# Patient Record
Sex: FEMALE | Race: WHITE | NOT HISPANIC OR LATINO | Employment: OTHER | URBAN - METROPOLITAN AREA
[De-identification: names, ages, dates, MRNs, and addresses within clinical notes are randomized per-mention and may not be internally consistent; named-entity substitution may affect disease eponyms.]

---

## 2017-01-01 ENCOUNTER — APPOINTMENT (OUTPATIENT)
Dept: CT IMAGING | Facility: HOSPITAL | Age: 77
End: 2017-01-01

## 2017-01-01 ENCOUNTER — APPOINTMENT (OUTPATIENT)
Dept: GENERAL RADIOLOGY | Facility: HOSPITAL | Age: 77
End: 2017-01-01

## 2017-01-01 ENCOUNTER — HOSPITAL ENCOUNTER (INPATIENT)
Facility: HOSPITAL | Age: 77
LOS: 8 days | Discharge: NURSING, REHAB OR LONG-TERM CARE | End: 2017-05-19
Attending: EMERGENCY MEDICINE | Admitting: INTERNAL MEDICINE

## 2017-01-01 ENCOUNTER — ANESTHESIA (OUTPATIENT)
Dept: TELEMETRY | Facility: HOSPITAL | Age: 77
End: 2017-01-01

## 2017-01-01 ENCOUNTER — DOCUMENTATION (OUTPATIENT)
Dept: SOCIAL WORK | Facility: HOSPITAL | Age: 77
End: 2017-01-01

## 2017-01-01 ENCOUNTER — ANESTHESIA EVENT (OUTPATIENT)
Dept: TELEMETRY | Facility: HOSPITAL | Age: 77
End: 2017-01-01

## 2017-01-01 ENCOUNTER — HOSPITAL ENCOUNTER (EMERGENCY)
Facility: HOSPITAL | Age: 77
Discharge: HOME | End: 2017-08-27
Attending: EMERGENCY MEDICINE | Admitting: EMERGENCY MEDICINE

## 2017-01-01 ENCOUNTER — TRANSCRIBE ORDERS (OUTPATIENT)
Dept: ADMINISTRATIVE | Facility: HOSPITAL | Age: 77
End: 2017-01-01

## 2017-01-01 ENCOUNTER — ANESTHESIA EVENT (OUTPATIENT)
Dept: PERIOP | Facility: HOSPITAL | Age: 77
End: 2017-01-01

## 2017-01-01 ENCOUNTER — ANESTHESIA (OUTPATIENT)
Dept: PERIOP | Facility: HOSPITAL | Age: 77
End: 2017-01-01

## 2017-01-01 VITALS
BODY MASS INDEX: 22.99 KG/M2 | HEIGHT: 65 IN | OXYGEN SATURATION: 99 % | WEIGHT: 138 LBS | TEMPERATURE: 98.2 F | SYSTOLIC BLOOD PRESSURE: 136 MMHG | RESPIRATION RATE: 20 BRPM | HEART RATE: 93 BPM | DIASTOLIC BLOOD PRESSURE: 68 MMHG

## 2017-01-01 VITALS
WEIGHT: 162 LBS | HEART RATE: 92 BPM | TEMPERATURE: 98.5 F | RESPIRATION RATE: 18 BRPM | DIASTOLIC BLOOD PRESSURE: 86 MMHG | BODY MASS INDEX: 26.99 KG/M2 | SYSTOLIC BLOOD PRESSURE: 150 MMHG | HEIGHT: 65 IN | OXYGEN SATURATION: 100 %

## 2017-01-01 DIAGNOSIS — Z78.9 IMPAIRED MOBILITY AND ADLS: ICD-10-CM

## 2017-01-01 DIAGNOSIS — K80.20 GALLSTONES: ICD-10-CM

## 2017-01-01 DIAGNOSIS — S72.401A CLOSED FRACTURE OF DISTAL END OF RIGHT FEMUR, UNSPECIFIED FRACTURE MORPHOLOGY, INITIAL ENCOUNTER (HCC): Primary | ICD-10-CM

## 2017-01-01 DIAGNOSIS — K29.00 ACUTE GASTRITIS WITHOUT HEMORRHAGE, UNSPECIFIED GASTRITIS TYPE: Primary | ICD-10-CM

## 2017-01-01 DIAGNOSIS — M79.2 INTRACTABLE NEUROPATHIC PAIN OF KNEE, RIGHT: ICD-10-CM

## 2017-01-01 DIAGNOSIS — Z74.09 IMPAIRED MOBILITY AND ADLS: ICD-10-CM

## 2017-01-01 DIAGNOSIS — Z74.09 IMPAIRED FUNCTIONAL MOBILITY, BALANCE, GAIT, AND ENDURANCE: ICD-10-CM
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
## 2017-01-01 DIAGNOSIS — Z87.891 PERSONAL HISTORY OF TOBACCO USE, PRESENTING HAZARDS TO HEALTH: Primary | ICD-10-CM

## 2017-01-01 DIAGNOSIS — J44.1 COPD WITH EXACERBATION (HCC): ICD-10-CM

## 2017-01-01 LAB
ABO GROUP BLD: NORMAL
ABO GROUP BLD: NORMAL
ALBUMIN SERPL-MCNC: 3.5 G/DL (ref 3.2–4.8)
ALBUMIN SERPL-MCNC: 3.8 G/DL (ref 3.2–4.8)
ALBUMIN SERPL-MCNC: 4 G/DL (ref 3.2–4.8)
ALBUMIN/GLOB SERPL: 1.1 G/DL (ref 1.5–2.5)
ALBUMIN/GLOB SERPL: 1.3 G/DL (ref 1.5–2.5)
ALBUMIN/GLOB SERPL: 1.3 G/DL (ref 1.5–2.5)
ALP SERPL-CCNC: 62 U/L (ref 25–100)
ALP SERPL-CCNC: 71 U/L (ref 25–100)
ALP SERPL-CCNC: 89 U/L (ref 25–100)
ALT SERPL W P-5'-P-CCNC: 14 U/L (ref 7–40)
ALT SERPL W P-5'-P-CCNC: 15 U/L (ref 7–40)
ALT SERPL W P-5'-P-CCNC: 19 U/L (ref 7–40)
AMYLASE SERPL-CCNC: 45 U/L (ref 30–118)
ANION GAP SERPL CALCULATED.3IONS-SCNC: 2 MMOL/L (ref 3–11)
ANION GAP SERPL CALCULATED.3IONS-SCNC: 4 MMOL/L (ref 3–11)
ANION GAP SERPL CALCULATED.3IONS-SCNC: 4 MMOL/L (ref 3–11)
APTT PPP: 31.8 SECONDS (ref 24–31)
AST SERPL-CCNC: 20 U/L (ref 0–33)
AST SERPL-CCNC: 23 U/L (ref 0–33)
AST SERPL-CCNC: 24 U/L (ref 0–33)
BACTERIA UR QL AUTO: ABNORMAL /HPF
BASOPHILS # BLD AUTO: 0.01 10*3/MM3 (ref 0–0.2)
BASOPHILS # BLD AUTO: 0.02 10*3/MM3 (ref 0–0.2)
BASOPHILS # BLD AUTO: 0.02 10*3/MM3 (ref 0–0.2)
BASOPHILS NFR BLD AUTO: 0.2 % (ref 0–1)
BASOPHILS NFR BLD AUTO: 0.3 % (ref 0–1)
BASOPHILS NFR BLD AUTO: 0.3 % (ref 0–1)
BILIRUB SERPL-MCNC: 0.2 MG/DL (ref 0.3–1.2)
BILIRUB SERPL-MCNC: 0.2 MG/DL (ref 0.3–1.2)
BILIRUB SERPL-MCNC: 0.3 MG/DL (ref 0.3–1.2)
BILIRUB UR QL STRIP: NEGATIVE
BLD GP AB SCN SERPL QL: NEGATIVE
BNP SERPL-MCNC: 13 PG/ML (ref 0–100)
BUN BLD-MCNC: 18 MG/DL (ref 9–23)
BUN BLD-MCNC: 8 MG/DL (ref 9–23)
BUN BLD-MCNC: 8 MG/DL (ref 9–23)
BUN/CREAT SERPL: 11.4 (ref 7–25)
BUN/CREAT SERPL: 11.4 (ref 7–25)
BUN/CREAT SERPL: 30 (ref 7–25)
CALCIUM SPEC-SCNC: 9 MG/DL (ref 8.7–10.4)
CALCIUM SPEC-SCNC: 9.2 MG/DL (ref 8.7–10.4)
CALCIUM SPEC-SCNC: 9.7 MG/DL (ref 8.7–10.4)
CHLORIDE SERPL-SCNC: 102 MMOL/L (ref 99–109)
CHLORIDE SERPL-SCNC: 105 MMOL/L (ref 99–109)
CHLORIDE SERPL-SCNC: 107 MMOL/L (ref 99–109)
CLARITY UR: ABNORMAL
CLARITY UR: CLEAR
CLARITY UR: CLEAR
CO2 SERPL-SCNC: 28 MMOL/L (ref 20–31)
CO2 SERPL-SCNC: 30 MMOL/L (ref 20–31)
CO2 SERPL-SCNC: 32 MMOL/L (ref 20–31)
COLOR UR: YELLOW
CREAT BLD-MCNC: 0.6 MG/DL (ref 0.6–1.3)
CREAT BLD-MCNC: 0.7 MG/DL (ref 0.6–1.3)
CREAT BLD-MCNC: 0.7 MG/DL (ref 0.6–1.3)
DEPRECATED RDW RBC AUTO: 43.6 FL (ref 37–54)
DEPRECATED RDW RBC AUTO: 45.2 FL (ref 37–54)
DEPRECATED RDW RBC AUTO: 45.5 FL (ref 37–54)
EOSINOPHIL # BLD AUTO: 0.17 10*3/MM3 (ref 0.1–0.3)
EOSINOPHIL # BLD AUTO: 0.2 10*3/MM3 (ref 0.1–0.3)
EOSINOPHIL # BLD AUTO: 0.29 10*3/MM3 (ref 0–0.3)
EOSINOPHIL NFR BLD AUTO: 2.7 % (ref 0–3)
EOSINOPHIL NFR BLD AUTO: 2.8 % (ref 0–3)
EOSINOPHIL NFR BLD AUTO: 3.8 % (ref 0–3)
ERYTHROCYTE [DISTWIDTH] IN BLOOD BY AUTOMATED COUNT: 12.9 % (ref 11.3–14.5)
ERYTHROCYTE [DISTWIDTH] IN BLOOD BY AUTOMATED COUNT: 12.9 % (ref 11.3–14.5)
ERYTHROCYTE [DISTWIDTH] IN BLOOD BY AUTOMATED COUNT: 13.5 % (ref 11.3–14.5)
GFR SERPL CREATININE-BSD FRML MDRD: 81 ML/MIN/1.73
GFR SERPL CREATININE-BSD FRML MDRD: 81 ML/MIN/1.73
GFR SERPL CREATININE-BSD FRML MDRD: 97 ML/MIN/1.73
GLOBULIN UR ELPH-MCNC: 2.7 GM/DL
GLOBULIN UR ELPH-MCNC: 3.1 GM/DL
GLOBULIN UR ELPH-MCNC: 3.6 GM/DL
GLUCOSE BLD-MCNC: 100 MG/DL (ref 70–100)
GLUCOSE BLD-MCNC: 103 MG/DL (ref 70–100)
GLUCOSE BLD-MCNC: 134 MG/DL (ref 70–100)
GLUCOSE UR STRIP-MCNC: NEGATIVE MG/DL
HBA1C MFR BLD: 5.1 % (ref 4.8–5.6)
HCT VFR BLD AUTO: 35.5 % (ref 34.5–44)
HCT VFR BLD AUTO: 36.1 % (ref 34.5–44)
HCT VFR BLD AUTO: 38.1 % (ref 34.5–44)
HGB BLD-MCNC: 11.3 G/DL (ref 11.5–15.5)
HGB BLD-MCNC: 12.3 G/DL (ref 11.5–15.5)
HGB BLD-MCNC: 12.3 G/DL (ref 11.5–15.5)
HGB UR QL STRIP.AUTO: ABNORMAL
HGB UR QL STRIP.AUTO: NEGATIVE
HGB UR QL STRIP.AUTO: NEGATIVE
HOLD SPECIMEN: NORMAL
HOLD SPECIMEN: NORMAL
HYALINE CASTS UR QL AUTO: ABNORMAL /LPF
IMM GRANULOCYTES # BLD: 0 10*3/MM3 (ref 0–0.03)
IMM GRANULOCYTES # BLD: 0.01 10*3/MM3 (ref 0–0.03)
IMM GRANULOCYTES # BLD: 0.02 10*3/MM3 (ref 0–0.03)
IMM GRANULOCYTES NFR BLD: 0 % (ref 0–0.6)
IMM GRANULOCYTES NFR BLD: 0.1 % (ref 0–0.6)
IMM GRANULOCYTES NFR BLD: 0.3 % (ref 0–0.6)
INR PPP: 0.95
KETONES UR QL STRIP: ABNORMAL
KETONES UR QL STRIP: NEGATIVE
KETONES UR QL STRIP: NEGATIVE
LEUKOCYTE ESTERASE UR QL STRIP.AUTO: NEGATIVE
LIPASE SERPL-CCNC: 24 U/L (ref 6–51)
LYMPHOCYTES # BLD AUTO: 1.46 10*3/MM3 (ref 0.6–4.8)
LYMPHOCYTES # BLD AUTO: 1.51 10*3/MM3 (ref 0.6–4.8)
LYMPHOCYTES # BLD AUTO: 1.65 10*3/MM3 (ref 0.6–4.8)
LYMPHOCYTES NFR BLD AUTO: 19.2 % (ref 24–44)
LYMPHOCYTES NFR BLD AUTO: 22.6 % (ref 24–44)
LYMPHOCYTES NFR BLD AUTO: 24.4 % (ref 24–44)
MAGNESIUM SERPL-MCNC: 1.9 MG/DL (ref 1.3–2.7)
MCH RBC QN AUTO: 30.1 PG (ref 27–31)
MCH RBC QN AUTO: 30.4 PG (ref 27–31)
MCH RBC QN AUTO: 30.8 PG (ref 27–31)
MCHC RBC AUTO-ENTMCNC: 31.8 G/DL (ref 32–36)
MCHC RBC AUTO-ENTMCNC: 32.3 G/DL (ref 32–36)
MCHC RBC AUTO-ENTMCNC: 34.1 G/DL (ref 32–36)
MCV RBC AUTO: 88.5 FL (ref 80–99)
MCV RBC AUTO: 95.3 FL (ref 80–99)
MCV RBC AUTO: 95.4 FL (ref 80–99)
MONOCYTES # BLD AUTO: 0.56 10*3/MM3 (ref 0–1)
MONOCYTES # BLD AUTO: 0.86 10*3/MM3 (ref 0–1)
MONOCYTES # BLD AUTO: 0.89 10*3/MM3 (ref 0–1)
MONOCYTES NFR BLD AUTO: 11.7 % (ref 0–12)
MONOCYTES NFR BLD AUTO: 13.9 % (ref 0–12)
MONOCYTES NFR BLD AUTO: 7.7 % (ref 0–12)
NEUTROPHILS # BLD AUTO: 3.63 10*3/MM3 (ref 1.5–8.3)
NEUTROPHILS # BLD AUTO: 4.86 10*3/MM3 (ref 1.5–8.3)
NEUTROPHILS # BLD AUTO: 4.94 10*3/MM3 (ref 1.5–8.3)
NEUTROPHILS NFR BLD AUTO: 58.7 % (ref 41–71)
NEUTROPHILS NFR BLD AUTO: 64.9 % (ref 41–71)
NEUTROPHILS NFR BLD AUTO: 66.4 % (ref 41–71)
NITRITE UR QL STRIP: NEGATIVE
PH UR STRIP.AUTO: 6 [PH] (ref 5–8)
PH UR STRIP.AUTO: 6 [PH] (ref 5–8)
PH UR STRIP.AUTO: 6.5 [PH] (ref 5–8)
PLATELET # BLD AUTO: 256 10*3/MM3 (ref 150–450)
PLATELET # BLD AUTO: 286 10*3/MM3 (ref 150–450)
PLATELET # BLD AUTO: 288 10*3/MM3 (ref 150–450)
PMV BLD AUTO: 8.2 FL (ref 6–12)
PMV BLD AUTO: 8.7 FL (ref 6–12)
PMV BLD AUTO: 8.9 FL (ref 6–12)
POTASSIUM BLD-SCNC: 3.9 MMOL/L (ref 3.5–5.5)
POTASSIUM BLD-SCNC: 4.1 MMOL/L (ref 3.5–5.5)
POTASSIUM BLD-SCNC: 4.8 MMOL/L (ref 3.5–5.5)
PROT SERPL-MCNC: 6.2 G/DL (ref 5.7–8.2)
PROT SERPL-MCNC: 7.1 G/DL (ref 5.7–8.2)
PROT SERPL-MCNC: 7.4 G/DL (ref 5.7–8.2)
PROT UR QL STRIP: NEGATIVE
PROTHROMBIN TIME: 10.3 SECONDS (ref 9.6–11.5)
RBC # BLD AUTO: 3.72 10*6/MM3 (ref 3.89–5.14)
RBC # BLD AUTO: 4 10*6/MM3 (ref 3.89–5.14)
RBC # BLD AUTO: 4.08 10*6/MM3 (ref 3.89–5.14)
RBC # UR: ABNORMAL /HPF
REF LAB TEST METHOD: ABNORMAL
RH BLD: POSITIVE
RH BLD: POSITIVE
SODIUM BLD-SCNC: 134 MMOL/L (ref 132–146)
SODIUM BLD-SCNC: 139 MMOL/L (ref 132–146)
SODIUM BLD-SCNC: 141 MMOL/L (ref 132–146)
SP GR UR STRIP: 1.01 (ref 1–1.03)
SP GR UR STRIP: 1.02 (ref 1–1.03)
SP GR UR STRIP: 1.02 (ref 1–1.03)
SQUAMOUS #/AREA URNS HPF: ABNORMAL /HPF
TROPONIN I SERPL-MCNC: <0.006 NG/ML
TSH SERPL DL<=0.05 MIU/L-ACNC: 2.27 MIU/ML (ref 0.35–5.35)
UROBILINOGEN UR QL STRIP: ABNORMAL
UROBILINOGEN UR QL STRIP: NORMAL
UROBILINOGEN UR QL STRIP: NORMAL
WBC NRBC COR # BLD: 6.18 10*3/MM3 (ref 3.5–10.8)
WBC NRBC COR # BLD: 7.31 10*3/MM3 (ref 3.5–10.8)
WBC NRBC COR # BLD: 7.61 10*3/MM3 (ref 3.5–10.8)
WBC UR QL AUTO: ABNORMAL /HPF
WHOLE BLOOD HOLD SPECIMEN: NORMAL
WHOLE BLOOD HOLD SPECIMEN: NORMAL

## 2017-01-01 PROCEDURE — 94799 UNLISTED PULMONARY SVC/PX: CPT

## 2017-01-01 PROCEDURE — 80053 COMPREHEN METABOLIC PANEL: CPT

## 2017-01-01 PROCEDURE — 94640 AIRWAY INHALATION TREATMENT: CPT

## 2017-01-01 PROCEDURE — 93005 ELECTROCARDIOGRAM TRACING: CPT

## 2017-01-01 PROCEDURE — 25010000002 ONDANSETRON PER 1 MG: Performed by: HOSPITALIST

## 2017-01-01 PROCEDURE — 84443 ASSAY THYROID STIM HORMONE: CPT | Performed by: HOSPITALIST

## 2017-01-01 PROCEDURE — 25010000002 ONDANSETRON PER 1 MG: Performed by: EMERGENCY MEDICINE

## 2017-01-01 PROCEDURE — 97110 THERAPEUTIC EXERCISES: CPT

## 2017-01-01 PROCEDURE — 73560 X-RAY EXAM OF KNEE 1 OR 2: CPT

## 2017-01-01 PROCEDURE — 86901 BLOOD TYPING SEROLOGIC RH(D): CPT | Performed by: EMERGENCY MEDICINE

## 2017-01-01 PROCEDURE — 25010000002 HYDROMORPHONE PER 4 MG: Performed by: EMERGENCY MEDICINE

## 2017-01-01 PROCEDURE — 93005 ELECTROCARDIOGRAM TRACING: CPT | Performed by: HOSPITALIST

## 2017-01-01 PROCEDURE — 97530 THERAPEUTIC ACTIVITIES: CPT | Performed by: OCCUPATIONAL THERAPIST

## 2017-01-01 PROCEDURE — 93010 ELECTROCARDIOGRAM REPORT: CPT | Performed by: INTERNAL MEDICINE

## 2017-01-01 PROCEDURE — 81001 URINALYSIS AUTO W/SCOPE: CPT | Performed by: INTERNAL MEDICINE

## 2017-01-01 PROCEDURE — 25010000002 ROPIVACAINE PER 1 MG: Performed by: NURSE ANESTHETIST, CERTIFIED REGISTERED

## 2017-01-01 PROCEDURE — C1713 ANCHOR/SCREW BN/BN,TIS/BN: HCPCS | Performed by: ORTHOPAEDIC SURGERY

## 2017-01-01 PROCEDURE — 99233 SBSQ HOSP IP/OBS HIGH 50: CPT | Performed by: INTERNAL MEDICINE

## 2017-01-01 PROCEDURE — 99239 HOSP IP/OBS DSCHRG MGMT >30: CPT | Performed by: NURSE PRACTITIONER

## 2017-01-01 PROCEDURE — 25010000002 MORPHINE PER 10 MG: Performed by: EMERGENCY MEDICINE

## 2017-01-01 PROCEDURE — 25010000002 HYDROMORPHONE PER 4 MG: Performed by: HOSPITALIST

## 2017-01-01 PROCEDURE — 73700 CT LOWER EXTREMITY W/O DYE: CPT

## 2017-01-01 PROCEDURE — 86900 BLOOD TYPING SEROLOGIC ABO: CPT | Performed by: EMERGENCY MEDICINE

## 2017-01-01 PROCEDURE — 99232 SBSQ HOSP IP/OBS MODERATE 35: CPT | Performed by: INTERNAL MEDICINE

## 2017-01-01 PROCEDURE — 80053 COMPREHEN METABOLIC PANEL: CPT | Performed by: HOSPITALIST

## 2017-01-01 PROCEDURE — 25010000002 FENTANYL CITRATE (PF) 100 MCG/2ML SOLUTION: Performed by: ANESTHESIOLOGY

## 2017-01-01 PROCEDURE — 94760 N-INVAS EAR/PLS OXIMETRY 1: CPT

## 2017-01-01 PROCEDURE — 71010 HC CHEST PA OR AP: CPT

## 2017-01-01 PROCEDURE — 0QSB04Z REPOSITION RIGHT LOWER FEMUR WITH INTERNAL FIXATION DEVICE, OPEN APPROACH: ICD-10-PCS | Performed by: ORTHOPAEDIC SURGERY

## 2017-01-01 PROCEDURE — 86850 RBC ANTIBODY SCREEN: CPT | Performed by: EMERGENCY MEDICINE

## 2017-01-01 PROCEDURE — 97116 GAIT TRAINING THERAPY: CPT

## 2017-01-01 PROCEDURE — 63710000001 PREDNISONE PER 1 MG: Performed by: EMERGENCY MEDICINE

## 2017-01-01 PROCEDURE — 83036 HEMOGLOBIN GLYCOSYLATED A1C: CPT | Performed by: HOSPITALIST

## 2017-01-01 PROCEDURE — 73502 X-RAY EXAM HIP UNI 2-3 VIEWS: CPT

## 2017-01-01 PROCEDURE — 97166 OT EVAL MOD COMPLEX 45 MIN: CPT

## 2017-01-01 PROCEDURE — 83735 ASSAY OF MAGNESIUM: CPT

## 2017-01-01 PROCEDURE — 76001 HC FLUORO GREATER THAN 1 HOUR: CPT

## 2017-01-01 PROCEDURE — 85025 COMPLETE CBC W/AUTO DIFF WBC: CPT | Performed by: HOSPITALIST

## 2017-01-01 PROCEDURE — 25010000002 ENOXAPARIN PER 10 MG: Performed by: INTERNAL MEDICINE

## 2017-01-01 PROCEDURE — 85025 COMPLETE CBC W/AUTO DIFF WBC: CPT | Performed by: EMERGENCY MEDICINE

## 2017-01-01 PROCEDURE — 97162 PT EVAL MOD COMPLEX 30 MIN: CPT | Performed by: PHYSICAL THERAPIST

## 2017-01-01 PROCEDURE — 74176 CT ABD & PELVIS W/O CONTRAST: CPT

## 2017-01-01 PROCEDURE — 99222 1ST HOSP IP/OBS MODERATE 55: CPT | Performed by: INTERNAL MEDICINE

## 2017-01-01 PROCEDURE — 25010000002 PROPOFOL 10 MG/ML EMULSION: Performed by: ANESTHESIOLOGY

## 2017-01-01 PROCEDURE — 99223 1ST HOSP IP/OBS HIGH 75: CPT | Performed by: HOSPITALIST

## 2017-01-01 PROCEDURE — 84484 ASSAY OF TROPONIN QUANT: CPT | Performed by: EMERGENCY MEDICINE

## 2017-01-01 PROCEDURE — G0378 HOSPITAL OBSERVATION PER HR: HCPCS

## 2017-01-01 PROCEDURE — 81003 URINALYSIS AUTO W/O SCOPE: CPT

## 2017-01-01 PROCEDURE — 97110 THERAPEUTIC EXERCISES: CPT | Performed by: PHYSICAL THERAPIST

## 2017-01-01 PROCEDURE — 83880 ASSAY OF NATRIURETIC PEPTIDE: CPT | Performed by: EMERGENCY MEDICINE

## 2017-01-01 PROCEDURE — 76000 FLUOROSCOPY <1 HR PHYS/QHP: CPT

## 2017-01-01 PROCEDURE — 99284 EMERGENCY DEPT VISIT MOD MDM: CPT

## 2017-01-01 PROCEDURE — 82150 ASSAY OF AMYLASE: CPT

## 2017-01-01 PROCEDURE — 25010000002 ROPIVACAINE PER 1 MG: Performed by: ANESTHESIOLOGY

## 2017-01-01 PROCEDURE — 97164 PT RE-EVAL EST PLAN CARE: CPT

## 2017-01-01 PROCEDURE — 25010000002 ENOXAPARIN PER 10 MG: Performed by: ORTHOPAEDIC SURGERY

## 2017-01-01 PROCEDURE — 85730 THROMBOPLASTIN TIME PARTIAL: CPT | Performed by: EMERGENCY MEDICINE

## 2017-01-01 PROCEDURE — 83690 ASSAY OF LIPASE: CPT

## 2017-01-01 PROCEDURE — 25010000002 LORAZEPAM PER 2 MG: Performed by: INTERNAL MEDICINE

## 2017-01-01 PROCEDURE — 81003 URINALYSIS AUTO W/O SCOPE: CPT | Performed by: EMERGENCY MEDICINE

## 2017-01-01 PROCEDURE — 86901 BLOOD TYPING SEROLOGIC RH(D): CPT

## 2017-01-01 PROCEDURE — 85025 COMPLETE CBC W/AUTO DIFF WBC: CPT

## 2017-01-01 PROCEDURE — 96374 THER/PROPH/DIAG INJ IV PUSH: CPT

## 2017-01-01 PROCEDURE — 86900 BLOOD TYPING SEROLOGIC ABO: CPT

## 2017-01-01 PROCEDURE — 85610 PROTHROMBIN TIME: CPT | Performed by: EMERGENCY MEDICINE

## 2017-01-01 PROCEDURE — 97530 THERAPEUTIC ACTIVITIES: CPT

## 2017-01-01 PROCEDURE — 80053 COMPREHEN METABOLIC PANEL: CPT | Performed by: EMERGENCY MEDICINE

## 2017-01-01 DEVICE — SCRW CANN VA LK 5X85MM: Type: IMPLANTABLE DEVICE | Site: FEMUR | Status: FUNCTIONAL

## 2017-01-01 DEVICE — IMPLANTABLE DEVICE: Type: IMPLANTABLE DEVICE | Site: FEMUR | Status: FUNCTIONAL

## 2017-01-01 DEVICE — SCRW VA LK ST STRDRV 5X38MM: Type: IMPLANTABLE DEVICE | Site: FEMUR | Status: FUNCTIONAL

## 2017-01-01 DEVICE — SCRW VA LK ST STRDRV 5X40MM: Type: IMPLANTABLE DEVICE | Site: FEMUR | Status: FUNCTIONAL

## 2017-01-01 DEVICE — SCRW VA LK ST STRDRV 5X36MM: Type: IMPLANTABLE DEVICE | Site: FEMUR | Status: FUNCTIONAL

## 2017-01-01 DEVICE — SCRW CANN VA LK 5X80MM: Type: IMPLANTABLE DEVICE | Site: FEMUR | Status: FUNCTIONAL

## 2017-01-01 RX ORDER — CLINDAMYCIN PHOSPHATE 600 MG/50ML
600 INJECTION INTRAVENOUS ONCE
Status: COMPLETED | OUTPATIENT
Start: 2017-01-01 | End: 2017-01-01

## 2017-01-01 RX ORDER — ROPIVACAINE HYDROCHLORIDE 2 MG/ML
INJECTION, SOLUTION EPIDURAL; INFILTRATION AS NEEDED
Status: DISCONTINUED | OUTPATIENT
Start: 2017-01-01 | End: 2017-01-01 | Stop reason: SURG

## 2017-01-01 RX ORDER — ALPRAZOLAM 2 MG/1
2 TABLET ORAL NIGHTLY PRN
Qty: 3 TABLET | Refills: 0 | Status: ON HOLD | OUTPATIENT
Start: 2017-01-01 | End: 2018-01-01

## 2017-01-01 RX ORDER — CARVEDILOL 3.12 MG/1
3.12 TABLET ORAL 2 TIMES DAILY WITH MEALS
Status: DISCONTINUED | OUTPATIENT
Start: 2017-01-01 | End: 2017-01-01 | Stop reason: HOSPADM

## 2017-01-01 RX ORDER — SODIUM CHLORIDE, SODIUM LACTATE, POTASSIUM CHLORIDE, CALCIUM CHLORIDE 600; 310; 30; 20 MG/100ML; MG/100ML; MG/100ML; MG/100ML
9 INJECTION, SOLUTION INTRAVENOUS CONTINUOUS
Status: DISCONTINUED | OUTPATIENT
Start: 2017-01-01 | End: 2017-01-01 | Stop reason: HOSPADM

## 2017-01-01 RX ORDER — CARVEDILOL 3.12 MG/1
3.12 TABLET ORAL 2 TIMES DAILY WITH MEALS
COMMUNITY

## 2017-01-01 RX ORDER — HYDROMORPHONE HYDROCHLORIDE 1 MG/ML
0.25 INJECTION, SOLUTION INTRAMUSCULAR; INTRAVENOUS; SUBCUTANEOUS ONCE
Status: COMPLETED | OUTPATIENT
Start: 2017-01-01 | End: 2017-01-01

## 2017-01-01 RX ORDER — ACETAMINOPHEN 325 MG/1
650 TABLET ORAL EVERY 6 HOURS SCHEDULED
Status: DISCONTINUED | OUTPATIENT
Start: 2017-01-01 | End: 2017-01-01 | Stop reason: HOSPADM

## 2017-01-01 RX ORDER — ATORVASTATIN CALCIUM 80 MG/1
80 TABLET, FILM COATED ORAL DAILY
COMMUNITY

## 2017-01-01 RX ORDER — DULOXETIN HYDROCHLORIDE 30 MG/1
30 CAPSULE, DELAYED RELEASE ORAL DAILY
Status: ON HOLD | COMMUNITY
End: 2018-01-01

## 2017-01-01 RX ORDER — ASPIRIN 81 MG/1
81 TABLET, CHEWABLE ORAL DAILY
Status: ON HOLD | COMMUNITY
End: 2018-01-01

## 2017-01-01 RX ORDER — LIDOCAINE HYDROCHLORIDE 10 MG/ML
0.5 INJECTION, SOLUTION EPIDURAL; INFILTRATION; INTRACAUDAL; PERINEURAL ONCE AS NEEDED
Status: DISCONTINUED | OUTPATIENT
Start: 2017-01-01 | End: 2017-01-01 | Stop reason: HOSPADM

## 2017-01-01 RX ORDER — POLYETHYLENE GLYCOL 3350 17 G/17G
17 POWDER, FOR SOLUTION ORAL 2 TIMES DAILY
Status: ON HOLD
Start: 2017-01-01 | End: 2018-01-01

## 2017-01-01 RX ORDER — ALPRAZOLAM 1 MG/1
2 TABLET ORAL NIGHTLY PRN
Status: DISCONTINUED | OUTPATIENT
Start: 2017-01-01 | End: 2017-01-01 | Stop reason: HOSPADM

## 2017-01-01 RX ORDER — DOCUSATE SODIUM 100 MG/1
100 CAPSULE, LIQUID FILLED ORAL 2 TIMES DAILY
Status: DISCONTINUED | OUTPATIENT
Start: 2017-01-01 | End: 2017-01-01 | Stop reason: HOSPADM

## 2017-01-01 RX ORDER — ONDANSETRON 2 MG/ML
4 INJECTION INTRAMUSCULAR; INTRAVENOUS EVERY 6 HOURS PRN
Status: DISCONTINUED | OUTPATIENT
Start: 2017-01-01 | End: 2017-01-01 | Stop reason: HOSPADM

## 2017-01-01 RX ORDER — BISACODYL 10 MG
10 SUPPOSITORY, RECTAL RECTAL ONCE
Status: COMPLETED | OUTPATIENT
Start: 2017-01-01 | End: 2017-01-01

## 2017-01-01 RX ORDER — NICOTINE 21 MG/24HR
1 PATCH, TRANSDERMAL 24 HOURS TRANSDERMAL
Status: ON HOLD
Start: 2017-01-01 | End: 2018-01-01

## 2017-01-01 RX ORDER — PROPOFOL 10 MG/ML
VIAL (ML) INTRAVENOUS AS NEEDED
Status: DISCONTINUED | OUTPATIENT
Start: 2017-01-01 | End: 2017-01-01 | Stop reason: SURG

## 2017-01-01 RX ORDER — CETIRIZINE HYDROCHLORIDE 10 MG/1
5 TABLET ORAL DAILY
Status: DISCONTINUED | OUTPATIENT
Start: 2017-01-01 | End: 2017-01-01 | Stop reason: HOSPADM

## 2017-01-01 RX ORDER — FENTANYL CITRATE 50 UG/ML
50 INJECTION, SOLUTION INTRAMUSCULAR; INTRAVENOUS
Status: DISCONTINUED | OUTPATIENT
Start: 2017-01-01 | End: 2017-01-01 | Stop reason: HOSPADM

## 2017-01-01 RX ORDER — SODIUM CHLORIDE 0.9 % (FLUSH) 0.9 %
1-10 SYRINGE (ML) INJECTION AS NEEDED
Status: DISCONTINUED | OUTPATIENT
Start: 2017-01-01 | End: 2017-01-01 | Stop reason: HOSPADM

## 2017-01-01 RX ORDER — BUDESONIDE AND FORMOTEROL FUMARATE DIHYDRATE 160; 4.5 UG/1; UG/1
2 AEROSOL RESPIRATORY (INHALATION)
Status: DISCONTINUED | OUTPATIENT
Start: 2017-01-01 | End: 2017-01-01 | Stop reason: HOSPADM

## 2017-01-01 RX ORDER — FAMOTIDINE 20 MG/1
20 TABLET, FILM COATED ORAL ONCE
Status: COMPLETED | OUTPATIENT
Start: 2017-01-01 | End: 2017-01-01

## 2017-01-01 RX ORDER — DOCUSATE SODIUM 100 MG/1
100 CAPSULE, LIQUID FILLED ORAL 2 TIMES DAILY
Status: DISCONTINUED | OUTPATIENT
Start: 2017-01-01 | End: 2017-01-01 | Stop reason: SDUPTHER

## 2017-01-01 RX ORDER — CLOPIDOGREL BISULFATE 75 MG/1
75 TABLET ORAL DAILY
Status: DISCONTINUED | OUTPATIENT
Start: 2017-01-01 | End: 2017-01-01

## 2017-01-01 RX ORDER — ALBUTEROL SULFATE 1.25 MG/3ML
1 SOLUTION RESPIRATORY (INHALATION) EVERY 6 HOURS PRN
Status: ON HOLD | COMMUNITY
End: 2018-01-01

## 2017-01-01 RX ORDER — ONDANSETRON 2 MG/ML
4 INJECTION INTRAMUSCULAR; INTRAVENOUS ONCE AS NEEDED
Status: DISCONTINUED | OUTPATIENT
Start: 2017-01-01 | End: 2017-01-01 | Stop reason: HOSPADM

## 2017-01-01 RX ORDER — ONDANSETRON 2 MG/ML
4 INJECTION INTRAMUSCULAR; INTRAVENOUS ONCE
Status: COMPLETED | OUTPATIENT
Start: 2017-01-01 | End: 2017-01-01

## 2017-01-01 RX ORDER — ALBUTEROL SULFATE 2.5 MG/3ML
10 SOLUTION RESPIRATORY (INHALATION) CONTINUOUS
Status: DISPENSED | OUTPATIENT
Start: 2017-01-01 | End: 2017-01-01

## 2017-01-01 RX ORDER — ALPRAZOLAM 2 MG/1
TABLET ORAL NIGHTLY PRN
Status: ON HOLD | COMMUNITY
End: 2017-01-01

## 2017-01-01 RX ORDER — FENTANYL CITRATE 50 UG/ML
INJECTION, SOLUTION INTRAMUSCULAR; INTRAVENOUS AS NEEDED
Status: DISCONTINUED | OUTPATIENT
Start: 2017-01-01 | End: 2017-01-01 | Stop reason: SURG

## 2017-01-01 RX ORDER — LORATADINE 10 MG/1
CAPSULE, LIQUID FILLED ORAL
Status: ON HOLD | COMMUNITY
End: 2018-01-01

## 2017-01-01 RX ORDER — BISACODYL 5 MG/1
10 TABLET, DELAYED RELEASE ORAL DAILY PRN
Status: ON HOLD
Start: 2017-01-01 | End: 2018-01-01

## 2017-01-01 RX ORDER — DOCUSATE SODIUM 100 MG/1
100 CAPSULE, LIQUID FILLED ORAL 2 TIMES DAILY
Status: ON HOLD | COMMUNITY
End: 2018-01-01

## 2017-01-01 RX ORDER — OXYCODONE AND ACETAMINOPHEN 10; 325 MG/1; MG/1
1 TABLET ORAL EVERY 6 HOURS PRN
Status: DISCONTINUED | OUTPATIENT
Start: 2017-01-01 | End: 2017-01-01 | Stop reason: HOSPADM

## 2017-01-01 RX ORDER — ATORVASTATIN CALCIUM 40 MG/1
80 TABLET, FILM COATED ORAL DAILY
Status: DISCONTINUED | OUTPATIENT
Start: 2017-01-01 | End: 2017-01-01 | Stop reason: HOSPADM

## 2017-01-01 RX ORDER — MORPHINE SULFATE 4 MG/ML
4 INJECTION, SOLUTION INTRAMUSCULAR; INTRAVENOUS ONCE
Status: COMPLETED | OUTPATIENT
Start: 2017-01-01 | End: 2017-01-01

## 2017-01-01 RX ORDER — FAMOTIDINE 10 MG/ML
20 INJECTION, SOLUTION INTRAVENOUS ONCE
Status: DISCONTINUED | OUTPATIENT
Start: 2017-01-01 | End: 2017-01-01

## 2017-01-01 RX ORDER — CLOPIDOGREL BISULFATE 75 MG/1
75 TABLET ORAL DAILY
COMMUNITY
End: 2017-01-01 | Stop reason: HOSPADM

## 2017-01-01 RX ORDER — CLINDAMYCIN PHOSPHATE 600 MG/50ML
600 INJECTION INTRAVENOUS EVERY 8 HOURS
Status: COMPLETED | OUTPATIENT
Start: 2017-01-01 | End: 2017-01-01

## 2017-01-01 RX ORDER — ROCURONIUM BROMIDE 10 MG/ML
INJECTION, SOLUTION INTRAVENOUS AS NEEDED
Status: DISCONTINUED | OUTPATIENT
Start: 2017-01-01 | End: 2017-01-01 | Stop reason: SURG

## 2017-01-01 RX ORDER — CIPROFLOXACIN 500 MG/1
500 TABLET, FILM COATED ORAL 2 TIMES DAILY
COMMUNITY
End: 2017-01-01 | Stop reason: HOSPADM

## 2017-01-01 RX ORDER — RANITIDINE 300 MG/1
300 TABLET ORAL NIGHTLY
COMMUNITY
End: 2017-01-01 | Stop reason: HOSPADM

## 2017-01-01 RX ORDER — IPRATROPIUM BROMIDE AND ALBUTEROL SULFATE 2.5; .5 MG/3ML; MG/3ML
3 SOLUTION RESPIRATORY (INHALATION) EVERY 4 HOURS PRN
Status: DISCONTINUED | OUTPATIENT
Start: 2017-01-01 | End: 2017-01-01 | Stop reason: HOSPADM

## 2017-01-01 RX ORDER — DULOXETIN HYDROCHLORIDE 30 MG/1
30 CAPSULE, DELAYED RELEASE ORAL DAILY
Status: DISCONTINUED | OUTPATIENT
Start: 2017-01-01 | End: 2017-01-01 | Stop reason: HOSPADM

## 2017-01-01 RX ORDER — MAGNESIUM HYDROXIDE 1200 MG/15ML
LIQUID ORAL AS NEEDED
Status: DISCONTINUED | OUTPATIENT
Start: 2017-01-01 | End: 2017-01-01 | Stop reason: HOSPADM

## 2017-01-01 RX ORDER — OMEPRAZOLE 40 MG/1
40 CAPSULE, DELAYED RELEASE ORAL DAILY
Qty: 30 CAPSULE | Refills: 0 | Status: SHIPPED | OUTPATIENT
Start: 2017-01-01 | End: 2017-01-01

## 2017-01-01 RX ORDER — ACETAMINOPHEN 325 MG/1
650 TABLET ORAL EVERY 6 HOURS SCHEDULED
Status: CANCELLED | OUTPATIENT
Start: 2017-01-01

## 2017-01-01 RX ORDER — BISACODYL 5 MG/1
10 TABLET, DELAYED RELEASE ORAL DAILY PRN
Status: DISCONTINUED | OUTPATIENT
Start: 2017-01-01 | End: 2017-01-01 | Stop reason: HOSPADM

## 2017-01-01 RX ORDER — PROMETHAZINE HYDROCHLORIDE 25 MG/ML
12.5 INJECTION, SOLUTION INTRAMUSCULAR; INTRAVENOUS EVERY 6 HOURS PRN
Status: DISCONTINUED | OUTPATIENT
Start: 2017-01-01 | End: 2017-01-01 | Stop reason: HOSPADM

## 2017-01-01 RX ORDER — NICOTINE 21 MG/24HR
1 PATCH, TRANSDERMAL 24 HOURS TRANSDERMAL
Status: DISCONTINUED | OUTPATIENT
Start: 2017-01-01 | End: 2017-01-01 | Stop reason: HOSPADM

## 2017-01-01 RX ORDER — OXYCODONE AND ACETAMINOPHEN 10; 325 MG/1; MG/1
1 TABLET ORAL EVERY 6 HOURS PRN
Qty: 12 TABLET | Refills: 0 | Status: ON HOLD | OUTPATIENT
Start: 2017-01-01 | End: 2018-01-01

## 2017-01-01 RX ORDER — ALUMINA, MAGNESIA, AND SIMETHICONE 2400; 2400; 240 MG/30ML; MG/30ML; MG/30ML
15 SUSPENSION ORAL ONCE
Status: COMPLETED | OUTPATIENT
Start: 2017-01-01 | End: 2017-01-01

## 2017-01-01 RX ORDER — IPRATROPIUM BROMIDE AND ALBUTEROL SULFATE 2.5; .5 MG/3ML; MG/3ML
3 SOLUTION RESPIRATORY (INHALATION) ONCE
Status: COMPLETED | OUTPATIENT
Start: 2017-01-01 | End: 2017-01-01

## 2017-01-01 RX ORDER — OXYCODONE AND ACETAMINOPHEN 10; 325 MG/1; MG/1
1 TABLET ORAL EVERY 6 HOURS PRN
Status: ON HOLD | COMMUNITY
End: 2017-01-01

## 2017-01-01 RX ORDER — SODIUM CHLORIDE 9 MG/ML
75 INJECTION, SOLUTION INTRAVENOUS CONTINUOUS
Status: DISCONTINUED | OUTPATIENT
Start: 2017-01-01 | End: 2017-01-01

## 2017-01-01 RX ORDER — HYDROMORPHONE HYDROCHLORIDE 1 MG/ML
0.5 INJECTION, SOLUTION INTRAMUSCULAR; INTRAVENOUS; SUBCUTANEOUS
Status: DISCONTINUED | OUTPATIENT
Start: 2017-01-01 | End: 2017-01-01 | Stop reason: HOSPADM

## 2017-01-01 RX ORDER — ROPIVACAINE HYDROCHLORIDE 2 MG/ML
10 INJECTION, SOLUTION EPIDURAL; INFILTRATION CONTINUOUS
Status: DISCONTINUED | OUTPATIENT
Start: 2017-01-01 | End: 2017-01-01 | Stop reason: HOSPADM

## 2017-01-01 RX ORDER — POLYETHYLENE GLYCOL 3350 17 G/17G
17 POWDER, FOR SOLUTION ORAL 2 TIMES DAILY
Status: DISCONTINUED | OUTPATIENT
Start: 2017-01-01 | End: 2017-01-01 | Stop reason: HOSPADM

## 2017-01-01 RX ORDER — LORAZEPAM 2 MG/ML
0.5 INJECTION INTRAMUSCULAR ONCE
Status: COMPLETED | OUTPATIENT
Start: 2017-01-01 | End: 2017-01-01

## 2017-01-01 RX ORDER — ASPIRIN 81 MG/1
81 TABLET, CHEWABLE ORAL DAILY
Status: DISCONTINUED | OUTPATIENT
Start: 2017-01-01 | End: 2017-01-01 | Stop reason: HOSPADM

## 2017-01-01 RX ORDER — IPRATROPIUM BROMIDE AND ALBUTEROL SULFATE 2.5; .5 MG/3ML; MG/3ML
3 SOLUTION RESPIRATORY (INHALATION)
Status: DISCONTINUED | OUTPATIENT
Start: 2017-01-01 | End: 2017-01-01 | Stop reason: HOSPADM

## 2017-01-01 RX ORDER — POLYETHYLENE GLYCOL 3350 17 G/17G
17 POWDER, FOR SOLUTION ORAL DAILY
Status: DISCONTINUED | OUTPATIENT
Start: 2017-01-01 | End: 2017-01-01

## 2017-01-01 RX ORDER — PREDNISONE 20 MG/1
60 TABLET ORAL ONCE
Status: COMPLETED | OUTPATIENT
Start: 2017-01-01 | End: 2017-01-01

## 2017-01-01 RX ORDER — SODIUM CHLORIDE 0.9 % (FLUSH) 0.9 %
10 SYRINGE (ML) INJECTION AS NEEDED
Status: DISCONTINUED | OUTPATIENT
Start: 2017-01-01 | End: 2017-01-01 | Stop reason: HOSPADM

## 2017-01-01 RX ADMIN — SODIUM CHLORIDE 75 ML/HR: 9 INJECTION, SOLUTION INTRAVENOUS at 11:36

## 2017-01-01 RX ADMIN — SODIUM CHLORIDE 75 ML/HR: 9 INJECTION, SOLUTION INTRAVENOUS at 00:15

## 2017-01-01 RX ADMIN — ENOXAPARIN SODIUM 40 MG: 40 INJECTION SUBCUTANEOUS at 16:45

## 2017-01-01 RX ADMIN — PREDNISONE 60 MG: 20 TABLET ORAL at 23:04

## 2017-01-01 RX ADMIN — BUDESONIDE AND FORMOTEROL FUMARATE DIHYDRATE 2 PUFF: 160; 4.5 AEROSOL RESPIRATORY (INHALATION) at 07:53

## 2017-01-01 RX ADMIN — CARVEDILOL 3.12 MG: 3.12 TABLET, FILM COATED ORAL at 10:36

## 2017-01-01 RX ADMIN — DOCUSATE SODIUM 100 MG: 100 CAPSULE, LIQUID FILLED ORAL at 18:07

## 2017-01-01 RX ADMIN — IPRATROPIUM BROMIDE AND ALBUTEROL SULFATE 3 ML: .5; 3 SOLUTION RESPIRATORY (INHALATION) at 19:09

## 2017-01-01 RX ADMIN — MORPHINE SULFATE 4 MG: 4 INJECTION, SOLUTION INTRAMUSCULAR; INTRAVENOUS at 01:42

## 2017-01-01 RX ADMIN — ACETAMINOPHEN 650 MG: 325 TABLET ORAL at 23:29

## 2017-01-01 RX ADMIN — ACETAMINOPHEN 650 MG: 325 TABLET ORAL at 23:04

## 2017-01-01 RX ADMIN — ROPIVACAINE HYDROCHLORIDE 10 ML/HR: 2 INJECTION, SOLUTION EPIDURAL; INFILTRATION at 10:47

## 2017-01-01 RX ADMIN — IPRATROPIUM BROMIDE AND ALBUTEROL SULFATE 3 ML: .5; 3 SOLUTION RESPIRATORY (INHALATION) at 07:38

## 2017-01-01 RX ADMIN — ASPIRIN 81 MG 81 MG: 81 TABLET ORAL at 10:09

## 2017-01-01 RX ADMIN — ACETAMINOPHEN 650 MG: 325 TABLET ORAL at 05:57

## 2017-01-01 RX ADMIN — OXYCODONE HYDROCHLORIDE AND ACETAMINOPHEN 1 TABLET: 10; 325 TABLET ORAL at 08:15

## 2017-01-01 RX ADMIN — ALPRAZOLAM 2 MG: 1 TABLET ORAL at 22:13

## 2017-01-01 RX ADMIN — POLYETHYLENE GLYCOL 3350 17 G: 17 POWDER, FOR SOLUTION ORAL at 09:33

## 2017-01-01 RX ADMIN — HYDROMORPHONE HYDROCHLORIDE 0.5 MG: 1 INJECTION, SOLUTION INTRAMUSCULAR; INTRAVENOUS; SUBCUTANEOUS at 09:31

## 2017-01-01 RX ADMIN — CARVEDILOL 3.12 MG: 3.12 TABLET, FILM COATED ORAL at 21:37

## 2017-01-01 RX ADMIN — IPRATROPIUM BROMIDE AND ALBUTEROL SULFATE 3 ML: .5; 3 SOLUTION RESPIRATORY (INHALATION) at 05:07

## 2017-01-01 RX ADMIN — IPRATROPIUM BROMIDE AND ALBUTEROL SULFATE 3 ML: .5; 3 SOLUTION RESPIRATORY (INHALATION) at 07:31

## 2017-01-01 RX ADMIN — CARVEDILOL 3.12 MG: 3.12 TABLET, FILM COATED ORAL at 07:21

## 2017-01-01 RX ADMIN — NICOTINE 1 PATCH: 14 PATCH, EXTENDED RELEASE TRANSDERMAL at 08:08

## 2017-01-01 RX ADMIN — IPRATROPIUM BROMIDE AND ALBUTEROL SULFATE 3 ML: .5; 3 SOLUTION RESPIRATORY (INHALATION) at 16:00

## 2017-01-01 RX ADMIN — OXYCODONE HYDROCHLORIDE AND ACETAMINOPHEN 1 TABLET: 10; 325 TABLET ORAL at 12:28

## 2017-01-01 RX ADMIN — OXYCODONE HYDROCHLORIDE AND ACETAMINOPHEN 1 TABLET: 10; 325 TABLET ORAL at 04:47

## 2017-01-01 RX ADMIN — IPRATROPIUM BROMIDE AND ALBUTEROL SULFATE 3 ML: .5; 3 SOLUTION RESPIRATORY (INHALATION) at 07:54

## 2017-01-01 RX ADMIN — IPRATROPIUM BROMIDE AND ALBUTEROL SULFATE 3 ML: .5; 3 SOLUTION RESPIRATORY (INHALATION) at 16:08

## 2017-01-01 RX ADMIN — ALUMINUM HYDROXIDE, MAGNESIUM HYDROXIDE, AND DIMETHICONE 15 ML: 400; 400; 40 SUSPENSION ORAL at 23:05

## 2017-01-01 RX ADMIN — ONDANSETRON 4 MG: 2 INJECTION INTRAMUSCULAR; INTRAVENOUS at 08:35

## 2017-01-01 RX ADMIN — CARVEDILOL 3.12 MG: 3.12 TABLET, FILM COATED ORAL at 18:00

## 2017-01-01 RX ADMIN — ALPRAZOLAM 2 MG: 1 TABLET ORAL at 23:29

## 2017-01-01 RX ADMIN — OXYCODONE HYDROCHLORIDE AND ACETAMINOPHEN 1 TABLET: 10; 325 TABLET ORAL at 13:43

## 2017-01-01 RX ADMIN — HYDROMORPHONE HYDROCHLORIDE 0.25 MG: 1 INJECTION, SOLUTION INTRAMUSCULAR; INTRAVENOUS; SUBCUTANEOUS at 21:25

## 2017-01-01 RX ADMIN — OXYCODONE HYDROCHLORIDE AND ACETAMINOPHEN 1 TABLET: 10; 325 TABLET ORAL at 20:42

## 2017-01-01 RX ADMIN — ONDANSETRON 4 MG: 2 INJECTION INTRAMUSCULAR; INTRAVENOUS at 00:12

## 2017-01-01 RX ADMIN — BUDESONIDE AND FORMOTEROL FUMARATE DIHYDRATE 2 PUFF: 160; 4.5 AEROSOL RESPIRATORY (INHALATION) at 07:31

## 2017-01-01 RX ADMIN — ACETAMINOPHEN 650 MG: 325 TABLET ORAL at 08:13

## 2017-01-01 RX ADMIN — POLYETHYLENE GLYCOL 3350 17 G: 17 POWDER, FOR SOLUTION ORAL at 17:10

## 2017-01-01 RX ADMIN — POLYETHYLENE GLYCOL 3350 17 G: 17 POWDER, FOR SOLUTION ORAL at 10:08

## 2017-01-01 RX ADMIN — ATORVASTATIN CALCIUM 80 MG: 40 TABLET, FILM COATED ORAL at 09:33

## 2017-01-01 RX ADMIN — IPRATROPIUM BROMIDE AND ALBUTEROL SULFATE 3 ML: .5; 3 SOLUTION RESPIRATORY (INHALATION) at 20:17

## 2017-01-01 RX ADMIN — IPRATROPIUM BROMIDE AND ALBUTEROL SULFATE 3 ML: .5; 3 SOLUTION RESPIRATORY (INHALATION) at 16:53

## 2017-01-01 RX ADMIN — DOCUSATE SODIUM 100 MG: 100 CAPSULE, LIQUID FILLED ORAL at 08:15

## 2017-01-01 RX ADMIN — ASPIRIN 81 MG 81 MG: 81 TABLET ORAL at 08:06

## 2017-01-01 RX ADMIN — BUDESONIDE AND FORMOTEROL FUMARATE DIHYDRATE 2 PUFF: 160; 4.5 AEROSOL RESPIRATORY (INHALATION) at 08:27

## 2017-01-01 RX ADMIN — OXYCODONE HYDROCHLORIDE AND ACETAMINOPHEN 1 TABLET: 10; 325 TABLET ORAL at 06:24

## 2017-01-01 RX ADMIN — DULOXETINE HYDROCHLORIDE 30 MG: 30 CAPSULE, DELAYED RELEASE ORAL at 08:15

## 2017-01-01 RX ADMIN — SODIUM CHLORIDE 75 ML/HR: 9 INJECTION, SOLUTION INTRAVENOUS at 20:18

## 2017-01-01 RX ADMIN — ALPRAZOLAM 2 MG: 1 TABLET ORAL at 00:35

## 2017-01-01 RX ADMIN — HYDROMORPHONE HYDROCHLORIDE 0.5 MG: 1 INJECTION, SOLUTION INTRAMUSCULAR; INTRAVENOUS; SUBCUTANEOUS at 16:20

## 2017-01-01 RX ADMIN — ASPIRIN 81 MG 81 MG: 81 TABLET ORAL at 09:36

## 2017-01-01 RX ADMIN — DOCUSATE SODIUM 100 MG: 100 CAPSULE, LIQUID FILLED ORAL at 17:30

## 2017-01-01 RX ADMIN — OXYCODONE HYDROCHLORIDE AND ACETAMINOPHEN 1 TABLET: 10; 325 TABLET ORAL at 11:47

## 2017-01-01 RX ADMIN — ASPIRIN 81 MG 81 MG: 81 TABLET ORAL at 09:33

## 2017-01-01 RX ADMIN — ALBUTEROL SULFATE 10 MG: 2.5 SOLUTION RESPIRATORY (INHALATION) at 23:27

## 2017-01-01 RX ADMIN — ASPIRIN 81 MG 81 MG: 81 TABLET ORAL at 08:15

## 2017-01-01 RX ADMIN — IPRATROPIUM BROMIDE AND ALBUTEROL SULFATE 3 ML: .5; 3 SOLUTION RESPIRATORY (INHALATION) at 22:53

## 2017-01-01 RX ADMIN — FENTANYL CITRATE 50 MCG: 50 INJECTION, SOLUTION INTRAMUSCULAR; INTRAVENOUS at 19:30

## 2017-01-01 RX ADMIN — CARVEDILOL 3.12 MG: 3.12 TABLET, FILM COATED ORAL at 17:31

## 2017-01-01 RX ADMIN — DOCUSATE SODIUM 100 MG: 100 CAPSULE, LIQUID FILLED ORAL at 08:55

## 2017-01-01 RX ADMIN — IPRATROPIUM BROMIDE AND ALBUTEROL SULFATE 3 ML: .5; 3 SOLUTION RESPIRATORY (INHALATION) at 08:19

## 2017-01-01 RX ADMIN — SODIUM CHLORIDE, POTASSIUM CHLORIDE, SODIUM LACTATE AND CALCIUM CHLORIDE 9 ML/HR: 600; 310; 30; 20 INJECTION, SOLUTION INTRAVENOUS at 14:37

## 2017-01-01 RX ADMIN — ACETAMINOPHEN 650 MG: 325 TABLET ORAL at 23:17

## 2017-01-01 RX ADMIN — CARVEDILOL 3.12 MG: 3.12 TABLET, FILM COATED ORAL at 08:15

## 2017-01-01 RX ADMIN — BUDESONIDE AND FORMOTEROL FUMARATE DIHYDRATE 2 PUFF: 160; 4.5 AEROSOL RESPIRATORY (INHALATION) at 19:18

## 2017-01-01 RX ADMIN — SODIUM CHLORIDE, POTASSIUM CHLORIDE, SODIUM LACTATE AND CALCIUM CHLORIDE: 600; 310; 30; 20 INJECTION, SOLUTION INTRAVENOUS at 17:50

## 2017-01-01 RX ADMIN — IPRATROPIUM BROMIDE AND ALBUTEROL SULFATE 3 ML: .5; 3 SOLUTION RESPIRATORY (INHALATION) at 04:53

## 2017-01-01 RX ADMIN — POLYETHYLENE GLYCOL 3350 17 G: 17 POWDER, FOR SOLUTION ORAL at 08:44

## 2017-01-01 RX ADMIN — LORAZEPAM 0.5 MG: 2 INJECTION, SOLUTION INTRAMUSCULAR; INTRAVENOUS at 11:14

## 2017-01-01 RX ADMIN — DULOXETINE HYDROCHLORIDE 30 MG: 30 CAPSULE, DELAYED RELEASE ORAL at 10:09

## 2017-01-01 RX ADMIN — ONDANSETRON 4 MG: 2 INJECTION INTRAMUSCULAR; INTRAVENOUS at 09:56

## 2017-01-01 RX ADMIN — IPRATROPIUM BROMIDE AND ALBUTEROL SULFATE 3 ML: .5; 3 SOLUTION RESPIRATORY (INHALATION) at 10:39

## 2017-01-01 RX ADMIN — ROPIVACAINE HYDROCHLORIDE 10 ML/HR: 2 INJECTION, SOLUTION EPIDURAL; INFILTRATION at 16:18

## 2017-01-01 RX ADMIN — IPRATROPIUM BROMIDE AND ALBUTEROL SULFATE 3 ML: .5; 3 SOLUTION RESPIRATORY (INHALATION) at 23:27

## 2017-01-01 RX ADMIN — HYDROMORPHONE HYDROCHLORIDE 0.5 MG: 1 INJECTION, SOLUTION INTRAMUSCULAR; INTRAVENOUS; SUBCUTANEOUS at 10:55

## 2017-01-01 RX ADMIN — CARVEDILOL 3.12 MG: 3.12 TABLET, FILM COATED ORAL at 08:06

## 2017-01-01 RX ADMIN — NICOTINE 1 PATCH: 14 PATCH, EXTENDED RELEASE TRANSDERMAL at 10:36

## 2017-01-01 RX ADMIN — POLYETHYLENE GLYCOL 3350 17 G: 17 POWDER, FOR SOLUTION ORAL at 08:16

## 2017-01-01 RX ADMIN — ACETAMINOPHEN 650 MG: 325 TABLET ORAL at 00:03

## 2017-01-01 RX ADMIN — ATORVASTATIN CALCIUM 80 MG: 40 TABLET, FILM COATED ORAL at 08:15

## 2017-01-01 RX ADMIN — ACETAMINOPHEN 650 MG: 325 TABLET ORAL at 11:36

## 2017-01-01 RX ADMIN — OXYCODONE HYDROCHLORIDE AND ACETAMINOPHEN 1 TABLET: 10; 325 TABLET ORAL at 05:42

## 2017-01-01 RX ADMIN — ACETAMINOPHEN 650 MG: 325 TABLET ORAL at 13:03

## 2017-01-01 RX ADMIN — HYDROMORPHONE HYDROCHLORIDE 0.5 MG: 1 INJECTION, SOLUTION INTRAMUSCULAR; INTRAVENOUS; SUBCUTANEOUS at 00:21

## 2017-01-01 RX ADMIN — CARVEDILOL 3.12 MG: 3.12 TABLET, FILM COATED ORAL at 17:22

## 2017-01-01 RX ADMIN — IPRATROPIUM BROMIDE AND ALBUTEROL SULFATE 3 ML: .5; 3 SOLUTION RESPIRATORY (INHALATION) at 20:58

## 2017-01-01 RX ADMIN — HYDROMORPHONE HYDROCHLORIDE 0.5 MG: 1 INJECTION, SOLUTION INTRAMUSCULAR; INTRAVENOUS; SUBCUTANEOUS at 06:55

## 2017-01-01 RX ADMIN — CETIRIZINE HYDROCHLORIDE 5 MG: 10 TABLET, FILM COATED ORAL at 08:15

## 2017-01-01 RX ADMIN — IPRATROPIUM BROMIDE AND ALBUTEROL SULFATE 3 ML: .5; 3 SOLUTION RESPIRATORY (INHALATION) at 19:25

## 2017-01-01 RX ADMIN — SODIUM CHLORIDE 75 ML/HR: 9 INJECTION, SOLUTION INTRAVENOUS at 22:13

## 2017-01-01 RX ADMIN — IPRATROPIUM BROMIDE AND ALBUTEROL SULFATE 3 ML: .5; 3 SOLUTION RESPIRATORY (INHALATION) at 16:16

## 2017-01-01 RX ADMIN — BUDESONIDE AND FORMOTEROL FUMARATE DIHYDRATE 2 PUFF: 160; 4.5 AEROSOL RESPIRATORY (INHALATION) at 07:52

## 2017-01-01 RX ADMIN — CARVEDILOL 3.12 MG: 3.12 TABLET, FILM COATED ORAL at 18:07

## 2017-01-01 RX ADMIN — HYDROMORPHONE HYDROCHLORIDE 0.5 MG: 1 INJECTION, SOLUTION INTRAMUSCULAR; INTRAVENOUS; SUBCUTANEOUS at 11:37

## 2017-01-01 RX ADMIN — CLOPIDOGREL BISULFATE 75 MG: 75 TABLET ORAL at 08:46

## 2017-01-01 RX ADMIN — IPRATROPIUM BROMIDE AND ALBUTEROL SULFATE 3 ML: .5; 3 SOLUTION RESPIRATORY (INHALATION) at 09:24

## 2017-01-01 RX ADMIN — SODIUM CHLORIDE 75 ML/HR: 9 INJECTION, SOLUTION INTRAVENOUS at 01:25

## 2017-01-01 RX ADMIN — BUDESONIDE AND FORMOTEROL FUMARATE DIHYDRATE 2 PUFF: 160; 4.5 AEROSOL RESPIRATORY (INHALATION) at 07:54

## 2017-01-01 RX ADMIN — NICOTINE 1 PATCH: 14 PATCH, EXTENDED RELEASE TRANSDERMAL at 08:57

## 2017-01-01 RX ADMIN — HYDROMORPHONE HYDROCHLORIDE 0.5 MG: 1 INJECTION, SOLUTION INTRAMUSCULAR; INTRAVENOUS; SUBCUTANEOUS at 16:27

## 2017-01-01 RX ADMIN — ONDANSETRON 4 MG: 2 INJECTION INTRAMUSCULAR; INTRAVENOUS at 21:24

## 2017-01-01 RX ADMIN — DOCUSATE SODIUM 100 MG: 100 CAPSULE, LIQUID FILLED ORAL at 18:00

## 2017-01-01 RX ADMIN — POLYETHYLENE GLYCOL 3350 17 G: 17 POWDER, FOR SOLUTION ORAL at 17:18

## 2017-01-01 RX ADMIN — SODIUM CHLORIDE 75 ML/HR: 9 INJECTION, SOLUTION INTRAVENOUS at 04:47

## 2017-01-01 RX ADMIN — DOCUSATE SODIUM 100 MG: 100 CAPSULE, LIQUID FILLED ORAL at 17:22

## 2017-01-01 RX ADMIN — DOCUSATE SODIUM 100 MG: 100 CAPSULE, LIQUID FILLED ORAL at 09:33

## 2017-01-01 RX ADMIN — CETIRIZINE HYDROCHLORIDE 5 MG: 10 TABLET, FILM COATED ORAL at 10:09

## 2017-01-01 RX ADMIN — ROPIVACAINE HYDROCHLORIDE 10 ML/HR: 2 INJECTION, SOLUTION EPIDURAL; INFILTRATION at 03:31

## 2017-01-01 RX ADMIN — PROPOFOL 150 MG: 10 INJECTION, EMULSION INTRAVENOUS at 17:50

## 2017-01-01 RX ADMIN — IPRATROPIUM BROMIDE AND ALBUTEROL SULFATE 3 ML: .5; 3 SOLUTION RESPIRATORY (INHALATION) at 12:09

## 2017-01-01 RX ADMIN — ATORVASTATIN CALCIUM 80 MG: 40 TABLET, FILM COATED ORAL at 08:10

## 2017-01-01 RX ADMIN — DULOXETINE HYDROCHLORIDE 30 MG: 30 CAPSULE, DELAYED RELEASE ORAL at 09:34

## 2017-01-01 RX ADMIN — DULOXETINE HYDROCHLORIDE 30 MG: 30 CAPSULE, DELAYED RELEASE ORAL at 08:06

## 2017-01-01 RX ADMIN — IPRATROPIUM BROMIDE AND ALBUTEROL SULFATE 3 ML: .5; 3 SOLUTION RESPIRATORY (INHALATION) at 17:10

## 2017-01-01 RX ADMIN — ATORVASTATIN CALCIUM 80 MG: 40 TABLET, FILM COATED ORAL at 10:07

## 2017-01-01 RX ADMIN — ASPIRIN 81 MG 81 MG: 81 TABLET ORAL at 08:56

## 2017-01-01 RX ADMIN — CLINDAMYCIN PHOSPHATE 600 MG: 12 INJECTION, SOLUTION INTRAVENOUS at 18:09

## 2017-01-01 RX ADMIN — OXYCODONE HYDROCHLORIDE AND ACETAMINOPHEN 1 TABLET: 10; 325 TABLET ORAL at 09:36

## 2017-01-01 RX ADMIN — FENTANYL CITRATE 50 MCG: 50 INJECTION, SOLUTION INTRAMUSCULAR; INTRAVENOUS at 17:50

## 2017-01-01 RX ADMIN — ACETAMINOPHEN 650 MG: 325 TABLET ORAL at 05:30

## 2017-01-01 RX ADMIN — CARVEDILOL 3.12 MG: 3.12 TABLET, FILM COATED ORAL at 17:18

## 2017-01-01 RX ADMIN — IPRATROPIUM BROMIDE AND ALBUTEROL SULFATE 3 ML: .5; 3 SOLUTION RESPIRATORY (INHALATION) at 15:34

## 2017-01-01 RX ADMIN — NICOTINE 1 PATCH: 14 PATCH, EXTENDED RELEASE TRANSDERMAL at 10:07

## 2017-01-01 RX ADMIN — IPRATROPIUM BROMIDE AND ALBUTEROL SULFATE 3 ML: .5; 3 SOLUTION RESPIRATORY (INHALATION) at 01:20

## 2017-01-01 RX ADMIN — ROPIVACAINE HYDROCHLORIDE 10 ML/HR: 2 INJECTION, SOLUTION EPIDURAL; INFILTRATION at 10:46

## 2017-01-01 RX ADMIN — CLINDAMYCIN PHOSPHATE 600 MG: 12 INJECTION, SOLUTION INTRAVENOUS at 03:01

## 2017-01-01 RX ADMIN — BUDESONIDE AND FORMOTEROL FUMARATE DIHYDRATE 2 PUFF: 160; 4.5 AEROSOL RESPIRATORY (INHALATION) at 09:24

## 2017-01-01 RX ADMIN — DULOXETINE HYDROCHLORIDE 30 MG: 30 CAPSULE, DELAYED RELEASE ORAL at 09:37

## 2017-01-01 RX ADMIN — ACETAMINOPHEN 650 MG: 325 TABLET ORAL at 17:18

## 2017-01-01 RX ADMIN — NICOTINE 1 PATCH: 14 PATCH, EXTENDED RELEASE TRANSDERMAL at 09:35

## 2017-01-01 RX ADMIN — IPRATROPIUM BROMIDE AND ALBUTEROL SULFATE 3 ML: .5; 3 SOLUTION RESPIRATORY (INHALATION) at 07:53

## 2017-01-01 RX ADMIN — ACETAMINOPHEN 650 MG: 325 TABLET ORAL at 06:27

## 2017-01-01 RX ADMIN — HYDROMORPHONE HYDROCHLORIDE 0.5 MG: 1 INJECTION, SOLUTION INTRAMUSCULAR; INTRAVENOUS; SUBCUTANEOUS at 21:32

## 2017-01-01 RX ADMIN — HYDROMORPHONE HYDROCHLORIDE 0.5 MG: 1 INJECTION, SOLUTION INTRAMUSCULAR; INTRAVENOUS; SUBCUTANEOUS at 12:05

## 2017-01-01 RX ADMIN — ACETAMINOPHEN 650 MG: 325 TABLET ORAL at 17:22

## 2017-01-01 RX ADMIN — FENTANYL CITRATE 50 MCG: 50 INJECTION INTRAMUSCULAR; INTRAVENOUS at 20:32

## 2017-01-01 RX ADMIN — CARVEDILOL 3.12 MG: 3.12 TABLET, FILM COATED ORAL at 10:09

## 2017-01-01 RX ADMIN — NICOTINE 1 PATCH: 14 PATCH, EXTENDED RELEASE TRANSDERMAL at 09:34

## 2017-01-01 RX ADMIN — NICOTINE 1 PATCH: 14 PATCH, EXTENDED RELEASE TRANSDERMAL at 08:16

## 2017-01-01 RX ADMIN — CETIRIZINE HYDROCHLORIDE 5 MG: 10 TABLET, FILM COATED ORAL at 08:06

## 2017-01-01 RX ADMIN — HYDROMORPHONE HYDROCHLORIDE 0.5 MG: 1 INJECTION, SOLUTION INTRAMUSCULAR; INTRAVENOUS; SUBCUTANEOUS at 14:01

## 2017-01-01 RX ADMIN — CARVEDILOL 3.12 MG: 3.12 TABLET, FILM COATED ORAL at 09:33

## 2017-01-01 RX ADMIN — IPRATROPIUM BROMIDE AND ALBUTEROL SULFATE 3 ML: .5; 3 SOLUTION RESPIRATORY (INHALATION) at 01:43

## 2017-01-01 RX ADMIN — POLYETHYLENE GLYCOL 3350 17 G: 17 POWDER, FOR SOLUTION ORAL at 09:35

## 2017-01-01 RX ADMIN — ALPRAZOLAM 2 MG: 1 TABLET ORAL at 23:12

## 2017-01-01 RX ADMIN — FAMOTIDINE 20 MG: 20 TABLET ORAL at 14:37

## 2017-01-01 RX ADMIN — ENOXAPARIN SODIUM 40 MG: 40 INJECTION SUBCUTANEOUS at 15:09

## 2017-01-01 RX ADMIN — IPRATROPIUM BROMIDE AND ALBUTEROL SULFATE 3 ML: .5; 3 SOLUTION RESPIRATORY (INHALATION) at 12:04

## 2017-01-01 RX ADMIN — LIDOCAINE HYDROCHLORIDE 15 ML: 20 SOLUTION ORAL; TOPICAL at 23:05

## 2017-01-01 RX ADMIN — IPRATROPIUM BROMIDE AND ALBUTEROL SULFATE 3 ML: .5; 3 SOLUTION RESPIRATORY (INHALATION) at 21:12

## 2017-01-01 RX ADMIN — CETIRIZINE HYDROCHLORIDE 5 MG: 10 TABLET, FILM COATED ORAL at 08:55

## 2017-01-01 RX ADMIN — SODIUM CHLORIDE 75 ML/HR: 9 INJECTION, SOLUTION INTRAVENOUS at 11:47

## 2017-01-01 RX ADMIN — IPRATROPIUM BROMIDE AND ALBUTEROL SULFATE 3 ML: .5; 3 SOLUTION RESPIRATORY (INHALATION) at 13:05

## 2017-01-01 RX ADMIN — ATORVASTATIN CALCIUM 80 MG: 40 TABLET, FILM COATED ORAL at 08:46

## 2017-01-01 RX ADMIN — ALPRAZOLAM 2 MG: 1 TABLET ORAL at 22:11

## 2017-01-01 RX ADMIN — CLINDAMYCIN PHOSPHATE 600 MG: 12 INJECTION, SOLUTION INTRAVENOUS at 11:36

## 2017-01-01 RX ADMIN — DULOXETINE HYDROCHLORIDE 30 MG: 30 CAPSULE, DELAYED RELEASE ORAL at 08:55

## 2017-01-01 RX ADMIN — BUDESONIDE AND FORMOTEROL FUMARATE DIHYDRATE 2 PUFF: 160; 4.5 AEROSOL RESPIRATORY (INHALATION) at 20:17

## 2017-01-01 RX ADMIN — OXYCODONE HYDROCHLORIDE AND ACETAMINOPHEN 1 TABLET: 10; 325 TABLET ORAL at 19:56

## 2017-01-01 RX ADMIN — CETIRIZINE HYDROCHLORIDE 5 MG: 10 TABLET, FILM COATED ORAL at 09:36

## 2017-01-01 RX ADMIN — HYDROMORPHONE HYDROCHLORIDE 0.5 MG: 1 INJECTION, SOLUTION INTRAMUSCULAR; INTRAVENOUS; SUBCUTANEOUS at 18:01

## 2017-01-01 RX ADMIN — ACETAMINOPHEN 650 MG: 325 TABLET ORAL at 05:59

## 2017-01-01 RX ADMIN — ACETAMINOPHEN 650 MG: 325 TABLET ORAL at 17:30

## 2017-01-01 RX ADMIN — BUDESONIDE AND FORMOTEROL FUMARATE DIHYDRATE 2 PUFF: 160; 4.5 AEROSOL RESPIRATORY (INHALATION) at 07:38

## 2017-01-01 RX ADMIN — POLYETHYLENE GLYCOL 3350 17 G: 17 POWDER, FOR SOLUTION ORAL at 08:09

## 2017-01-01 RX ADMIN — HYDROMORPHONE HYDROCHLORIDE 0.5 MG: 1 INJECTION, SOLUTION INTRAMUSCULAR; INTRAVENOUS; SUBCUTANEOUS at 02:58

## 2017-01-01 RX ADMIN — HYDROMORPHONE HYDROCHLORIDE 0.5 MG: 1 INJECTION, SOLUTION INTRAMUSCULAR; INTRAVENOUS; SUBCUTANEOUS at 13:13

## 2017-01-01 RX ADMIN — ENOXAPARIN SODIUM 40 MG: 40 INJECTION SUBCUTANEOUS at 15:06

## 2017-01-01 RX ADMIN — ROPIVACAINE HYDROCHLORIDE 10 ML/HR: 2 INJECTION, SOLUTION EPIDURAL; INFILTRATION at 21:57

## 2017-01-01 RX ADMIN — IPRATROPIUM BROMIDE AND ALBUTEROL SULFATE 3 ML: .5; 3 SOLUTION RESPIRATORY (INHALATION) at 19:17

## 2017-01-01 RX ADMIN — CARVEDILOL 3.12 MG: 3.12 TABLET, FILM COATED ORAL at 09:36

## 2017-01-01 RX ADMIN — ROPIVACAINE HYDROCHLORIDE 10 ML/HR: 2 INJECTION, SOLUTION EPIDURAL; INFILTRATION at 12:02

## 2017-01-01 RX ADMIN — ALPRAZOLAM 2 MG: 1 TABLET ORAL at 23:04

## 2017-01-01 RX ADMIN — ALPRAZOLAM 2 MG: 1 TABLET ORAL at 02:11

## 2017-01-01 RX ADMIN — DOCUSATE SODIUM 100 MG: 100 CAPSULE, LIQUID FILLED ORAL at 17:10

## 2017-01-01 RX ADMIN — ACETAMINOPHEN 650 MG: 325 TABLET ORAL at 12:05

## 2017-01-01 RX ADMIN — HYDROMORPHONE HYDROCHLORIDE 0.5 MG: 1 INJECTION, SOLUTION INTRAMUSCULAR; INTRAVENOUS; SUBCUTANEOUS at 22:28

## 2017-01-01 RX ADMIN — OXYCODONE HYDROCHLORIDE AND ACETAMINOPHEN 1 TABLET: 10; 325 TABLET ORAL at 14:04

## 2017-01-01 RX ADMIN — HYDROMORPHONE HYDROCHLORIDE 0.5 MG: 1 INJECTION, SOLUTION INTRAMUSCULAR; INTRAVENOUS; SUBCUTANEOUS at 17:31

## 2017-01-01 RX ADMIN — CETIRIZINE HYDROCHLORIDE 5 MG: 10 TABLET, FILM COATED ORAL at 08:46

## 2017-01-01 RX ADMIN — POLYETHYLENE GLYCOL 3350 17 G: 17 POWDER, FOR SOLUTION ORAL at 08:56

## 2017-01-01 RX ADMIN — NICOTINE 1 PATCH: 14 PATCH, EXTENDED RELEASE TRANSDERMAL at 13:00

## 2017-01-01 RX ADMIN — OXYCODONE HYDROCHLORIDE AND ACETAMINOPHEN 1 TABLET: 10; 325 TABLET ORAL at 19:39

## 2017-01-01 RX ADMIN — CARVEDILOL 3.12 MG: 3.12 TABLET, FILM COATED ORAL at 01:02

## 2017-01-01 RX ADMIN — HYDROMORPHONE HYDROCHLORIDE 0.5 MG: 1 INJECTION, SOLUTION INTRAMUSCULAR; INTRAVENOUS; SUBCUTANEOUS at 08:55

## 2017-01-01 RX ADMIN — IPRATROPIUM BROMIDE AND ALBUTEROL SULFATE 3 ML: .5; 3 SOLUTION RESPIRATORY (INHALATION) at 12:14

## 2017-01-01 RX ADMIN — HYDROMORPHONE HYDROCHLORIDE 0.5 MG: 1 INJECTION, SOLUTION INTRAMUSCULAR; INTRAVENOUS; SUBCUTANEOUS at 23:18

## 2017-01-01 RX ADMIN — ATORVASTATIN CALCIUM 80 MG: 40 TABLET, FILM COATED ORAL at 09:35

## 2017-01-01 RX ADMIN — OXYCODONE HYDROCHLORIDE AND ACETAMINOPHEN 1 TABLET: 10; 325 TABLET ORAL at 19:14

## 2017-01-01 RX ADMIN — ROCURONIUM BROMIDE 30 MG: 10 INJECTION, SOLUTION INTRAVENOUS at 17:50

## 2017-01-01 RX ADMIN — SODIUM CHLORIDE 75 ML/HR: 9 INJECTION, SOLUTION INTRAVENOUS at 11:13

## 2017-01-01 RX ADMIN — Medication 10 ML: at 14:37

## 2017-01-01 RX ADMIN — BUDESONIDE AND FORMOTEROL FUMARATE DIHYDRATE 2 PUFF: 160; 4.5 AEROSOL RESPIRATORY (INHALATION) at 20:58

## 2017-01-01 RX ADMIN — BUDESONIDE AND FORMOTEROL FUMARATE DIHYDRATE 2 PUFF: 160; 4.5 AEROSOL RESPIRATORY (INHALATION) at 21:12

## 2017-01-01 RX ADMIN — HYDROMORPHONE HYDROCHLORIDE 0.5 MG: 1 INJECTION, SOLUTION INTRAMUSCULAR; INTRAVENOUS; SUBCUTANEOUS at 23:23

## 2017-01-01 RX ADMIN — HYDROMORPHONE HYDROCHLORIDE 0.5 MG: 1 INJECTION, SOLUTION INTRAMUSCULAR; INTRAVENOUS; SUBCUTANEOUS at 02:34

## 2017-01-01 RX ADMIN — ASPIRIN 81 MG 81 MG: 81 TABLET ORAL at 08:44

## 2017-01-01 RX ADMIN — BUDESONIDE AND FORMOTEROL FUMARATE DIHYDRATE 2 PUFF: 160; 4.5 AEROSOL RESPIRATORY (INHALATION) at 19:09

## 2017-01-01 RX ADMIN — SODIUM CHLORIDE 75 ML/HR: 9 INJECTION, SOLUTION INTRAVENOUS at 01:38

## 2017-01-01 RX ADMIN — IPRATROPIUM BROMIDE AND ALBUTEROL SULFATE 3 ML: .5; 3 SOLUTION RESPIRATORY (INHALATION) at 11:46

## 2017-01-01 RX ADMIN — OXYCODONE HYDROCHLORIDE AND ACETAMINOPHEN 1 TABLET: 10; 325 TABLET ORAL at 05:30

## 2017-01-01 RX ADMIN — DOCUSATE SODIUM 100 MG: 100 CAPSULE, LIQUID FILLED ORAL at 08:44

## 2017-01-01 RX ADMIN — BISACODYL 10 MG: 10 SUPPOSITORY RECTAL at 11:48

## 2017-01-01 RX ADMIN — BUDESONIDE AND FORMOTEROL FUMARATE DIHYDRATE 2 PUFF: 160; 4.5 AEROSOL RESPIRATORY (INHALATION) at 19:25

## 2017-01-01 RX ADMIN — CARVEDILOL 3.12 MG: 3.12 TABLET, FILM COATED ORAL at 08:44

## 2017-01-01 RX ADMIN — DOCUSATE SODIUM 100 MG: 100 CAPSULE, LIQUID FILLED ORAL at 08:05

## 2017-01-01 RX ADMIN — Medication 20 ML: at 18:00

## 2017-01-01 RX ADMIN — IPRATROPIUM BROMIDE AND ALBUTEROL SULFATE 3 ML: .5; 3 SOLUTION RESPIRATORY (INHALATION) at 12:41

## 2017-01-01 RX ADMIN — IPRATROPIUM BROMIDE AND ALBUTEROL SULFATE 3 ML: .5; 3 SOLUTION RESPIRATORY (INHALATION) at 07:52

## 2017-01-01 RX ADMIN — CARVEDILOL 3.12 MG: 3.12 TABLET, FILM COATED ORAL at 17:10

## 2017-01-01 RX ADMIN — IPRATROPIUM BROMIDE AND ALBUTEROL SULFATE 3 ML: .5; 3 SOLUTION RESPIRATORY (INHALATION) at 12:16

## 2017-01-01 RX ADMIN — DULOXETINE HYDROCHLORIDE 30 MG: 30 CAPSULE, DELAYED RELEASE ORAL at 08:46

## 2017-01-01 RX ADMIN — ATORVASTATIN CALCIUM 80 MG: 40 TABLET, FILM COATED ORAL at 08:56

## 2017-01-01 RX ADMIN — IPRATROPIUM BROMIDE AND ALBUTEROL SULFATE 3 ML: .5; 3 SOLUTION RESPIRATORY (INHALATION) at 23:10

## 2017-05-11 PROBLEM — S72.401A CLOSED FRACTURE OF DISTAL END OF RIGHT FEMUR (HCC): Status: ACTIVE | Noted: 2017-01-01

## 2017-05-11 PROBLEM — I25.10 CAD (CORONARY ARTERY DISEASE): Status: ACTIVE | Noted: 2017-01-01

## 2017-05-11 PROBLEM — E78.5 HYPERLIPIDEMIA: Status: ACTIVE | Noted: 2017-01-01

## 2017-05-11 PROBLEM — J44.9 COPD (CHRONIC OBSTRUCTIVE PULMONARY DISEASE) (HCC): Status: ACTIVE | Noted: 2017-01-01

## 2017-05-11 PROBLEM — I50.9 CHF (CONGESTIVE HEART FAILURE) (HCC): Status: ACTIVE | Noted: 2017-01-01

## 2017-05-11 PROBLEM — R30.0 DYSURIA: Status: ACTIVE | Noted: 2017-01-01

## 2017-05-19 PROBLEM — R30.0 DYSURIA: Status: RESOLVED | Noted: 2017-01-01 | Resolved: 2017-01-01

## 2017-08-14 NOTE — PROGRESS NOTES
Began pre-authorization for Rx. Duloxetine.  Application forwarded to [facility] to complete.  ([employer])

## 2018-01-01 ENCOUNTER — APPOINTMENT (OUTPATIENT)
Dept: GENERAL RADIOLOGY | Facility: HOSPITAL | Age: 78
End: 2018-01-01

## 2018-01-01 ENCOUNTER — HOSPITAL ENCOUNTER (INPATIENT)
Facility: HOSPITAL | Age: 78
LOS: 16 days | End: 2018-02-25
Attending: INTERNAL MEDICINE | Admitting: INTERNAL MEDICINE

## 2018-01-01 ENCOUNTER — APPOINTMENT (OUTPATIENT)
Dept: CT IMAGING | Facility: HOSPITAL | Age: 78
End: 2018-01-01

## 2018-01-01 ENCOUNTER — ANESTHESIA (OUTPATIENT)
Dept: PERIOP | Facility: HOSPITAL | Age: 78
End: 2018-01-01

## 2018-01-01 ENCOUNTER — HOSPITAL ENCOUNTER (INPATIENT)
Facility: HOSPITAL | Age: 78
LOS: 2 days | End: 2018-02-27
Attending: INTERNAL MEDICINE | Admitting: INTERNAL MEDICINE

## 2018-01-01 ENCOUNTER — APPOINTMENT (OUTPATIENT)
Dept: CARDIOLOGY | Facility: HOSPITAL | Age: 78
End: 2018-01-01
Attending: INTERNAL MEDICINE

## 2018-01-01 ENCOUNTER — ANESTHESIA EVENT (OUTPATIENT)
Dept: PERIOP | Facility: HOSPITAL | Age: 78
End: 2018-01-01

## 2018-01-01 ENCOUNTER — TRANSCRIBE ORDERS (OUTPATIENT)
Dept: ADMINISTRATIVE | Facility: HOSPITAL | Age: 78
End: 2018-01-01

## 2018-01-01 VITALS
HEIGHT: 65 IN | BODY MASS INDEX: 21.01 KG/M2 | HEART RATE: 98 BPM | WEIGHT: 126.1 LBS | RESPIRATION RATE: 24 BRPM | TEMPERATURE: 97.6 F | SYSTOLIC BLOOD PRESSURE: 159 MMHG | DIASTOLIC BLOOD PRESSURE: 59 MMHG | OXYGEN SATURATION: 91 %

## 2018-01-01 VITALS
BODY MASS INDEX: 20.99 KG/M2 | TEMPERATURE: 97.9 F | RESPIRATION RATE: 28 BRPM | DIASTOLIC BLOOD PRESSURE: 68 MMHG | HEART RATE: 103 BPM | HEIGHT: 65 IN | WEIGHT: 126 LBS | SYSTOLIC BLOOD PRESSURE: 126 MMHG | OXYGEN SATURATION: 95 %

## 2018-01-01 DIAGNOSIS — N63.0 BREAST LUMP: Primary | ICD-10-CM

## 2018-01-01 DIAGNOSIS — R13.10 DYSPHAGIA, UNSPECIFIED TYPE: ICD-10-CM

## 2018-01-01 DIAGNOSIS — Z74.09 IMPAIRED MOBILITY AND ADLS: ICD-10-CM

## 2018-01-01 DIAGNOSIS — Z78.9 IMPAIRED MOBILITY AND ADLS: ICD-10-CM

## 2018-01-01 DIAGNOSIS — F41.9 ANXIETY: ICD-10-CM

## 2018-01-01 DIAGNOSIS — I62.9 INTRACRANIAL HEMORRHAGE (HCC): ICD-10-CM

## 2018-01-01 DIAGNOSIS — K63.1 BOWEL PERFORATION (HCC): ICD-10-CM

## 2018-01-01 DIAGNOSIS — K56.7 ILEUS (HCC): ICD-10-CM

## 2018-01-01 DIAGNOSIS — Z74.09 IMPAIRED FUNCTIONAL MOBILITY, BALANCE, GAIT, AND ENDURANCE: ICD-10-CM

## 2018-01-01 DIAGNOSIS — J43.9 PULMONARY EMPHYSEMA, UNSPECIFIED EMPHYSEMA TYPE (HCC): Primary | ICD-10-CM

## 2018-01-01 LAB
ABO + RH BLD: NORMAL
ABO + RH BLD: NORMAL
ABO GROUP BLD: NORMAL
ABO GROUP BLD: NORMAL
ALBUMIN SERPL-MCNC: 2.8 G/DL (ref 3.2–4.8)
ALBUMIN SERPL-MCNC: 2.9 G/DL (ref 3.2–4.8)
ALBUMIN SERPL-MCNC: 3 G/DL (ref 3.2–4.8)
ALBUMIN SERPL-MCNC: 3 G/DL (ref 3.2–4.8)
ALBUMIN SERPL-MCNC: 3.1 G/DL (ref 3.2–4.8)
ALBUMIN SERPL-MCNC: 3.3 G/DL (ref 3.2–4.8)
ALBUMIN SERPL-MCNC: 3.4 G/DL (ref 3.2–4.8)
ALBUMIN SERPL-MCNC: 3.6 G/DL (ref 3.2–4.8)
ALBUMIN SERPL-MCNC: 3.8 G/DL (ref 3.2–4.8)
ALBUMIN/GLOB SERPL: 1.1 G/DL (ref 1.5–2.5)
ALBUMIN/GLOB SERPL: 1.3 G/DL (ref 1.5–2.5)
ALBUMIN/GLOB SERPL: 1.4 G/DL (ref 1.5–2.5)
ALBUMIN/GLOB SERPL: 1.4 G/DL (ref 1.5–2.5)
ALBUMIN/GLOB SERPL: 1.5 G/DL (ref 1.5–2.5)
ALBUMIN/GLOB SERPL: 1.6 G/DL (ref 1.5–2.5)
ALBUMIN/GLOB SERPL: 1.8 G/DL (ref 1.5–2.5)
ALBUMIN/GLOB SERPL: 2.1 G/DL (ref 1.5–2.5)
ALBUMIN/GLOB SERPL: 2.3 G/DL (ref 1.5–2.5)
ALP SERPL-CCNC: 23 U/L (ref 25–100)
ALP SERPL-CCNC: 45 U/L (ref 25–100)
ALP SERPL-CCNC: 48 U/L (ref 25–100)
ALP SERPL-CCNC: 50 U/L (ref 25–100)
ALP SERPL-CCNC: 51 U/L (ref 25–100)
ALP SERPL-CCNC: 54 U/L (ref 25–100)
ALP SERPL-CCNC: 55 U/L (ref 25–100)
ALP SERPL-CCNC: 58 U/L (ref 25–100)
ALP SERPL-CCNC: 62 U/L (ref 25–100)
ALP SERPL-CCNC: 78 U/L (ref 25–100)
ALP SERPL-CCNC: 91 U/L (ref 25–100)
ALT SERPL W P-5'-P-CCNC: 200 U/L (ref 7–40)
ALT SERPL W P-5'-P-CCNC: 21 U/L (ref 7–40)
ALT SERPL W P-5'-P-CCNC: 304 U/L (ref 7–40)
ALT SERPL W P-5'-P-CCNC: 38 U/L (ref 7–40)
ALT SERPL W P-5'-P-CCNC: 40 U/L (ref 7–40)
ALT SERPL W P-5'-P-CCNC: 41 U/L (ref 7–40)
ALT SERPL W P-5'-P-CCNC: 43 U/L (ref 7–40)
ALT SERPL W P-5'-P-CCNC: 43 U/L (ref 7–40)
ALT SERPL W P-5'-P-CCNC: 47 U/L (ref 7–40)
ALT SERPL W P-5'-P-CCNC: 49 U/L (ref 7–40)
ALT SERPL W P-5'-P-CCNC: 53 U/L (ref 7–40)
ANION GAP SERPL CALCULATED.3IONS-SCNC: 11 MMOL/L (ref 3–11)
ANION GAP SERPL CALCULATED.3IONS-SCNC: 4 MMOL/L (ref 3–11)
ANION GAP SERPL CALCULATED.3IONS-SCNC: 5 MMOL/L (ref 3–11)
ANION GAP SERPL CALCULATED.3IONS-SCNC: 6 MMOL/L (ref 3–11)
ANION GAP SERPL CALCULATED.3IONS-SCNC: 6 MMOL/L (ref 3–11)
ANION GAP SERPL CALCULATED.3IONS-SCNC: 7 MMOL/L (ref 3–11)
ANION GAP SERPL CALCULATED.3IONS-SCNC: 8 MMOL/L (ref 3–11)
ANION GAP SERPL CALCULATED.3IONS-SCNC: 9 MMOL/L (ref 3–11)
ANION GAP SERPL CALCULATED.3IONS-SCNC: 9 MMOL/L (ref 3–11)
APTT PPP: 30.3 SECONDS (ref 24–31)
ARTERIAL PATENCY WRIST A: ABNORMAL
ARTICHOKE IGE QN: 107 MG/DL (ref 0–130)
AST SERPL-CCNC: 147 U/L (ref 0–33)
AST SERPL-CCNC: 172 U/L (ref 0–33)
AST SERPL-CCNC: 25 U/L (ref 0–33)
AST SERPL-CCNC: 29 U/L (ref 0–33)
AST SERPL-CCNC: 36 U/L (ref 0–33)
AST SERPL-CCNC: 36 U/L (ref 0–33)
AST SERPL-CCNC: 40 U/L (ref 0–33)
AST SERPL-CCNC: 41 U/L (ref 0–33)
AST SERPL-CCNC: 42 U/L (ref 0–33)
AST SERPL-CCNC: 48 U/L (ref 0–33)
AST SERPL-CCNC: 52 U/L (ref 0–33)
ATMOSPHERIC PRESS: ABNORMAL MMHG
BACTERIA SPEC AEROBE CULT: ABNORMAL
BACTERIA SPEC AEROBE CULT: NORMAL
BACTERIA SPEC AEROBE CULT: NORMAL
BACTERIA UR QL AUTO: ABNORMAL /HPF
BASE EXCESS BLDA CALC-SCNC: -0.1 MMOL/L (ref 0–2)
BASE EXCESS BLDA CALC-SCNC: -0.5 MMOL/L (ref 0–2)
BASE EXCESS BLDA CALC-SCNC: -2 MMOL/L (ref 0–2)
BASE EXCESS BLDA CALC-SCNC: -2.5 MMOL/L (ref 0–2)
BASE EXCESS BLDA CALC-SCNC: -8.1 MMOL/L (ref 0–2)
BASE EXCESS BLDA CALC-SCNC: 0.1 MMOL/L (ref 0–2)
BASE EXCESS BLDA CALC-SCNC: 0.2 MMOL/L (ref 0–2)
BASE EXCESS BLDA CALC-SCNC: 0.4 MMOL/L (ref 0–2)
BASE EXCESS BLDA CALC-SCNC: 1.3 MMOL/L (ref 0–2)
BASE EXCESS BLDA CALC-SCNC: 1.4 MMOL/L (ref 0–2)
BASE EXCESS BLDA CALC-SCNC: 1.6 MMOL/L (ref 0–2)
BASE EXCESS BLDA CALC-SCNC: 2.1 MMOL/L (ref 0–2)
BASE EXCESS BLDA CALC-SCNC: 2.6 MMOL/L (ref 0–2)
BASE EXCESS BLDA CALC-SCNC: 4 MMOL/L (ref 0–2)
BASOPHILS # BLD AUTO: 0 10*3/MM3 (ref 0–0.2)
BASOPHILS # BLD AUTO: 0.01 10*3/MM3 (ref 0–0.2)
BASOPHILS # BLD AUTO: 0.02 10*3/MM3 (ref 0–0.2)
BASOPHILS # BLD AUTO: 0.03 10*3/MM3 (ref 0–0.2)
BASOPHILS NFR BLD AUTO: 0 % (ref 0–1)
BASOPHILS NFR BLD AUTO: 0.1 % (ref 0–1)
BASOPHILS NFR BLD AUTO: 0.2 % (ref 0–1)
BDY SITE: ABNORMAL
BH BB BLOOD EXPIRATION DATE: NORMAL
BH BB BLOOD EXPIRATION DATE: NORMAL
BH BB BLOOD TYPE BARCODE: 6200
BH BB BLOOD TYPE BARCODE: 6200
BH BB DISPENSE STATUS: NORMAL
BH BB DISPENSE STATUS: NORMAL
BH BB PRODUCT CODE: NORMAL
BH BB PRODUCT CODE: NORMAL
BH BB UNIT NUMBER: NORMAL
BH BB UNIT NUMBER: NORMAL
BH CV ECHO MEAS - BSA(HAYCOCK): 1.6 M^2
BH CV ECHO MEAS - BSA: 1.6 M^2
BH CV ECHO MEAS - BZI_BMI: 21 KILOGRAMS/M^2
BH CV ECHO MEAS - BZI_METRIC_HEIGHT: 165.1 CM
BH CV ECHO MEAS - BZI_METRIC_WEIGHT: 57.2 KG
BH CV ECHO MEAS - RAP SYSTOLE: 8 MMHG
BH CV ECHO MEAS - RVSP: 38 MMHG
BH CV ECHO MEAS - TR MAX VEL: 273.1 CM/SEC
BH CV VAS BP LEFT ARM: NORMAL MMHG
BILIRUB SERPL-MCNC: 0.5 MG/DL (ref 0.3–1.2)
BILIRUB SERPL-MCNC: 0.7 MG/DL (ref 0.3–1.2)
BILIRUB SERPL-MCNC: 0.7 MG/DL (ref 0.3–1.2)
BILIRUB SERPL-MCNC: 1.1 MG/DL (ref 0.3–1.2)
BILIRUB SERPL-MCNC: 1.2 MG/DL (ref 0.3–1.2)
BILIRUB SERPL-MCNC: 1.4 MG/DL (ref 0.3–1.2)
BILIRUB UR QL STRIP: NEGATIVE
BLD GP AB SCN SERPL QL: NEGATIVE
BLD GP AB SCN SERPL QL: NEGATIVE
BNP SERPL-MCNC: 199 PG/ML (ref 0–100)
BNP SERPL-MCNC: 225 PG/ML (ref 0–100)
BUN BLD-MCNC: 10 MG/DL (ref 9–23)
BUN BLD-MCNC: 10 MG/DL (ref 9–23)
BUN BLD-MCNC: 15 MG/DL (ref 9–23)
BUN BLD-MCNC: 17 MG/DL (ref 9–23)
BUN BLD-MCNC: 21 MG/DL (ref 9–23)
BUN BLD-MCNC: 22 MG/DL (ref 9–23)
BUN BLD-MCNC: 31 MG/DL (ref 9–23)
BUN BLD-MCNC: 39 MG/DL (ref 9–23)
BUN BLD-MCNC: 40 MG/DL (ref 9–23)
BUN BLD-MCNC: 40 MG/DL (ref 9–23)
BUN BLD-MCNC: 41 MG/DL (ref 9–23)
BUN BLD-MCNC: 43 MG/DL (ref 9–23)
BUN BLD-MCNC: 45 MG/DL (ref 9–23)
BUN BLD-MCNC: 47 MG/DL (ref 9–23)
BUN BLD-MCNC: 49 MG/DL (ref 9–23)
BUN BLD-MCNC: 51 MG/DL (ref 9–23)
BUN BLD-MCNC: 52 MG/DL (ref 9–23)
BUN/CREAT SERPL: 104 (ref 7–25)
BUN/CREAT SERPL: 16.7 (ref 7–25)
BUN/CREAT SERPL: 20 (ref 7–25)
BUN/CREAT SERPL: 21.4 (ref 7–25)
BUN/CREAT SERPL: 32.7 (ref 7–25)
BUN/CREAT SERPL: 34 (ref 7–25)
BUN/CREAT SERPL: 35 (ref 7–25)
BUN/CREAT SERPL: 36.7 (ref 7–25)
BUN/CREAT SERPL: 39.2 (ref 7–25)
BUN/CREAT SERPL: 41 (ref 7–25)
BUN/CREAT SERPL: 51.7 (ref 7–25)
BUN/CREAT SERPL: 53.8 (ref 7–25)
BUN/CREAT SERPL: 57.1 (ref 7–25)
BUN/CREAT SERPL: 57.1 (ref 7–25)
BUN/CREAT SERPL: 75 (ref 7–25)
BUN/CREAT SERPL: 78 (ref 7–25)
BUN/CREAT SERPL: 78.3 (ref 7–25)
CA-I BLDA-SCNC: 0.9 MMOL/L (ref 1.12–1.3)
CA-I BLDA-SCNC: 1.02 MMOL/L (ref 1.12–1.3)
CA-I SERPL ISE-MCNC: 1.03 MMOL/L (ref 1.12–1.32)
CALCIUM SPEC-SCNC: 5.9 MG/DL (ref 8.7–10.4)
CALCIUM SPEC-SCNC: 6.4 MG/DL (ref 8.7–10.4)
CALCIUM SPEC-SCNC: 7.1 MG/DL (ref 8.7–10.4)
CALCIUM SPEC-SCNC: 7.9 MG/DL (ref 8.7–10.4)
CALCIUM SPEC-SCNC: 7.9 MG/DL (ref 8.7–10.4)
CALCIUM SPEC-SCNC: 8 MG/DL (ref 8.7–10.4)
CALCIUM SPEC-SCNC: 8.1 MG/DL (ref 8.7–10.4)
CALCIUM SPEC-SCNC: 8.1 MG/DL (ref 8.7–10.4)
CALCIUM SPEC-SCNC: 8.2 MG/DL (ref 8.7–10.4)
CALCIUM SPEC-SCNC: 8.4 MG/DL (ref 8.7–10.4)
CALCIUM SPEC-SCNC: 8.4 MG/DL (ref 8.7–10.4)
CALCIUM SPEC-SCNC: 8.5 MG/DL (ref 8.7–10.4)
CALCIUM SPEC-SCNC: 8.9 MG/DL (ref 8.7–10.4)
CALCIUM SPEC-SCNC: 9.1 MG/DL (ref 8.7–10.4)
CHLORIDE BLDA-SCNC: 109 MMOL/L (ref 98–105)
CHLORIDE BLDA-SCNC: 110 MMOL/L (ref 98–105)
CHLORIDE SERPL-SCNC: 102 MMOL/L (ref 99–109)
CHLORIDE SERPL-SCNC: 106 MMOL/L (ref 99–109)
CHLORIDE SERPL-SCNC: 107 MMOL/L (ref 99–109)
CHLORIDE SERPL-SCNC: 109 MMOL/L (ref 99–109)
CHLORIDE SERPL-SCNC: 109 MMOL/L (ref 99–109)
CHLORIDE SERPL-SCNC: 110 MMOL/L (ref 99–109)
CHLORIDE SERPL-SCNC: 110 MMOL/L (ref 99–109)
CHLORIDE SERPL-SCNC: 111 MMOL/L (ref 99–109)
CHLORIDE SERPL-SCNC: 112 MMOL/L (ref 99–109)
CHLORIDE SERPL-SCNC: 112 MMOL/L (ref 99–109)
CHLORIDE SERPL-SCNC: 113 MMOL/L (ref 99–109)
CHLORIDE SERPL-SCNC: 113 MMOL/L (ref 99–109)
CHLORIDE SERPL-SCNC: 114 MMOL/L (ref 99–109)
CHLORIDE SERPL-SCNC: 118 MMOL/L (ref 99–109)
CHLORIDE SERPL-SCNC: 120 MMOL/L (ref 99–109)
CHLORIDE SERPL-SCNC: 123 MMOL/L (ref 99–109)
CHLORIDE SERPL-SCNC: 123 MMOL/L (ref 99–109)
CHOLEST SERPL-MCNC: 186 MG/DL (ref 0–200)
CLARITY UR: CLEAR
CO2 BLDA-SCNC: 21 MMOL/L (ref 23–27)
CO2 BLDA-SCNC: 21.9 MMOL/L (ref 22–33)
CO2 BLDA-SCNC: 22.8 MMOL/L (ref 22–33)
CO2 BLDA-SCNC: 22.9 MMOL/L (ref 22–33)
CO2 BLDA-SCNC: 23.2 MMOL/L (ref 22–33)
CO2 BLDA-SCNC: 24 MMOL/L (ref 22–33)
CO2 BLDA-SCNC: 24.2 MMOL/L (ref 22–33)
CO2 BLDA-SCNC: 24.2 MMOL/L (ref 22–33)
CO2 BLDA-SCNC: 24.5 MMOL/L (ref 22–33)
CO2 BLDA-SCNC: 26.1 MMOL/L (ref 22–33)
CO2 BLDA-SCNC: 27.4 MMOL/L (ref 22–33)
CO2 BLDA-SCNC: 27.8 MMOL/L (ref 22–33)
CO2 BLDA-SCNC: 27.8 MMOL/L (ref 23–27)
CO2 BLDA-SCNC: 29.7 MMOL/L (ref 22–33)
CO2 SERPL-SCNC: 22 MMOL/L (ref 20–31)
CO2 SERPL-SCNC: 23 MMOL/L (ref 20–31)
CO2 SERPL-SCNC: 24 MMOL/L (ref 20–31)
CO2 SERPL-SCNC: 24 MMOL/L (ref 20–31)
CO2 SERPL-SCNC: 25 MMOL/L (ref 20–31)
CO2 SERPL-SCNC: 25 MMOL/L (ref 20–31)
CO2 SERPL-SCNC: 26 MMOL/L (ref 20–31)
CO2 SERPL-SCNC: 27 MMOL/L (ref 20–31)
CO2 SERPL-SCNC: 28 MMOL/L (ref 20–31)
CO2 SERPL-SCNC: 30 MMOL/L (ref 20–31)
CO2 SERPL-SCNC: 32 MMOL/L (ref 20–31)
COHGB MFR BLD: 1.1 % (ref 0–2)
COHGB MFR BLD: 1.3 % (ref 0–2)
COHGB MFR BLD: 1.4 % (ref 0–2)
COHGB MFR BLD: 1.4 % (ref 0–2)
COHGB MFR BLD: 1.5 % (ref 0–2)
COHGB MFR BLD: 1.6 % (ref 0–2)
COHGB MFR BLD: 1.8 % (ref 0–2)
COLOR UR: YELLOW
CORTIS SERPL-MCNC: 30.2 MCG/DL
CREAT BLD-MCNC: 0.5 MG/DL (ref 0.6–1.3)
CREAT BLD-MCNC: 0.6 MG/DL (ref 0.6–1.3)
CREAT BLD-MCNC: 0.7 MG/DL (ref 0.6–1.3)
CREAT BLD-MCNC: 0.8 MG/DL (ref 0.6–1.3)
CREAT BLD-MCNC: 1 MG/DL (ref 0.6–1.3)
CREAT BLD-MCNC: 1.3 MG/DL (ref 0.6–1.3)
CREAT BLD-MCNC: 1.5 MG/DL (ref 0.6–1.3)
CRP SERPL-MCNC: 20.7 MG/DL (ref 0–1)
D DIMER PPP FEU-MCNC: 9.91 MG/L (FEU) (ref 0–0.5)
D-LACTATE SERPL-SCNC: 1.9 MMOL/L (ref 0.5–2)
D-LACTATE SERPL-SCNC: 2.6 MMOL/L (ref 0.5–2)
DEPRECATED RDW RBC AUTO: 46.7 FL (ref 37–54)
DEPRECATED RDW RBC AUTO: 47 FL (ref 37–54)
DEPRECATED RDW RBC AUTO: 47.2 FL (ref 37–54)
DEPRECATED RDW RBC AUTO: 47.7 FL (ref 37–54)
DEPRECATED RDW RBC AUTO: 47.7 FL (ref 37–54)
DEPRECATED RDW RBC AUTO: 48.9 FL (ref 37–54)
DEPRECATED RDW RBC AUTO: 49.5 FL (ref 37–54)
DEPRECATED RDW RBC AUTO: 49.9 FL (ref 37–54)
DEPRECATED RDW RBC AUTO: 50.1 FL (ref 37–54)
DEPRECATED RDW RBC AUTO: 50.1 FL (ref 37–54)
DEPRECATED RDW RBC AUTO: 50.5 FL (ref 37–54)
DEPRECATED RDW RBC AUTO: 52.8 FL (ref 37–54)
DEPRECATED RDW RBC AUTO: 53.6 FL (ref 37–54)
DEPRECATED RDW RBC AUTO: 54.4 FL (ref 37–54)
DEPRECATED RDW RBC AUTO: 54.9 FL (ref 37–54)
EOSINOPHIL # BLD AUTO: 0 10*3/MM3 (ref 0–0.3)
EOSINOPHIL # BLD AUTO: 0.01 10*3/MM3 (ref 0–0.3)
EOSINOPHIL # BLD AUTO: 0.01 10*3/MM3 (ref 0–0.3)
EOSINOPHIL # BLD AUTO: 0.21 10*3/MM3 (ref 0–0.3)
EOSINOPHIL NFR BLD AUTO: 0 % (ref 0–3)
EOSINOPHIL NFR BLD AUTO: 0.1 % (ref 0–3)
EOSINOPHIL NFR BLD AUTO: 0.1 % (ref 0–3)
EOSINOPHIL NFR BLD AUTO: 2.4 % (ref 0–3)
ERYTHROCYTE [DISTWIDTH] IN BLOOD BY AUTOMATED COUNT: 13.5 % (ref 11.3–14.5)
ERYTHROCYTE [DISTWIDTH] IN BLOOD BY AUTOMATED COUNT: 13.7 % (ref 11.3–14.5)
ERYTHROCYTE [DISTWIDTH] IN BLOOD BY AUTOMATED COUNT: 13.8 % (ref 11.3–14.5)
ERYTHROCYTE [DISTWIDTH] IN BLOOD BY AUTOMATED COUNT: 14.5 % (ref 11.3–14.5)
ERYTHROCYTE [DISTWIDTH] IN BLOOD BY AUTOMATED COUNT: 14.5 % (ref 11.3–14.5)
ERYTHROCYTE [DISTWIDTH] IN BLOOD BY AUTOMATED COUNT: 14.6 % (ref 11.3–14.5)
ERYTHROCYTE [DISTWIDTH] IN BLOOD BY AUTOMATED COUNT: 14.6 % (ref 11.3–14.5)
ERYTHROCYTE [DISTWIDTH] IN BLOOD BY AUTOMATED COUNT: 14.7 % (ref 11.3–14.5)
ERYTHROCYTE [DISTWIDTH] IN BLOOD BY AUTOMATED COUNT: 14.9 % (ref 11.3–14.5)
ERYTHROCYTE [DISTWIDTH] IN BLOOD BY AUTOMATED COUNT: 15.5 % (ref 11.3–14.5)
ERYTHROCYTE [DISTWIDTH] IN BLOOD BY AUTOMATED COUNT: 15.6 % (ref 11.3–14.5)
ERYTHROCYTE [DISTWIDTH] IN BLOOD BY AUTOMATED COUNT: 16.3 % (ref 11.3–14.5)
ERYTHROCYTE [DISTWIDTH] IN BLOOD BY AUTOMATED COUNT: 16.7 % (ref 11.3–14.5)
GFR SERPL CREATININE-BSD FRML MDRD: 120 ML/MIN/1.73
GFR SERPL CREATININE-BSD FRML MDRD: 34 ML/MIN/1.73
GFR SERPL CREATININE-BSD FRML MDRD: 40 ML/MIN/1.73
GFR SERPL CREATININE-BSD FRML MDRD: 54 ML/MIN/1.73
GFR SERPL CREATININE-BSD FRML MDRD: 70 ML/MIN/1.73
GFR SERPL CREATININE-BSD FRML MDRD: 81 ML/MIN/1.73
GFR SERPL CREATININE-BSD FRML MDRD: 97 ML/MIN/1.73
GLOBULIN UR ELPH-MCNC: 1.2 GM/DL
GLOBULIN UR ELPH-MCNC: 1.4 GM/DL
GLOBULIN UR ELPH-MCNC: 1.7 GM/DL
GLOBULIN UR ELPH-MCNC: 1.9 GM/DL
GLOBULIN UR ELPH-MCNC: 2.2 GM/DL
GLOBULIN UR ELPH-MCNC: 2.2 GM/DL
GLOBULIN UR ELPH-MCNC: 2.3 GM/DL
GLOBULIN UR ELPH-MCNC: 2.3 GM/DL
GLOBULIN UR ELPH-MCNC: 2.7 GM/DL
GLOBULIN UR ELPH-MCNC: 2.8 GM/DL
GLOBULIN UR ELPH-MCNC: 3 GM/DL
GLUCOSE BLD-MCNC: 108 MG/DL (ref 70–100)
GLUCOSE BLD-MCNC: 112 MG/DL (ref 70–100)
GLUCOSE BLD-MCNC: 120 MG/DL (ref 70–100)
GLUCOSE BLD-MCNC: 136 MG/DL (ref 70–100)
GLUCOSE BLD-MCNC: 145 MG/DL (ref 70–100)
GLUCOSE BLD-MCNC: 152 MG/DL (ref 70–100)
GLUCOSE BLD-MCNC: 154 MG/DL (ref 70–100)
GLUCOSE BLD-MCNC: 157 MG/DL (ref 70–100)
GLUCOSE BLD-MCNC: 162 MG/DL (ref 70–100)
GLUCOSE BLD-MCNC: 163 MG/DL (ref 70–100)
GLUCOSE BLD-MCNC: 172 MG/DL (ref 70–100)
GLUCOSE BLD-MCNC: 179 MG/DL (ref 70–100)
GLUCOSE BLD-MCNC: 185 MG/DL (ref 70–100)
GLUCOSE BLD-MCNC: 188 MG/DL (ref 70–100)
GLUCOSE BLD-MCNC: 188 MG/DL (ref 70–100)
GLUCOSE BLD-MCNC: 227 MG/DL (ref 70–100)
GLUCOSE BLD-MCNC: 228 MG/DL (ref 70–100)
GLUCOSE BLDA-MCNC: 126 MG/DL (ref 67–93)
GLUCOSE BLDA-MCNC: 137 MG/DL (ref 67–93)
GLUCOSE BLDC GLUCOMTR-MCNC: 113 MG/DL (ref 70–130)
GLUCOSE BLDC GLUCOMTR-MCNC: 114 MG/DL (ref 70–130)
GLUCOSE BLDC GLUCOMTR-MCNC: 116 MG/DL (ref 70–130)
GLUCOSE BLDC GLUCOMTR-MCNC: 118 MG/DL (ref 70–130)
GLUCOSE BLDC GLUCOMTR-MCNC: 119 MG/DL (ref 70–130)
GLUCOSE BLDC GLUCOMTR-MCNC: 125 MG/DL (ref 70–130)
GLUCOSE BLDC GLUCOMTR-MCNC: 128 MG/DL (ref 70–130)
GLUCOSE BLDC GLUCOMTR-MCNC: 130 MG/DL (ref 70–130)
GLUCOSE BLDC GLUCOMTR-MCNC: 130 MG/DL (ref 70–130)
GLUCOSE BLDC GLUCOMTR-MCNC: 135 MG/DL (ref 70–130)
GLUCOSE BLDC GLUCOMTR-MCNC: 140 MG/DL (ref 70–130)
GLUCOSE BLDC GLUCOMTR-MCNC: 144 MG/DL (ref 70–130)
GLUCOSE BLDC GLUCOMTR-MCNC: 148 MG/DL (ref 70–130)
GLUCOSE BLDC GLUCOMTR-MCNC: 148 MG/DL (ref 70–130)
GLUCOSE BLDC GLUCOMTR-MCNC: 150 MG/DL (ref 70–130)
GLUCOSE BLDC GLUCOMTR-MCNC: 151 MG/DL (ref 70–130)
GLUCOSE BLDC GLUCOMTR-MCNC: 158 MG/DL (ref 70–130)
GLUCOSE BLDC GLUCOMTR-MCNC: 161 MG/DL (ref 70–130)
GLUCOSE BLDC GLUCOMTR-MCNC: 161 MG/DL (ref 70–130)
GLUCOSE BLDC GLUCOMTR-MCNC: 162 MG/DL (ref 70–130)
GLUCOSE BLDC GLUCOMTR-MCNC: 165 MG/DL (ref 70–130)
GLUCOSE BLDC GLUCOMTR-MCNC: 169 MG/DL (ref 70–130)
GLUCOSE BLDC GLUCOMTR-MCNC: 171 MG/DL (ref 70–130)
GLUCOSE BLDC GLUCOMTR-MCNC: 173 MG/DL (ref 70–130)
GLUCOSE BLDC GLUCOMTR-MCNC: 175 MG/DL (ref 70–130)
GLUCOSE BLDC GLUCOMTR-MCNC: 176 MG/DL (ref 70–130)
GLUCOSE BLDC GLUCOMTR-MCNC: 177 MG/DL (ref 70–130)
GLUCOSE BLDC GLUCOMTR-MCNC: 178 MG/DL (ref 70–130)
GLUCOSE BLDC GLUCOMTR-MCNC: 180 MG/DL (ref 70–130)
GLUCOSE BLDC GLUCOMTR-MCNC: 184 MG/DL (ref 70–130)
GLUCOSE BLDC GLUCOMTR-MCNC: 190 MG/DL (ref 70–130)
GLUCOSE BLDC GLUCOMTR-MCNC: 190 MG/DL (ref 70–130)
GLUCOSE BLDC GLUCOMTR-MCNC: 191 MG/DL (ref 70–130)
GLUCOSE BLDC GLUCOMTR-MCNC: 197 MG/DL (ref 70–130)
GLUCOSE BLDC GLUCOMTR-MCNC: 201 MG/DL (ref 70–130)
GLUCOSE BLDC GLUCOMTR-MCNC: 207 MG/DL (ref 70–130)
GLUCOSE BLDC GLUCOMTR-MCNC: 207 MG/DL (ref 70–130)
GLUCOSE BLDC GLUCOMTR-MCNC: 212 MG/DL (ref 70–130)
GLUCOSE BLDC GLUCOMTR-MCNC: 227 MG/DL (ref 70–130)
GLUCOSE BLDC GLUCOMTR-MCNC: 234 MG/DL (ref 70–130)
GLUCOSE BLDC GLUCOMTR-MCNC: 239 MG/DL (ref 70–130)
GLUCOSE UR STRIP-MCNC: NEGATIVE MG/DL
HBA1C MFR BLD: 5.8 % (ref 4.8–5.6)
HCO3 BLDA-SCNC: 19.5 MMOL/L (ref 20–26)
HCO3 BLDA-SCNC: 21 MMOL/L (ref 20–26)
HCO3 BLDA-SCNC: 21.7 MMOL/L (ref 20–26)
HCO3 BLDA-SCNC: 22.1 MMOL/L (ref 20–26)
HCO3 BLDA-SCNC: 22.4 MMOL/L (ref 20–26)
HCO3 BLDA-SCNC: 23 MMOL/L (ref 20–26)
HCO3 BLDA-SCNC: 23.1 MMOL/L (ref 20–26)
HCO3 BLDA-SCNC: 23.3 MMOL/L (ref 20–26)
HCO3 BLDA-SCNC: 23.5 MMOL/L (ref 20–26)
HCO3 BLDA-SCNC: 25.2 MMOL/L (ref 20–26)
HCO3 BLDA-SCNC: 26.4 MMOL/L (ref 20–26)
HCO3 BLDA-SCNC: 26.5 MMOL/L (ref 20–26)
HCO3 BLDA-SCNC: 26.6 MMOL/L (ref 20–26)
HCO3 BLDA-SCNC: 28.1 MMOL/L (ref 20–26)
HCT VFR BLD AUTO: 23.2 % (ref 34.5–44)
HCT VFR BLD AUTO: 27.1 % (ref 34.5–44)
HCT VFR BLD AUTO: 29.1 % (ref 34.5–44)
HCT VFR BLD AUTO: 30 % (ref 34.5–44)
HCT VFR BLD AUTO: 30.4 % (ref 34.5–44)
HCT VFR BLD AUTO: 30.9 % (ref 34.5–44)
HCT VFR BLD AUTO: 31.7 % (ref 34.5–44)
HCT VFR BLD AUTO: 33.6 % (ref 34.5–44)
HCT VFR BLD AUTO: 34.7 % (ref 34.5–44)
HCT VFR BLD AUTO: 34.8 % (ref 34.5–44)
HCT VFR BLD AUTO: 35.9 % (ref 34.5–44)
HCT VFR BLD AUTO: 36.7 % (ref 34.5–44)
HCT VFR BLD AUTO: 37.5 % (ref 34.5–44)
HCT VFR BLD AUTO: 38.6 % (ref 34.5–44)
HCT VFR BLD AUTO: 40.2 % (ref 34.5–44)
HCT VFR BLD CALC: 25.4 %
HCT VFR BLD CALC: 26.4 %
HCT VFR BLD CALC: 28.3 %
HCT VFR BLD CALC: 29.5 %
HCT VFR BLD CALC: 31.5 %
HCT VFR BLD CALC: 35.1 %
HCT VFR BLD CALC: 35.2 %
HCT VFR BLD CALC: 36 %
HCT VFR BLD CALC: 36 %
HCT VFR BLD CALC: 36.5 %
HCT VFR BLD CALC: 37.9 %
HCT VFR BLD CALC: 39.5 %
HCT VFR BLDA CALC: 29 % (ref 39–51)
HCT VFR BLDA CALC: 32 % (ref 39–51)
HDLC SERPL-MCNC: 64 MG/DL (ref 40–60)
HGB BLD-MCNC: 10.1 G/DL (ref 11.5–15.5)
HGB BLD-MCNC: 10.3 G/DL (ref 11.5–15.5)
HGB BLD-MCNC: 10.5 G/DL (ref 11.5–15.5)
HGB BLD-MCNC: 11 G/DL (ref 11.5–15.5)
HGB BLD-MCNC: 11.3 G/DL (ref 11.5–15.5)
HGB BLD-MCNC: 11.5 G/DL (ref 11.5–15.5)
HGB BLD-MCNC: 11.6 G/DL (ref 11.5–15.5)
HGB BLD-MCNC: 12 G/DL (ref 11.5–15.5)
HGB BLD-MCNC: 12.2 G/DL (ref 11.5–15.5)
HGB BLD-MCNC: 12.5 G/DL (ref 11.5–15.5)
HGB BLD-MCNC: 13.3 G/DL (ref 11.5–15.5)
HGB BLD-MCNC: 7.5 G/DL (ref 11.5–15.5)
HGB BLD-MCNC: 9.1 G/DL (ref 11.5–15.5)
HGB BLD-MCNC: 9.7 G/DL (ref 11.5–15.5)
HGB BLD-MCNC: 9.8 G/DL (ref 11.5–15.5)
HGB BLDA-MCNC: 10 G/DL (ref 10–16)
HGB BLDA-MCNC: 10.3 G/DL (ref 14–18)
HGB BLDA-MCNC: 11 G/DL (ref 10–16)
HGB BLDA-MCNC: 11.4 G/DL (ref 14–18)
HGB BLDA-MCNC: 11.5 G/DL (ref 14–18)
HGB BLDA-MCNC: 11.7 G/DL (ref 14–18)
HGB BLDA-MCNC: 11.8 G/DL (ref 14–18)
HGB BLDA-MCNC: 11.9 G/DL (ref 14–18)
HGB BLDA-MCNC: 12.4 G/DL (ref 14–18)
HGB BLDA-MCNC: 12.9 G/DL (ref 14–18)
HGB BLDA-MCNC: 8.3 G/DL (ref 14–18)
HGB BLDA-MCNC: 8.6 G/DL (ref 14–18)
HGB BLDA-MCNC: 9.2 G/DL (ref 14–18)
HGB BLDA-MCNC: 9.6 G/DL (ref 14–18)
HGB UR QL STRIP.AUTO: NEGATIVE
HOLD SPECIMEN: NORMAL
HOROWITZ INDEX BLD+IHG-RTO: 100 %
HOROWITZ INDEX BLD+IHG-RTO: 100 %
HOROWITZ INDEX BLD+IHG-RTO: 28 %
HOROWITZ INDEX BLD+IHG-RTO: 30 %
HOROWITZ INDEX BLD+IHG-RTO: 32 %
HOROWITZ INDEX BLD+IHG-RTO: 36 %
HOROWITZ INDEX BLD+IHG-RTO: 40 %
HOROWITZ INDEX BLD+IHG-RTO: 70 %
HYALINE CASTS UR QL AUTO: ABNORMAL /LPF
IMM GRANULOCYTES # BLD: 0.01 10*3/MM3 (ref 0–0.03)
IMM GRANULOCYTES # BLD: 0.02 10*3/MM3 (ref 0–0.03)
IMM GRANULOCYTES # BLD: 0.03 10*3/MM3 (ref 0–0.03)
IMM GRANULOCYTES # BLD: 0.04 10*3/MM3 (ref 0–0.03)
IMM GRANULOCYTES # BLD: 0.05 10*3/MM3 (ref 0–0.03)
IMM GRANULOCYTES # BLD: 0.06 10*3/MM3 (ref 0–0.03)
IMM GRANULOCYTES # BLD: 0.07 10*3/MM3 (ref 0–0.03)
IMM GRANULOCYTES # BLD: 0.07 10*3/MM3 (ref 0–0.03)
IMM GRANULOCYTES # BLD: 0.08 10*3/MM3 (ref 0–0.03)
IMM GRANULOCYTES NFR BLD: 0.1 % (ref 0–0.6)
IMM GRANULOCYTES NFR BLD: 0.1 % (ref 0–0.6)
IMM GRANULOCYTES NFR BLD: 0.2 % (ref 0–0.6)
IMM GRANULOCYTES NFR BLD: 0.3 % (ref 0–0.6)
IMM GRANULOCYTES NFR BLD: 0.3 % (ref 0–0.6)
IMM GRANULOCYTES NFR BLD: 0.4 % (ref 0–0.6)
IMM GRANULOCYTES NFR BLD: 0.5 % (ref 0–0.6)
INR PPP: 0.94 (ref 0.91–1.09)
KETONES UR QL STRIP: NEGATIVE
LEUKOCYTE ESTERASE UR QL STRIP.AUTO: ABNORMAL
LV EF 2D ECHO EST: 55 %
LYMPHOCYTES # BLD AUTO: 0.24 10*3/MM3 (ref 0.6–4.8)
LYMPHOCYTES # BLD AUTO: 0.36 10*3/MM3 (ref 0.6–4.8)
LYMPHOCYTES # BLD AUTO: 0.38 10*3/MM3 (ref 0.6–4.8)
LYMPHOCYTES # BLD AUTO: 0.38 10*3/MM3 (ref 0.6–4.8)
LYMPHOCYTES # BLD AUTO: 0.43 10*3/MM3 (ref 0.6–4.8)
LYMPHOCYTES # BLD AUTO: 0.61 10*3/MM3 (ref 0.6–4.8)
LYMPHOCYTES # BLD AUTO: 0.62 10*3/MM3 (ref 0.6–4.8)
LYMPHOCYTES # BLD AUTO: 0.66 10*3/MM3 (ref 0.6–4.8)
LYMPHOCYTES # BLD AUTO: 0.82 10*3/MM3 (ref 0.6–4.8)
LYMPHOCYTES # BLD AUTO: 1.1 10*3/MM3 (ref 0.6–4.8)
LYMPHOCYTES # BLD AUTO: 1.11 10*3/MM3 (ref 0.6–4.8)
LYMPHOCYTES # BLD AUTO: 1.26 10*3/MM3 (ref 0.6–4.8)
LYMPHOCYTES # BLD AUTO: 1.78 10*3/MM3 (ref 0.6–4.8)
LYMPHOCYTES NFR BLD AUTO: 1.6 % (ref 24–44)
LYMPHOCYTES NFR BLD AUTO: 11.4 % (ref 24–44)
LYMPHOCYTES NFR BLD AUTO: 14.2 % (ref 24–44)
LYMPHOCYTES NFR BLD AUTO: 2 % (ref 24–44)
LYMPHOCYTES NFR BLD AUTO: 2.9 % (ref 24–44)
LYMPHOCYTES NFR BLD AUTO: 3.6 % (ref 24–44)
LYMPHOCYTES NFR BLD AUTO: 4.7 % (ref 24–44)
LYMPHOCYTES NFR BLD AUTO: 5 % (ref 24–44)
LYMPHOCYTES NFR BLD AUTO: 6 % (ref 24–44)
LYMPHOCYTES NFR BLD AUTO: 8 % (ref 24–44)
LYMPHOCYTES NFR BLD AUTO: 9.3 % (ref 24–44)
MAGNESIUM SERPL-MCNC: 1.6 MG/DL (ref 1.3–2.7)
MAGNESIUM SERPL-MCNC: 2 MG/DL (ref 1.3–2.7)
MAGNESIUM SERPL-MCNC: 2.1 MG/DL (ref 1.3–2.7)
MAGNESIUM SERPL-MCNC: 2.2 MG/DL (ref 1.3–2.7)
MAGNESIUM SERPL-MCNC: 2.3 MG/DL (ref 1.3–2.7)
MAGNESIUM SERPL-MCNC: 2.5 MG/DL (ref 1.3–2.7)
MAGNESIUM SERPL-MCNC: 2.9 MG/DL (ref 1.3–2.7)
MAGNESIUM SERPL-MCNC: 2.9 MG/DL (ref 1.3–2.7)
MCH RBC QN AUTO: 30 PG (ref 27–31)
MCH RBC QN AUTO: 30 PG (ref 27–31)
MCH RBC QN AUTO: 30.1 PG (ref 27–31)
MCH RBC QN AUTO: 30.3 PG (ref 27–31)
MCH RBC QN AUTO: 30.3 PG (ref 27–31)
MCH RBC QN AUTO: 30.4 PG (ref 27–31)
MCH RBC QN AUTO: 30.6 PG (ref 27–31)
MCH RBC QN AUTO: 30.7 PG (ref 27–31)
MCH RBC QN AUTO: 30.7 PG (ref 27–31)
MCH RBC QN AUTO: 30.9 PG (ref 27–31)
MCH RBC QN AUTO: 31 PG (ref 27–31)
MCH RBC QN AUTO: 31.4 PG (ref 27–31)
MCH RBC QN AUTO: 31.5 PG (ref 27–31)
MCHC RBC AUTO-ENTMCNC: 32.3 G/DL (ref 32–36)
MCHC RBC AUTO-ENTMCNC: 32.3 G/DL (ref 32–36)
MCHC RBC AUTO-ENTMCNC: 32.4 G/DL (ref 32–36)
MCHC RBC AUTO-ENTMCNC: 32.5 G/DL (ref 32–36)
MCHC RBC AUTO-ENTMCNC: 32.5 G/DL (ref 32–36)
MCHC RBC AUTO-ENTMCNC: 32.7 G/DL (ref 32–36)
MCHC RBC AUTO-ENTMCNC: 33.1 G/DL (ref 32–36)
MCHC RBC AUTO-ENTMCNC: 33.2 G/DL (ref 32–36)
MCHC RBC AUTO-ENTMCNC: 33.3 G/DL (ref 32–36)
MCHC RBC AUTO-ENTMCNC: 33.3 G/DL (ref 32–36)
MCHC RBC AUTO-ENTMCNC: 33.6 G/DL (ref 32–36)
MCV RBC AUTO: 90.9 FL (ref 80–99)
MCV RBC AUTO: 91.8 FL (ref 80–99)
MCV RBC AUTO: 92.4 FL (ref 80–99)
MCV RBC AUTO: 92.7 FL (ref 80–99)
MCV RBC AUTO: 92.8 FL (ref 80–99)
MCV RBC AUTO: 93.2 FL (ref 80–99)
MCV RBC AUTO: 93.3 FL (ref 80–99)
MCV RBC AUTO: 93.5 FL (ref 80–99)
MCV RBC AUTO: 93.6 FL (ref 80–99)
MCV RBC AUTO: 93.8 FL (ref 80–99)
MCV RBC AUTO: 94.6 FL (ref 80–99)
MCV RBC AUTO: 94.8 FL (ref 80–99)
MCV RBC AUTO: 95.1 FL (ref 80–99)
METHGB BLD QL: 0.4 % (ref 0–1.5)
METHGB BLD QL: 0.5 % (ref 0–1.5)
METHGB BLD QL: 0.6 % (ref 0–1.5)
METHGB BLD QL: 0.7 % (ref 0–1.5)
METHGB BLD QL: 0.7 % (ref 0–1.5)
METHGB BLD QL: 0.8 % (ref 0–1.5)
METHGB BLD QL: 0.8 % (ref 0–1.5)
METHGB BLD QL: 0.9 % (ref 0–1.5)
METHGB BLD QL: 1.1 % (ref 0–1.5)
METHGB BLD QL: 1.1 % (ref 0–1.5)
MODALITY: ABNORMAL
MONOCYTES # BLD AUTO: 0.38 10*3/MM3 (ref 0–1)
MONOCYTES # BLD AUTO: 0.45 10*3/MM3 (ref 0–1)
MONOCYTES # BLD AUTO: 0.61 10*3/MM3 (ref 0–1)
MONOCYTES # BLD AUTO: 0.62 10*3/MM3 (ref 0–1)
MONOCYTES # BLD AUTO: 0.62 10*3/MM3 (ref 0–1)
MONOCYTES # BLD AUTO: 0.64 10*3/MM3 (ref 0–1)
MONOCYTES # BLD AUTO: 0.72 10*3/MM3 (ref 0–1)
MONOCYTES # BLD AUTO: 0.77 10*3/MM3 (ref 0–1)
MONOCYTES # BLD AUTO: 0.77 10*3/MM3 (ref 0–1)
MONOCYTES # BLD AUTO: 0.92 10*3/MM3 (ref 0–1)
MONOCYTES # BLD AUTO: 1.05 10*3/MM3 (ref 0–1)
MONOCYTES # BLD AUTO: 1.3 10*3/MM3 (ref 0–1)
MONOCYTES # BLD AUTO: 1.72 10*3/MM3 (ref 0–1)
MONOCYTES NFR BLD AUTO: 11 % (ref 0–12)
MONOCYTES NFR BLD AUTO: 2.7 % (ref 0–12)
MONOCYTES NFR BLD AUTO: 4.3 % (ref 0–12)
MONOCYTES NFR BLD AUTO: 5 % (ref 0–12)
MONOCYTES NFR BLD AUTO: 5.2 % (ref 0–12)
MONOCYTES NFR BLD AUTO: 5.6 % (ref 0–12)
MONOCYTES NFR BLD AUTO: 6.9 % (ref 0–12)
MONOCYTES NFR BLD AUTO: 7 % (ref 0–12)
MONOCYTES NFR BLD AUTO: 8.2 % (ref 0–12)
MONOCYTES NFR BLD AUTO: 8.4 % (ref 0–12)
MONOCYTES NFR BLD AUTO: 9.5 % (ref 0–12)
NEUTROPHILS # BLD AUTO: 10.77 10*3/MM3 (ref 1.5–8.3)
NEUTROPHILS # BLD AUTO: 11.5 10*3/MM3 (ref 1.5–8.3)
NEUTROPHILS # BLD AUTO: 11.53 10*3/MM3 (ref 1.5–8.3)
NEUTROPHILS # BLD AUTO: 11.66 10*3/MM3 (ref 1.5–8.3)
NEUTROPHILS # BLD AUTO: 12.05 10*3/MM3 (ref 1.5–8.3)
NEUTROPHILS # BLD AUTO: 12.26 10*3/MM3 (ref 1.5–8.3)
NEUTROPHILS # BLD AUTO: 13.74 10*3/MM3 (ref 1.5–8.3)
NEUTROPHILS # BLD AUTO: 14.26 10*3/MM3 (ref 1.5–8.3)
NEUTROPHILS # BLD AUTO: 16.75 10*3/MM3 (ref 1.5–8.3)
NEUTROPHILS # BLD AUTO: 6.55 10*3/MM3 (ref 1.5–8.3)
NEUTROPHILS # BLD AUTO: 6.74 10*3/MM3 (ref 1.5–8.3)
NEUTROPHILS # BLD AUTO: 9.64 10*3/MM3 (ref 1.5–8.3)
NEUTROPHILS # BLD AUTO: 9.99 10*3/MM3 (ref 1.5–8.3)
NEUTROPHILS NFR BLD AUTO: 76.1 % (ref 41–71)
NEUTROPHILS NFR BLD AUTO: 77 % (ref 41–71)
NEUTROPHILS NFR BLD AUTO: 84.3 % (ref 41–71)
NEUTROPHILS NFR BLD AUTO: 84.8 % (ref 41–71)
NEUTROPHILS NFR BLD AUTO: 86.1 % (ref 41–71)
NEUTROPHILS NFR BLD AUTO: 86.7 % (ref 41–71)
NEUTROPHILS NFR BLD AUTO: 87.4 % (ref 41–71)
NEUTROPHILS NFR BLD AUTO: 88.8 % (ref 41–71)
NEUTROPHILS NFR BLD AUTO: 89.4 % (ref 41–71)
NEUTROPHILS NFR BLD AUTO: 91.9 % (ref 41–71)
NEUTROPHILS NFR BLD AUTO: 92.4 % (ref 41–71)
NEUTROPHILS NFR BLD AUTO: 92.8 % (ref 41–71)
NEUTROPHILS NFR BLD AUTO: 93.2 % (ref 41–71)
NITRITE UR QL STRIP: NEGATIVE
OXYHGB MFR BLDV: 85.7 % (ref 94–99)
OXYHGB MFR BLDV: 86.1 % (ref 94–99)
OXYHGB MFR BLDV: 89.3 % (ref 94–99)
OXYHGB MFR BLDV: 89.5 % (ref 94–99)
OXYHGB MFR BLDV: 90.1 % (ref 94–99)
OXYHGB MFR BLDV: 90.1 % (ref 94–99)
OXYHGB MFR BLDV: 92.2 % (ref 94–99)
OXYHGB MFR BLDV: 92.8 % (ref 94–99)
OXYHGB MFR BLDV: 96.7 % (ref 94–99)
OXYHGB MFR BLDV: 97 % (ref 94–99)
OXYHGB MFR BLDV: 98 % (ref 94–99)
OXYHGB MFR BLDV: 98.2 % (ref 94–99)
PCO2 BLDA: 26.4 MM HG (ref 35–45)
PCO2 BLDA: 26.8 MM HG (ref 35–45)
PCO2 BLDA: 28.3 MM HG (ref 35–45)
PCO2 BLDA: 29.8 MM HG (ref 35–45)
PCO2 BLDA: 30.1 MM HG (ref 35–45)
PCO2 BLDA: 30.9 MM HG (ref 35–45)
PCO2 BLDA: 31.6 MM HG (ref 35–45)
PCO2 BLDA: 33.8 MM HG (ref 35–45)
PCO2 BLDA: 33.8 MM HG (ref 35–45)
PCO2 BLDA: 34.1 MM HG (ref 35–45)
PCO2 BLDA: 40.2 MM HG (ref 35–45)
PCO2 BLDA: 43 MM HG (ref 34–46)
PCO2 BLDA: 48.7 MM HG (ref 34–46)
PCO2 BLDA: 53.2 MM HG (ref 35–45)
PF4 HEPARIN CMPLX AB SER-ACNC: 0.33 OD (ref 0–0.4)
PH BLDA: 7.22 PH UNITS (ref 7.34–7.46)
PH BLDA: 7.33 PH UNITS (ref 7.35–7.45)
PH BLDA: 7.41 PH UNITS (ref 7.34–7.46)
PH BLDA: 7.41 PH UNITS (ref 7.35–7.45)
PH BLDA: 7.43 PH UNITS (ref 7.35–7.45)
PH BLDA: 7.44 PH UNITS (ref 7.35–7.45)
PH BLDA: 7.45 PH UNITS (ref 7.35–7.45)
PH BLDA: 7.46 PH UNITS (ref 7.35–7.45)
PH BLDA: 7.49 PH UNITS (ref 7.35–7.45)
PH BLDA: 7.52 PH UNITS (ref 7.35–7.45)
PH BLDA: 7.52 PH UNITS (ref 7.35–7.45)
PH BLDA: 7.53 PH UNITS (ref 7.35–7.45)
PH UR STRIP.AUTO: <=5 [PH] (ref 5–8)
PHOSPHATE SERPL-MCNC: 2.4 MG/DL (ref 2.4–5.1)
PHOSPHATE SERPL-MCNC: 2.6 MG/DL (ref 2.4–5.1)
PHOSPHATE SERPL-MCNC: 2.8 MG/DL (ref 2.4–5.1)
PHOSPHATE SERPL-MCNC: 2.8 MG/DL (ref 2.4–5.1)
PHOSPHATE SERPL-MCNC: 3.3 MG/DL (ref 2.4–5.1)
PHOSPHATE SERPL-MCNC: 3.3 MG/DL (ref 2.4–5.1)
PHOSPHATE SERPL-MCNC: 3.4 MG/DL (ref 2.4–5.1)
PHOSPHATE SERPL-MCNC: 3.6 MG/DL (ref 2.4–5.1)
PHOSPHATE SERPL-MCNC: 3.9 MG/DL (ref 2.4–5.1)
PHOSPHATE SERPL-MCNC: 4.4 MG/DL (ref 2.4–5.1)
PLAT MORPH BLD: NORMAL
PLATELET # BLD AUTO: 113 10*3/MM3 (ref 150–450)
PLATELET # BLD AUTO: 130 10*3/MM3 (ref 150–450)
PLATELET # BLD AUTO: 146 10*3/MM3 (ref 150–450)
PLATELET # BLD AUTO: 235 10*3/MM3 (ref 150–450)
PLATELET # BLD AUTO: 238 10*3/MM3 (ref 150–450)
PLATELET # BLD AUTO: 241 10*3/MM3 (ref 150–450)
PLATELET # BLD AUTO: 258 10*3/MM3 (ref 150–450)
PLATELET # BLD AUTO: 260 10*3/MM3 (ref 150–450)
PLATELET # BLD AUTO: 289 10*3/MM3 (ref 150–450)
PLATELET # BLD AUTO: 304 10*3/MM3 (ref 150–450)
PLATELET # BLD AUTO: 307 10*3/MM3 (ref 150–450)
PLATELET # BLD AUTO: 329 10*3/MM3 (ref 150–450)
PLATELET # BLD AUTO: 79 10*3/MM3 (ref 150–450)
PLATELET # BLD AUTO: 88 10*3/MM3 (ref 150–450)
PLATELET # BLD AUTO: 95 10*3/MM3 (ref 150–450)
PMV BLD AUTO: 10.1 FL (ref 6–12)
PMV BLD AUTO: 10.1 FL (ref 6–12)
PMV BLD AUTO: 10.8 FL (ref 6–12)
PMV BLD AUTO: 11.3 FL (ref 6–12)
PMV BLD AUTO: 12.8 FL (ref 6–12)
PMV BLD AUTO: 8.9 FL (ref 6–12)
PMV BLD AUTO: 9 FL (ref 6–12)
PMV BLD AUTO: 9.1 FL (ref 6–12)
PMV BLD AUTO: 9.5 FL (ref 6–12)
PMV BLD AUTO: 9.5 FL (ref 6–12)
PMV BLD AUTO: 9.6 FL (ref 6–12)
PMV BLD AUTO: 9.8 FL (ref 6–12)
PMV BLD AUTO: 9.9 FL (ref 6–12)
PO2 BLDA: 102 MM HG (ref 83–108)
PO2 BLDA: 106 MM HG (ref 83–108)
PO2 BLDA: 220 MM HG (ref 83–108)
PO2 BLDA: 411.6 MMHG (ref 79–99)
PO2 BLDA: 452.5 MMHG (ref 79–99)
PO2 BLDA: 476 MM HG (ref 83–108)
PO2 BLDA: 49.2 MM HG (ref 83–108)
PO2 BLDA: 50.9 MM HG (ref 83–108)
PO2 BLDA: 57.1 MM HG (ref 83–108)
PO2 BLDA: 57.3 MM HG (ref 83–108)
PO2 BLDA: 58.6 MM HG (ref 83–108)
PO2 BLDA: 61.1 MM HG (ref 83–108)
PO2 BLDA: 66 MM HG (ref 83–108)
PO2 BLDA: 69.2 MM HG (ref 83–108)
POTASSIUM BLD-SCNC: 3.3 MMOL/L (ref 3.5–5.5)
POTASSIUM BLD-SCNC: 3.4 MMOL/L (ref 3.5–5.5)
POTASSIUM BLD-SCNC: 3.4 MMOL/L (ref 3.5–5.5)
POTASSIUM BLD-SCNC: 3.7 MMOL/L (ref 3.5–5.5)
POTASSIUM BLD-SCNC: 3.8 MMOL/L (ref 3.5–5.5)
POTASSIUM BLD-SCNC: 3.9 MMOL/L (ref 3.5–5.5)
POTASSIUM BLD-SCNC: 4 MMOL/L (ref 3.5–5.5)
POTASSIUM BLD-SCNC: 4.1 MMOL/L (ref 3.5–5.5)
POTASSIUM BLD-SCNC: 4.2 MMOL/L (ref 3.5–5.5)
POTASSIUM BLD-SCNC: 4.2 MMOL/L (ref 3.5–5.5)
POTASSIUM BLD-SCNC: 4.3 MMOL/L (ref 3.5–5.5)
POTASSIUM BLD-SCNC: 4.7 MMOL/L (ref 3.5–5.5)
POTASSIUM BLDA-SCNC: 3.51 MMOL/L (ref 3.5–5.3)
POTASSIUM BLDA-SCNC: 3.96 MMOL/L (ref 3.5–5.3)
PREALB SERPL-MCNC: <5 MG/DL (ref 10–40)
PROT SERPL-MCNC: 4 G/DL (ref 5.7–8.2)
PROT SERPL-MCNC: 4.4 G/DL (ref 5.7–8.2)
PROT SERPL-MCNC: 4.7 G/DL (ref 5.7–8.2)
PROT SERPL-MCNC: 5 G/DL (ref 5.7–8.2)
PROT SERPL-MCNC: 5.2 G/DL (ref 5.7–8.2)
PROT SERPL-MCNC: 5.3 G/DL (ref 5.7–8.2)
PROT SERPL-MCNC: 5.4 G/DL (ref 5.7–8.2)
PROT SERPL-MCNC: 5.5 G/DL (ref 5.7–8.2)
PROT SERPL-MCNC: 6.3 G/DL (ref 5.7–8.2)
PROT SERPL-MCNC: 6.4 G/DL (ref 5.7–8.2)
PROT SERPL-MCNC: 6.6 G/DL (ref 5.7–8.2)
PROT UR QL STRIP: NEGATIVE
PROTHROMBIN TIME: 9.9 SECONDS (ref 9.6–11.5)
RBC # BLD AUTO: 2.44 10*6/MM3 (ref 3.89–5.14)
RBC # BLD AUTO: 2.89 10*6/MM3 (ref 3.89–5.14)
RBC # BLD AUTO: 3.14 10*6/MM3 (ref 3.89–5.14)
RBC # BLD AUTO: 3.22 10*6/MM3 (ref 3.89–5.14)
RBC # BLD AUTO: 3.26 10*6/MM3 (ref 3.89–5.14)
RBC # BLD AUTO: 3.39 10*6/MM3 (ref 3.89–5.14)
RBC # BLD AUTO: 3.4 10*6/MM3 (ref 3.89–5.14)
RBC # BLD AUTO: 3.66 10*6/MM3 (ref 3.89–5.14)
RBC # BLD AUTO: 3.68 10*6/MM3 (ref 3.89–5.14)
RBC # BLD AUTO: 3.71 10*6/MM3 (ref 3.89–5.14)
RBC # BLD AUTO: 3.87 10*6/MM3 (ref 3.89–5.14)
RBC # BLD AUTO: 3.92 10*6/MM3 (ref 3.89–5.14)
RBC # BLD AUTO: 4.06 10*6/MM3 (ref 3.89–5.14)
RBC # BLD AUTO: 4.13 10*6/MM3 (ref 3.89–5.14)
RBC # BLD AUTO: 4.24 10*6/MM3 (ref 3.89–5.14)
RBC # UR: ABNORMAL /HPF
RBC MORPH BLD: NORMAL
REF LAB TEST METHOD: ABNORMAL
RH BLD: POSITIVE
RH BLD: POSITIVE
SAO2 % BLD: 99.5 % (ref 92–98)
SAO2 % BLD: 99.5 % (ref 92–98)
SAO2 % BLDCOA: 92.2 %
SAO2 % BLDCOA: 92.8 %
SAO2 % BLDCOA: 94 %
SODIUM BLD-SCNC: 136 MMOL/L (ref 132–146)
SODIUM BLD-SCNC: 139 MMOL/L (ref 132–146)
SODIUM BLD-SCNC: 140 MMOL/L (ref 132–146)
SODIUM BLD-SCNC: 140 MMOL/L (ref 132–146)
SODIUM BLD-SCNC: 141 MMOL/L (ref 132–146)
SODIUM BLD-SCNC: 143 MMOL/L (ref 132–146)
SODIUM BLD-SCNC: 144 MMOL/L (ref 132–146)
SODIUM BLD-SCNC: 145 MMOL/L (ref 132–146)
SODIUM BLD-SCNC: 146 MMOL/L (ref 132–146)
SODIUM BLD-SCNC: 148 MMOL/L (ref 132–146)
SODIUM BLD-SCNC: 148 MMOL/L (ref 132–146)
SODIUM BLD-SCNC: 149 MMOL/L (ref 132–146)
SODIUM BLD-SCNC: 149 MMOL/L (ref 132–146)
SODIUM BLD-SCNC: 151 MMOL/L (ref 132–146)
SODIUM BLD-SCNC: 152 MMOL/L (ref 132–146)
SODIUM BLDA-SCNC: 142.5 MMOL/L (ref 134–146)
SODIUM BLDA-SCNC: 147.1 MMOL/L (ref 134–146)
SODIUM UR-SCNC: 107 MMOL/L (ref 30–90)
SP GR UR STRIP: 1.02 (ref 1–1.03)
SQUAMOUS #/AREA URNS HPF: ABNORMAL /HPF
TRIGL SERPL-MCNC: 109 MG/DL (ref 0–150)
TRIGL SERPL-MCNC: 76 MG/DL (ref 0–150)
TROPONIN I SERPL-MCNC: 0.08 NG/ML
UNIT  ABO: NORMAL
UNIT  ABO: NORMAL
UNIT  RH: NORMAL
UNIT  RH: NORMAL
UROBILINOGEN UR QL STRIP: ABNORMAL
VANCOMYCIN SERPL-MCNC: 10.2 MCG/ML (ref 5–40)
WBC MORPH BLD: NORMAL
WBC NRBC COR # BLD: 10.49 10*3/MM3 (ref 3.5–10.8)
WBC NRBC COR # BLD: 11.78 10*3/MM3 (ref 3.5–10.8)
WBC NRBC COR # BLD: 12.51 10*3/MM3 (ref 3.5–10.8)
WBC NRBC COR # BLD: 12.9 10*3/MM3 (ref 3.5–10.8)
WBC NRBC COR # BLD: 13.33 10*3/MM3 (ref 3.5–10.8)
WBC NRBC COR # BLD: 13.66 10*3/MM3 (ref 3.5–10.8)
WBC NRBC COR # BLD: 13.83 10*3/MM3 (ref 3.5–10.8)
WBC NRBC COR # BLD: 14.88 10*3/MM3 (ref 3.5–10.8)
WBC NRBC COR # BLD: 15.36 10*3/MM3 (ref 3.5–10.8)
WBC NRBC COR # BLD: 15.63 10*3/MM3 (ref 3.5–10.8)
WBC NRBC COR # BLD: 15.98 10*3/MM3 (ref 3.5–10.8)
WBC NRBC COR # BLD: 16.21 10*3/MM3 (ref 3.5–10.8)
WBC NRBC COR # BLD: 17.96 10*3/MM3 (ref 3.5–10.8)
WBC NRBC COR # BLD: 7.56 10*3/MM3 (ref 3.5–10.8)
WBC NRBC COR # BLD: 8.86 10*3/MM3 (ref 3.5–10.8)
WBC UR QL AUTO: ABNORMAL /HPF

## 2018-01-01 PROCEDURE — 25010000002 HYDROMORPHONE PER 4 MG: Performed by: NEUROLOGICAL SURGERY

## 2018-01-01 PROCEDURE — 82962 GLUCOSE BLOOD TEST: CPT

## 2018-01-01 PROCEDURE — 92610 EVALUATE SWALLOWING FUNCTION: CPT

## 2018-01-01 PROCEDURE — 0DH60UZ INSERTION OF FEEDING DEVICE INTO STOMACH, OPEN APPROACH: ICD-10-PCS | Performed by: SURGERY

## 2018-01-01 PROCEDURE — 80053 COMPREHEN METABOLIC PANEL: CPT | Performed by: INTERNAL MEDICINE

## 2018-01-01 PROCEDURE — 25010000002 MAGNESIUM SULFATE PER 500 MG OF MAGNESIUM: Performed by: INTERNAL MEDICINE

## 2018-01-01 PROCEDURE — 80202 ASSAY OF VANCOMYCIN: CPT

## 2018-01-01 PROCEDURE — 25010000002 CALCIUM GLUCONATE PER 10 ML: Performed by: INTERNAL MEDICINE

## 2018-01-01 PROCEDURE — 25010000002 MEROPENEM: Performed by: INTERNAL MEDICINE

## 2018-01-01 PROCEDURE — 80048 BASIC METABOLIC PNL TOTAL CA: CPT | Performed by: NEUROLOGICAL SURGERY

## 2018-01-01 PROCEDURE — 94799 UNLISTED PULMONARY SVC/PX: CPT

## 2018-01-01 PROCEDURE — 84295 ASSAY OF SERUM SODIUM: CPT | Performed by: SURGERY

## 2018-01-01 PROCEDURE — 94660 CPAP INITIATION&MGMT: CPT

## 2018-01-01 PROCEDURE — 99233 SBSQ HOSP IP/OBS HIGH 50: CPT | Performed by: INTERNAL MEDICINE

## 2018-01-01 PROCEDURE — 85025 COMPLETE CBC W/AUTO DIFF WBC: CPT | Performed by: INTERNAL MEDICINE

## 2018-01-01 PROCEDURE — 25010000002 MEROPENEM

## 2018-01-01 PROCEDURE — 84478 ASSAY OF TRIGLYCERIDES: CPT | Performed by: INTERNAL MEDICINE

## 2018-01-01 PROCEDURE — 85025 COMPLETE CBC W/AUTO DIFF WBC: CPT | Performed by: NEUROLOGICAL SURGERY

## 2018-01-01 PROCEDURE — 82805 BLOOD GASES W/O2 SATURATION: CPT | Performed by: INTERNAL MEDICINE

## 2018-01-01 PROCEDURE — 99283 EMERGENCY DEPT VISIT LOW MDM: CPT

## 2018-01-01 PROCEDURE — 25010000002 DEXAMETHASONE PER 1 MG: Performed by: NEUROLOGICAL SURGERY

## 2018-01-01 PROCEDURE — 74018 RADEX ABDOMEN 1 VIEW: CPT

## 2018-01-01 PROCEDURE — 25010000002 MAGNESIUM SULFATE PER 500 MG OF MAGNESIUM

## 2018-01-01 PROCEDURE — 84100 ASSAY OF PHOSPHORUS: CPT | Performed by: INTERNAL MEDICINE

## 2018-01-01 PROCEDURE — 71045 X-RAY EXAM CHEST 1 VIEW: CPT

## 2018-01-01 PROCEDURE — 92612 ENDOSCOPY SWALLOW (FEES) VID: CPT

## 2018-01-01 PROCEDURE — B548ZZA ULTRASONOGRAPHY OF SUPERIOR VENA CAVA, GUIDANCE: ICD-10-PCS | Performed by: INTERNAL MEDICINE

## 2018-01-01 PROCEDURE — 94640 AIRWAY INHALATION TREATMENT: CPT

## 2018-01-01 PROCEDURE — 86900 BLOOD TYPING SEROLOGIC ABO: CPT

## 2018-01-01 PROCEDURE — 74176 CT ABD & PELVIS W/O CONTRAST: CPT

## 2018-01-01 PROCEDURE — 86901 BLOOD TYPING SEROLOGIC RH(D): CPT | Performed by: INTERNAL MEDICINE

## 2018-01-01 PROCEDURE — 25010000002 PROPOFOL 1000 MG/ML EMULSION: Performed by: NEUROLOGICAL SURGERY

## 2018-01-01 PROCEDURE — 25010000002 POTASSIUM CHLORIDE PER 2 MEQ OF POTASSIUM: Performed by: INTERNAL MEDICINE

## 2018-01-01 PROCEDURE — 94003 VENT MGMT INPAT SUBQ DAY: CPT

## 2018-01-01 PROCEDURE — 85025 COMPLETE CBC W/AUTO DIFF WBC: CPT | Performed by: NURSE PRACTITIONER

## 2018-01-01 PROCEDURE — P9016 RBC LEUKOCYTES REDUCED: HCPCS

## 2018-01-01 PROCEDURE — 99238 HOSP IP/OBS DSCHRG MGMT 30/<: CPT | Performed by: INTERNAL MEDICINE

## 2018-01-01 PROCEDURE — 97530 THERAPEUTIC ACTIVITIES: CPT

## 2018-01-01 PROCEDURE — 63710000001 INSULIN DETEMIR PER 5 UNITS: Performed by: INTERNAL MEDICINE

## 2018-01-01 PROCEDURE — 25810000003 POTASSIUM CHLORIDE PER 2 MEQ: Performed by: NEUROLOGICAL SURGERY

## 2018-01-01 PROCEDURE — 83735 ASSAY OF MAGNESIUM: CPT | Performed by: INTERNAL MEDICINE

## 2018-01-01 PROCEDURE — 83880 ASSAY OF NATRIURETIC PEPTIDE: CPT | Performed by: INTERNAL MEDICINE

## 2018-01-01 PROCEDURE — C1713 ANCHOR/SCREW BN/BN,TIS/BN: HCPCS | Performed by: NEUROLOGICAL SURGERY

## 2018-01-01 PROCEDURE — 94002 VENT MGMT INPAT INIT DAY: CPT

## 2018-01-01 PROCEDURE — 85610 PROTHROMBIN TIME: CPT | Performed by: INTERNAL MEDICINE

## 2018-01-01 PROCEDURE — 84295 ASSAY OF SERUM SODIUM: CPT | Performed by: ANESTHESIOLOGY

## 2018-01-01 PROCEDURE — 84132 ASSAY OF SERUM POTASSIUM: CPT | Performed by: SURGERY

## 2018-01-01 PROCEDURE — 86901 BLOOD TYPING SEROLOGIC RH(D): CPT | Performed by: NEUROLOGICAL SURGERY

## 2018-01-01 PROCEDURE — 70450 CT HEAD/BRAIN W/O DYE: CPT

## 2018-01-01 PROCEDURE — 94760 N-INVAS EAR/PLS OXIMETRY 1: CPT

## 2018-01-01 PROCEDURE — 25010000002 PROPOFOL 10 MG/ML EMULSION: Performed by: ANESTHESIOLOGY

## 2018-01-01 PROCEDURE — 63710000001 INSULIN REGULAR HUMAN PER 5 UNITS: Performed by: INTERNAL MEDICINE

## 2018-01-01 PROCEDURE — 82805 BLOOD GASES W/O2 SATURATION: CPT | Performed by: NURSE PRACTITIONER

## 2018-01-01 PROCEDURE — 25010000002 LORAZEPAM PER 2 MG: Performed by: NURSE PRACTITIONER

## 2018-01-01 PROCEDURE — 86900 BLOOD TYPING SEROLOGIC ABO: CPT | Performed by: INTERNAL MEDICINE

## 2018-01-01 PROCEDURE — 25010000002 PROPOFOL 10 MG/ML EMULSION: Performed by: NURSE ANESTHETIST, CERTIFIED REGISTERED

## 2018-01-01 PROCEDURE — 25010000002 HALOPERIDOL LACTATE PER 5 MG: Performed by: INTERNAL MEDICINE

## 2018-01-01 PROCEDURE — 25010000002 VANCOMYCIN PER 500 MG

## 2018-01-01 PROCEDURE — 25010000002 MIDAZOLAM PER 1 MG

## 2018-01-01 PROCEDURE — 25010000003 POTASSIUM CHLORIDE PER 2 MEQ: Performed by: NURSE PRACTITIONER

## 2018-01-01 PROCEDURE — 5A1945Z RESPIRATORY VENTILATION, 24-96 CONSECUTIVE HOURS: ICD-10-PCS | Performed by: INTERNAL MEDICINE

## 2018-01-01 PROCEDURE — 25010000002 FENTANYL CITRATE (PF) 100 MCG/2ML SOLUTION

## 2018-01-01 PROCEDURE — 25010000002 FUROSEMIDE PER 20 MG: Performed by: NEUROLOGICAL SURGERY

## 2018-01-01 PROCEDURE — 82435 ASSAY OF BLOOD CHLORIDE: CPT | Performed by: SURGERY

## 2018-01-01 PROCEDURE — 31500 INSERT EMERGENCY AIRWAY: CPT

## 2018-01-01 PROCEDURE — 25010000002 CEFTRIAXONE PER 250 MG: Performed by: ANESTHESIOLOGY

## 2018-01-01 PROCEDURE — 25010000002 MEROPENEM: Performed by: SURGERY

## 2018-01-01 PROCEDURE — 83605 ASSAY OF LACTIC ACID: CPT | Performed by: NURSE PRACTITIONER

## 2018-01-01 PROCEDURE — 82805 BLOOD GASES W/O2 SATURATION: CPT | Performed by: ANESTHESIOLOGY

## 2018-01-01 PROCEDURE — 87040 BLOOD CULTURE FOR BACTERIA: CPT | Performed by: INTERNAL MEDICINE

## 2018-01-01 PROCEDURE — 0BH17EZ INSERTION OF ENDOTRACHEAL AIRWAY INTO TRACHEA, VIA NATURAL OR ARTIFICIAL OPENING: ICD-10-PCS | Performed by: INTERNAL MEDICINE

## 2018-01-01 PROCEDURE — 36430 TRANSFUSION BLD/BLD COMPNT: CPT

## 2018-01-01 PROCEDURE — 86140 C-REACTIVE PROTEIN: CPT | Performed by: INTERNAL MEDICINE

## 2018-01-01 PROCEDURE — 82435 ASSAY OF BLOOD CHLORIDE: CPT | Performed by: ANESTHESIOLOGY

## 2018-01-01 PROCEDURE — 92507 TX SP LANG VOICE COMM INDIV: CPT

## 2018-01-01 PROCEDURE — 25010000002 CEFTRIAXONE PER 250 MG: Performed by: INTERNAL MEDICINE

## 2018-01-01 PROCEDURE — 84484 ASSAY OF TROPONIN QUANT: CPT | Performed by: INTERNAL MEDICINE

## 2018-01-01 PROCEDURE — 25010000002 DEXAMETHASONE PER 1 MG: Performed by: NURSE ANESTHETIST, CERTIFIED REGISTERED

## 2018-01-01 PROCEDURE — 84132 ASSAY OF SERUM POTASSIUM: CPT | Performed by: ANESTHESIOLOGY

## 2018-01-01 PROCEDURE — 93005 ELECTROCARDIOGRAM TRACING: CPT | Performed by: NURSE PRACTITIONER

## 2018-01-01 PROCEDURE — 97110 THERAPEUTIC EXERCISES: CPT

## 2018-01-01 PROCEDURE — 99284 EMERGENCY DEPT VISIT MOD MDM: CPT

## 2018-01-01 PROCEDURE — 97116 GAIT TRAINING THERAPY: CPT

## 2018-01-01 PROCEDURE — 87077 CULTURE AEROBIC IDENTIFY: CPT | Performed by: INTERNAL MEDICINE

## 2018-01-01 PROCEDURE — 25010000002 HYDROMORPHONE PER 4 MG: Performed by: NURSE PRACTITIONER

## 2018-01-01 PROCEDURE — 25010000002 HYDRALAZINE PER 20 MG: Performed by: PHYSICIAN ASSISTANT

## 2018-01-01 PROCEDURE — P9046 ALBUMIN (HUMAN), 25%, 20 ML: HCPCS | Performed by: NURSE PRACTITIONER

## 2018-01-01 PROCEDURE — 82330 ASSAY OF CALCIUM: CPT | Performed by: ANESTHESIOLOGY

## 2018-01-01 PROCEDURE — 25010000002 CALCIUM GLUCONATE PER 10 ML

## 2018-01-01 PROCEDURE — 97163 PT EVAL HIGH COMPLEX 45 MIN: CPT

## 2018-01-01 PROCEDURE — 25010000002 MORPHINE SULFATE (PF) 2 MG/ML SOLUTION: Performed by: NURSE PRACTITIONER

## 2018-01-01 PROCEDURE — 63710000001 DEXAMETHASONE PER 0.25 MG: Performed by: NEUROLOGICAL SURGERY

## 2018-01-01 PROCEDURE — 86923 COMPATIBILITY TEST ELECTRIC: CPT

## 2018-01-01 PROCEDURE — 80053 COMPREHEN METABOLIC PANEL: CPT | Performed by: NURSE PRACTITIONER

## 2018-01-01 PROCEDURE — 87186 SC STD MICRODIL/AGAR DIL: CPT | Performed by: INTERNAL MEDICINE

## 2018-01-01 PROCEDURE — 25010000002 ALBUMIN HUMAN 5% PER 50 ML: Performed by: NURSE PRACTITIONER

## 2018-01-01 PROCEDURE — 25010000002 DEXAMETHASONE PER 1 MG

## 2018-01-01 PROCEDURE — 0DH63UZ INSERTION OF FEEDING DEVICE INTO STOMACH, PERCUTANEOUS APPROACH: ICD-10-PCS | Performed by: SURGERY

## 2018-01-01 PROCEDURE — 86850 RBC ANTIBODY SCREEN: CPT | Performed by: INTERNAL MEDICINE

## 2018-01-01 PROCEDURE — 86850 RBC ANTIBODY SCREEN: CPT | Performed by: NEUROLOGICAL SURGERY

## 2018-01-01 PROCEDURE — 86900 BLOOD TYPING SEROLOGIC ABO: CPT | Performed by: NEUROLOGICAL SURGERY

## 2018-01-01 PROCEDURE — 99291 CRITICAL CARE FIRST HOUR: CPT | Performed by: NURSE PRACTITIONER

## 2018-01-01 PROCEDURE — 25010000002 FENTANYL CITRATE (PF) 100 MCG/2ML SOLUTION: Performed by: NURSE ANESTHETIST, CERTIFIED REGISTERED

## 2018-01-01 PROCEDURE — 81001 URINALYSIS AUTO W/SCOPE: CPT | Performed by: INTERNAL MEDICINE

## 2018-01-01 PROCEDURE — 85018 HEMOGLOBIN: CPT | Performed by: ANESTHESIOLOGY

## 2018-01-01 PROCEDURE — 25010000002 MORPHINE PER 10 MG: Performed by: NURSE PRACTITIONER

## 2018-01-01 PROCEDURE — 36600 WITHDRAWAL OF ARTERIAL BLOOD: CPT | Performed by: NURSE PRACTITIONER

## 2018-01-01 PROCEDURE — 25010000003 POTASSIUM CHLORIDE PER 2 MEQ: Performed by: INTERNAL MEDICINE

## 2018-01-01 PROCEDURE — 86022 PLATELET ANTIBODIES: CPT | Performed by: INTERNAL MEDICINE

## 2018-01-01 PROCEDURE — 99291 CRITICAL CARE FIRST HOUR: CPT | Performed by: INTERNAL MEDICINE

## 2018-01-01 PROCEDURE — 99232 SBSQ HOSP IP/OBS MODERATE 35: CPT | Performed by: INTERNAL MEDICINE

## 2018-01-01 PROCEDURE — 83605 ASSAY OF LACTIC ACID: CPT | Performed by: INTERNAL MEDICINE

## 2018-01-01 PROCEDURE — 25010000002 HYDROMORPHONE HCL-NACL 30-0.9 MG/30ML-% SOLUTION PREFILLED SYRINGE: Performed by: NURSE PRACTITIONER

## 2018-01-01 PROCEDURE — 25010000002 ONDANSETRON PER 1 MG: Performed by: NEUROLOGICAL SURGERY

## 2018-01-01 PROCEDURE — 87086 URINE CULTURE/COLONY COUNT: CPT | Performed by: INTERNAL MEDICINE

## 2018-01-01 PROCEDURE — 36600 WITHDRAWAL OF ARTERIAL BLOOD: CPT | Performed by: INTERNAL MEDICINE

## 2018-01-01 PROCEDURE — P9041 ALBUMIN (HUMAN),5%, 50ML: HCPCS | Performed by: NURSE PRACTITIONER

## 2018-01-01 PROCEDURE — 25010000002 VANCOMYCIN PER 500 MG: Performed by: NEUROLOGICAL SURGERY

## 2018-01-01 PROCEDURE — 82805 BLOOD GASES W/O2 SATURATION: CPT | Performed by: NEUROLOGICAL SURGERY

## 2018-01-01 PROCEDURE — 61313 CRNEC/CRNOT STTL ICERE: CPT | Performed by: NEUROLOGICAL SURGERY

## 2018-01-01 PROCEDURE — 25010000002 PROMETHAZINE PER 50 MG: Performed by: NEUROLOGICAL SURGERY

## 2018-01-01 PROCEDURE — 93010 ELECTROCARDIOGRAM REPORT: CPT | Performed by: INTERNAL MEDICINE

## 2018-01-01 PROCEDURE — 25010000002 PROPOFOL 10 MG/ML EMULSION

## 2018-01-01 PROCEDURE — 82330 ASSAY OF CALCIUM: CPT | Performed by: SURGERY

## 2018-01-01 PROCEDURE — 93005 ELECTROCARDIOGRAM TRACING: CPT | Performed by: INTERNAL MEDICINE

## 2018-01-01 PROCEDURE — 83735 ASSAY OF MAGNESIUM: CPT | Performed by: NURSE PRACTITIONER

## 2018-01-01 PROCEDURE — 85379 FIBRIN DEGRADATION QUANT: CPT | Performed by: INTERNAL MEDICINE

## 2018-01-01 PROCEDURE — 82805 BLOOD GASES W/O2 SATURATION: CPT | Performed by: SURGERY

## 2018-01-01 PROCEDURE — 36556 INSERT NON-TUNNEL CV CATH: CPT | Performed by: NURSE PRACTITIONER

## 2018-01-01 PROCEDURE — 02HV33Z INSERTION OF INFUSION DEVICE INTO SUPERIOR VENA CAVA, PERCUTANEOUS APPROACH: ICD-10-PCS | Performed by: INTERNAL MEDICINE

## 2018-01-01 PROCEDURE — 85027 COMPLETE CBC AUTOMATED: CPT | Performed by: NEUROLOGICAL SURGERY

## 2018-01-01 PROCEDURE — 25010000002 LORAZEPAM PER 2 MG: Performed by: INTERNAL MEDICINE

## 2018-01-01 PROCEDURE — 85007 BL SMEAR W/DIFF WBC COUNT: CPT | Performed by: NURSE PRACTITIONER

## 2018-01-01 PROCEDURE — 71275 CT ANGIOGRAPHY CHEST: CPT

## 2018-01-01 PROCEDURE — 0 IOPAMIDOL PER 1 ML: Performed by: INTERNAL MEDICINE

## 2018-01-01 PROCEDURE — 85018 HEMOGLOBIN: CPT | Performed by: SURGERY

## 2018-01-01 PROCEDURE — 82533 TOTAL CORTISOL: CPT | Performed by: NURSE PRACTITIONER

## 2018-01-01 PROCEDURE — 25010000002 ALBUMIN HUMAN 25% PER 50 ML: Performed by: NURSE PRACTITIONER

## 2018-01-01 PROCEDURE — 25010000002 FUROSEMIDE PER 20 MG: Performed by: NURSE ANESTHETIST, CERTIFIED REGISTERED

## 2018-01-01 PROCEDURE — 0DP60UZ REMOVAL OF FEEDING DEVICE FROM STOMACH, OPEN APPROACH: ICD-10-PCS | Performed by: SURGERY

## 2018-01-01 PROCEDURE — 93306 TTE W/DOPPLER COMPLETE: CPT

## 2018-01-01 PROCEDURE — 84132 ASSAY OF SERUM POTASSIUM: CPT | Performed by: INTERNAL MEDICINE

## 2018-01-01 PROCEDURE — 99024 POSTOP FOLLOW-UP VISIT: CPT | Performed by: PHYSICIAN ASSISTANT

## 2018-01-01 PROCEDURE — 25010000002 CALCIUM GLUCONATE PER 10 ML: Performed by: NURSE PRACTITIONER

## 2018-01-01 PROCEDURE — 84134 ASSAY OF PREALBUMIN: CPT | Performed by: INTERNAL MEDICINE

## 2018-01-01 PROCEDURE — 25010000002 PHENYLEPHRINE PER 1 ML: Performed by: NURSE ANESTHETIST, CERTIFIED REGISTERED

## 2018-01-01 PROCEDURE — 3E0336Z INTRODUCTION OF NUTRITIONAL SUBSTANCE INTO PERIPHERAL VEIN, PERCUTANEOUS APPROACH: ICD-10-PCS | Performed by: INTERNAL MEDICINE

## 2018-01-01 PROCEDURE — 25010000002 MORPHINE PER 10 MG: Performed by: INTERNAL MEDICINE

## 2018-01-01 PROCEDURE — 80061 LIPID PANEL: CPT | Performed by: NURSE PRACTITIONER

## 2018-01-01 PROCEDURE — 82330 ASSAY OF CALCIUM: CPT | Performed by: NURSE PRACTITIONER

## 2018-01-01 PROCEDURE — 00C70ZZ EXTIRPATION OF MATTER FROM CEREBRAL HEMISPHERE, OPEN APPROACH: ICD-10-PCS | Performed by: NEUROLOGICAL SURGERY

## 2018-01-01 PROCEDURE — 84300 ASSAY OF URINE SODIUM: CPT | Performed by: INTERNAL MEDICINE

## 2018-01-01 PROCEDURE — 97164 PT RE-EVAL EST PLAN CARE: CPT

## 2018-01-01 PROCEDURE — 85730 THROMBOPLASTIN TIME PARTIAL: CPT | Performed by: EMERGENCY MEDICINE

## 2018-01-01 PROCEDURE — 25010000002 VANCOMYCIN

## 2018-01-01 PROCEDURE — 83036 HEMOGLOBIN GLYCOSYLATED A1C: CPT | Performed by: INTERNAL MEDICINE

## 2018-01-01 PROCEDURE — 97168 OT RE-EVAL EST PLAN CARE: CPT

## 2018-01-01 PROCEDURE — 80048 BASIC METABOLIC PNL TOTAL CA: CPT | Performed by: INTERNAL MEDICINE

## 2018-01-01 PROCEDURE — 93306 TTE W/DOPPLER COMPLETE: CPT | Performed by: INTERNAL MEDICINE

## 2018-01-01 PROCEDURE — 85025 COMPLETE CBC W/AUTO DIFF WBC: CPT | Performed by: EMERGENCY MEDICINE

## 2018-01-01 PROCEDURE — 85027 COMPLETE CBC AUTOMATED: CPT | Performed by: INTERNAL MEDICINE

## 2018-01-01 PROCEDURE — 85007 BL SMEAR W/DIFF WBC COUNT: CPT | Performed by: INTERNAL MEDICINE

## 2018-01-01 PROCEDURE — 97165 OT EVAL LOW COMPLEX 30 MIN: CPT

## 2018-01-01 PROCEDURE — 92523 SPEECH SOUND LANG COMPREHEN: CPT

## 2018-01-01 DEVICE — DURAGEN® PLUS DURAL REGENERATION MATRIX, 3 IN X 3 IN (7.5 CM X 7.5 CM)
Type: IMPLANTABLE DEVICE | Site: CRANIAL | Status: FUNCTIONAL
Brand: DURAGEN® PLUS

## 2018-01-01 DEVICE — SCRW MATRIXNEURO SD TI 4MM: Type: IMPLANTABLE DEVICE | Site: CRANIAL | Status: FUNCTIONAL

## 2018-01-01 DEVICE — PERI-GUARD REPAIR PATCH (PERI-GUARD) IS A BIOLOGIC TISSUE PREPARED FROM BOVINE PERICARDIUM CROSS-LINKED WITH GLUTARALDEHYDE. THE PERICARDIUM IS PROCURED FROM CATTLE ORIGINATING IN THE UNITED STATES. PERI-GUARD IS CHEMICALLY STERILIZED USING ETHANOL AND PROPYLENE OXIDE. PERI-GUARD IS TREATED WITH 1 MOLAR SODIUM HYDROXIDE FOR 60-75 MINUTES AT 20-25C.
Type: IMPLANTABLE DEVICE | Site: CRANIAL | Status: FUNCTIONAL
Brand: PERI-GUARD

## 2018-01-01 DEVICE — PLT MATRIXNEURO STR TI 2HL 12MM: Type: IMPLANTABLE DEVICE | Site: CRANIAL | Status: FUNCTIONAL

## 2018-01-01 DEVICE — PLT CVR BURHL MATRIXNEURO TI 17MM: Type: IMPLANTABLE DEVICE | Site: CRANIAL | Status: FUNCTIONAL

## 2018-01-01 RX ORDER — IPRATROPIUM BROMIDE AND ALBUTEROL SULFATE 2.5; .5 MG/3ML; MG/3ML
3 SOLUTION RESPIRATORY (INHALATION) EVERY 6 HOURS PRN
Status: CANCELLED | OUTPATIENT
Start: 2018-01-01

## 2018-01-01 RX ORDER — ONDANSETRON 2 MG/ML
4 INJECTION INTRAMUSCULAR; INTRAVENOUS EVERY 6 HOURS PRN
Status: DISCONTINUED | OUTPATIENT
Start: 2018-01-01 | End: 2018-01-01 | Stop reason: HOSPADM

## 2018-01-01 RX ORDER — LORAZEPAM 2 MG/ML
0.25 INJECTION INTRAMUSCULAR EVERY 4 HOURS PRN
Status: CANCELLED | OUTPATIENT
Start: 2018-01-01 | End: 2018-03-07

## 2018-01-01 RX ORDER — IPRATROPIUM BROMIDE AND ALBUTEROL SULFATE 2.5; .5 MG/3ML; MG/3ML
3 SOLUTION RESPIRATORY (INHALATION) EVERY 6 HOURS PRN
Status: DISCONTINUED | OUTPATIENT
Start: 2018-01-01 | End: 2018-02-28 | Stop reason: HOSPADM

## 2018-01-01 RX ORDER — MORPHINE SULFATE 1 MG/ML
INJECTION INTRAVENOUS CONTINUOUS
Status: CANCELLED | OUTPATIENT
Start: 2018-01-01 | End: 2018-03-11

## 2018-01-01 RX ORDER — ALBUMIN, HUMAN INJ 5% 5 %
25 SOLUTION INTRAVENOUS ONCE
Status: COMPLETED | OUTPATIENT
Start: 2018-01-01 | End: 2018-01-01

## 2018-01-01 RX ORDER — SODIUM CHLORIDE 0.9 % (FLUSH) 0.9 %
1-10 SYRINGE (ML) INJECTION AS NEEDED
Status: DISCONTINUED | OUTPATIENT
Start: 2018-01-01 | End: 2018-01-01

## 2018-01-01 RX ORDER — MAGNESIUM HYDROXIDE 1200 MG/15ML
LIQUID ORAL AS NEEDED
Status: DISCONTINUED | OUTPATIENT
Start: 2018-01-01 | End: 2018-01-01 | Stop reason: HOSPADM

## 2018-01-01 RX ORDER — MAGNESIUM SULFATE HEPTAHYDRATE 40 MG/ML
4 INJECTION, SOLUTION INTRAVENOUS AS NEEDED
Status: DISCONTINUED | OUTPATIENT
Start: 2018-01-01 | End: 2018-01-01

## 2018-01-01 RX ORDER — LORAZEPAM 2 MG/ML
0.5 INJECTION INTRAMUSCULAR EVERY 4 HOURS PRN
Status: DISCONTINUED | OUTPATIENT
Start: 2018-01-01 | End: 2018-01-01 | Stop reason: HOSPADM

## 2018-01-01 RX ORDER — POTASSIUM CHLORIDE 750 MG/1
40 CAPSULE, EXTENDED RELEASE ORAL AS NEEDED
Status: DISCONTINUED | OUTPATIENT
Start: 2018-01-01 | End: 2018-01-01

## 2018-01-01 RX ORDER — SODIUM CHLORIDE 9 MG/ML
100 INJECTION, SOLUTION INTRAVENOUS CONTINUOUS
Status: DISCONTINUED | OUTPATIENT
Start: 2018-01-01 | End: 2018-01-01

## 2018-01-01 RX ORDER — LORAZEPAM 2 MG/ML
0.25 INJECTION INTRAMUSCULAR EVERY 6 HOURS
Status: CANCELLED | OUTPATIENT
Start: 2018-01-01 | End: 2018-03-07

## 2018-01-01 RX ORDER — LORAZEPAM 2 MG/ML
INJECTION INTRAMUSCULAR
Status: DISCONTINUED
Start: 2018-01-01 | End: 2018-01-01 | Stop reason: WASHOUT

## 2018-01-01 RX ORDER — ETOMIDATE 2 MG/ML
INJECTION INTRAVENOUS
Status: COMPLETED
Start: 2018-01-01 | End: 2018-01-01

## 2018-01-01 RX ORDER — LORAZEPAM 2 MG/ML
0.25 INJECTION INTRAMUSCULAR EVERY 4 HOURS PRN
Status: DISCONTINUED | OUTPATIENT
Start: 2018-01-01 | End: 2018-02-28 | Stop reason: HOSPADM

## 2018-01-01 RX ORDER — MAGNESIUM SULFATE 1 G/100ML
3 INJECTION INTRAVENOUS ONCE
Status: DISCONTINUED | OUTPATIENT
Start: 2018-01-01 | End: 2018-01-01

## 2018-01-01 RX ORDER — DEXAMETHASONE SODIUM PHOSPHATE 4 MG/ML
INJECTION, SOLUTION INTRA-ARTICULAR; INTRALESIONAL; INTRAMUSCULAR; INTRAVENOUS; SOFT TISSUE AS NEEDED
Status: DISCONTINUED | OUTPATIENT
Start: 2018-01-01 | End: 2018-01-01 | Stop reason: SURG

## 2018-01-01 RX ORDER — VECURONIUM BROMIDE 1 MG/ML
INJECTION, POWDER, LYOPHILIZED, FOR SOLUTION INTRAVENOUS AS NEEDED
Status: DISCONTINUED | OUTPATIENT
Start: 2018-01-01 | End: 2018-01-01 | Stop reason: SURG

## 2018-01-01 RX ORDER — PROMETHAZINE HYDROCHLORIDE 25 MG/ML
12.5 INJECTION, SOLUTION INTRAMUSCULAR; INTRAVENOUS EVERY 6 HOURS PRN
Status: DISCONTINUED | OUTPATIENT
Start: 2018-01-01 | End: 2018-01-01

## 2018-01-01 RX ORDER — FUROSEMIDE 10 MG/ML
40 INJECTION INTRAMUSCULAR; INTRAVENOUS DAILY
Status: DISCONTINUED | OUTPATIENT
Start: 2018-01-01 | End: 2018-01-01

## 2018-01-01 RX ORDER — ONDANSETRON 4 MG/1
4 TABLET, FILM COATED ORAL EVERY 6 HOURS PRN
Status: DISCONTINUED | OUTPATIENT
Start: 2018-01-01 | End: 2018-01-01

## 2018-01-01 RX ORDER — PHENYLEPHRINE HCL IN 0.9% NACL 0.5 MG/5ML
.5-3 SYRINGE (ML) INTRAVENOUS
Status: DISCONTINUED | OUTPATIENT
Start: 2018-01-01 | End: 2018-01-01

## 2018-01-01 RX ORDER — LORAZEPAM 2 MG/ML
0.25 INJECTION INTRAMUSCULAR EVERY 6 HOURS
Status: DISCONTINUED | OUTPATIENT
Start: 2018-01-01 | End: 2018-01-01 | Stop reason: HOSPADM

## 2018-01-01 RX ORDER — FAMOTIDINE 10 MG/ML
20 INJECTION, SOLUTION INTRAVENOUS
Status: DISCONTINUED | OUTPATIENT
Start: 2018-01-01 | End: 2018-01-01

## 2018-01-01 RX ORDER — DEXAMETHASONE SODIUM PHOSPHATE 4 MG/ML
6 INJECTION, SOLUTION INTRA-ARTICULAR; INTRALESIONAL; INTRAMUSCULAR; INTRAVENOUS; SOFT TISSUE EVERY 6 HOURS
Status: DISCONTINUED | OUTPATIENT
Start: 2018-01-01 | End: 2018-01-01

## 2018-01-01 RX ORDER — IPRATROPIUM BROMIDE AND ALBUTEROL SULFATE 2.5; .5 MG/3ML; MG/3ML
3 SOLUTION RESPIRATORY (INHALATION)
Status: DISCONTINUED | OUTPATIENT
Start: 2018-01-01 | End: 2018-01-01

## 2018-01-01 RX ORDER — MANNITOL 20 G/100ML
50 INJECTION, SOLUTION INTRAVENOUS ONCE
Status: COMPLETED | OUTPATIENT
Start: 2018-01-01 | End: 2018-01-01

## 2018-01-01 RX ORDER — ONDANSETRON 2 MG/ML
4 INJECTION INTRAMUSCULAR; INTRAVENOUS EVERY 6 HOURS PRN
Status: CANCELLED | OUTPATIENT
Start: 2018-01-01

## 2018-01-01 RX ORDER — SODIUM CHLORIDE 0.9 % (FLUSH) 0.9 %
1-10 SYRINGE (ML) INJECTION AS NEEDED
Status: DISCONTINUED | OUTPATIENT
Start: 2018-01-01 | End: 2018-01-01 | Stop reason: HOSPADM

## 2018-01-01 RX ORDER — CEFTRIAXONE 1 G/1
INJECTION, POWDER, FOR SOLUTION INTRAMUSCULAR; INTRAVENOUS AS NEEDED
Status: DISCONTINUED | OUTPATIENT
Start: 2018-01-01 | End: 2018-01-01 | Stop reason: SURG

## 2018-01-01 RX ORDER — ATORVASTATIN CALCIUM 40 MG/1
80 TABLET, FILM COATED ORAL NIGHTLY
Status: DISCONTINUED | OUTPATIENT
Start: 2018-01-01 | End: 2018-01-01

## 2018-01-01 RX ORDER — FUROSEMIDE 10 MG/ML
INJECTION INTRAMUSCULAR; INTRAVENOUS AS NEEDED
Status: DISCONTINUED | OUTPATIENT
Start: 2018-01-01 | End: 2018-01-01 | Stop reason: SURG

## 2018-01-01 RX ORDER — CHLORHEXIDINE GLUCONATE 0.12 MG/ML
15 RINSE ORAL EVERY 12 HOURS SCHEDULED
Status: DISCONTINUED | OUTPATIENT
Start: 2018-01-01 | End: 2018-01-01

## 2018-01-01 RX ORDER — HYDROMORPHONE HYDROCHLORIDE 1 MG/ML
0.5 INJECTION, SOLUTION INTRAMUSCULAR; INTRAVENOUS; SUBCUTANEOUS
Status: DISCONTINUED | OUTPATIENT
Start: 2018-01-01 | End: 2018-01-01

## 2018-01-01 RX ORDER — DEXAMETHASONE SODIUM PHOSPHATE 4 MG/ML
2 INJECTION, SOLUTION INTRA-ARTICULAR; INTRALESIONAL; INTRAMUSCULAR; INTRAVENOUS; SOFT TISSUE EVERY 6 HOURS
Status: DISCONTINUED | OUTPATIENT
Start: 2018-01-01 | End: 2018-01-01

## 2018-01-01 RX ORDER — MORPHINE SULFATE 2 MG/ML
4 INJECTION, SOLUTION INTRAMUSCULAR; INTRAVENOUS
Status: DISCONTINUED | OUTPATIENT
Start: 2018-01-01 | End: 2018-02-28 | Stop reason: HOSPADM

## 2018-01-01 RX ORDER — SODIUM CHLORIDE 0.9 % (FLUSH) 0.9 %
1-10 SYRINGE (ML) INJECTION AS NEEDED
Status: DISCONTINUED | OUTPATIENT
Start: 2018-01-01 | End: 2018-02-28 | Stop reason: HOSPADM

## 2018-01-01 RX ORDER — SODIUM CHLORIDE 0.9 % (FLUSH) 0.9 %
1-10 SYRINGE (ML) INJECTION AS NEEDED
Status: CANCELLED | OUTPATIENT
Start: 2018-01-01

## 2018-01-01 RX ORDER — SODIUM CHLORIDE 9 MG/ML
75 INJECTION, SOLUTION INTRAVENOUS CONTINUOUS
Status: DISCONTINUED | OUTPATIENT
Start: 2018-01-01 | End: 2018-01-01

## 2018-01-01 RX ORDER — MORPHINE SULFATE 2 MG/ML
4 INJECTION, SOLUTION INTRAMUSCULAR; INTRAVENOUS
Status: DISCONTINUED | OUTPATIENT
Start: 2018-01-01 | End: 2018-01-01 | Stop reason: HOSPADM

## 2018-01-01 RX ORDER — FAMOTIDINE 10 MG/ML
20 INJECTION, SOLUTION INTRAVENOUS ONCE
Status: DISCONTINUED | OUTPATIENT
Start: 2018-01-01 | End: 2018-01-01 | Stop reason: HOSPADM

## 2018-01-01 RX ORDER — ALPRAZOLAM 0.5 MG/1
0.5 TABLET ORAL 3 TIMES DAILY PRN
Status: DISCONTINUED | OUTPATIENT
Start: 2018-01-01 | End: 2018-01-01

## 2018-01-01 RX ORDER — SODIUM CHLORIDE 0.9 % (FLUSH) 0.9 %
10 SYRINGE (ML) INJECTION AS NEEDED
Status: DISCONTINUED | OUTPATIENT
Start: 2018-01-01 | End: 2018-01-01

## 2018-01-01 RX ORDER — DEXAMETHASONE SODIUM PHOSPHATE 4 MG/ML
4 INJECTION, SOLUTION INTRA-ARTICULAR; INTRALESIONAL; INTRAMUSCULAR; INTRAVENOUS; SOFT TISSUE EVERY 6 HOURS
Status: DISCONTINUED | OUTPATIENT
Start: 2018-01-01 | End: 2018-01-01

## 2018-01-01 RX ORDER — CEFTRIAXONE SODIUM 1 G/50ML
1 INJECTION, SOLUTION INTRAVENOUS EVERY 24 HOURS
Status: DISCONTINUED | OUTPATIENT
Start: 2018-01-01 | End: 2018-01-01

## 2018-01-01 RX ORDER — DEXAMETHASONE 4 MG/1
4 TABLET ORAL EVERY 6 HOURS SCHEDULED
Status: DISCONTINUED | OUTPATIENT
Start: 2018-01-01 | End: 2018-01-01

## 2018-01-01 RX ORDER — DEXMEDETOMIDINE HYDROCHLORIDE 4 UG/ML
.2-1.5 INJECTION, SOLUTION INTRAVENOUS
Status: DISCONTINUED | OUTPATIENT
Start: 2018-01-01 | End: 2018-01-01

## 2018-01-01 RX ORDER — AMINO ACIDS/PROTEIN HYDROLYS 15G-100/30
1 LIQUID (ML) ORAL DAILY
Status: DISCONTINUED | OUTPATIENT
Start: 2018-01-01 | End: 2018-01-01

## 2018-01-01 RX ORDER — HYDROMORPHONE HYDROCHLORIDE 1 MG/ML
0.25 INJECTION, SOLUTION INTRAMUSCULAR; INTRAVENOUS; SUBCUTANEOUS
Status: DISCONTINUED | OUTPATIENT
Start: 2018-01-01 | End: 2018-01-01

## 2018-01-01 RX ORDER — GLYCOPYRROLATE 0.2 MG/ML
0.4 INJECTION INTRAMUSCULAR; INTRAVENOUS EVERY 4 HOURS PRN
Status: DISCONTINUED | OUTPATIENT
Start: 2018-01-01 | End: 2018-02-28 | Stop reason: HOSPADM

## 2018-01-01 RX ORDER — POTASSIUM CHLORIDE 29.8 MG/ML
20 INJECTION INTRAVENOUS
Status: DISCONTINUED | OUTPATIENT
Start: 2018-01-01 | End: 2018-01-01

## 2018-01-01 RX ORDER — POTASSIUM CHLORIDE 1.5 G/1.77G
40 POWDER, FOR SOLUTION ORAL AS NEEDED
Status: DISCONTINUED | OUTPATIENT
Start: 2018-01-01 | End: 2018-01-01

## 2018-01-01 RX ORDER — MORPHINE SULFATE 1 MG/ML
INJECTION INTRAVENOUS CONTINUOUS
Status: DISCONTINUED | OUTPATIENT
Start: 2018-01-01 | End: 2018-01-01

## 2018-01-01 RX ORDER — HYDROMORPHONE HYDROCHLORIDE 1 MG/ML
0.5 INJECTION, SOLUTION INTRAMUSCULAR; INTRAVENOUS; SUBCUTANEOUS
Status: DISCONTINUED | OUTPATIENT
Start: 2018-01-01 | End: 2018-01-01 | Stop reason: HOSPADM

## 2018-01-01 RX ORDER — BISACODYL 10 MG
10 SUPPOSITORY, RECTAL RECTAL DAILY PRN
Status: DISCONTINUED | OUTPATIENT
Start: 2018-01-01 | End: 2018-01-01

## 2018-01-01 RX ORDER — HALOPERIDOL 5 MG/ML
5 INJECTION INTRAMUSCULAR EVERY 6 HOURS PRN
Status: DISCONTINUED | OUTPATIENT
Start: 2018-01-01 | End: 2018-01-01

## 2018-01-01 RX ORDER — LORAZEPAM 2 MG/ML
0.5 INJECTION INTRAMUSCULAR EVERY 6 HOURS
Status: DISCONTINUED | OUTPATIENT
Start: 2018-01-01 | End: 2018-02-28 | Stop reason: HOSPADM

## 2018-01-01 RX ORDER — SODIUM CHLORIDE, SODIUM LACTATE, POTASSIUM CHLORIDE, CALCIUM CHLORIDE 600; 310; 30; 20 MG/100ML; MG/100ML; MG/100ML; MG/100ML
INJECTION, SOLUTION INTRAVENOUS CONTINUOUS PRN
Status: DISCONTINUED | OUTPATIENT
Start: 2018-01-01 | End: 2018-01-01 | Stop reason: SURG

## 2018-01-01 RX ORDER — IPRATROPIUM BROMIDE AND ALBUTEROL SULFATE 2.5; .5 MG/3ML; MG/3ML
3 SOLUTION RESPIRATORY (INHALATION) EVERY 6 HOURS PRN
Status: DISCONTINUED | OUTPATIENT
Start: 2018-01-01 | End: 2018-01-01 | Stop reason: HOSPADM

## 2018-01-01 RX ORDER — MORPHINE SULFATE 1 MG/ML
INJECTION INTRAVENOUS CONTINUOUS
Status: DISCONTINUED | OUTPATIENT
Start: 2018-01-01 | End: 2018-01-01 | Stop reason: HOSPADM

## 2018-01-01 RX ORDER — LORAZEPAM 2 MG/ML
0.25 INJECTION INTRAMUSCULAR EVERY 6 HOURS PRN
Status: DISCONTINUED | OUTPATIENT
Start: 2018-01-01 | End: 2018-01-01

## 2018-01-01 RX ORDER — DEXAMETHASONE SODIUM PHOSPHATE 4 MG/ML
2 INJECTION, SOLUTION INTRA-ARTICULAR; INTRALESIONAL; INTRAMUSCULAR; INTRAVENOUS; SOFT TISSUE 2 TIMES DAILY
Status: DISCONTINUED | OUTPATIENT
Start: 2018-01-01 | End: 2018-01-01

## 2018-01-01 RX ORDER — SODIUM CHLORIDE, SODIUM LACTATE, POTASSIUM CHLORIDE, CALCIUM CHLORIDE 600; 310; 30; 20 MG/100ML; MG/100ML; MG/100ML; MG/100ML
100 INJECTION, SOLUTION INTRAVENOUS CONTINUOUS
Status: DISCONTINUED | OUTPATIENT
Start: 2018-01-01 | End: 2018-01-01

## 2018-01-01 RX ORDER — SODIUM CHLORIDE, SODIUM LACTATE, POTASSIUM CHLORIDE, CALCIUM CHLORIDE 600; 310; 30; 20 MG/100ML; MG/100ML; MG/100ML; MG/100ML
9 INJECTION, SOLUTION INTRAVENOUS CONTINUOUS
Status: DISCONTINUED | OUTPATIENT
Start: 2018-01-01 | End: 2018-01-01 | Stop reason: HOSPADM

## 2018-01-01 RX ORDER — LORAZEPAM 2 MG/ML
0.25 INJECTION INTRAMUSCULAR EVERY 4 HOURS PRN
Status: DISCONTINUED | OUTPATIENT
Start: 2018-01-01 | End: 2018-01-01 | Stop reason: HOSPADM

## 2018-01-01 RX ORDER — SODIUM CHLORIDE 9 MG/ML
INJECTION, SOLUTION INTRAVENOUS AS NEEDED
Status: DISCONTINUED | OUTPATIENT
Start: 2018-01-01 | End: 2018-01-01 | Stop reason: HOSPADM

## 2018-01-01 RX ORDER — ALBUMIN (HUMAN) 12.5 G/50ML
25 SOLUTION INTRAVENOUS ONCE
Status: COMPLETED | OUTPATIENT
Start: 2018-01-01 | End: 2018-01-01

## 2018-01-01 RX ORDER — PANTOPRAZOLE SODIUM 40 MG/10ML
40 INJECTION, POWDER, LYOPHILIZED, FOR SOLUTION INTRAVENOUS
Status: DISCONTINUED | OUTPATIENT
Start: 2018-01-01 | End: 2018-01-01

## 2018-01-01 RX ORDER — FAMOTIDINE 20 MG/1
20 TABLET, FILM COATED ORAL ONCE
Status: DISCONTINUED | OUTPATIENT
Start: 2018-01-01 | End: 2018-01-01 | Stop reason: HOSPADM

## 2018-01-01 RX ORDER — MIDAZOLAM HYDROCHLORIDE 1 MG/ML
INJECTION INTRAMUSCULAR; INTRAVENOUS
Status: COMPLETED
Start: 2018-01-01 | End: 2018-01-01

## 2018-01-01 RX ORDER — FENTANYL CITRATE 50 UG/ML
50 INJECTION, SOLUTION INTRAMUSCULAR; INTRAVENOUS
Status: DISCONTINUED | OUTPATIENT
Start: 2018-01-01 | End: 2018-01-01 | Stop reason: HOSPADM

## 2018-01-01 RX ORDER — DEXAMETHASONE 4 MG/1
4 TABLET ORAL EVERY 8 HOURS SCHEDULED
Status: DISCONTINUED | OUTPATIENT
Start: 2018-01-01 | End: 2018-01-01

## 2018-01-01 RX ORDER — GLYCOPYRROLATE 0.2 MG/ML
0.3 INJECTION INTRAMUSCULAR; INTRAVENOUS EVERY 4 HOURS PRN
Status: DISCONTINUED | OUTPATIENT
Start: 2018-01-01 | End: 2018-01-01 | Stop reason: HOSPADM

## 2018-01-01 RX ORDER — MIRTAZAPINE 15 MG/1
15 TABLET, FILM COATED ORAL NIGHTLY
Status: DISCONTINUED | OUTPATIENT
Start: 2018-01-01 | End: 2018-01-01

## 2018-01-01 RX ORDER — MORPHINE SULFATE 2 MG/ML
2 INJECTION, SOLUTION INTRAMUSCULAR; INTRAVENOUS
Status: CANCELLED | OUTPATIENT
Start: 2018-01-01

## 2018-01-01 RX ORDER — MORPHINE SULFATE 2 MG/ML
2 INJECTION, SOLUTION INTRAMUSCULAR; INTRAVENOUS
Status: DISCONTINUED | OUTPATIENT
Start: 2018-01-01 | End: 2018-01-01 | Stop reason: HOSPADM

## 2018-01-01 RX ORDER — VANCOMYCIN HYDROCHLORIDE 1 G/200ML
1 INJECTION, SOLUTION INTRAVENOUS ONCE
Status: COMPLETED | OUTPATIENT
Start: 2018-01-01 | End: 2018-01-01

## 2018-01-01 RX ORDER — SACCHAROMYCES BOULARDII 250 MG
250 CAPSULE ORAL 2 TIMES DAILY
Status: DISCONTINUED | OUTPATIENT
Start: 2018-01-01 | End: 2018-01-01

## 2018-01-01 RX ORDER — PROPOFOL 10 MG/ML
VIAL (ML) INTRAVENOUS AS NEEDED
Status: DISCONTINUED | OUTPATIENT
Start: 2018-01-01 | End: 2018-01-01 | Stop reason: SURG

## 2018-01-01 RX ORDER — LIDOCAINE HYDROCHLORIDE 10 MG/ML
0.5 INJECTION, SOLUTION EPIDURAL; INFILTRATION; INTRACAUDAL; PERINEURAL ONCE AS NEEDED
Status: DISCONTINUED | OUTPATIENT
Start: 2018-01-01 | End: 2018-01-01 | Stop reason: HOSPADM

## 2018-01-01 RX ORDER — CALCIUM GLUCONATE 94 MG/ML
1 INJECTION, SOLUTION INTRAVENOUS ONCE
Status: COMPLETED | OUTPATIENT
Start: 2018-01-01 | End: 2018-01-01

## 2018-01-01 RX ORDER — ATRACURIUM BESYLATE 10 MG/ML
INJECTION, SOLUTION INTRAVENOUS AS NEEDED
Status: DISCONTINUED | OUTPATIENT
Start: 2018-01-01 | End: 2018-01-01 | Stop reason: SURG

## 2018-01-01 RX ORDER — ONDANSETRON 2 MG/ML
4 INJECTION INTRAMUSCULAR; INTRAVENOUS EVERY 6 HOURS PRN
Status: DISCONTINUED | OUTPATIENT
Start: 2018-01-01 | End: 2018-02-28 | Stop reason: HOSPADM

## 2018-01-01 RX ORDER — PHENYLEPHRINE HCL IN 0.9% NACL 0.5 MG/5ML
.5-3 SYRINGE (ML) INTRAVENOUS
Status: DISCONTINUED | OUTPATIENT
Start: 2018-01-01 | End: 2018-01-01 | Stop reason: SDUPTHER

## 2018-01-01 RX ORDER — NALOXONE HCL 0.4 MG/ML
0.1 VIAL (ML) INJECTION
Status: DISCONTINUED | OUTPATIENT
Start: 2018-01-01 | End: 2018-01-01

## 2018-01-01 RX ORDER — PROPOFOL 10 MG/ML
VIAL (ML) INTRAVENOUS CONTINUOUS PRN
Status: DISCONTINUED | OUTPATIENT
Start: 2018-01-01 | End: 2018-01-01 | Stop reason: SURG

## 2018-01-01 RX ORDER — MAGNESIUM SULFATE HEPTAHYDRATE 40 MG/ML
6 INJECTION, SOLUTION INTRAVENOUS AS NEEDED
Status: DISCONTINUED | OUTPATIENT
Start: 2018-01-01 | End: 2018-01-01

## 2018-01-01 RX ORDER — MORPHINE SULFATE 2 MG/ML
2 INJECTION, SOLUTION INTRAMUSCULAR; INTRAVENOUS
Status: DISCONTINUED | OUTPATIENT
Start: 2018-01-01 | End: 2018-02-28 | Stop reason: HOSPADM

## 2018-01-01 RX ORDER — ACETAMINOPHEN 650 MG/1
650 SUPPOSITORY RECTAL EVERY 4 HOURS PRN
Status: DISCONTINUED | OUTPATIENT
Start: 2018-01-01 | End: 2018-01-01

## 2018-01-01 RX ORDER — FENTANYL CITRATE 50 UG/ML
INJECTION, SOLUTION INTRAMUSCULAR; INTRAVENOUS
Status: COMPLETED
Start: 2018-01-01 | End: 2018-01-01

## 2018-01-01 RX ORDER — LORAZEPAM 2 MG/ML
0.5 INJECTION INTRAMUSCULAR EVERY 4 HOURS PRN
Status: DISCONTINUED | OUTPATIENT
Start: 2018-01-01 | End: 2018-02-28 | Stop reason: HOSPADM

## 2018-01-01 RX ORDER — GLYCOPYRROLATE 0.2 MG/ML
0.3 INJECTION INTRAMUSCULAR; INTRAVENOUS EVERY 4 HOURS PRN
Status: CANCELLED | OUTPATIENT
Start: 2018-01-01

## 2018-01-01 RX ORDER — DEXTROSE, SODIUM CHLORIDE, AND POTASSIUM CHLORIDE 5; .9; .15 G/100ML; G/100ML; G/100ML
75 INJECTION INTRAVENOUS CONTINUOUS
Status: DISCONTINUED | OUTPATIENT
Start: 2018-01-01 | End: 2018-01-01

## 2018-01-01 RX ORDER — LORAZEPAM 2 MG/ML
0.25 INJECTION INTRAMUSCULAR ONCE
Status: COMPLETED | OUTPATIENT
Start: 2018-01-01 | End: 2018-01-01

## 2018-01-01 RX ORDER — NALOXONE HCL 0.4 MG/ML
0.4 VIAL (ML) INJECTION
Status: DISCONTINUED | OUTPATIENT
Start: 2018-01-01 | End: 2018-01-01

## 2018-01-01 RX ORDER — ONDANSETRON 2 MG/ML
4 INJECTION INTRAMUSCULAR; INTRAVENOUS EVERY 6 HOURS PRN
Status: DISCONTINUED | OUTPATIENT
Start: 2018-01-01 | End: 2018-01-01

## 2018-01-01 RX ORDER — LIDOCAINE HYDROCHLORIDE 10 MG/ML
INJECTION, SOLUTION EPIDURAL; INFILTRATION; INTRACAUDAL; PERINEURAL AS NEEDED
Status: DISCONTINUED | OUTPATIENT
Start: 2018-01-01 | End: 2018-01-01 | Stop reason: SURG

## 2018-01-01 RX ORDER — MANNITOL 20 G/100ML
INJECTION, SOLUTION INTRAVENOUS CONTINUOUS PRN
Status: DISCONTINUED | OUTPATIENT
Start: 2018-01-01 | End: 2018-01-01 | Stop reason: SURG

## 2018-01-01 RX ORDER — DEXAMETHASONE SODIUM PHOSPHATE 4 MG/ML
4 INJECTION, SOLUTION INTRA-ARTICULAR; INTRALESIONAL; INTRAMUSCULAR; INTRAVENOUS; SOFT TISSUE EVERY 8 HOURS
Status: DISCONTINUED | OUTPATIENT
Start: 2018-01-01 | End: 2018-01-01

## 2018-01-01 RX ORDER — HYDRALAZINE HYDROCHLORIDE 20 MG/ML
20 INJECTION INTRAMUSCULAR; INTRAVENOUS EVERY 6 HOURS PRN
Status: DISCONTINUED | OUTPATIENT
Start: 2018-01-01 | End: 2018-01-01

## 2018-01-01 RX ORDER — MORPHINE SULFATE 4 MG/ML
4 INJECTION, SOLUTION INTRAMUSCULAR; INTRAVENOUS
Status: CANCELLED | OUTPATIENT
Start: 2018-01-01

## 2018-01-01 RX ORDER — MIRTAZAPINE 15 MG/1
15 TABLET, FILM COATED ORAL NIGHTLY
COMMUNITY

## 2018-01-01 RX ORDER — ALPRAZOLAM 1 MG/1
1 TABLET ORAL 3 TIMES DAILY PRN
Status: ON HOLD | COMMUNITY
End: 2018-01-01

## 2018-01-01 RX ORDER — RANITIDINE 300 MG/1
300 TABLET ORAL NIGHTLY
Status: ON HOLD | COMMUNITY
End: 2018-01-01

## 2018-01-01 RX ORDER — METHYLPHENIDATE HYDROCHLORIDE 10 MG/1
5 TABLET ORAL DAILY
Status: DISCONTINUED | OUTPATIENT
Start: 2018-01-01 | End: 2018-01-01

## 2018-01-01 RX ORDER — ALENDRONATE SODIUM 70 MG/1
70 TABLET ORAL WEEKLY
Status: ON HOLD | COMMUNITY
End: 2018-01-01

## 2018-01-01 RX ORDER — DOXYCYCLINE HYCLATE 100 MG/1
100 CAPSULE ORAL EVERY 12 HOURS SCHEDULED
Status: DISCONTINUED | OUTPATIENT
Start: 2018-01-01 | End: 2018-01-01

## 2018-01-01 RX ORDER — POTASSIUM CHLORIDE 29.8 MG/ML
20 INJECTION INTRAVENOUS ONCE
Status: COMPLETED | OUTPATIENT
Start: 2018-01-01 | End: 2018-01-01

## 2018-01-01 RX ORDER — CLOPIDOGREL BISULFATE 75 MG/1
75 TABLET ORAL DAILY
Status: ON HOLD | COMMUNITY
End: 2018-01-01

## 2018-01-01 RX ORDER — BUDESONIDE 0.5 MG/2ML
0.5 INHALANT ORAL
Status: DISCONTINUED | OUTPATIENT
Start: 2018-01-01 | End: 2018-01-01

## 2018-01-01 RX ORDER — LORAZEPAM 2 MG/ML
0.25 INJECTION INTRAMUSCULAR
Status: DISCONTINUED | OUTPATIENT
Start: 2018-01-01 | End: 2018-01-01

## 2018-01-01 RX ORDER — LORAZEPAM 2 MG/ML
0.5 INJECTION INTRAMUSCULAR EVERY 4 HOURS PRN
Status: CANCELLED | OUTPATIENT
Start: 2018-01-01 | End: 2018-03-07

## 2018-01-01 RX ORDER — SODIUM CHLORIDE AND POTASSIUM CHLORIDE 150; 450 MG/100ML; MG/100ML
100 INJECTION, SOLUTION INTRAVENOUS CONTINUOUS
Status: DISCONTINUED | OUTPATIENT
Start: 2018-01-01 | End: 2018-01-01

## 2018-01-01 RX ORDER — POVIDONE-IODINE 10 MG/G
OINTMENT TOPICAL
Status: DISCONTINUED | OUTPATIENT
Start: 2018-01-01 | End: 2018-01-01

## 2018-01-01 RX ORDER — CARVEDILOL 3.12 MG/1
3.12 TABLET ORAL 2 TIMES DAILY WITH MEALS
Status: DISCONTINUED | OUTPATIENT
Start: 2018-01-01 | End: 2018-01-01

## 2018-01-01 RX ORDER — ONDANSETRON 2 MG/ML
4 INJECTION INTRAMUSCULAR; INTRAVENOUS ONCE AS NEEDED
Status: DISCONTINUED | OUTPATIENT
Start: 2018-01-01 | End: 2018-01-01

## 2018-01-01 RX ORDER — DEXTROSE MONOHYDRATE 50 MG/ML
75 INJECTION, SOLUTION INTRAVENOUS CONTINUOUS
Status: DISCONTINUED | OUTPATIENT
Start: 2018-01-01 | End: 2018-01-01

## 2018-01-01 RX ORDER — PROPOFOL 10 MG/ML
VIAL (ML) INTRAVENOUS
Status: COMPLETED
Start: 2018-01-01 | End: 2018-01-01

## 2018-01-01 RX ORDER — FAMOTIDINE 20 MG/1
20 TABLET, FILM COATED ORAL 2 TIMES DAILY
Status: CANCELLED | OUTPATIENT
Start: 2018-01-01

## 2018-01-01 RX ORDER — ACETAMINOPHEN 325 MG/1
650 TABLET ORAL EVERY 4 HOURS PRN
Status: DISCONTINUED | OUTPATIENT
Start: 2018-01-01 | End: 2018-01-01

## 2018-01-01 RX ORDER — NICOTINE 21 MG/24HR
1 PATCH, TRANSDERMAL 24 HOURS TRANSDERMAL EVERY 24 HOURS
Status: DISCONTINUED | OUTPATIENT
Start: 2018-01-01 | End: 2018-01-01

## 2018-01-01 RX ORDER — GLYCOPYRROLATE 0.2 MG/ML
0.3 INJECTION INTRAMUSCULAR; INTRAVENOUS EVERY 4 HOURS PRN
Status: DISCONTINUED | OUTPATIENT
Start: 2018-01-01 | End: 2018-01-01

## 2018-01-01 RX ORDER — LORAZEPAM 2 MG/ML
0.25 INJECTION INTRAMUSCULAR EVERY 6 HOURS
Status: DISCONTINUED | OUTPATIENT
Start: 2018-01-01 | End: 2018-01-01

## 2018-01-01 RX ORDER — GUAIFENESIN 600 MG/1
1200 TABLET, EXTENDED RELEASE ORAL 2 TIMES DAILY PRN
Status: ON HOLD | COMMUNITY
End: 2018-01-01

## 2018-01-01 RX ORDER — FENTANYL CITRATE 50 UG/ML
INJECTION, SOLUTION INTRAMUSCULAR; INTRAVENOUS AS NEEDED
Status: DISCONTINUED | OUTPATIENT
Start: 2018-01-01 | End: 2018-01-01 | Stop reason: SURG

## 2018-01-01 RX ORDER — MORPHINE SULFATE 1 MG/ML
INJECTION INTRAVENOUS CONTINUOUS
Status: DISCONTINUED | OUTPATIENT
Start: 2018-01-01 | End: 2018-02-28 | Stop reason: HOSPADM

## 2018-01-01 RX ADMIN — POVIDONE-IODINE: 100 OINTMENT TOPICAL at 09:08

## 2018-01-01 RX ADMIN — DEXAMETHASONE SODIUM PHOSPHATE 4 MG: 4 INJECTION, SOLUTION INTRAMUSCULAR; INTRAVENOUS at 01:31

## 2018-01-01 RX ADMIN — INSULIN HUMAN 2 UNITS: 100 INJECTION, SOLUTION PARENTERAL at 00:03

## 2018-01-01 RX ADMIN — NYSTATIN 500000 UNITS: 100000 SUSPENSION ORAL at 14:20

## 2018-01-01 RX ADMIN — IPRATROPIUM BROMIDE AND ALBUTEROL SULFATE 3 ML: .5; 3 SOLUTION RESPIRATORY (INHALATION) at 09:00

## 2018-01-01 RX ADMIN — NICARDIPINE HYDROCHLORIDE 5 MG/HR: 0.2 INJECTION, SOLUTION INTRAVENOUS at 13:21

## 2018-01-01 RX ADMIN — DEXAMETHASONE SODIUM PHOSPHATE 2 MG: 4 INJECTION, SOLUTION INTRAMUSCULAR; INTRAVENOUS at 00:03

## 2018-01-01 RX ADMIN — ONDANSETRON HYDROCHLORIDE 4 MG: 2 INJECTION, SOLUTION INTRAMUSCULAR; INTRAVENOUS at 20:53

## 2018-01-01 RX ADMIN — SMOFLIPID 20000 MG: 6; 6; 5; 3 INJECTION, EMULSION INTRAVENOUS at 17:34

## 2018-01-01 RX ADMIN — CALCIUM GLUCONATE: 94 INJECTION, SOLUTION INTRAVENOUS at 18:15

## 2018-01-01 RX ADMIN — DEXAMETHASONE 4 MG: 4 TABLET ORAL at 12:34

## 2018-01-01 RX ADMIN — DEXAMETHASONE SODIUM PHOSPHATE 2 MG: 4 INJECTION, SOLUTION INTRAMUSCULAR; INTRAVENOUS at 05:25

## 2018-01-01 RX ADMIN — IPRATROPIUM BROMIDE AND ALBUTEROL SULFATE 3 ML: .5; 3 SOLUTION RESPIRATORY (INHALATION) at 16:19

## 2018-01-01 RX ADMIN — LORAZEPAM 0.25 MG: 2 INJECTION INTRAMUSCULAR; INTRAVENOUS at 20:01

## 2018-01-01 RX ADMIN — INSULIN HUMAN 2 UNITS: 100 INJECTION, SOLUTION PARENTERAL at 13:48

## 2018-01-01 RX ADMIN — DEXTROSE MONOHYDRATE 75 ML/HR: 50 INJECTION, SOLUTION INTRAVENOUS at 17:42

## 2018-01-01 RX ADMIN — HYDROMORPHONE HYDROCHLORIDE 0.25 MG: 10 INJECTION INTRAMUSCULAR; INTRAVENOUS; SUBCUTANEOUS at 00:33

## 2018-01-01 RX ADMIN — NICARDIPINE HYDROCHLORIDE 5 MG/HR: 0.1 INJECTION, SOLUTION INTRAVENOUS at 14:40

## 2018-01-01 RX ADMIN — GLYCOPYRROLATE 0.3 MG: 0.2 INJECTION, SOLUTION INTRAMUSCULAR; INTRAVENOUS at 13:07

## 2018-01-01 RX ADMIN — LORAZEPAM 0.5 MG: 2 INJECTION INTRAMUSCULAR; INTRAVENOUS at 00:33

## 2018-01-01 RX ADMIN — DEXAMETHASONE SODIUM PHOSPHATE 4 MG: 4 INJECTION, SOLUTION INTRAMUSCULAR; INTRAVENOUS at 04:25

## 2018-01-01 RX ADMIN — IPRATROPIUM BROMIDE AND ALBUTEROL SULFATE 3 ML: .5; 3 SOLUTION RESPIRATORY (INHALATION) at 07:41

## 2018-01-01 RX ADMIN — DEXAMETHASONE SODIUM PHOSPHATE 4 MG: 4 INJECTION, SOLUTION INTRAMUSCULAR; INTRAVENOUS at 20:11

## 2018-01-01 RX ADMIN — INSULIN HUMAN 2 UNITS: 100 INJECTION, SOLUTION PARENTERAL at 17:42

## 2018-01-01 RX ADMIN — SODIUM CHLORIDE 1000 ML: 9 INJECTION, SOLUTION INTRAVENOUS at 20:15

## 2018-01-01 RX ADMIN — CHLORHEXIDINE GLUCONATE 15 ML: 1.2 RINSE ORAL at 09:08

## 2018-01-01 RX ADMIN — NYSTATIN 500000 UNITS: 100000 SUSPENSION ORAL at 11:45

## 2018-01-01 RX ADMIN — IPRATROPIUM BROMIDE AND ALBUTEROL SULFATE 3 ML: .5; 3 SOLUTION RESPIRATORY (INHALATION) at 20:12

## 2018-01-01 RX ADMIN — HYDROMORPHONE HYDROCHLORIDE 0.5 MG: 10 INJECTION INTRAMUSCULAR; INTRAVENOUS; SUBCUTANEOUS at 22:56

## 2018-01-01 RX ADMIN — SODIUM CHLORIDE, POTASSIUM CHLORIDE, SODIUM LACTATE AND CALCIUM CHLORIDE: 600; 310; 30; 20 INJECTION, SOLUTION INTRAVENOUS at 15:35

## 2018-01-01 RX ADMIN — CARVEDILOL 3.12 MG: 3.12 TABLET, FILM COATED ORAL at 17:49

## 2018-01-01 RX ADMIN — LORAZEPAM 0.5 MG: 2 INJECTION INTRAMUSCULAR; INTRAVENOUS at 17:16

## 2018-01-01 RX ADMIN — MORPHINE SULFATE 4 MG: 10 INJECTION INTRAVENOUS at 13:14

## 2018-01-01 RX ADMIN — DEXAMETHASONE SODIUM PHOSPHATE 4 MG: 4 INJECTION, SOLUTION INTRAMUSCULAR; INTRAVENOUS at 17:36

## 2018-01-01 RX ADMIN — DEXTROSE MONOHYDRATE, SODIUM CHLORIDE, AND POTASSIUM CHLORIDE 75 ML/HR: 50; 9; 1.49 INJECTION, SOLUTION INTRAVENOUS at 12:41

## 2018-01-01 RX ADMIN — MEROPENEM 1 G: 1 INJECTION, POWDER, FOR SOLUTION INTRAVENOUS at 03:57

## 2018-01-01 RX ADMIN — DEXTROSE MONOHYDRATE, SODIUM CHLORIDE, AND POTASSIUM CHLORIDE 75 ML/HR: 50; 9; 1.49 INJECTION, SOLUTION INTRAVENOUS at 13:45

## 2018-01-01 RX ADMIN — DEXAMETHASONE SODIUM PHOSPHATE 6 MG: 4 INJECTION, SOLUTION INTRAMUSCULAR; INTRAVENOUS at 08:41

## 2018-01-01 RX ADMIN — INSULIN HUMAN 2 UNITS: 100 INJECTION, SOLUTION PARENTERAL at 11:45

## 2018-01-01 RX ADMIN — IPRATROPIUM BROMIDE AND ALBUTEROL SULFATE 3 ML: .5; 3 SOLUTION RESPIRATORY (INHALATION) at 21:11

## 2018-01-01 RX ADMIN — VANCOMYCIN HYDROCHLORIDE 750 MG: 750 INJECTION, SOLUTION INTRAVENOUS at 09:13

## 2018-01-01 RX ADMIN — IPRATROPIUM BROMIDE AND ALBUTEROL SULFATE 3 ML: .5; 3 SOLUTION RESPIRATORY (INHALATION) at 16:56

## 2018-01-01 RX ADMIN — MIRTAZAPINE 15 MG: 15 TABLET, FILM COATED ORAL at 20:46

## 2018-01-01 RX ADMIN — NYSTATIN 500000 UNITS: 100000 SUSPENSION ORAL at 09:07

## 2018-01-01 RX ADMIN — IPRATROPIUM BROMIDE AND ALBUTEROL SULFATE 3 ML: .5; 3 SOLUTION RESPIRATORY (INHALATION) at 20:40

## 2018-01-01 RX ADMIN — INSULIN HUMAN 2 UNITS: 100 INJECTION, SOLUTION PARENTERAL at 05:53

## 2018-01-01 RX ADMIN — INSULIN HUMAN 2 UNITS: 100 INJECTION, SOLUTION PARENTERAL at 00:25

## 2018-01-01 RX ADMIN — DEXAMETHASONE SODIUM PHOSPHATE 4 MG: 4 INJECTION, SOLUTION INTRAMUSCULAR; INTRAVENOUS at 02:07

## 2018-01-01 RX ADMIN — PHENYLEPHRINE HYDROCHLORIDE 200 MCG: 10 INJECTION INTRAVENOUS at 16:51

## 2018-01-01 RX ADMIN — POTASSIUM CHLORIDE AND SODIUM CHLORIDE 100 ML/HR: 450; 150 INJECTION, SOLUTION INTRAVENOUS at 06:02

## 2018-01-01 RX ADMIN — MIRTAZAPINE 15 MG: 15 TABLET, FILM COATED ORAL at 20:37

## 2018-01-01 RX ADMIN — DEXAMETHASONE SODIUM PHOSPHATE 6 MG: 4 INJECTION, SOLUTION INTRAMUSCULAR; INTRAVENOUS at 08:36

## 2018-01-01 RX ADMIN — IPRATROPIUM BROMIDE AND ALBUTEROL SULFATE 3 ML: .5; 3 SOLUTION RESPIRATORY (INHALATION) at 12:17

## 2018-01-01 RX ADMIN — MEROPENEM 1 G: 1 INJECTION, POWDER, FOR SOLUTION INTRAVENOUS at 00:11

## 2018-01-01 RX ADMIN — MIRTAZAPINE 15 MG: 15 TABLET, FILM COATED ORAL at 00:10

## 2018-01-01 RX ADMIN — INSULIN DETEMIR 12 UNITS: 100 INJECTION, SOLUTION SUBCUTANEOUS at 18:17

## 2018-01-01 RX ADMIN — ALBUMIN HUMAN 25 G: 0.25 SOLUTION INTRAVENOUS at 06:33

## 2018-01-01 RX ADMIN — IPRATROPIUM BROMIDE AND ALBUTEROL SULFATE 3 ML: .5; 3 SOLUTION RESPIRATORY (INHALATION) at 08:23

## 2018-01-01 RX ADMIN — IPRATROPIUM BROMIDE AND ALBUTEROL SULFATE 3 ML: .5; 3 SOLUTION RESPIRATORY (INHALATION) at 12:45

## 2018-01-01 RX ADMIN — MEROPENEM 1 G: 1 INJECTION, POWDER, FOR SOLUTION INTRAVENOUS at 12:05

## 2018-01-01 RX ADMIN — DEXAMETHASONE SODIUM PHOSPHATE 6 MG: 4 INJECTION, SOLUTION INTRAMUSCULAR; INTRAVENOUS at 02:10

## 2018-01-01 RX ADMIN — INSULIN DETEMIR 12 UNITS: 100 INJECTION, SOLUTION SUBCUTANEOUS at 14:43

## 2018-01-01 RX ADMIN — LORAZEPAM 0.5 MG: 2 INJECTION INTRAMUSCULAR; INTRAVENOUS at 18:45

## 2018-01-01 RX ADMIN — SODIUM CHLORIDE 75 ML/HR: 9 INJECTION, SOLUTION INTRAVENOUS at 14:40

## 2018-01-01 RX ADMIN — Medication 1 PACKET: at 09:40

## 2018-01-01 RX ADMIN — SMOFLIPID 20000 MG: 6; 6; 5; 3 INJECTION, EMULSION INTRAVENOUS at 18:16

## 2018-01-01 RX ADMIN — SODIUM CHLORIDE: 9 INJECTION, SOLUTION INTRAVENOUS at 21:20

## 2018-01-01 RX ADMIN — DEXAMETHASONE SODIUM PHOSPHATE 6 MG: 4 INJECTION, SOLUTION INTRAMUSCULAR; INTRAVENOUS at 08:32

## 2018-01-01 RX ADMIN — MEROPENEM 1 G: 1 INJECTION, POWDER, FOR SOLUTION INTRAVENOUS at 19:58

## 2018-01-01 RX ADMIN — CALCIUM GLUCONATE: 94 INJECTION, SOLUTION INTRAVENOUS at 17:02

## 2018-01-01 RX ADMIN — NYSTATIN 500000 UNITS: 100000 SUSPENSION ORAL at 20:35

## 2018-01-01 RX ADMIN — FAMOTIDINE 20 MG: 10 INJECTION, SOLUTION INTRAVENOUS at 08:05

## 2018-01-01 RX ADMIN — DEXAMETHASONE SODIUM PHOSPHATE 4 MG: 4 INJECTION, SOLUTION INTRAMUSCULAR; INTRAVENOUS at 10:38

## 2018-01-01 RX ADMIN — DEXMEDETOMIDINE HYDROCHLORIDE 0.4 MCG/KG/HR: 4 INJECTION, SOLUTION INTRAVENOUS at 20:15

## 2018-01-01 RX ADMIN — HALOPERIDOL LACTATE 5 MG: 5 INJECTION, SOLUTION INTRAMUSCULAR at 02:36

## 2018-01-01 RX ADMIN — MORPHINE SULFATE 4 MG: 10 INJECTION INTRAVENOUS at 17:16

## 2018-01-01 RX ADMIN — NYSTATIN 500000 UNITS: 100000 SUSPENSION ORAL at 20:03

## 2018-01-01 RX ADMIN — DEXAMETHASONE SODIUM PHOSPHATE 4 MG: 4 INJECTION, SOLUTION INTRAMUSCULAR; INTRAVENOUS at 01:37

## 2018-01-01 RX ADMIN — MORPHINE SULFATE 2 MG: 10 INJECTION INTRAVENOUS at 13:07

## 2018-01-01 RX ADMIN — MEROPENEM 1 G: 1 INJECTION, POWDER, FOR SOLUTION INTRAVENOUS at 00:07

## 2018-01-01 RX ADMIN — ALBUMIN HUMAN 25 G: 0.05 INJECTION, SOLUTION INTRAVENOUS at 02:35

## 2018-01-01 RX ADMIN — DEXAMETHASONE SODIUM PHOSPHATE 6 MG: 4 INJECTION, SOLUTION INTRAMUSCULAR; INTRAVENOUS at 08:01

## 2018-01-01 RX ADMIN — INSULIN HUMAN 4 UNITS: 100 INJECTION, SOLUTION PARENTERAL at 17:43

## 2018-01-01 RX ADMIN — Medication 0.2 MCG/KG/MIN: at 15:49

## 2018-01-01 RX ADMIN — IPRATROPIUM BROMIDE AND ALBUTEROL SULFATE 3 ML: .5; 3 SOLUTION RESPIRATORY (INHALATION) at 16:48

## 2018-01-01 RX ADMIN — NYSTATIN 500000 UNITS: 100000 SUSPENSION ORAL at 17:38

## 2018-01-01 RX ADMIN — SODIUM CHLORIDE 50 ML/HR: 9 INJECTION, SOLUTION INTRAVENOUS at 10:35

## 2018-01-01 RX ADMIN — HYDROMORPHONE HYDROCHLORIDE 0.5 MG: 10 INJECTION INTRAMUSCULAR; INTRAVENOUS; SUBCUTANEOUS at 03:57

## 2018-01-01 RX ADMIN — DEXAMETHASONE SODIUM PHOSPHATE 6 MG: 4 INJECTION, SOLUTION INTRAMUSCULAR; INTRAVENOUS at 02:11

## 2018-01-01 RX ADMIN — PROPOFOL 50 MCG/KG/MIN: 10 INJECTION, EMULSION INTRAVENOUS at 18:34

## 2018-01-01 RX ADMIN — HYDROMORPHONE HYDROCHLORIDE 0.5 MG: 10 INJECTION INTRAMUSCULAR; INTRAVENOUS; SUBCUTANEOUS at 00:12

## 2018-01-01 RX ADMIN — NYSTATIN 500000 UNITS: 100000 SUSPENSION ORAL at 13:48

## 2018-01-01 RX ADMIN — IPRATROPIUM BROMIDE AND ALBUTEROL SULFATE 3 ML: .5; 3 SOLUTION RESPIRATORY (INHALATION) at 15:45

## 2018-01-01 RX ADMIN — POTASSIUM CHLORIDE 20 MEQ: 29.8 INJECTION, SOLUTION INTRAVENOUS at 06:33

## 2018-01-01 RX ADMIN — IPRATROPIUM BROMIDE AND ALBUTEROL SULFATE 3 ML: .5; 3 SOLUTION RESPIRATORY (INHALATION) at 11:49

## 2018-01-01 RX ADMIN — MEROPENEM 1 G: 1 INJECTION, POWDER, FOR SOLUTION INTRAVENOUS at 20:19

## 2018-01-01 RX ADMIN — MANNITOL 50 G: 20 INJECTION, SOLUTION INTRAVENOUS at 02:17

## 2018-01-01 RX ADMIN — PROPOFOL 50 MCG/KG/MIN: 10 INJECTION, EMULSION INTRAVENOUS at 20:09

## 2018-01-01 RX ADMIN — SODIUM CHLORIDE 100 ML/HR: 9 INJECTION, SOLUTION INTRAVENOUS at 19:01

## 2018-01-01 RX ADMIN — HYDROMORPHONE HYDROCHLORIDE 0.25 MG: 10 INJECTION INTRAMUSCULAR; INTRAVENOUS; SUBCUTANEOUS at 05:51

## 2018-01-01 RX ADMIN — HALOPERIDOL LACTATE 5 MG: 5 INJECTION, SOLUTION INTRAMUSCULAR at 14:55

## 2018-01-01 RX ADMIN — MEROPENEM 1 G: 1 INJECTION, POWDER, FOR SOLUTION INTRAVENOUS at 00:17

## 2018-01-01 RX ADMIN — INSULIN HUMAN 2 UNITS: 100 INJECTION, SOLUTION PARENTERAL at 05:25

## 2018-01-01 RX ADMIN — HYDRALAZINE HYDROCHLORIDE 20 MG: 20 INJECTION INTRAMUSCULAR; INTRAVENOUS at 17:46

## 2018-01-01 RX ADMIN — IPRATROPIUM BROMIDE AND ALBUTEROL SULFATE 3 ML: .5; 3 SOLUTION RESPIRATORY (INHALATION) at 07:06

## 2018-01-01 RX ADMIN — DEXAMETHASONE SODIUM PHOSPHATE 6 MG: 4 INJECTION, SOLUTION INTRAMUSCULAR; INTRAVENOUS at 01:08

## 2018-01-01 RX ADMIN — DEXAMETHASONE SODIUM PHOSPHATE 2 MG: 4 INJECTION, SOLUTION INTRAMUSCULAR; INTRAVENOUS at 08:17

## 2018-01-01 RX ADMIN — CHLORHEXIDINE GLUCONATE 15 ML: 1.2 RINSE ORAL at 20:18

## 2018-01-01 RX ADMIN — DEXAMETHASONE SODIUM PHOSPHATE 6 MG: 4 INJECTION, SOLUTION INTRAMUSCULAR; INTRAVENOUS at 20:28

## 2018-01-01 RX ADMIN — IPRATROPIUM BROMIDE AND ALBUTEROL SULFATE 3 ML: .5; 3 SOLUTION RESPIRATORY (INHALATION) at 15:40

## 2018-01-01 RX ADMIN — SMOFLIPID 20000 MG: 6; 6; 5; 3 INJECTION, EMULSION INTRAVENOUS at 18:26

## 2018-01-01 RX ADMIN — CHLORHEXIDINE GLUCONATE 15 ML: 1.2 RINSE ORAL at 08:37

## 2018-01-01 RX ADMIN — DEXTROSE MONOHYDRATE, SODIUM CHLORIDE, AND POTASSIUM CHLORIDE 75 ML/HR: 50; 9; 1.49 INJECTION, SOLUTION INTRAVENOUS at 07:13

## 2018-01-01 RX ADMIN — HYDROMORPHONE HYDROCHLORIDE 0.5 MG: 10 INJECTION INTRAMUSCULAR; INTRAVENOUS; SUBCUTANEOUS at 15:12

## 2018-01-01 RX ADMIN — NYSTATIN 500000 UNITS: 100000 SUSPENSION ORAL at 17:51

## 2018-01-01 RX ADMIN — DEXAMETHASONE SODIUM PHOSPHATE 4 MG: 4 INJECTION, SOLUTION INTRAMUSCULAR; INTRAVENOUS at 17:46

## 2018-01-01 RX ADMIN — INSULIN HUMAN 2 UNITS: 100 INJECTION, SOLUTION PARENTERAL at 05:46

## 2018-01-01 RX ADMIN — PROMETHAZINE HYDROCHLORIDE 12.5 MG: 25 INJECTION INTRAMUSCULAR; INTRAVENOUS at 08:04

## 2018-01-01 RX ADMIN — PROPOFOL 50 MCG/KG/MIN: 10 INJECTION, EMULSION INTRAVENOUS at 01:41

## 2018-01-01 RX ADMIN — LORAZEPAM 0.25 MG: 2 INJECTION INTRAMUSCULAR; INTRAVENOUS at 08:17

## 2018-01-01 RX ADMIN — DEXAMETHASONE SODIUM PHOSPHATE 6 MG: 4 INJECTION, SOLUTION INTRAMUSCULAR; INTRAVENOUS at 08:05

## 2018-01-01 RX ADMIN — DEXAMETHASONE SODIUM PHOSPHATE 4 MG: 4 INJECTION, SOLUTION INTRAMUSCULAR; INTRAVENOUS at 03:00

## 2018-01-01 RX ADMIN — POVIDONE-IODINE 1 APPLICATION: 100 OINTMENT TOPICAL at 13:50

## 2018-01-01 RX ADMIN — MORPHINE SULFATE: 1 INJECTION INTRAVENOUS at 10:29

## 2018-01-01 RX ADMIN — IOPAMIDOL 46 ML: 755 INJECTION, SOLUTION INTRAVENOUS at 11:58

## 2018-01-01 RX ADMIN — IPRATROPIUM BROMIDE AND ALBUTEROL SULFATE 3 ML: .5; 3 SOLUTION RESPIRATORY (INHALATION) at 13:42

## 2018-01-01 RX ADMIN — INSULIN HUMAN 2 UNITS: 100 INJECTION, SOLUTION PARENTERAL at 06:44

## 2018-01-01 RX ADMIN — HYDROMORPHONE HYDROCHLORIDE 0.5 MG: 10 INJECTION INTRAMUSCULAR; INTRAVENOUS; SUBCUTANEOUS at 12:32

## 2018-01-01 RX ADMIN — LORAZEPAM 0.5 MG: 2 INJECTION INTRAMUSCULAR; INTRAVENOUS at 20:34

## 2018-01-01 RX ADMIN — INSULIN HUMAN 2 UNITS: 100 INJECTION, SOLUTION PARENTERAL at 17:46

## 2018-01-01 RX ADMIN — DEXAMETHASONE SODIUM PHOSPHATE 6 MG: 4 INJECTION, SOLUTION INTRAMUSCULAR; INTRAVENOUS at 13:35

## 2018-01-01 RX ADMIN — MORPHINE SULFATE: 1 INJECTION INTRAVENOUS at 12:20

## 2018-01-01 RX ADMIN — IPRATROPIUM BROMIDE AND ALBUTEROL SULFATE 3 ML: .5; 3 SOLUTION RESPIRATORY (INHALATION) at 12:57

## 2018-01-01 RX ADMIN — CHLORHEXIDINE GLUCONATE 15 ML: 1.2 RINSE ORAL at 20:16

## 2018-01-01 RX ADMIN — CHLORHEXIDINE GLUCONATE 15 ML: 1.2 RINSE ORAL at 08:05

## 2018-01-01 RX ADMIN — MEROPENEM 1 G: 1 INJECTION, POWDER, FOR SOLUTION INTRAVENOUS at 11:45

## 2018-01-01 RX ADMIN — DEXAMETHASONE SODIUM PHOSPHATE 6 MG: 4 INJECTION, SOLUTION INTRAMUSCULAR; INTRAVENOUS at 14:15

## 2018-01-01 RX ADMIN — DEXAMETHASONE SODIUM PHOSPHATE 2 MG: 4 INJECTION, SOLUTION INTRAMUSCULAR; INTRAVENOUS at 20:01

## 2018-01-01 RX ADMIN — MEROPENEM 1 G: 1 INJECTION, POWDER, FOR SOLUTION INTRAVENOUS at 20:03

## 2018-01-01 RX ADMIN — DEXAMETHASONE SODIUM PHOSPHATE 4 MG: 4 INJECTION, SOLUTION INTRAMUSCULAR; INTRAVENOUS at 18:16

## 2018-01-01 RX ADMIN — LORAZEPAM 0.25 MG: 2 INJECTION INTRAMUSCULAR; INTRAVENOUS at 13:23

## 2018-01-01 RX ADMIN — CHLORHEXIDINE GLUCONATE 15 ML: 1.2 RINSE ORAL at 08:33

## 2018-01-01 RX ADMIN — DEXAMETHASONE 4 MG: 4 TABLET ORAL at 14:06

## 2018-01-01 RX ADMIN — ONDANSETRON HYDROCHLORIDE 4 MG: 2 INJECTION, SOLUTION INTRAMUSCULAR; INTRAVENOUS at 04:39

## 2018-01-01 RX ADMIN — DEXAMETHASONE SODIUM PHOSPHATE 4 MG: 4 INJECTION, SOLUTION INTRAMUSCULAR; INTRAVENOUS at 09:51

## 2018-01-01 RX ADMIN — DEXAMETHASONE 4 MG: 4 TABLET ORAL at 17:43

## 2018-01-01 RX ADMIN — SODIUM CHLORIDE 75 ML/HR: 9 INJECTION, SOLUTION INTRAVENOUS at 12:45

## 2018-01-01 RX ADMIN — ALBUMIN HUMAN: 0.05 INJECTION, SOLUTION INTRAVENOUS at 22:20

## 2018-01-01 RX ADMIN — ATORVASTATIN CALCIUM 80 MG: 40 TABLET, FILM COATED ORAL at 20:56

## 2018-01-01 RX ADMIN — INSULIN HUMAN 2 UNITS: 100 INJECTION, SOLUTION PARENTERAL at 11:58

## 2018-01-01 RX ADMIN — HYDROMORPHONE HYDROCHLORIDE 0.5 MG: 10 INJECTION INTRAMUSCULAR; INTRAVENOUS; SUBCUTANEOUS at 08:17

## 2018-01-01 RX ADMIN — MORPHINE SULFATE 4 MG: 10 INJECTION INTRAVENOUS at 20:51

## 2018-01-01 RX ADMIN — SODIUM CHLORIDE 75 ML/HR: 9 INJECTION, SOLUTION INTRAVENOUS at 11:35

## 2018-01-01 RX ADMIN — NYSTATIN 500000 UNITS: 100000 SUSPENSION ORAL at 20:01

## 2018-01-01 RX ADMIN — NYSTATIN 500000 UNITS: 100000 SUSPENSION ORAL at 20:19

## 2018-01-01 RX ADMIN — HYDROMORPHONE HYDROCHLORIDE 0.25 MG: 10 INJECTION INTRAMUSCULAR; INTRAVENOUS; SUBCUTANEOUS at 02:16

## 2018-01-01 RX ADMIN — Medication 9.38 PACKET: at 08:42

## 2018-01-01 RX ADMIN — DEXAMETHASONE SODIUM PHOSPHATE 6 MG: 4 INJECTION, SOLUTION INTRAMUSCULAR; INTRAVENOUS at 02:17

## 2018-01-01 RX ADMIN — SODIUM CHLORIDE 100 ML/HR: 9 INJECTION, SOLUTION INTRAVENOUS at 15:06

## 2018-01-01 RX ADMIN — NICARDIPINE HYDROCHLORIDE 5 MG/HR: 0.1 INJECTION, SOLUTION INTRAVENOUS at 15:13

## 2018-01-01 RX ADMIN — DEXMEDETOMIDINE HYDROCHLORIDE 0.6 MCG/KG/HR: 4 INJECTION, SOLUTION INTRAVENOUS at 02:35

## 2018-01-01 RX ADMIN — POVIDONE-IODINE: 100 OINTMENT TOPICAL at 09:51

## 2018-01-01 RX ADMIN — MEROPENEM 1 G: 1 INJECTION, POWDER, FOR SOLUTION INTRAVENOUS at 03:00

## 2018-01-01 RX ADMIN — DEXTROSE MONOHYDRATE, SODIUM CHLORIDE, AND POTASSIUM CHLORIDE 75 ML/HR: 50; 9; 1.49 INJECTION, SOLUTION INTRAVENOUS at 18:44

## 2018-01-01 RX ADMIN — PHENYLEPHRINE HYDROCHLORIDE 0.1 MCG/KG/MIN: 10 INJECTION INTRAVENOUS at 16:57

## 2018-01-01 RX ADMIN — FUROSEMIDE 40 MG: 10 INJECTION, SOLUTION INTRAMUSCULAR; INTRAVENOUS at 08:18

## 2018-01-01 RX ADMIN — IPRATROPIUM BROMIDE AND ALBUTEROL SULFATE 3 ML: .5; 3 SOLUTION RESPIRATORY (INHALATION) at 16:23

## 2018-01-01 RX ADMIN — PROPOFOL 150 MG: 10 INJECTION, EMULSION INTRAVENOUS at 15:36

## 2018-01-01 RX ADMIN — DEXAMETHASONE SODIUM PHOSPHATE 6 MG: 4 INJECTION, SOLUTION INTRAMUSCULAR; INTRAVENOUS at 20:36

## 2018-01-01 RX ADMIN — CHLORHEXIDINE GLUCONATE 15 ML: 1.2 RINSE ORAL at 09:51

## 2018-01-01 RX ADMIN — CHLORHEXIDINE GLUCONATE 15 ML: 1.2 RINSE ORAL at 08:54

## 2018-01-01 RX ADMIN — MORPHINE SULFATE 4 MG: 10 INJECTION INTRAVENOUS at 02:21

## 2018-01-01 RX ADMIN — MIDAZOLAM HYDROCHLORIDE 4 MG: 1 INJECTION, SOLUTION INTRAMUSCULAR; INTRAVENOUS at 12:48

## 2018-01-01 RX ADMIN — DEXTROSE MONOHYDRATE, SODIUM CHLORIDE, AND POTASSIUM CHLORIDE 75 ML/HR: 50; 9; 1.49 INJECTION, SOLUTION INTRAVENOUS at 00:20

## 2018-01-01 RX ADMIN — MORPHINE SULFATE 4 MG: 10 INJECTION INTRAVENOUS at 09:41

## 2018-01-01 RX ADMIN — NYSTATIN 500000 UNITS: 100000 SUSPENSION ORAL at 08:55

## 2018-01-01 RX ADMIN — HALOPERIDOL LACTATE 5 MG: 5 INJECTION, SOLUTION INTRAMUSCULAR at 21:19

## 2018-01-01 RX ADMIN — IPRATROPIUM BROMIDE AND ALBUTEROL SULFATE 3 ML: .5; 3 SOLUTION RESPIRATORY (INHALATION) at 16:11

## 2018-01-01 RX ADMIN — DEXAMETHASONE SODIUM PHOSPHATE 6 MG: 4 INJECTION, SOLUTION INTRAMUSCULAR; INTRAVENOUS at 01:49

## 2018-01-01 RX ADMIN — CHLORHEXIDINE GLUCONATE 15 ML: 1.2 RINSE ORAL at 00:19

## 2018-01-01 RX ADMIN — IPRATROPIUM BROMIDE AND ALBUTEROL SULFATE 3 ML: .5; 3 SOLUTION RESPIRATORY (INHALATION) at 21:03

## 2018-01-01 RX ADMIN — IPRATROPIUM BROMIDE AND ALBUTEROL SULFATE 3 ML: .5; 3 SOLUTION RESPIRATORY (INHALATION) at 12:19

## 2018-01-01 RX ADMIN — IPRATROPIUM BROMIDE AND ALBUTEROL SULFATE 3 ML: .5; 3 SOLUTION RESPIRATORY (INHALATION) at 11:48

## 2018-01-01 RX ADMIN — FAMOTIDINE 20 MG: 10 INJECTION, SOLUTION INTRAVENOUS at 01:08

## 2018-01-01 RX ADMIN — PANTOPRAZOLE SODIUM 40 MG: 40 INJECTION, POWDER, FOR SOLUTION INTRAVENOUS at 11:58

## 2018-01-01 RX ADMIN — IPRATROPIUM BROMIDE AND ALBUTEROL SULFATE 3 ML: .5; 3 SOLUTION RESPIRATORY (INHALATION) at 19:37

## 2018-01-01 RX ADMIN — DEXMEDETOMIDINE HYDROCHLORIDE 0.2 MCG/KG/HR: 4 INJECTION, SOLUTION INTRAVENOUS at 11:23

## 2018-01-01 RX ADMIN — LORAZEPAM 0.5 MG: 2 INJECTION INTRAMUSCULAR; INTRAVENOUS at 11:53

## 2018-01-01 RX ADMIN — HYDROMORPHONE HYDROCHLORIDE 0.5 MG: 10 INJECTION INTRAMUSCULAR; INTRAVENOUS; SUBCUTANEOUS at 04:45

## 2018-01-01 RX ADMIN — CALCIUM GLUCONATE: 94 INJECTION, SOLUTION INTRAVENOUS at 18:29

## 2018-01-01 RX ADMIN — DEXAMETHASONE SODIUM PHOSPHATE 4 MG: 4 INJECTION, SOLUTION INTRAMUSCULAR; INTRAVENOUS at 10:08

## 2018-01-01 RX ADMIN — ATORVASTATIN CALCIUM 80 MG: 40 TABLET, FILM COATED ORAL at 20:37

## 2018-01-01 RX ADMIN — PANTOPRAZOLE SODIUM 40 MG: 40 INJECTION, POWDER, FOR SOLUTION INTRAVENOUS at 05:25

## 2018-01-01 RX ADMIN — INSULIN HUMAN 4 UNITS: 100 INJECTION, SOLUTION PARENTERAL at 12:34

## 2018-01-01 RX ADMIN — IPRATROPIUM BROMIDE AND ALBUTEROL SULFATE 3 ML: .5; 3 SOLUTION RESPIRATORY (INHALATION) at 08:16

## 2018-01-01 RX ADMIN — ATRACURIUM BESYLATE 50 MG: 10 INJECTION, SOLUTION INTRAVENOUS at 15:36

## 2018-01-01 RX ADMIN — IPRATROPIUM BROMIDE AND ALBUTEROL SULFATE 3 ML: .5; 3 SOLUTION RESPIRATORY (INHALATION) at 08:10

## 2018-01-01 RX ADMIN — Medication 0.5 MCG/KG/MIN: at 09:00

## 2018-01-01 RX ADMIN — Medication 0.2 MCG/KG/MIN: at 14:23

## 2018-01-01 RX ADMIN — ONDANSETRON HYDROCHLORIDE 4 MG: 2 INJECTION, SOLUTION INTRAMUSCULAR; INTRAVENOUS at 18:05

## 2018-01-01 RX ADMIN — FUROSEMIDE 20 MG: 10 INJECTION, SOLUTION INTRAMUSCULAR; INTRAVENOUS at 16:15

## 2018-01-01 RX ADMIN — LORAZEPAM 0.25 MG: 2 INJECTION INTRAMUSCULAR; INTRAVENOUS at 05:23

## 2018-01-01 RX ADMIN — DEXMEDETOMIDINE HYDROCHLORIDE 1 MCG/KG/HR: 4 INJECTION, SOLUTION INTRAVENOUS at 10:18

## 2018-01-01 RX ADMIN — IPRATROPIUM BROMIDE AND ALBUTEROL SULFATE 3 ML: .5; 3 SOLUTION RESPIRATORY (INHALATION) at 19:04

## 2018-01-01 RX ADMIN — MORPHINE SULFATE 2 MG: 10 INJECTION INTRAVENOUS at 15:10

## 2018-01-01 RX ADMIN — HYDROMORPHONE HYDROCHLORIDE: 10 INJECTION INTRAMUSCULAR; INTRAVENOUS; SUBCUTANEOUS at 23:04

## 2018-01-01 RX ADMIN — FAMOTIDINE 20 MG: 10 INJECTION, SOLUTION INTRAVENOUS at 08:54

## 2018-01-01 RX ADMIN — CALCIUM GLUCONATE: 94 INJECTION, SOLUTION INTRAVENOUS at 17:50

## 2018-01-01 RX ADMIN — DEXAMETHASONE SODIUM PHOSPHATE 10 MG: 4 INJECTION, SOLUTION INTRAMUSCULAR; INTRAVENOUS at 15:36

## 2018-01-01 RX ADMIN — NYSTATIN 500000 UNITS: 100000 SUSPENSION ORAL at 18:12

## 2018-01-01 RX ADMIN — DEXAMETHASONE 4 MG: 4 TABLET ORAL at 06:03

## 2018-01-01 RX ADMIN — NOREPINEPHRINE BITARTRATE 0.28 MCG/KG/MIN: 1 INJECTION INTRAVENOUS at 21:20

## 2018-01-01 RX ADMIN — HYDROMORPHONE HYDROCHLORIDE 0.25 MG: 10 INJECTION INTRAMUSCULAR; INTRAVENOUS; SUBCUTANEOUS at 20:04

## 2018-01-01 RX ADMIN — FAMOTIDINE 20 MG: 10 INJECTION, SOLUTION INTRAVENOUS at 08:25

## 2018-01-01 RX ADMIN — NYSTATIN 500000 UNITS: 100000 SUSPENSION ORAL at 08:09

## 2018-01-01 RX ADMIN — IPRATROPIUM BROMIDE AND ALBUTEROL SULFATE 3 ML: .5; 3 SOLUTION RESPIRATORY (INHALATION) at 20:26

## 2018-01-01 RX ADMIN — ATORVASTATIN CALCIUM 80 MG: 40 TABLET, FILM COATED ORAL at 00:09

## 2018-01-01 RX ADMIN — IPRATROPIUM BROMIDE AND ALBUTEROL SULFATE 3 ML: .5; 3 SOLUTION RESPIRATORY (INHALATION) at 19:20

## 2018-01-01 RX ADMIN — HYDROMORPHONE HYDROCHLORIDE 0.5 MG: 10 INJECTION INTRAMUSCULAR; INTRAVENOUS; SUBCUTANEOUS at 23:55

## 2018-01-01 RX ADMIN — PHENYLEPHRINE HYDROCHLORIDE 100 MCG: 10 INJECTION INTRAVENOUS at 16:45

## 2018-01-01 RX ADMIN — FUROSEMIDE 40 MG: 10 INJECTION, SOLUTION INTRAMUSCULAR; INTRAVENOUS at 08:00

## 2018-01-01 RX ADMIN — MANNITOL: 20 INJECTION, SOLUTION INTRAVENOUS at 15:40

## 2018-01-01 RX ADMIN — MEROPENEM 1 G: 1 INJECTION, POWDER, FOR SOLUTION INTRAVENOUS at 11:58

## 2018-01-01 RX ADMIN — IPRATROPIUM BROMIDE AND ALBUTEROL SULFATE 3 ML: .5; 3 SOLUTION RESPIRATORY (INHALATION) at 15:53

## 2018-01-01 RX ADMIN — MEROPENEM 1 G: 1 INJECTION, POWDER, FOR SOLUTION INTRAVENOUS at 14:46

## 2018-01-01 RX ADMIN — MEROPENEM 1 G: 1 INJECTION, POWDER, FOR SOLUTION INTRAVENOUS at 04:00

## 2018-01-01 RX ADMIN — HYDROMORPHONE HYDROCHLORIDE 0.5 MG: 10 INJECTION INTRAMUSCULAR; INTRAVENOUS; SUBCUTANEOUS at 15:29

## 2018-01-01 RX ADMIN — IPRATROPIUM BROMIDE AND ALBUTEROL SULFATE 3 ML: .5; 3 SOLUTION RESPIRATORY (INHALATION) at 19:52

## 2018-01-01 RX ADMIN — DEXTROSE MONOHYDRATE, SODIUM CHLORIDE, AND POTASSIUM CHLORIDE 75 ML/HR: 50; 9; 1.49 INJECTION, SOLUTION INTRAVENOUS at 18:32

## 2018-01-01 RX ADMIN — HYDROMORPHONE HYDROCHLORIDE 0.5 MG: 10 INJECTION INTRAMUSCULAR; INTRAVENOUS; SUBCUTANEOUS at 05:22

## 2018-01-01 RX ADMIN — DEXAMETHASONE SODIUM PHOSPHATE 6 MG: 4 INJECTION, SOLUTION INTRAMUSCULAR; INTRAVENOUS at 02:36

## 2018-01-01 RX ADMIN — VANCOMYCIN HYDROCHLORIDE 750 MG: 750 INJECTION, SOLUTION INTRAVENOUS at 06:03

## 2018-01-01 RX ADMIN — DEXMEDETOMIDINE HYDROCHLORIDE 0.2 MCG/KG/HR: 100 INJECTION, SOLUTION INTRAVENOUS at 16:19

## 2018-01-01 RX ADMIN — MEROPENEM 1 G: 1 INJECTION, POWDER, FOR SOLUTION INTRAVENOUS at 00:03

## 2018-01-01 RX ADMIN — MEROPENEM 1 G: 1 INJECTION, POWDER, FOR SOLUTION INTRAVENOUS at 11:32

## 2018-01-01 RX ADMIN — MIRTAZAPINE 15 MG: 15 TABLET, FILM COATED ORAL at 20:36

## 2018-01-01 RX ADMIN — HYDROMORPHONE HYDROCHLORIDE: 10 INJECTION INTRAMUSCULAR; INTRAVENOUS; SUBCUTANEOUS at 01:08

## 2018-01-01 RX ADMIN — CHLORHEXIDINE GLUCONATE 15 ML: 1.2 RINSE ORAL at 20:15

## 2018-01-01 RX ADMIN — MAGNESIUM SULFATE IN WATER 4 G: 40 INJECTION, SOLUTION INTRAVENOUS at 06:34

## 2018-01-01 RX ADMIN — ALPRAZOLAM 0.5 MG: 0.5 TABLET ORAL at 04:07

## 2018-01-01 RX ADMIN — CEFTRIAXONE SODIUM 1 G: 1 INJECTION, SOLUTION INTRAVENOUS at 17:13

## 2018-01-01 RX ADMIN — DEXAMETHASONE SODIUM PHOSPHATE 4 MG: 4 INJECTION, SOLUTION INTRAMUSCULAR; INTRAVENOUS at 09:11

## 2018-01-01 RX ADMIN — POTASSIUM CHLORIDE: 2 INJECTION, SOLUTION, CONCENTRATE INTRAVENOUS at 18:25

## 2018-01-01 RX ADMIN — MORPHINE SULFATE 4 MG: 10 INJECTION INTRAVENOUS at 20:34

## 2018-01-01 RX ADMIN — NYSTATIN 500000 UNITS: 100000 SUSPENSION ORAL at 11:58

## 2018-01-01 RX ADMIN — CHLORHEXIDINE GLUCONATE 15 ML: 1.2 RINSE ORAL at 20:19

## 2018-01-01 RX ADMIN — IPRATROPIUM BROMIDE AND ALBUTEROL SULFATE 3 ML: .5; 3 SOLUTION RESPIRATORY (INHALATION) at 16:33

## 2018-01-01 RX ADMIN — IPRATROPIUM BROMIDE AND ALBUTEROL SULFATE 3 ML: .5; 3 SOLUTION RESPIRATORY (INHALATION) at 20:02

## 2018-01-01 RX ADMIN — IPRATROPIUM BROMIDE AND ALBUTEROL SULFATE 3 ML: .5; 3 SOLUTION RESPIRATORY (INHALATION) at 12:47

## 2018-01-01 RX ADMIN — HYDROMORPHONE HYDROCHLORIDE 0.25 MG: 10 INJECTION INTRAMUSCULAR; INTRAVENOUS; SUBCUTANEOUS at 17:02

## 2018-01-01 RX ADMIN — FENTANYL CITRATE 250 MCG: 50 INJECTION, SOLUTION INTRAMUSCULAR; INTRAVENOUS at 15:36

## 2018-01-01 RX ADMIN — LORAZEPAM 0.25 MG: 2 INJECTION INTRAMUSCULAR; INTRAVENOUS at 18:01

## 2018-01-01 RX ADMIN — DEXAMETHASONE SODIUM PHOSPHATE 4 MG: 4 INJECTION, SOLUTION INTRAMUSCULAR; INTRAVENOUS at 02:02

## 2018-01-01 RX ADMIN — POTASSIUM CHLORIDE 20 MEQ: 29.8 INJECTION, SOLUTION INTRAVENOUS at 17:24

## 2018-01-01 RX ADMIN — IPRATROPIUM BROMIDE AND ALBUTEROL SULFATE 3 ML: .5; 3 SOLUTION RESPIRATORY (INHALATION) at 19:18

## 2018-01-01 RX ADMIN — DEXMEDETOMIDINE HYDROCHLORIDE 0.8 MCG/KG/HR: 4 INJECTION, SOLUTION INTRAVENOUS at 00:46

## 2018-01-01 RX ADMIN — IPRATROPIUM BROMIDE AND ALBUTEROL SULFATE 3 ML: .5; 3 SOLUTION RESPIRATORY (INHALATION) at 13:01

## 2018-01-01 RX ADMIN — INSULIN HUMAN 2 UNITS: 100 INJECTION, SOLUTION PARENTERAL at 05:48

## 2018-01-01 RX ADMIN — HYDROMORPHONE HYDROCHLORIDE 0.25 MG: 10 INJECTION INTRAMUSCULAR; INTRAVENOUS; SUBCUTANEOUS at 04:38

## 2018-01-01 RX ADMIN — INSULIN HUMAN 4 UNITS: 100 INJECTION, SOLUTION PARENTERAL at 05:46

## 2018-01-01 RX ADMIN — IPRATROPIUM BROMIDE AND ALBUTEROL SULFATE 3 ML: .5; 3 SOLUTION RESPIRATORY (INHALATION) at 21:16

## 2018-01-01 RX ADMIN — LORAZEPAM 0.25 MG: 2 INJECTION INTRAMUSCULAR; INTRAVENOUS at 05:25

## 2018-01-01 RX ADMIN — ATRACURIUM BESYLATE 10 MG: 10 INJECTION, SOLUTION INTRAVENOUS at 18:03

## 2018-01-01 RX ADMIN — IPRATROPIUM BROMIDE AND ALBUTEROL SULFATE 3 ML: .5; 3 SOLUTION RESPIRATORY (INHALATION) at 07:19

## 2018-01-01 RX ADMIN — NYSTATIN 500000 UNITS: 100000 SUSPENSION ORAL at 16:44

## 2018-01-01 RX ADMIN — HYDROMORPHONE HYDROCHLORIDE 0.5 MG: 10 INJECTION INTRAMUSCULAR; INTRAVENOUS; SUBCUTANEOUS at 06:16

## 2018-01-01 RX ADMIN — IPRATROPIUM BROMIDE AND ALBUTEROL SULFATE 3 ML: .5; 3 SOLUTION RESPIRATORY (INHALATION) at 08:34

## 2018-01-01 RX ADMIN — NICARDIPINE HYDROCHLORIDE 5 MG/HR: 0.2 INJECTION, SOLUTION INTRAVENOUS at 02:46

## 2018-01-01 RX ADMIN — FUROSEMIDE 40 MG: 10 INJECTION, SOLUTION INTRAMUSCULAR; INTRAVENOUS at 08:43

## 2018-01-01 RX ADMIN — NOREPINEPHRINE BITARTRATE 0.3 MCG/KG/MIN: 1 INJECTION, SOLUTION, CONCENTRATE INTRAVENOUS at 11:39

## 2018-01-01 RX ADMIN — DEXAMETHASONE SODIUM PHOSPHATE 4 MG: 4 INJECTION, SOLUTION INTRAMUSCULAR; INTRAVENOUS at 17:42

## 2018-01-01 RX ADMIN — CHLORHEXIDINE GLUCONATE 15 ML: 1.2 RINSE ORAL at 08:20

## 2018-01-01 RX ADMIN — IPRATROPIUM BROMIDE AND ALBUTEROL SULFATE 3 ML: .5; 3 SOLUTION RESPIRATORY (INHALATION) at 12:40

## 2018-01-01 RX ADMIN — DEXAMETHASONE SODIUM PHOSPHATE 4 MG: 4 INJECTION, SOLUTION INTRAMUSCULAR; INTRAVENOUS at 17:24

## 2018-01-01 RX ADMIN — POTASSIUM CHLORIDE AND SODIUM CHLORIDE 100 ML/HR: 450; 150 INJECTION, SOLUTION INTRAVENOUS at 20:07

## 2018-01-01 RX ADMIN — IPRATROPIUM BROMIDE AND ALBUTEROL SULFATE 3 ML: .5; 3 SOLUTION RESPIRATORY (INHALATION) at 12:35

## 2018-01-01 RX ADMIN — CARVEDILOL 3.12 MG: 3.12 TABLET, FILM COATED ORAL at 17:43

## 2018-01-01 RX ADMIN — POVIDONE-IODINE: 100 OINTMENT TOPICAL at 08:09

## 2018-01-01 RX ADMIN — LORAZEPAM 0.25 MG: 2 INJECTION INTRAMUSCULAR; INTRAVENOUS at 20:48

## 2018-01-01 RX ADMIN — INSULIN HUMAN 2 UNITS: 100 INJECTION, SOLUTION PARENTERAL at 00:20

## 2018-01-01 RX ADMIN — SMOFLIPID 20000 MG: 6; 6; 5; 3 INJECTION, EMULSION INTRAVENOUS at 17:50

## 2018-01-01 RX ADMIN — NYSTATIN 500000 UNITS: 100000 SUSPENSION ORAL at 08:17

## 2018-01-01 RX ADMIN — FUROSEMIDE 40 MG: 10 INJECTION, SOLUTION INTRAMUSCULAR; INTRAVENOUS at 08:05

## 2018-01-01 RX ADMIN — HYDROMORPHONE HYDROCHLORIDE 0.5 MG: 10 INJECTION INTRAMUSCULAR; INTRAVENOUS; SUBCUTANEOUS at 21:12

## 2018-01-01 RX ADMIN — DEXMEDETOMIDINE HYDROCHLORIDE 0.4 MCG/KG/HR: 4 INJECTION, SOLUTION INTRAVENOUS at 00:21

## 2018-01-01 RX ADMIN — VANCOMYCIN HYDROCHLORIDE 1 G: 1 INJECTION, SOLUTION INTRAVENOUS at 15:45

## 2018-01-01 RX ADMIN — CALCIUM GLUCONATE 1 G: 94 INJECTION, SOLUTION INTRAVENOUS at 06:34

## 2018-01-01 RX ADMIN — SMOFLIPID 20000 MG: 6; 6; 5; 3 INJECTION, EMULSION INTRAVENOUS at 17:51

## 2018-01-01 RX ADMIN — CHLORHEXIDINE GLUCONATE 15 ML: 1.2 RINSE ORAL at 20:39

## 2018-01-01 RX ADMIN — DEXMEDETOMIDINE HYDROCHLORIDE 0.2 MCG/KG/HR: 4 INJECTION, SOLUTION INTRAVENOUS at 04:10

## 2018-01-01 RX ADMIN — NYSTATIN 500000 UNITS: 100000 SUSPENSION ORAL at 20:15

## 2018-01-01 RX ADMIN — DEXAMETHASONE 4 MG: 4 TABLET ORAL at 08:01

## 2018-01-01 RX ADMIN — DEXAMETHASONE SODIUM PHOSPHATE 6 MG: 4 INJECTION, SOLUTION INTRAMUSCULAR; INTRAVENOUS at 08:19

## 2018-01-01 RX ADMIN — FENTANYL CITRATE 100 MCG: 50 INJECTION, SOLUTION INTRAMUSCULAR; INTRAVENOUS at 12:48

## 2018-01-01 RX ADMIN — MORPHINE SULFATE: 1 INJECTION INTRAVENOUS at 02:56

## 2018-01-01 RX ADMIN — ACETAMINOPHEN 650 MG: 650 SUPPOSITORY RECTAL at 00:40

## 2018-01-01 RX ADMIN — DEXAMETHASONE SODIUM PHOSPHATE 4 MG: 4 INJECTION, SOLUTION INTRAMUSCULAR; INTRAVENOUS at 17:51

## 2018-01-01 RX ADMIN — DEXMEDETOMIDINE HYDROCHLORIDE 0.6 MCG/KG/HR: 4 INJECTION, SOLUTION INTRAVENOUS at 11:46

## 2018-01-01 RX ADMIN — DEXAMETHASONE 4 MG: 4 TABLET ORAL at 00:20

## 2018-01-01 RX ADMIN — INSULIN HUMAN 2 UNITS: 100 INJECTION, SOLUTION PARENTERAL at 17:51

## 2018-01-01 RX ADMIN — CEFTRIAXONE SODIUM 1 G: 1 INJECTION, POWDER, FOR SOLUTION INTRAMUSCULAR; INTRAVENOUS at 21:30

## 2018-01-01 RX ADMIN — PROPOFOL: 10 INJECTION, EMULSION INTRAVENOUS at 18:30

## 2018-01-01 RX ADMIN — CHLORHEXIDINE GLUCONATE 15 ML: 1.2 RINSE ORAL at 20:35

## 2018-01-01 RX ADMIN — IPRATROPIUM BROMIDE AND ALBUTEROL SULFATE 3 ML: .5; 3 SOLUTION RESPIRATORY (INHALATION) at 16:01

## 2018-01-01 RX ADMIN — IPRATROPIUM BROMIDE AND ALBUTEROL SULFATE 3 ML: .5; 3 SOLUTION RESPIRATORY (INHALATION) at 13:17

## 2018-01-01 RX ADMIN — IPRATROPIUM BROMIDE AND ALBUTEROL SULFATE 3 ML: .5; 3 SOLUTION RESPIRATORY (INHALATION) at 11:58

## 2018-01-01 RX ADMIN — IPRATROPIUM BROMIDE AND ALBUTEROL SULFATE 3 ML: .5; 3 SOLUTION RESPIRATORY (INHALATION) at 09:20

## 2018-01-01 RX ADMIN — DEXTROSE MONOHYDRATE, SODIUM CHLORIDE, AND POTASSIUM CHLORIDE 75 ML/HR: 50; 9; 1.49 INJECTION, SOLUTION INTRAVENOUS at 20:29

## 2018-01-01 RX ADMIN — ETOMIDATE: 2 INJECTION INTRAVENOUS at 18:15

## 2018-01-01 RX ADMIN — NICARDIPINE HYDROCHLORIDE 5 MG/HR: 0.1 INJECTION, SOLUTION INTRAVENOUS at 18:31

## 2018-01-01 RX ADMIN — ATORVASTATIN CALCIUM 80 MG: 40 TABLET, FILM COATED ORAL at 20:35

## 2018-01-01 RX ADMIN — HYDROMORPHONE HYDROCHLORIDE 0.5 MG: 10 INJECTION INTRAMUSCULAR; INTRAVENOUS; SUBCUTANEOUS at 20:55

## 2018-01-01 RX ADMIN — MORPHINE SULFATE 4 MG: 10 INJECTION INTRAVENOUS at 10:47

## 2018-01-01 RX ADMIN — IPRATROPIUM BROMIDE AND ALBUTEROL SULFATE 3 ML: .5; 3 SOLUTION RESPIRATORY (INHALATION) at 20:47

## 2018-01-01 RX ADMIN — IPRATROPIUM BROMIDE AND ALBUTEROL SULFATE 3 ML: .5; 3 SOLUTION RESPIRATORY (INHALATION) at 07:49

## 2018-01-01 RX ADMIN — NOREPINEPHRINE BITARTRATE 0.1 MCG/KG/MIN: 1 INJECTION, SOLUTION, CONCENTRATE INTRAVENOUS at 19:45

## 2018-01-01 RX ADMIN — HYDROMORPHONE HYDROCHLORIDE 0.5 MG: 10 INJECTION INTRAMUSCULAR; INTRAVENOUS; SUBCUTANEOUS at 05:23

## 2018-01-01 RX ADMIN — Medication 1 PACKET: at 08:01

## 2018-01-01 RX ADMIN — ATORVASTATIN CALCIUM 80 MG: 40 TABLET, FILM COATED ORAL at 20:46

## 2018-01-01 RX ADMIN — DEXAMETHASONE SODIUM PHOSPHATE 4 MG: 4 INJECTION, SOLUTION INTRAMUSCULAR; INTRAVENOUS at 09:08

## 2018-01-01 RX ADMIN — IPRATROPIUM BROMIDE AND ALBUTEROL SULFATE 3 ML: .5; 3 SOLUTION RESPIRATORY (INHALATION) at 16:30

## 2018-01-01 RX ADMIN — ALPRAZOLAM 0.5 MG: 0.5 TABLET ORAL at 16:04

## 2018-01-01 RX ADMIN — INSULIN HUMAN 2 UNITS: 100 INJECTION, SOLUTION PARENTERAL at 18:12

## 2018-01-01 RX ADMIN — IPRATROPIUM BROMIDE AND ALBUTEROL SULFATE 3 ML: .5; 3 SOLUTION RESPIRATORY (INHALATION) at 13:30

## 2018-01-01 RX ADMIN — IPRATROPIUM BROMIDE AND ALBUTEROL SULFATE 3 ML: .5; 3 SOLUTION RESPIRATORY (INHALATION) at 08:30

## 2018-01-01 RX ADMIN — PROPOFOL 50 MCG/KG/MIN: 10 INJECTION, EMULSION INTRAVENOUS at 06:05

## 2018-01-01 RX ADMIN — DEXTROSE MONOHYDRATE, SODIUM CHLORIDE, AND POTASSIUM CHLORIDE 75 ML/HR: 50; 9; 1.49 INJECTION, SOLUTION INTRAVENOUS at 00:40

## 2018-01-01 RX ADMIN — HYDROMORPHONE HYDROCHLORIDE 0.5 MG: 10 INJECTION INTRAMUSCULAR; INTRAVENOUS; SUBCUTANEOUS at 03:44

## 2018-01-01 RX ADMIN — DEXAMETHASONE SODIUM PHOSPHATE 6 MG: 4 INJECTION, SOLUTION INTRAMUSCULAR; INTRAVENOUS at 20:24

## 2018-01-01 RX ADMIN — MIRTAZAPINE 15 MG: 15 TABLET, FILM COATED ORAL at 20:56

## 2018-01-01 RX ADMIN — POTASSIUM CHLORIDE: 2 INJECTION, SOLUTION, CONCENTRATE INTRAVENOUS at 17:50

## 2018-01-01 RX ADMIN — INSULIN HUMAN 4 UNITS: 100 INJECTION, SOLUTION PARENTERAL at 00:10

## 2018-01-01 RX ADMIN — IPRATROPIUM BROMIDE AND ALBUTEROL SULFATE 3 ML: .5; 3 SOLUTION RESPIRATORY (INHALATION) at 16:17

## 2018-01-01 RX ADMIN — LORAZEPAM 0.25 MG: 2 INJECTION INTRAMUSCULAR; INTRAVENOUS at 13:08

## 2018-01-01 RX ADMIN — HYDROMORPHONE HYDROCHLORIDE 0.25 MG: 10 INJECTION INTRAMUSCULAR; INTRAVENOUS; SUBCUTANEOUS at 21:33

## 2018-01-01 RX ADMIN — POTASSIUM CHLORIDE 20 MEQ: 29.8 INJECTION, SOLUTION INTRAVENOUS at 18:26

## 2018-01-01 RX ADMIN — HYDROMORPHONE HYDROCHLORIDE 0.5 MG: 10 INJECTION INTRAMUSCULAR; INTRAVENOUS; SUBCUTANEOUS at 02:33

## 2018-01-01 RX ADMIN — DEXAMETHASONE SODIUM PHOSPHATE 6 MG: 4 INJECTION, SOLUTION INTRAMUSCULAR; INTRAVENOUS at 14:29

## 2018-01-01 RX ADMIN — SMOFLIPID 20000 MG: 6; 6; 5; 3 INJECTION, EMULSION INTRAVENOUS at 17:02

## 2018-01-01 RX ADMIN — HYDROMORPHONE HYDROCHLORIDE 0.5 MG: 10 INJECTION INTRAMUSCULAR; INTRAVENOUS; SUBCUTANEOUS at 16:04

## 2018-01-01 RX ADMIN — MEROPENEM 1 G: 1 INJECTION, POWDER, FOR SOLUTION INTRAVENOUS at 12:24

## 2018-01-01 RX ADMIN — DEXAMETHASONE SODIUM PHOSPHATE 6 MG: 4 INJECTION, SOLUTION INTRAMUSCULAR; INTRAVENOUS at 13:43

## 2018-01-01 RX ADMIN — IPRATROPIUM BROMIDE AND ALBUTEROL SULFATE 3 ML: .5; 3 SOLUTION RESPIRATORY (INHALATION) at 17:08

## 2018-01-01 RX ADMIN — IPRATROPIUM BROMIDE AND ALBUTEROL SULFATE 3 ML: .5; 3 SOLUTION RESPIRATORY (INHALATION) at 20:03

## 2018-01-01 RX ADMIN — LORAZEPAM 0.5 MG: 2 INJECTION INTRAMUSCULAR; INTRAVENOUS at 06:03

## 2018-01-01 RX ADMIN — VANCOMYCIN HYDROCHLORIDE 1250 MG: 10 INJECTION, POWDER, LYOPHILIZED, FOR SOLUTION INTRAVENOUS at 11:32

## 2018-01-01 RX ADMIN — HYDROMORPHONE HYDROCHLORIDE 0.5 MG: 10 INJECTION INTRAMUSCULAR; INTRAVENOUS; SUBCUTANEOUS at 01:47

## 2018-01-01 RX ADMIN — IPRATROPIUM BROMIDE AND ALBUTEROL SULFATE 3 ML: .5; 3 SOLUTION RESPIRATORY (INHALATION) at 07:21

## 2018-01-01 RX ADMIN — DEXAMETHASONE SODIUM PHOSPHATE 6 MG: 4 INJECTION, SOLUTION INTRAMUSCULAR; INTRAVENOUS at 20:13

## 2018-01-01 RX ADMIN — LIDOCAINE HYDROCHLORIDE 60 MG: 10 INJECTION, SOLUTION EPIDURAL; INFILTRATION; INTRACAUDAL; PERINEURAL at 15:36

## 2018-01-01 RX ADMIN — HYDROMORPHONE HYDROCHLORIDE 0.25 MG: 10 INJECTION INTRAMUSCULAR; INTRAVENOUS; SUBCUTANEOUS at 13:18

## 2018-01-01 RX ADMIN — CARVEDILOL 3.12 MG: 3.12 TABLET, FILM COATED ORAL at 20:40

## 2018-01-01 RX ADMIN — MEROPENEM 1 G: 1 INJECTION, POWDER, FOR SOLUTION INTRAVENOUS at 20:15

## 2018-01-01 RX ADMIN — CARVEDILOL 3.12 MG: 3.12 TABLET, FILM COATED ORAL at 08:01

## 2018-01-01 RX ADMIN — DEXAMETHASONE SODIUM PHOSPHATE 6 MG: 4 INJECTION, SOLUTION INTRAMUSCULAR; INTRAVENOUS at 13:44

## 2018-01-01 RX ADMIN — HYDROMORPHONE HYDROCHLORIDE 0.5 MG: 10 INJECTION INTRAMUSCULAR; INTRAVENOUS; SUBCUTANEOUS at 21:22

## 2018-01-01 RX ADMIN — IPRATROPIUM BROMIDE AND ALBUTEROL SULFATE 3 ML: .5; 3 SOLUTION RESPIRATORY (INHALATION) at 19:44

## 2018-01-01 RX ADMIN — NYSTATIN 500000 UNITS: 100000 SUSPENSION ORAL at 17:34

## 2018-01-01 RX ADMIN — CHLORHEXIDINE GLUCONATE 15 ML: 1.2 RINSE ORAL at 08:25

## 2018-01-01 RX ADMIN — IPRATROPIUM BROMIDE AND ALBUTEROL SULFATE 3 ML: .5; 3 SOLUTION RESPIRATORY (INHALATION) at 07:51

## 2018-01-01 RX ADMIN — HYDROMORPHONE HYDROCHLORIDE 0.5 MG: 10 INJECTION INTRAMUSCULAR; INTRAVENOUS; SUBCUTANEOUS at 18:20

## 2018-01-01 RX ADMIN — MEROPENEM 1 G: 1 INJECTION, POWDER, FOR SOLUTION INTRAVENOUS at 12:43

## 2018-01-01 RX ADMIN — DEXAMETHASONE SODIUM PHOSPHATE 4 MG: 4 INJECTION, SOLUTION INTRAMUSCULAR; INTRAVENOUS at 11:32

## 2018-01-01 RX ADMIN — FUROSEMIDE 40 MG: 10 INJECTION, SOLUTION INTRAMUSCULAR; INTRAVENOUS at 08:42

## 2018-01-01 RX ADMIN — IPRATROPIUM BROMIDE AND ALBUTEROL SULFATE 3 ML: .5; 3 SOLUTION RESPIRATORY (INHALATION) at 11:57

## 2018-01-01 RX ADMIN — DEXTROSE MONOHYDRATE, SODIUM CHLORIDE, AND POTASSIUM CHLORIDE 75 ML/HR: 50; 9; 1.49 INJECTION, SOLUTION INTRAVENOUS at 10:43

## 2018-01-01 RX ADMIN — INSULIN HUMAN 4 UNITS: 100 INJECTION, SOLUTION PARENTERAL at 23:31

## 2018-01-01 RX ADMIN — DEXAMETHASONE SODIUM PHOSPHATE 6 MG: 4 INJECTION, SOLUTION INTRAMUSCULAR; INTRAVENOUS at 20:15

## 2018-01-01 RX ADMIN — LORAZEPAM 0.25 MG: 2 INJECTION INTRAMUSCULAR; INTRAVENOUS at 00:20

## 2018-01-01 RX ADMIN — PHENYLEPHRINE HYDROCHLORIDE 200 MCG: 10 INJECTION INTRAVENOUS at 16:34

## 2018-01-01 RX ADMIN — VECURONIUM BROMIDE 5 MG: 1 INJECTION, POWDER, LYOPHILIZED, FOR SOLUTION INTRAVENOUS at 22:19

## 2018-01-01 RX ADMIN — IPRATROPIUM BROMIDE AND ALBUTEROL SULFATE 3 ML: .5; 3 SOLUTION RESPIRATORY (INHALATION) at 08:09

## 2018-01-01 RX ADMIN — IPRATROPIUM BROMIDE AND ALBUTEROL SULFATE 3 ML: .5; 3 SOLUTION RESPIRATORY (INHALATION) at 20:00

## 2018-01-01 RX ADMIN — INSULIN DETEMIR 12 UNITS: 100 INJECTION, SOLUTION SUBCUTANEOUS at 14:30

## 2018-01-01 RX ADMIN — HYDROMORPHONE HYDROCHLORIDE 0.5 MG: 10 INJECTION INTRAMUSCULAR; INTRAVENOUS; SUBCUTANEOUS at 07:58

## 2018-01-01 RX ADMIN — ATRACURIUM BESYLATE 10 MG: 10 INJECTION, SOLUTION INTRAVENOUS at 18:05

## 2018-01-01 RX ADMIN — NYSTATIN 500000 UNITS: 100000 SUSPENSION ORAL at 08:50

## 2018-01-01 RX ADMIN — VECURONIUM BROMIDE 5 MG: 1 INJECTION, POWDER, LYOPHILIZED, FOR SOLUTION INTRAVENOUS at 21:35

## 2018-01-01 RX ADMIN — LORAZEPAM 0.5 MG: 2 INJECTION INTRAMUSCULAR; INTRAVENOUS at 16:12

## 2018-02-09 PROBLEM — I63.9 STROKE (HCC): Status: ACTIVE | Noted: 2018-01-01

## 2018-02-09 PROBLEM — I61.1 NONTRAUMATIC CORTICAL HEMORRHAGE OF RIGHT CEREBRAL HEMISPHERE (HCC): Status: ACTIVE | Noted: 2018-01-01

## 2018-02-09 PROBLEM — F41.9 ANXIETY: Status: ACTIVE | Noted: 2018-01-01

## 2018-02-09 PROBLEM — G89.4 CHRONIC PAIN SYNDROME: Status: ACTIVE | Noted: 2018-01-01

## 2018-02-09 NOTE — ANESTHESIA POSTPROCEDURE EVALUATION
Patient: Preeti Manning    Procedure Summary     Date Anesthesia Start Anesthesia Stop Room / Location    02/09/18 1526 1850 [facility] / [facility]       Procedure Diagnosis Surgeon Provider    CRANIOTOMY FOR INTRACRANIAL HEMORRHAGE (N/A Head) No diagnosis on file. MD Baldemar Juarez MD          Anesthesia Type: general  Last vitals  BP   118/67 (02/09/18 1515)   Temp   97.3 °F (36.3 °C) (02/09/18 1445)   Pulse   95 (02/09/18 1515)   Resp   16 (02/09/18 1445)     SpO2   97 % (02/09/18 1515)     Post Anesthesia Care and Evaluation    Patient location during evaluation: ICU  Patient participation: complete - patient cannot participate (intubated and sedated)  Post-procedure mental status: sedated.  Pain score: 0  Pain management: adequate  Airway patency: patent  Anesthetic complications: No anesthetic complications  PONV Status: none  Cardiovascular status: acceptable and hemodynamically stable  Respiratory status: acceptable, ETT and ventilator  Hydration status: acceptable    Comments: No apparent anesthesia complications noted

## 2018-02-09 NOTE — SIGNIFICANT NOTE
02/09/18 1537   SLP Deferred Reason   SLP Deferred Reason Patient unavailable for evaluation  (Pt OFT for procedure this date. SLP to follow up 2/10.)

## 2018-02-09 NOTE — ANESTHESIA PROCEDURE NOTES
Arterial Line    Patient location during procedure: OR   Line placed for hemodynamic monitoring.  Preanesthetic Checklist  Completed: patient identified, site marked, surgical consent, pre-op evaluation, timeout performed, IV checked, risks and benefits discussed and monitors and equipment checked  Arterial Line Prep   Sterile Tech: cap, gloves and sterile barriers  Prep: ChloraPrep  Patient monitoring: blood pressure monitoring, continuous pulse oximetry and EKG  Arterial Line Procedure   Laterality:right  Location:  radial artery  Catheter size: 20 G   Guidance: palpation technique  Number of attempts: 1  Successful placement: yes          Post Assessment   Dressing Type: line sutured, occlusive dressing applied, secured with tape and wrist guard applied.   Complications no  Circ/Move/Sens Assessment: normal and unchanged.   Patient Tolerance: patient tolerated the procedure well with no apparent complications

## 2018-02-09 NOTE — ANESTHESIA PROCEDURE NOTES
Airway  Urgency: elective    Airway not difficult    General Information and Staff    Patient location during procedure: OR    Indications and Patient Condition  Indications for airway management: airway protection    Preoxygenated: yes  MILS not maintained throughout  Mask difficulty assessment: 1 - vent by mask    Final Airway Details  Final airway type: endotracheal airway      Successful airway: ETT  Cuffed: yes   Successful intubation technique: direct laryngoscopy  Endotracheal tube insertion site: oral  Blade: Becky  Blade size: #3  ETT size: 6.5 mm  Cormack-Lehane Classification: grade I - full view of glottis  Placement verified by: chest auscultation and capnometry   Number of attempts at approach: 1    Additional Comments  Negative epigastric sounds, Breath sound equal bilaterally with symmetric chest rise and fall

## 2018-02-09 NOTE — ED PROVIDER NOTES
Subjective   HPI Comments: Mrs. Pineda is brought by EMS with confusion. .  Her daughter went to check on her today around noon and found her to be confused.  EMS noted that she was weak on the left side.  She kept asking to speak to her daughter even though she already was speaking to her. She displayed slurred speech, left sided weakness and lethargy.  Mrs. Pineda is not able to provide much recent history but does tell me she has a bad headache.  She denies pain in her chest or abdomen.. Per family, she is normally active, ambulatory and able to care for herself.       Patient is a 77 y.o. female presenting with neurologic complaint.   History provided by:  Patient and EMS personnel  History limited by:  Mental status change  Neurologic Problem   The patient's primary symptoms include an altered mental status, focal weakness, slurred speech and weakness. This is a new problem. The current episode started today. The neurological problem developed suddenly. Last Known Well Instant: Unknown, but not today.  The problem is unchanged. There was facial, left-sided and upper extremity focality noted. Associated symptoms include confusion and headaches. Past treatments include nothing. (CHF, COPD, med list indicates she is on Plavix)       Review of Systems   Unable to perform ROS: Acuity of condition   Neurological: Positive for focal weakness, weakness and headaches.   Psychiatric/Behavioral: Positive for confusion.       Past Medical History:   Diagnosis Date   • Anxiety    • Arthritis    • CHF (congestive heart failure)    • COPD (chronic obstructive pulmonary disease)    • Coronary artery disease    • Depression    • Fibromyalgia        Allergies   Allergen Reactions   • Diphenhydramine Other (See Comments)     TACHYCARDIA   • Levaquin [Levofloxacin In D5w] Hallucinations   • Sulfa Antibiotics Other (See Comments)     BLISTERS IN MOUTH AND AROUND MOUTH   • Wellbutrin [Bupropion] Mental Status Change   •  Penicillins Rash       Past Surgical History:   Procedure Laterality Date   • CORONARY ANGIOPLASTY WITH STENT PLACEMENT     • FEMUR OPEN REDUCTION INTERNAL FIXATION Right 5/15/2017    Procedure: DISTAL FEMUR OPEN REDUCTION INTERNAL FIXATION RIGHT;  Surgeon: Pierre Velarde MD;  Location: [facility];  Service:    • TOTAL HIP ARTHROPLASTY Right 2017       History reviewed. No pertinent family history.    Social History     Social History   • Marital status:      Spouse name: N/A   • Number of children: N/A   • Years of education: N/A     Social History Main Topics   • Smoking status: Current Every Day Smoker     Packs/day: 0.50   • Smokeless tobacco: Never Used   • Alcohol use No   • Drug use: Defer   • Sexual activity: Defer     Other Topics Concern   • None     Social History Narrative         Objective   Physical Exam   Constitutional: She appears well-developed and well-nourished.   HENT:   Head: Normocephalic and atraumatic.   Right Ear: External ear normal.   Left Ear: External ear normal.   Nose: Nose normal.   Eyes: Conjunctivae are normal. No scleral icterus.   Neck: Normal range of motion. Neck supple.   Abdominal: Soft. She exhibits no distension. There is no tenderness.   Musculoskeletal: She exhibits no edema or tenderness.   Neurological: She is alert.   Cannot look to the left.  Weakness in extremities.    Skin: Skin is warm and dry. She is not diaphoretic.   Nursing note and vitals reviewed.      Procedures         ED Course  Recent Results (from the past 24 hour(s))   aPTT    Collection Time: 02/09/18  2:18 PM   Result Value Ref Range    PTT 30.3 24.0 - 31.0 seconds   CBC Auto Differential    Collection Time: 02/09/18  2:18 PM   Result Value Ref Range    WBC 8.86 3.50 - 10.80 10*3/mm3    RBC 3.68 (L) 3.89 - 5.14 10*6/mm3    Hemoglobin 11.3 (L) 11.5 - 15.5 g/dL    Hematocrit 34.8 34.5 - 44.0 %    MCV 94.6 80.0 - 99.0 fL    MCH 30.7 27.0 - 31.0 pg    MCHC 32.5 32.0 - 36.0 g/dL    RDW 13.5 11.3 -  14.5 %    RDW-SD 47.0 37.0 - 54.0 fl    MPV 9.0 6.0 - 12.0 fL    Platelets 260 150 - 450 10*3/mm3    Neutrophil % 76.1 (H) 41.0 - 71.0 %    Lymphocyte % 14.2 (L) 24.0 - 44.0 %    Monocyte % 7.0 0.0 - 12.0 %    Eosinophil % 2.4 0.0 - 3.0 %    Basophil % 0.1 0.0 - 1.0 %    Immature Grans % 0.2 0.0 - 0.6 %    Neutrophils, Absolute 6.74 1.50 - 8.30 10*3/mm3    Lymphocytes, Absolute 1.26 0.60 - 4.80 10*3/mm3    Monocytes, Absolute 0.62 0.00 - 1.00 10*3/mm3    Eosinophils, Absolute 0.21 0.00 - 0.30 10*3/mm3    Basophils, Absolute 0.01 0.00 - 0.20 10*3/mm3    Immature Grans, Absolute 0.02 0.00 - 0.03 10*3/mm3   Comprehensive Metabolic Panel    Collection Time: 02/09/18  2:18 PM   Result Value Ref Range    Glucose 120 (H) 70 - 100 mg/dL    BUN 10 9 - 23 mg/dL    Creatinine 0.60 0.60 - 1.30 mg/dL    Sodium 140 132 - 146 mmol/L    Potassium 3.7 3.5 - 5.5 mmol/L    Chloride 106 99 - 109 mmol/L    CO2 30.0 20.0 - 31.0 mmol/L    Calcium 8.4 (L) 8.7 - 10.4 mg/dL    Total Protein 6.6 5.7 - 8.2 g/dL    Albumin 3.80 3.20 - 4.80 g/dL    ALT (SGPT) 21 7 - 40 U/L    AST (SGOT) 25 0 - 33 U/L    Alkaline Phosphatase 62 25 - 100 U/L    Total Bilirubin 0.5 0.3 - 1.2 mg/dL    eGFR Non African Amer 97 >60 mL/min/1.73    Globulin 2.8 gm/dL    A/G Ratio 1.4 (L) 1.5 - 2.5 g/dL    BUN/Creatinine Ratio 16.7 7.0 - 25.0    Anion Gap 4.0 3.0 - 11.0 mmol/L   Protime-INR    Collection Time: 02/09/18  2:18 PM   Result Value Ref Range    Protime 9.9 9.6 - 11.5 Seconds    INR 0.94 0.91 - 1.09   Type & Screen    Collection Time: 02/09/18  2:28 PM   Result Value Ref Range    ABO Type A     RH type Positive     Antibody Screen Negative      Note: In addition to lab results from this visit, the labs listed above may include labs taken at another facility or during a different encounter within the last 24 hours. Please correlate lab times with ED admission and discharge times for further clarification of the services performed during this visit.    XR Chest 1  View   Preliminary Result   Mild chronic appearing lung changes. No clearly acute chest   disease is seen.       D:  02/09/2018   E:  02/09/2018                  CT Head Without Contrast Stroke Protocol   Final Result   1.  A large intra-axial right frontal hemorrhage is noted with a   subdural component. A small amount of intraventricular breakthrough is   noted in the right lateral ventricular system involving the anterior   horn and the occipital horn. There is global edema and midline shift   from right to left of at least 1 cm perhaps slightly more.   2.  There is compression of the lateral and third ventricles and the   fourth ventricle is extremely small.   3.  The determination of 3-dimensional volume indicates a 30.1 cc   hemorrhage based on calculation.   4.  Other findings as above. The findings today are ominous and there   are no immediate or recent comparisons.       D:  02/09/2018   E:  02/09/2018               This report was finalized on 2/9/2018 2:13 PM by Dr. Conor Corcoran MD.            Vitals:    02/09/18 1430 02/09/18 1445 02/09/18 1500 02/09/18 1515   BP: 142/82 132/78 122/66 118/67   BP Location:  Left arm     Patient Position:  Lying     Pulse: 89 92 96 95   Resp:  16     Temp:  97.3 °F (36.3 °C)     TempSrc:  Axillary     SpO2: 98% 98% 97% 97%   Weight: 57.2 kg (126 lb)        Medications   sodium chloride 0.9 % flush 10 mL ( Intravenous MAR Hold 2/9/18 1527)   sodium chloride 0.9 % flush 1-10 mL ( Intravenous MAR Hold 2/9/18 1527)   niCARdipine (CARDENE-IV) 20 mg/200 mL (0.1 mg/mL) in 0.9% NaCl infusion (1 mg/hr Intravenous Rate/Dose Change 2/9/18 1612)   niCARdipine (CARDENE) 20 mg in sodium chloride 0.9 % 200 mL (0.1 mg/mL) infusion (not administered)   sodium chloride 0.9 % flush 1-10 mL ( Intravenous MAR Hold 2/9/18 1527)   sodium chloride 0.9 % infusion ( Intravenous Anesthesia Volume Adjustment 2/9/18 1615)   acetaminophen (TYLENOL) tablet 650 mg ( Oral MAR Hold 2/9/18 1527)     Or    acetaminophen (TYLENOL) suppository 650 mg ( Rectal MAR Hold 2/9/18 1527)   phenylephrine (JUANA-SYNEPHRINE) 50 mg/250 mL (0.2 mg/mL) in 0.9% NS  infusion (0.5 mcg/kg/min × 57.2 kg Intravenous Not Given 2/9/18 1503)   ondansetron (ZOFRAN) injection 4 mg ( Intravenous MAR Hold 2/9/18 1527)   bisacodyl (DULCOLAX) suppository 10 mg ( Rectal MAR Hold 2/9/18 1527)   ipratropium-albuterol (DUO-NEB) nebulizer solution 3 mL (not administered)   budesonide (PULMICORT) nebulizer solution 0.5 mg (not administered)   floseal (FLOSEAL) injection (10 mL Topical Given 2/9/18 1615)   gelatin absorbable (GELFOAM) sponge (1 each Topical Given 2/9/18 1615)   sodium chloride (NS) irrigation solution (2,000 mL Irrigation Given 2/9/18 1615)   sterile water irrigation solution (1,000 mL Irrigation Given 2/9/18 1616)   thrombin spray (5,000 Units Topical Given 2/9/18 1616)   sodium chloride 0.9 % solution (500 mL Irrigation Given 2/9/18 1617)   vancomycin (VANCOCIN) in iso-osmotic dextrose IVPB 1 g (premix) 200 mL (1 g Intravenous Given 2/9/18 1545)     ECG/EMG Results (last 24 hours)     ** No results found for the last 24 hours. **          ED Course   Comment By Time   Dr. Locke discussed CT findings with radiologist.  Due to current CT maintenance, she was taken to the CT scan in the [facility] ICU for workup.  I had my discussions with consultants while she was in the CT scan there.  I was advised by nursing staff that there was an ICU bed available literally around the corner.  I spoke with Dr. Stewart and she was taken directly from CT scan to an ICU bed. Arnold Locke MD 02/09 0522   Dr. Locke discussed the patient's case with Dr. Juarez and Dr. Stewart. Rashel Colón 02/09 5926                     MDM  Number of Diagnoses or Management Options  Intracranial hemorrhage: new and requires workup     Amount and/or Complexity of Data Reviewed  Clinical lab tests: ordered and reviewed  Tests in the radiology section of CPT®:  ordered and reviewed  Discussion of test results with the performing providers: yes  Obtain history from someone other than the patient: yes  Discuss the patient with other providers: yes  Independent visualization of images, tracings, or specimens: yes    Patient Progress  Patient progress: stable      Final diagnoses:   Intracranial hemorrhage       Documentation assistance provided by haley Colón.  Information recorded by the scribe was done at my direction and has been verified and validated by me.     Rashel Colón  02/09/18 1424       Arnold Locke MD  02/09/18 4828

## 2018-02-09 NOTE — ANESTHESIA PREPROCEDURE EVALUATION
Anesthesia Evaluation     Patient summary reviewed and Nursing notes reviewed                Airway   Mallampati: I  TM distance: <3 FB  Neck ROM: full  no difficulty expected  Dental - normal exam     Pulmonary - normal exam   (+) a smoker Current, COPD,   Cardiovascular - normal exam    (+) CAD, cardiac stents CHF, hyperlipidemia      Neuro/Psych  (+) CVA, psychiatric history Anxiety,     GI/Hepatic/Renal/Endo - negative ROS     Musculoskeletal     Abdominal  - normal exam    Bowel sounds: normal.   Substance History - negative use     OB/GYN negative ob/gyn ROS         Other   (+) arthritis                     Anesthesia Plan    ASA 3 - emergent     general   (MANPREET)  intravenous induction   Anesthetic plan and risks discussed with patient.    Plan discussed with CRNA.

## 2018-02-09 NOTE — H&P
Reason for consultation; right frontal intra-cerebral hemorrhage      Narrative of present illness: 77-year-old presented to the emergency department with headache and weakness of the left upper extremity.  This was thought to be an ischemic stroke however CT scan show this to be a intraparenchymal hemorrhage with subdural component.  Neurosurgical consultation was requested.      Neurological examination:     Mental status: The patient is a housing  This speech is unclear.  She follows commands however has weakness in the left upper extremity   There is no pupillary inequality.  Both pupils respond briskly to light.  Motor/sensory examination: She has a mild left-sided weakness as compared to the right with extinction.      Review of pertinent neurological data set:   IMPRESSION:  1.  A large intra-axial right frontal hemorrhage is noted with a  subdural component. A small amount of intraventricular breakthrough is  noted in the right lateral ventricular system involving the anterior  horn and the occipital horn. There is global edema and midline shift  from right to left of at least 1 cm perhaps slightly more.  2.  There is compression of the lateral and third ventricles and the  fourth ventricle is extremely small.  3.  The determination of 3-dimensional volume indicates a 30.1 cc  hemorrhage based on calculation.  4.  Other findings as above. The findings today are ominous and there  are no immediate or recent comparisons.           RECOMMENDATIONS: 1.  I would recommend craniotomy and decompression.  She has a large hematoma that has extended into the right frontal horn.            I greatly appreciate the opportunity of seeing this patient.  I have reviewed my observations and suggestions with the patient/family.

## 2018-02-09 NOTE — H&P
Pulmonary/Critical Care History and Physical Exam     LOS: 0 days   Patient Care Team:  Carmela Pollock MD as PCP - General    Chief Complaint:  AMS / weakness    Subjective     HPI:   77-year-old female smoker with a history of coronary artery disease, prior stents, COPD, fibromyalgia, chronic pain on chronic narcotics, anxiety on benzodiazepines was found to be confused by a family friend today.  She apparently had been called by a friend of her granddaughter who noticed that she was slurring words and confused.  The patient's daughter was informed of this and started to drive to her home.  Another friend arrived and noted the patient to be confused with slurring of words and weakness on the left.  EMS was called and she was brought to our emergency department for presumed ischemic stroke.  A stat CT scan of the head revealed a large right frontal intracerebral hemorrhage with subdural extension.  Dr. Riley with neurosurgery was consulted.  The patient was moved directly to the intensive care unit from the CT scanner.  On arrival to the intensive care unit she had somewhat garbled speech but was able to form words and answers some questions appropriately.  She complains of headache.  She is weak in the left upper extremity.  Dr. Riley has plans to take her to the operating room this afternoon.    History taken from: patient    Past Medical History:   Diagnosis Date   • Anxiety    • Arthritis    • CHF (congestive heart failure)    • COPD (chronic obstructive pulmonary disease)    • Coronary artery disease    • Depression    • Fibromyalgia        Past Surgical History:   Procedure Laterality Date   • CORONARY ANGIOPLASTY WITH STENT PLACEMENT     • FEMUR OPEN REDUCTION INTERNAL FIXATION Right 5/15/2017    Procedure: DISTAL FEMUR OPEN REDUCTION INTERNAL FIXATION RIGHT;  Surgeon: Pierre Velarde MD;  Location: [facility];  Service:    • TOTAL HIP ARTHROPLASTY Right 2017       History reviewed. No pertinent family  history.    Social History     Social History   • Marital status:      Spouse name: N/A   • Number of children: N/A   • Years of education: N/A     Occupational History   • Not on file.     Social History Main Topics   • Smoking status: Current Every Day Smoker     Packs/day: 0.50   • Smokeless tobacco: Never Used   • Alcohol use No   • Drug use: Defer   • Sexual activity: Defer     Other Topics Concern   • Not on file     Social History Narrative       Allergies   Allergen Reactions   • Diphenhydramine Other (See Comments)     TACHYCARDIA   • Levaquin [Levofloxacin In D5w] Hallucinations   • Sulfa Antibiotics Other (See Comments)     BLISTERS IN MOUTH AND AROUND MOUTH   • Wellbutrin [Bupropion] Mental Status Change   • Penicillins Rash       PMH/FH/SocH were reviewed by me and updates were made.     Review of Systems:    Review of 14 systems was completed with positives and pertinent negatives noted in the subjective section.  All other systems reviewed and are negative.   Exceptions are noted below:    Unable to obtain secondary to AMS / speech difficulty    Objective     Vital Signs  Temp:  [97.3 °F (36.3 °C)] 97.3 °F (36.3 °C)  Heart Rate:  [82-96] 96  Resp:  [16] 16  BP: (122-147)/(66-85) 122/66  Body mass index is 20.97 kg/(m^2).       Physical Exam:     General Appearance:    Drowsy, confused, cooperative, in no acute distress   Head:    Normocephalic, without obvious abnormality, atraumatic   Eyes:            Lids and lashes normal, conjunctivae and sclerae normal, no   icterus, no pallor, corneas clear, PERRL   ENMT:   Ears appear intact with no abnormalities noted      No oral lesions, no thrush, oral mucosa moist   Neck:   No adenopathy, supple, trachea midline, no thyromegaly, no   carotid bruit, no JVD       Lungs/resp:     Normal effort, symmetric chest rise, no crepitus, clear to      auscultation bilaterally, no chest wall tenderness                  Heart/CV:    Regular rhythm and normal  rate, normal S1 and S2, no            murmur   Abdomen/GI:     Normal bowel sounds, no masses, no organomegaly, soft,        non-tender, non-distended   G/U:     Deferred   Extremities/MSK:   No clubbing, cyanosis or edema.  Normal tone.  No deformities.    Pulses:   Pulses palpable and equal bilaterally   Skin:   No bleeding, bruising or rash   Hem/Lymph:   No cervical or supraclavicular adenopathy.    Neurologic:   Moderately confused.  Garbled speech.  Weak LUE with drift.  Mild left facial droop.             Psychiatric:  Mildly agitated and anxious, oriented x 1.      Results Review:     I reviewed the patient's new clinical results.     Results from last 7 days  Lab Units 02/09/18  1418   SODIUM mmol/L 140   POTASSIUM mmol/L 3.7   CHLORIDE mmol/L 106   CO2 mmol/L 30.0   BUN mg/dL 10   CREATININE mg/dL 0.60   CALCIUM mg/dL 8.4*   BILIRUBIN mg/dL 0.5   ALK PHOS U/L 62   ALT (SGPT) U/L 21   AST (SGOT) U/L 25   GLUCOSE mg/dL 120*       Results from last 7 days  Lab Units 02/09/18  1418   WBC 10*3/mm3 8.86   HEMOGLOBIN g/dL 11.3*   HEMATOCRIT % 34.8   PLATELETS 10*3/mm3 260           I reviewed the patient's new imaging including images and reports.    CXR 2/9/18:  IMPRESSION:  Mild chronic appearing lung changes. No clearly acute chest  disease is seen.    CT head 2/9/18:  IMPRESSION:  1.  A large intra-axial right frontal hemorrhage is noted with a  subdural component. A small amount of intraventricular breakthrough is  noted in the right lateral ventricular system involving the anterior  horn and the occipital horn. There is global edema and midline shift  from right to left of at least 1 cm perhaps slightly more.  2.  There is compression of the lateral and third ventricles and the  fourth ventricle is extremely small.  3.  The determination of 3-dimensional volume indicates a 30.1 cc  hemorrhage based on calculation.  4.  Other findings as above. The findings today are ominous and there  are no immediate or  recent comparisons.    Medication Review:     niCARdipine (CARDENE) infusion 3-15 mg/hr Intravenous Once       niCARdipine 5-15 mg/hr Last Rate: 5 mg/hr (02/09/18 1440)   phenylephrine 0.5-3 mcg/kg/min    sodium chloride 100 mL/hr Last Rate: 100 mL/hr (02/09/18 1506)       Assessment/Plan     Hospital Problem List     COPD (chronic obstructive pulmonary disease)    CAD (coronary artery disease)    CHF (congestive heart failure)    Stroke    Nontraumatic cortical hemorrhage of right cerebral hemisphere    Anxiety - chronic benzodiazepines    Chronic pain syndrome - chronic narcotic pain medications        78 YO smoker with tobacco abuse, congestive heart failure, CAD on plavix, who was found to have garbled speech and left upper extremity weakness after developing a severe headache.  She was brought to the emergency department where a CT scan showed a large right frontal hemorrhage.    Plan:  1.  Follow-up labs.  Correct coagulopathy if present.   2.  Dr. Riley plans to take patient to the operating room today.  3.  Gentle fluids.  4.  Watch for evidence of benzodiazepine withdrawal, avoid sedatives if possible.  5.  Hemorrhagic stroke order set utilized.  6.  Admit to ICU.   7.  Support breathing - ventilator support post-operatively if indicated.      Discussed with patient's daughter at bedside.     Brandon Stewart MD  02/09/18  3:18 PM    50 minutes of critical care provided. This time excludes other billable procedures. Time does include preparation of documents, medical consultations, review of old records, and direct bedside care. Patient was at high risk for life-threatening deterioration due to hemorrhagic CVA.

## 2018-02-09 NOTE — CONSULTS
Reason for consultation; right frontal intra-cerebral hemorrhage     Narrative of present illness: 77-year-old presented to the emergency department with headache and weakness of the left upper extremity.  This was thought to be an ischemic stroke however CT scan show this to be a intraparenchymal hemorrhage with subdural component.  Neurosurgical consultation was requested.     Neurological examination:    Mental status: The patient is a housing  This speech is unclear.  She follows commands however has weakness in the left upper extremity   There is no pupillary inequality.  Both pupils respond briskly to light.  Motor/sensory examination: She has a mild left-sided weakness as compared to the right with extinction.     Review of pertinent neurological data set:   IMPRESSION:  1.  A large intra-axial right frontal hemorrhage is noted with a  subdural component. A small amount of intraventricular breakthrough is  noted in the right lateral ventricular system involving the anterior  horn and the occipital horn. There is global edema and midline shift  from right to left of at least 1 cm perhaps slightly more.  2.  There is compression of the lateral and third ventricles and the  fourth ventricle is extremely small.  3.  The determination of 3-dimensional volume indicates a 30.1 cc  hemorrhage based on calculation.  4.  Other findings as above. The findings today are ominous and there  are no immediate or recent comparisons.         RECOMMENDATIONS: 1.  I would recommend craniotomy and decompression.  She has a large hematoma that has extended into the right frontal horn.         I greatly appreciate the opportunity of seeing this patient.  I have reviewed my observations and suggestions with the patient/family.    Stef Riley M.D.  [facility]  [phone]

## 2018-02-10 NOTE — PROGRESS NOTES
"           FOLLOW UP ENCOUNTER          WORKING DIAGNOSIS:       POD#1 : S/P CRANIOTOMY FOR RIGHT FRONTAL ICH.                          MEDICAL HISTORY SINCE LAST ENCOUNTER :       CT scan is improved.  Has a significant amount of pneumocephalus.  She has small amount of blood within the ventricle.  The shift has improved.    He remains sedated on ventilation support.                                      ASSESSMENT/DISPOSITION :       1.  Would like to wean from ventilator today of possible with extubation anticipated.  She will need \"holiday\" to determine her status.  I've seen her move her right side spontaneously.  Too sedated however for adequate examination.    2.  We'll continue to monitor CT scanning, etc.           "

## 2018-02-10 NOTE — PLAN OF CARE
Problem: Patient Care Overview (Adult)  Goal: Plan of Care Review  Outcome: Ongoing (interventions implemented as appropriate)   02/10/18 0334   Coping/Psychosocial Response Interventions   Plan Of Care Reviewed With patient;family   Patient Care Overview   Progress progress toward functional goals as expected   Outcome Evaluation   Outcome Summary/Follow up Plan Patient has remained sedated with 50mcg/kg/min per Dr. Riley since her arrival to the unit at 1900 on 2/9/18. Her blood pressure has been controlled less than 120 with 5 mg of cardene. Preparing to take patient for follow up CT scan.        Problem: Skin Integrity Impairment, Risk/Actual (Adult)  Goal: Identify Related Risk Factors and Signs and Symptoms  Outcome: Ongoing (interventions implemented as appropriate)   02/10/18 0334   Skin Integrity Impairment, Risk/Actual   Skin Integrity Impairment, Risk/Actual: Related Risk Factors cognitive impairment;immobility;sensory impairment;surgery/procedure     Goal: Skin Integrity/Wound Healing  Outcome: Ongoing (interventions implemented as appropriate)   02/10/18 0334   Skin Integrity Impairment, Risk/Actual (Adult)   Skin Integrity/Wound Healing making progress toward outcome       Problem: Craniotomy/Craniectomy/Cranioplasty (Adult)  Goal: Signs and Symptoms of Listed Potential Problems Will be Absent or Manageable (Craniotomy/Craniectomy/Cranioplasty)  Outcome: Ongoing (interventions implemented as appropriate)   02/10/18 0334   Craniotomy/Craniectomy/Cranioplasty   Problems Assessed (Craniotomy/Craniectomy/Cranioplasty) all   Problems Present (Craniotomy/Craniectomy/Cranioplasty) acute neurologic deterioration       Problem: Mechanical Ventilation, Invasive (Adult)  Goal: Signs and Symptoms of Listed Potential Problems Will be Absent or Manageable (Mechanical Ventilation, Invasive)  Outcome: Ongoing (interventions implemented as appropriate)   02/10/18 0334   Mechanical Ventilation, Invasive   Problems  Assessed (Mechanical Ventilation, Invasive) all   Problems Present (Mechanical Ventilation, Invasive) immobility

## 2018-02-10 NOTE — PLAN OF CARE
Problem: Mechanical Ventilation, Invasive (Adult)  Intervention: Optimize Oxygenation/Ventilation   02/10/18 0450   Respiratory Interventions   Airway/Ventilation Management airway patency maintained;humidification applied   Positioning   Head Of Bed (HOB) Position HOB at 30-45 degrees   Maintain Airway Patency   Suction Method endotracheal

## 2018-02-10 NOTE — PLAN OF CARE
Problem: Mechanical Ventilation, Invasive (Adult)  Intervention: Prevent Airway Displacement/Mechanical Dysfunction   02/10/18 1727   Prevent Airway Displacement/Mechanical Dysfunction   Airway Safety Measures manual resuscitator at bedside;oxygen flow meter at bedside;suction at bedside     Intervention: Promote Early Weaning/Extubation   02/10/18 1727   Coping/Psychosocial Interventions   Environmental Support calm environment promoted     Intervention: Prevent Ventilator-associated Pneumonia   02/10/18 1727   Positioning   Head Of Bed (HOB) Position HOB at 30-45 degrees

## 2018-02-10 NOTE — BRIEF OP NOTE
CRANIOTOMY FOR ANEURYSM/ARTERIOVENOUS MALFORMATION  Progress Note    Preeti Manning  2/9/2018    Pre-op Diagnosis:   Intracerebral hemorrhage, right frontal lobe       Post-Op Diagnosis diagnosis:   Same        Procedure(s):  CRANIOTOMY FOR INTRACRANIAL HEMORRHAGE    Surgeon(s):  Stef Riley MD    Anesthesia: General    Staff:   Circulator: Yue Day, CARMEL; Stephon Javier RN; Beulah Michaels RN  Scrub Person: Millie Lopez; Koby Martinez; Kasie Estrella; Zonia Wilson  Nursing Assistant: Marielos Jensen  Assistant: Coretta Skelton PA-C    Estimated Blood Loss: 200 mL    Urine Voided: 1800 mL    Specimens:                None      Drains:   Urethral Catheter 02/09/18 100% silicone 16 10 (Active)           Findings: Large right intracerebral hemorrhage extending to into the ventricle.    Complications: None noted  Stef Riley MD     Date: 2/9/2018  Time: 7:01 PM

## 2018-02-10 NOTE — OP NOTE
"Preoperative diagnosis: Right frontal intraparenchymal hemorrhage]    Postoperative diagnosis: []     Procedure performed: []Right frontal craniotomy and excision of intraparenchymal hemorrhage    Surgeon: Stef Riley M.D.  Assistant: Bri Skelton PA-C    Indications: [ This is a 77-year-old female who developed progressive obtundation and a mild left hemiparesis secondary to a large right frontal spontaneous intracerebral hemorrhage.  She presented to the emergency department; had a CT scan and showed neurological decline and was taken to the operating room and a urgent fashion for removal of the hematoma.  ] The risks and benefits of this procedure were discussed with the patient and family.  Consent forms were presented and signatures obtained.  All questions have been answered satisfactorily and the patient/family wishes to proceed with the procedure.    Description of procedure: Subsequent to induction with general anesthesia, a Benz was placed into the bladder and the patient was placed in the []supine position with the Chavez head rest applied.  The usual preparation and draping were performed.  Subsequent to procedure roll \"timeout\" the operation began.    [A right frontal and the incision was employed for a free osteoplastic bone flap.  Tacking sutures were placed to prevent epidural extravasation.  The dura was opened and hinged medially.  Hemorrhage was seen in the subcutaneous area throughout the right frontal lobe and down to the sylvian fissure.  The arachnoid was sylvian fissure was intact.  The right frontal lobe was entered and a large hematoma was removed.  It was somewhat adherent to the tissues making dissection a bit tedious.  The hematoma tracked down to the ventricle on the right frontal horn.  This was opened approximately 3 mm.    Blood was seen in the subdural space in the subfrontal area and in the temporal fossa.  The hemorrhage appeared to be contained   ithin the " right frontal lobe.  There was no evidence of blood or hematoma tracking into the temporal lobe or sylvian fissure.  There is no evidence of an aneurysm    Hemostasis was obtained with some difficulty with use of FloSeal and Gelfoam and thrombin and Surgicel.  This was accomplished however and the wound was subsequent closed.  Bovine pericardium was used to reconstruct the dura over which non-suturable DuraGen was placed.  Above this was placed Surgicel for further hemostasis.    The bone was fixated with dog bones and bur hole covers using 3 and 4 mm screws.  The temporalis was closed with 2-0 Vicryl; galea closed with 3-0 Vicryl and scalp closed with 3-0 Nurolon.  A sterile dressing was applied and patient was returned to the ICU in critical condition.    Summary of surgical findings: []    Complications:[]  .

## 2018-02-10 NOTE — SIGNIFICANT NOTE
02/10/18 1045   SLP Deferred Reason   SLP Deferred Reason Patient unavailable for evaluation  (Pt intubated at this time. SLP will place on hold. Please re-consult once extubated.)

## 2018-02-10 NOTE — PROGRESS NOTES
Adult Nutrition  Assessment/PES    Patient Name:  Preeti Manning  YOB: 1940  MRN: [id number]  Admit Date:  2/9/2018    Assessment Date:  2/10/2018    Comments:  RD completed initial nutrition assessment r/t MST > 2. RD to continue to follow.       · Pt has had significant unintentional weight loss within the last 9 months PTA.  Nutrition Focused Physical Exam would be beneficial in assessment of fat and muscle loss and completion of MSA-will preform as appropriate.      · Due to recent significant weight loss patient may be at increased risk for refeeding    Recommendations:  • Correct/replace depleted electrolyte concentrations prior to the initiation of enteral feedings  • Continue monitoring parameters (serum potassium, phosphorus and magnesium) following EN initiation and replace per protocol  • Slow advancement of EN   • Provide supplemental thiamin  with EN initiation     EN initiation Recommendations:  1. Initiate EN as medically feasible  2. If current propofol dosage continues (17.1mL/hr) initiate Peptamen VHP @20mL/hr and increase by 15mL Q8hrs to goal rate of 55mL/hr. Provide 10mL/hr flush to maintain tube patency. (Will provide 1100kcals +propofol 1553kcals, 101g pro, 4.4g fiber, 924mL H20, 1124 total H20)  3. If no propofol being administered initiate  Peptamen AF @20mL/hr and increase by 15mL Q8hrs to goal rate of 70mL/hr. Provide 10mL/hr flush to maintain tube patency. (Will provide 1680kcals, 106g pro, 8.4g fiber, 1135mL H20, 1335mL total H20)            Reason for Assessment       02/10/18 1056    Reason for Assessment    Reason For Assessment/Visit identified at risk by screening criteria    Identified At Risk By Screening Criteria MST SCORE 2+   unsure wt loss, no indicators present    Time Spent (min) 30    Diagnosis Diagnosis    Cardiac --   PMH: CHF, CAD    Neurological CVA;ICH   2/9 s/P: Right frontal craniotomy and excision of intraparenchymal hemorrhage    Pulmonary/Critical  "Care Other (comment);Ventilator   PMH: COPD. Intubated 2/9    Skin Surgical wound    Other diagnosis PMH:Anxiety, chronic pain, chronic narc use   Active Problems:    COPD (chronic obstructive pulmonary disease)    CAD (coronary artery disease)    CHF (congestive heart failure)    Stroke    Nontraumatic cortical hemorrhage of right cerebral hemisphere    Anxiety - chronic benzodiazepines    Chronic pain syndrome - chronic narcotic pain medications               Nutrition/Diet History       02/10/18 1100    Nutrition/Diet History    Factors Affecting Nutritional Intake Factors    Reported/Observed By Other; RN, Family    Other Pts family member in room at time of RD visit-she states she is aware of weight or PO intake trend PTA, she provided RD with phone number of pts daughter who pt lives with who can provide additional information. RD spoke to pts daughter via phone who stated  pt had lost a significant amount of wt since May 2017 r/t her breaking her femur. Pts daughter states pt does not eat well during the day when she is alone and that she refuses to make any food for herself-she includes pt does better with fast food/junk food that is brought to her. Pts daughter states pt had weighed 126lbs in MD's office approx 3 weeks ago and that this physican and been noting continued wt loss. Pts daughter states she is agreeable to initiation of nutrition support. RD spoke with pts RN who stated she is unsure of timeline for sedation vacation.    Per Dr. Riley 2/10:   1.  Would like to wean from ventilator today of possible with extubation anticipated.  She will need \"holiday\" to determine her status.  I've seen her move her right side spontaneously.  Too sedated however for adequate examination.           Anthropometrics       02/10/18 1102    Anthropometrics (Special Considerations)    Height Used for Calculations 1.651 m (5' 5\")    Weight Used for Calculations 57.2 kg (126 lb)   stated 2/9-per reports 3 weeks " "ago-standing scale in MD's office    RD Calculated BMI (kg/m2) 20.97    Usual Body Weight (UBW)    Usual Body Weight 73.5 kg (162 lb)    Weight Loss 16.3 kg (36 lb)    % Weight Loss  22 %    Weight Loss Time Frame 9 months    Body Mass Index (BMI)    BMI Grade 19.1 - 24.9 - normal            Labs/Tests/Procedures/Meds       02/10/18 1103    Labs/Tests/Procedures/Meds    Labs/Tests Review Reviewed;Hgb A1C    Medication Review Reviewed, pertinent   GTT:lasix, propofol @17.16mL/hr, NaCl@100mL/hr Other meds: decadron              Estimated/Assessed Needs       02/10/18 1125    Calculation Measurements    Weight Used For Calculations 57.2 kg (126 lb)    Height Used for Calculations 1.651 m (5' 5\")    Estimated/Assessed Energy Needs    Energy Need Method Kcal/kg;Peters [place]    kcal/kg 25   25-30kcals/kg actbw: 3587-4589    25 Kcal/Kg (kcal) 1428.82    Estimated REE  (Peters [place]) 1139    Diagnoses Factors 1.4    Estimated Kcal (Peters [place])  1595    Estimated Kcal Range  approx 1600kcals/day    Estimated/Assessed Protein Needs    Weight Used for Protein Calculation 57.2 kg (126 lb 1.7 oz)    Protein (gm/kg) --   per ASPEN guidelines: 1.3-2.5g/kg/d: 74-143g/day    Estimated Protein Range 109g/day   average of recommendation, will provide 1.9g/kg/d            Nutrition Prescription Ordered       02/10/18 1132    Nutrition Prescription PO    Current PO Diet NPO              Problem/Interventions:        Problem 1       02/10/18 1132    Nutrition Diagnoses Problem 1    Problem 1 Inadequate Nutrient Intake   kcal and pro    Etiology (related to) --   clinincal status/resp status    Signs/Symptoms (evidenced by) NPO   Pt continuing to require mechanical ventilation            Problem 2       02/10/18 1133    Nutrition Diagnoses Problem 2    Problem 2 Increased Nutrient Needs    Macronutrient Kcal;Protein    Etiology (related to) --   clinical status    Signs/Symptoms (evidenced by) --   Craniotomy/critical illness "            Problem 3       02/10/18 1135    Nutrition Diagnoses Problem 3    Problem 3 Unintended Weight Loss    Etiology (related to) --   food preferences, po intake trend    Signs/Symptoms (evidenced by) Unintended Weight Change    Unintended Weight Change Loss    Number of Pounds Lost 36lbs (22%)    Weight loss time period 9 months                Intervention Goal       02/10/18 1135    Intervention Goal    General Nutrition support treatment    TF/PN Inititiate TF/PN   If unable to extubate/or start PO diet            Nutrition Intervention       02/10/18 1136    Nutrition Intervention    RD/Tech Action Follow Tx progress;Care plan reviewd;Other (comment)   RD will monitor GOC/POC for further nutrition intervention reccommendations            Nutrition Prescription       02/10/18 1142    Nutrition Prescription EN    Enteral Prescription Enteral begin/change;Enteral to supply   EN recs provided above            Education/Evaluation       02/10/18 1137    Monitor/Evaluation    Monitor Per protocol;I&O;Pertinent labs;TF delivery/tolerance;Weight;Other (comment)   Resp status        Electronically signed by:  Leann Chowdhury RDN, DOMINGO  02/10/18 11:43 AM

## 2018-02-10 NOTE — PROGRESS NOTES
Pulmonary/Critical Care Follow-up     LOS: 1 day   Patient Care Team:  Carmela Pollock MD as PCP - General    Chief Complaint: Headache      Subjective    77-year-old female smoker with a history of coronary artery disease, prior stents, COPD, fibromyalgia, chronic pain on chronic narcotics, anxiety on benzodiazepines was found to be confused by a family friend on 2/9/18.  She apparently had been called by a friend of her granddaughter who noticed that she was slurring words and confused.  The patient's daughter was informed of this and started to drive to her home.  Another friend arrived and noted the patient to be confused with slurring of words and weakness on the left.  EMS was called and she was brought to our emergency department for presumed ischemic stroke.  A stat CT scan of the head revealed a large right frontal intracerebral hemorrhage with subdural extension.  Dr. Riley with neurosurgery was consulted.  The patient was moved directly to the intensive care unit from the CT scanner.  On arrival to the intensive care unit she had somewhat garbled speech but was able to form words and answers some questions appropriately.  She complained of headache.  She was weak in the left upper extremity.  Dr. Riley took her to the operating room the day of admission.    Interval History:   Patient has been on mechanical ventilation.  Sedation has been weaned off.  She is currently drowsy but follows some commands on right.  She does not open eyes spontaneously.  Her son is at the bedside.     History taken from: Chart, staff.     PMH/FH/Social History were reviewed and updated appropriately in the electronic medical record.     Review of Systems:    Review of 14 systems was completed with positives and pertinent negatives noted in the subjective section.  All other systems reviewed and are negative.   Exceptions are noted below:    Unable to obtain secondary to mechanical ventilation.       Objective     Vital  Signs  Temp:  [97.3 °F (36.3 °C)-100.9 °F (38.3 °C)] 100.9 °F (38.3 °C)  Heart Rate:  [] 120  Resp:  [16-27] 27  BP: ()/(46-85) 112/63  Arterial Line BP: ()/(48-75) 115/63  FiO2 (%):  [40 %-50 %] 40 %  02/09 0701 - 02/10 0700  In: 4401.3 [I.V.:3601.3]  Out: 3900 [Urine:3700]  Body mass index is 20.97 kg/(m^2).  Vent Mode: VC+/AC  FiO2 (%):  [40 %-50 %] 40 %  S RR:  [18] 18  PEEP/CPAP (cm H2O):  [5 cm H20] 5 cm H20  TX SUP:  [0 cm H20] 0 cm H20  MAP (cm H2O):  [8.5-10] 9.1  Physical Exam:     Constitutional:    Alert, cooperative, in no acute distress   Head:    Normocephalic, without obvious abnormality, atraumatic   Eyes:            Lids and lashes normal, conjunctivae and sclerae normal, no   icterus, no pallor, corneas clear, does not open eyes to command or spontaneously, PER   ENMT:   Ears appear intact with no abnormalities noted      No oral lesions, no thrush, oral endotracheal tube in place.    Neck:   No adenopathy, supple, trachea midline, no thyromegaly, no JVD       Lungs/Resp:     On ventilator, overbreathes ventilator rate, symmetric chest rise, no crepitus, clear to auscultation bilaterally              Heart/CV:    Regular rhythm and normal rate, normal S1 and S2, no            murmur   Abdomen/GI:     Normal bowel sounds, no masses, no organomegaly, soft,        non-tender, non-distended   :     Deferred   Extremities/MSK:   No clubbing or cyanosis.  No edema.  Normal tone.  No deformities.    Pulses:   Pulses palpable and equal bilaterally   Skin:   No bleeding, bruising or rash   Heme/Lymph:   No cervical or supraclavicular adenopathy.    Neurologic:    Psychiatric:       Right hemiparesis.  Eyes closed.  Follows commands on right.     Drowsy, does not appear anxious.             Results Review:     I reviewed the patient's new clinical results.     Results from last 7 days  Lab Units 02/10/18  0440 02/09/18  1418   SODIUM mmol/L 136 140   POTASSIUM mmol/L 3.9 3.7   CHLORIDE  mmol/L 102 106   CO2 mmol/L 26.0 30.0   BUN mg/dL 10 10   CREATININE mg/dL 0.50* 0.60   CALCIUM mg/dL 8.1* 8.4*   BILIRUBIN mg/dL  --  0.5   ALK PHOS U/L  --  62   ALT (SGPT) U/L  --  21   AST (SGOT) U/L  --  25   GLUCOSE mg/dL 157* 120*       Results from last 7 days  Lab Units 02/10/18  0440 02/09/18  1418   WBC 10*3/mm3 16.21* 8.86   HEMOGLOBIN g/dL 11.5 11.3*   HEMATOCRIT % 34.7 34.8   PLATELETS 10*3/mm3 289 260       Results from last 7 days  Lab Units 02/09/18  2031   PH, ARTERIAL pH units 7.331*   PO2 ART mm Hg 476.0*   PCO2, ARTERIAL mm Hg 53.2*   HCO3 ART mmol/L 28.1*           I reviewed the patient's new imaging including images and reports.  CT head 2/10:  IMPRESSION:  There has been approximately 50% evacuation of a large  intra-axial right frontal hemorrhage. There is residual intra-axial  blood as well as a much smaller extra-axial fluid collection and  extra-axial air with mass effect, right to left shift measuring  approximately 8 mm.    Medication Review:     atorvastatin 80 mg Oral Nightly   carvedilol 3.125 mg Oral BID With Meals   chlorhexidine 15 mL Mouth/Throat Q12H   dexamethasone 6 mg Intravenous Q6H   furosemide 40 mg Intravenous Daily   ipratropium-albuterol 3 mL Nebulization 4x Daily - RT   mirtazapine 15 mg Oral Nightly       niCARdipine 5-15 mg/hr Last Rate: Stopped (02/10/18 0800)   phenylephrine 0.5-3 mcg/kg/min    propofol 5-50 mcg/kg/min Last Rate: 50 mcg/kg/min (02/10/18 0605)   sodium chloride 0.45 % with KCl 20 mEq 100 mL/hr Last Rate: 100 mL/hr (02/10/18 0602)       Assessment/Plan     Active Problems:    COPD (chronic obstructive pulmonary disease)    CAD (coronary artery disease)    CHF (congestive heart failure)    Stroke    Nontraumatic cortical hemorrhage of right cerebral hemisphere    Anxiety - chronic benzodiazepines    Chronic pain syndrome - chronic narcotic pain medications      78 YO smoker with tobacco abuse, congestive heart failure, CAD on plavix, who was found to  have garbled speech and left upper extremity weakness after developing a severe headache.  She was brought to the emergency department where a CT scan showed a large right frontal hemorrhage.     Plan:  1.  Wean sedation.   2.  Wean ventilator as tolerated.   3.  Gentle fluids.  4.  Watch for evidence of benzodiazepine withdrawal, avoid sedatives if possible.  5.  Hemorrhagic stroke order set utilized.  6.  Place nasoduodenal feeding tube in anticipation of tube feeding.       Brandon Stewart MD  02/10/18  9:14 AM        Critical care time : 34 minutes.(This excludes time spent performing separately reportable procedures and services). including high complexity decision making to assess, manipulate, and support vital organ system failure in this individual who has impairment of one or more vital organ systems such that there is a high probability of imminent or life threatening deterioration in the patient’s condition.    *. Please note that portions of this note were completed with a voice recognition program. Efforts were made to edit the dictations, but occasionally words are mistranscribed.

## 2018-02-11 NOTE — PROGRESS NOTES
"           FOLLOW UP ENCOUNTER          WORKING DIAGNOSIS:   POD# 2 : SPONTANEOUS INTRACEREBRAL HEMORRHAGIC STROKE, RIGHT.                           MEDICAL HISTORY SINCE LAST ENCOUNTER :       Her sedation has been decreased.  She will intermittently follow commands in the right upper extremity.  There is been gross movement in her left leg however none in her left arm.    CT scan remained stable.  Significantly less pneumocephalus.  The shaft is reduced.  She has retained clot within the basal ganglia as anticipated.  This will resolve with time.  No intervention indicated or warranted.                                      ASSESSMENT/DISPOSITION :       1.  Continue to comanagement.  It is noteworthy that she is left-handed.  Therefore, her \"dominant\" hemisphere MAY will be right sided.  Should that be the case I would anticipate that she MAY be aphasic given the location of the hemorrhage and surgery required for removal.    2.  Continue to hopefully wean from ventilator in the next 1-2 days.  This will give us a better opportunity she should determine her neurological status at this time with some idea towards prognostication.      3. Greatly appreciate Intensivist help!!!      4. Would like to keep SAP >120 at this time. Started Neosyn PRN.   "

## 2018-02-11 NOTE — PLAN OF CARE
Problem: Patient Care Overview (Adult)  Goal: Plan of Care Review  Outcome: Ongoing (interventions implemented as appropriate)   02/11/18 1444   Coping/Psychosocial Response Interventions   Plan Of Care Reviewed With patient   Outcome Evaluation   Outcome Summary/Follow up Plan PT evaluation completed. Pt demonstrates decreased indep and safety re: functional mobility with unstable signs/symptoms, warranting further skilled PT services to promote PLOF. Limited today by decreased processing and difficulty attending to task. Recommend IP rehab placement at d/c.        Problem: Inpatient Physical Therapy  Goal: Bed Mobility Goal LTG- PT  Outcome: Ongoing (interventions implemented as appropriate)   02/11/18 1444   Bed Mobility PT LTG   Bed Mobility PT LTG, Date Established 02/11/18   Bed Mobility PT LTG, Time to Achieve 2 wks   Bed Mobility PT LTG, Activity Type supine to sit/sit to supine   Bed Mobility PT LTG, [place] Level minimum assist (75% patient effort)     Goal: Transfer Training Goal 1 LTG- PT  Outcome: Ongoing (interventions implemented as appropriate)   02/11/18 1444   Transfer Training PT LTG   Transfer Training PT LTG, Date Established 02/11/18   Transfer Training PT LTG, Time to Achieve 2 wks   Transfer Training PT LTG, Activity Type sit to stand/stand to sit   Transfer Training PT LTG, [place] Level minimum assist (75% patient effort);2 person assist required   Transfer Training PT LTG, Assist Device walker, rolling     Goal: Gait Training Goal LTG- PT  Outcome: Ongoing (interventions implemented as appropriate)   02/11/18 1444   Gait Training PT LTG   Gait Training Goal PT LTG, Date Established 02/11/18   Gait Training Goal PT LTG, Time to Achieve 2 wks   Gait Training Goal PT LTG, [place] Level moderate assist (50% patient effort);2 person assist required   Gait Training Goal PT LTG, Assist Device walker, rolling   Gait Training Goal PT LTG, Distance to Achieve 10     Goal: Static  Sitting Balance Goal LTG- PT  Outcome: Ongoing (interventions implemented as appropriate)   02/11/18 1444   Static Sitting Balance PT LTG   Static Sitting Balance PT LTG, Date Established 02/11/18   Static Sitting Balance PT LTG, Time to Achieve 2 wks   Static Sitting Balance PT LTG, [place] Level supervision required   Static Sitting Balance PT LTG, Assist Device UE Support

## 2018-02-11 NOTE — PROGRESS NOTES
Pulmonary/Critical Care Follow-up     LOS: 2 days   Patient Care Team:  Carmela Pollock MD as PCP - General    Chief Complaint: Headache      Subjective    77-year-old female smoker with a history of coronary artery disease, prior stents, COPD, fibromyalgia, chronic pain on chronic narcotics, anxiety on benzodiazepines was found to be confused by a family friend on 2/9/18.  She apparently had been called by a friend of her granddaughter who noticed that she was slurring words and confused.  The patient's daughter was informed of this and started to drive to her home.  Another friend arrived and noted the patient to be confused with slurring of words and weakness on the left.  EMS was called and she was brought to our emergency department for presumed ischemic stroke.  A stat CT scan of the head revealed a large right frontal intracerebral hemorrhage with subdural extension.  Dr. Riley with neurosurgery was consulted.  The patient was moved directly to the intensive care unit from the CT scanner.  On arrival to the intensive care unit she had somewhat garbled speech but was able to form words and answers some questions appropriately.  She complained of headache.  She was weak in the left upper extremity.  Dr. Riley took her to the operating room the day of admission.    Interval History:   Patient was extubated this morning.  She is doing well.  Sedation has been weaned off.  Her son is at the bedside.  She continues to have weakness of her left upper extremity.  She follows commands.    History taken from: Chart, staff.     PMH/FH/Social History were reviewed and updated appropriately in the electronic medical record.     Review of Systems:    Review of 14 systems was completed with positives and pertinent negatives noted in the subjective section.  All other systems reviewed and are negative.   Exceptions are noted below:    Unable to obtain secondary to mechanical ventilation.       Objective     Vital  Signs  Temp:  [97.9 °F (36.6 °C)-100.1 °F (37.8 °C)] 100 °F (37.8 °C)  Heart Rate:  [] 73  Resp:  [12-26] 26  BP: ()/(40-88) 154/73  Arterial Line BP: ()/(38-78) 93/45  FiO2 (%):  [40 %-100 %] 40 %  02/10 0701 - 02/11 0700  In: 2705.2 [I.V.:2705.2]  Out: 1780 [Urine:1780]  Body mass index is 20.97 kg/(m^2).  Vent Mode: VC+/AC  FiO2 (%):  [40 %-100 %] 40 %  S RR:  [12-18] 12  PEEP/CPAP (cm H2O):  [5 cm H20] 5 cm H20  NH SUP:  [0 cm H20-10 cm H20] 0 cm H20  MAP (cm H2O):  [7-14] 9.3  Physical Exam:     Constitutional:    Alert, cooperative, in no acute distress   Head:    Normocephalic, without obvious abnormality, atraumatic   Eyes:            Lids and lashes normal, conjunctivae and sclerae normal, no   icterus, no pallor, corneas clear, does not open eyes to command or spontaneously, PER   ENMT:   Ears appear intact with no abnormalities noted      No oral lesions, no thrush, oral mucosa dry.    Neck:   No adenopathy, supple, trachea midline, no thyromegaly, no JVD       Lungs/Resp:     Normal effort, symmetric chest rise, no crepitus, clear to auscultation bilaterally              Heart/CV:    Regular rhythm and normal rate, normal S1 and S2, no            murmur   Abdomen/GI:     Normal bowel sounds, no masses, no organomegaly, soft,        non-tender, non-distended   :     Deferred   Extremities/MSK:   No clubbing or cyanosis.  No edema.  Normal tone.  No deformities.    Pulses:   Pulses palpable and equal bilaterally   Skin:   No bleeding, bruising or rash   Heme/Lymph:   No cervical or supraclavicular adenopathy.    Neurologic:    Psychiatric:      Left upper extremity weak.  Follows commands on right.     Drowsy, does not appear anxious.             Results Review:     I reviewed the patient's new clinical results.     Results from last 7 days  Lab Units 02/11/18  0440 02/10/18  1526 02/10/18  0440 02/09/18  1418   SODIUM mmol/L 141 139 136 140   POTASSIUM mmol/L 4.1 4.1 3.9 3.7   CHLORIDE  mmol/L 110* 107 102 106   CO2 mmol/L 26.0 27.0 26.0 30.0   BUN mg/dL 22 15 10 10   CREATININE mg/dL 0.60 0.70 0.50* 0.60   CALCIUM mg/dL 8.0* 8.0* 8.1* 8.4*   BILIRUBIN mg/dL  --   --   --  0.5   ALK PHOS U/L  --   --   --  62   ALT (SGPT) U/L  --   --   --  21   AST (SGOT) U/L  --   --   --  25   GLUCOSE mg/dL 179* 154* 157* 120*       Results from last 7 days  Lab Units 02/10/18  0440 02/09/18  1418   WBC 10*3/mm3 16.21* 8.86   HEMOGLOBIN g/dL 11.5 11.3*   HEMATOCRIT % 34.7 34.8   PLATELETS 10*3/mm3 289 260       Results from last 7 days  Lab Units 02/11/18  0034   PH, ARTERIAL pH units 7.429   PO2 ART mm Hg 106.0   PCO2, ARTERIAL mm Hg 40.2   HCO3 ART mmol/L 26.6*           I reviewed the patient's new imaging including images and reports.  CT head 2/10:  IMPRESSION:  There has been approximately 50% evacuation of a large  intra-axial right frontal hemorrhage. There is residual intra-axial  blood as well as a much smaller extra-axial fluid collection and  extra-axial air with mass effect, right to left shift measuring  approximately 8 mm.    Medication Review:     atorvastatin 80 mg Oral Nightly   carvedilol 3.125 mg Oral BID With Meals   chlorhexidine 15 mL Mouth/Throat Q12H   dexamethasone 6 mg Intravenous Q6H   furosemide 40 mg Intravenous Daily   ipratropium-albuterol 3 mL Nebulization 4x Daily - RT   mirtazapine 15 mg Oral Nightly       dexmedetomidine 0.2-1.5 mcg/kg/hr Last Rate: 0.6 mcg/kg/hr (02/11/18 1146)   dexmedetomidine (PRECEDEX) IV infusion 0.2-1.5 mcg/kg/hr    dextrose 5 % and sodium chloride 0.9 % with KCl 20 mEq 75 mL/hr Last Rate: 75 mL/hr (02/11/18 0713)   niCARdipine 5-15 mg/hr Last Rate: Stopped (02/10/18 0800)   phenylephrine 0.5-3 mcg/kg/min Last Rate: 0.2 mcg/kg/min (02/11/18 1927)       Assessment/Plan     Active Problems:    COPD (chronic obstructive pulmonary disease)    CAD (coronary artery disease)    CHF (congestive heart failure)    Stroke    Nontraumatic cortical hemorrhage of  right cerebral hemisphere    Anxiety - chronic benzodiazepines    Chronic pain syndrome - chronic narcotic pain medications      78 YO smoker with tobacco abuse, congestive heart failure, CAD on plavix, who was found to have garbled speech and left upper extremity weakness after developing a severe headache.  She was brought to the emergency department where a CT scan showed a large right frontal hemorrhage.     Plan:  1.  Extubate (done)   2.  Speech evaluation.   3.  Gentle fluids.  4.  Watch for evidence of benzodiazepine withdrawal, avoid sedatives if possible.  5.  Hemorrhagic stroke order set utilized.  6.  Await feeding tube placement for initiation of tube feeding.   7.  Consult nutrition.       Brandon Stewart MD  02/11/18  12:53 PM        Critical care time : 35 minutes.(This excludes time spent performing separately reportable procedures and services). including high complexity decision making to assess, manipulate, and support vital organ system failure in this individual who has impairment of one or more vital organ systems such that there is a high probability of imminent or life threatening deterioration in the patient’s condition.    *. Please note that portions of this note were completed with a voice recognition program. Efforts were made to edit the dictations, but occasionally words are mistranscribed.

## 2018-02-11 NOTE — PLAN OF CARE
Problem: Mechanical Ventilation, Invasive (Adult)  Intervention: Prevent Airway Displacement/Mechanical Dysfunction   02/11/18 0800   Prevent Airway Displacement/Mechanical Dysfunction   Airway Safety Measures manual resuscitator/mask in room;oxygen flow meter at bedside;suction at bedside

## 2018-02-11 NOTE — THERAPY EVALUATION
Acute Care - Speech Language Pathology Initial Evaluation  [facility]   Cognitive-Communication Evaluation       Patient Name: Preeti Manning  : 1940  MRN: [id number]  Today's Date: 2018  Onset of Illness/Injury or Date of Surgery Date: 18            Admit Date: 2018     Visit Dx:    ICD-10-CM ICD-9-CM   1. Intracranial hemorrhage I62.9 432.9   2. Impaired mobility and ADLs Z74.09 799.89   3. 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   • Anxiety - chronic benzodiazepines   • Chronic pain syndrome - chronic narcotic pain medications     Past Medical History:   Diagnosis Date   • Anxiety    • Arthritis    • CHF (congestive heart failure)    • COPD (chronic obstructive pulmonary disease)    • Coronary artery disease    • Depression    • Fibromyalgia      Past Surgical History:   Procedure Laterality Date   • CORONARY ANGIOPLASTY WITH STENT PLACEMENT     • FEMUR OPEN REDUCTION INTERNAL FIXATION Right 5/15/2017    Procedure: DISTAL FEMUR OPEN REDUCTION INTERNAL FIXATION RIGHT;  Surgeon: Pierre Velarde MD;  Location: [facility];  Service:    • TOTAL HIP ARTHROPLASTY Right 2017          SLP EVALUATION (last 72 hours)      SLP Evaluation       18 1420                Rehab Evaluation    Document Type evaluation  -LS        Subjective Information no complaints;agree to therapy  -LS        General Information    Patient Profile Review yes  -LS        Subjective Patient Observations alert and cooperative  -LS        Pertinent History Of Current Problem Admit per CVA pathway   -LS        Current Diet Limitations NPO  -LS        Prior Level of Function- Communication  "functional in all spheres  -LS        Prior Level of Function- Swallowing no diet consistency restrictions  -        Plans/Goals Discussed With patient  -        Barriers to Rehab medically complex  -        Clinical Impression    Patient's Goals For Discharge patient did not state  -        Criteria for Skilled Therapeutic Interventions Met skilled criteria for cognitive linguistic intervention met;skilled criteria for speech language intervention met  -LS        Rehab Potential fair, will monitor progress closely  -        Therapy Frequency 5 times/wk  -LS        Cognitive Assessment/Intervention    Additional Documentation Cognitive Assessment Intervention (Group)  -        Cognitive Assessment Intervention    Behavior/Mood Observations behavior appropriate to situation, WNL/WFL;alert  -        Attention moderate impairment;severe impairment   needs constant cues   -        Communication Assessment/Intervention    Additional Documentation Auditory Comprehen Assess/Intervention (Group);Verbal Expression Assess/Intervention (Group);Motor Speech Assessment/Intervention (Group)  -        Auditory Comprehen Assess/Intervention    Auditory Comprehension severe impairment  -LS        Auditory Comprehen Assess/Intervention    Answers Yes/No Questions severe impairment  -LS        Yes/No Questions Comment pt responded \"yeah\" to questions but \"no\" was nto observed  -LS        Able to Follow Commands severe impairment  -LS        Verbal Expression Assess/Intervention    Automatic Speech severe impairment  -LS        Automatic Speech Comment pt not observed to immiate counting w/ max cues  -        Motor Speech Assess/Intervention    Motor Speech-Apraxia Observations unable/difficult to assess  -LS        Motor Speech-Dysarthria Observations unable/difficult to assess  -        Communication Treatment Objective and Progress    Receptive Language Treatment Objectives Improve ability to follow " directions;Improve ability to comprehend questions  -LS        Expressive Treatment Objectives Improve word retrieval skills;Improve connected speech to express thoughts  -LS        Cognitive Linguistic Treatment Objectives Improve attention  -LS        Improve ability to follow directions    Improve ability to follow directions: one-step directions with objects;80%;with consistent cues  -LS        Status: Improve ability to follow directions New  -LS        Improve ability to comprehend questions    Improve ability to comprehend questions: simple yes/no questions;80%;with consistent cues  -LS        Status: Improve ability to comprehend questions New  -LS        Improve word retrieval skills    Improve word retrieval skills by: completing automatic speech tasks counting;60%;with consistent cues  -LS        Status: Improve word retrieval skills New  -LS        Improve attention    Improve attention by: looking at speaker;attending to task;80%;without cues  -LS        Status: Improve attention New  -LS          User Key  (r) = Recorded By, (t) = Taken By, (c) = Cosigned By    Initials Name Effective Dates    LS Monique Vivas MS [facility]-SLP 06/22/15 -            EDUCATION  The patient has been educated in the following areas:   Cognitive Impairment Communication Impairment.    SLP Recommendation and Plan        Rehab Potential: fair, will monitor progress closely  Criteria for Skilled Therapeutic Interventions Met: skilled criteria for cognitive linguistic intervention met, skilled criteria for speech language intervention met        Therapy Frequency: 5 times/wk          Plan of Care Review  Plan Of Care Reviewed With: patient  Progress: progress towards functional goals is fair  Outcome Summary/Follow up Plan: Cog/Comm eval complete. Pt presents with severe expressive/receptive and attention deficits. SLP unable to determine motor speech deficits 2' limited expressive language output. Pt needed max cues to maintain  "alertness. Pt not observed to follow single step directions given max cues. Pt vocalized \"yeah\" to yes/no questions but a \"no\" response was not established. Suspect \"yeah\" is an automatic response. REC: continue dx/tx for cog/comm     Clinical swallow eval complete. Pt with difficulty maintaining alertness. Limited oral movements observed w/ max cues. Anterior loss with tsp thins. Pt not observed to use lips to remove pureed from spoon. Pt presents w/ s/s of asp c/b cough w/ thins. REC: NPO, meds via alt route. Re-assess at bedside on 2/12.          IP SLP Goals       02/11/18 1511          Begin to Take Some PO Safely    Begin to Take Some PO Safely- SLP, Date Established 02/11/18  -LS      Begin to Take Some PO Safely- SLP, Time to Achieve by discharge  -LS      Begin to Take Some PO Safely- SLP, Outcome goal ongoing  -LS      Receptive Language- Optimal Participation in Care    Receptive Language Optimal Participation in Care- SLP, Date Established 02/11/18  -LS      Receptive Language Optimal Participation in Care- SLP, Time to Achieve by discharge  -LS      Receptive Language Optimal Participation in Care- SLP, Outcome goal ongoing  -LS      Expressive- Optimal Participation in Care    Expressive Optimal Participation in Care- SLP, Date Established 02/11/18  -LS      Expressive Optimal Participation in Care- SLP, Time to Achieve by discharge  -LS      Expressive Optimal Participation in Care- SLP, Outcome goal ongoing  -LS        User Key  (r) = Recorded By, (t) = Taken By, (c) = Cosigned By    Initials Name Provider Type    XAVI Vivas MS CCC-SLP Speech and Language Pathologist              Time Calculation:         Time Calculation- SLP       02/11/18 1516          Time Calculation- SLP    SLP Start Time 1420  -LS      SLP Received On 02/11/18  -LS        User Key  (r) = Recorded By, (t) = Taken By, (c) = Cosigned By    Initials Name Provider Type    XAVI Vivas MS CCC-SLP Speech and Language " Pathologist          Therapy Charges for Today     Code Description Service Date Service Provider Modifiers Qty    43627301888 HC ST EVAL SPEECH AND PROD W LANG  2 2018 Monique Vivas, MS CCC-SLP GN 1    59292752326 HC ST EVAL ORAL PHARYNG SWALLOW 2 2018 Monique Vivas, MS CCC-SLP GN 1                     Monique Vivas, MS CCC-SLP  2018 and Acute Care - Speech Language Pathology   Swallow Initial Evaluation [facility]   Clinical Swallow Evaluation       Patient Name: Preeti Manning  : 1940  MRN: [id number]  Today's Date: 2018  Onset of Illness/Injury or Date of Surgery Date: 18            Admit Date: 2018    Visit Dx:     ICD-10-CM ICD-9-CM   1. Intracranial hemorrhage I62.9 432.9   2. Impaired mobility and ADLs Z74.09 799.89   3. 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   • Anxiety - chronic benzodiazepines   • Chronic pain syndrome - chronic narcotic pain medications     Past Medical History:   Diagnosis Date   • Anxiety    • Arthritis    • CHF (congestive heart failure)    • COPD (chronic obstructive pulmonary disease)    • Coronary artery disease    • Depression    • Fibromyalgia      Past Surgical History:   Procedure Laterality Date   • CORONARY ANGIOPLASTY WITH STENT PLACEMENT     • FEMUR OPEN REDUCTION INTERNAL FIXATION Right 5/15/2017    Procedure: DISTAL FEMUR OPEN REDUCTION INTERNAL FIXATION RIGHT;  Surgeon: Pierre Velarde MD;  [facility]: [facility];  Service:    • TOTAL HIP ARTHROPLASTY Right 2017          SWALLOW EVALUATION (last 72 hours)      Swallow Evaluation       18 1420                Clinical Impression    SLP  Swallowing Diagnosis other (see comments)   r/o pharyngeal dysphagia  -        Criteria for Skilled Therapeutic Interventions Met skilled criteria for dysphagia intervention met  -        FCM, Swallowing 1-->Level 1  -        Therapy Frequency 5 times/wk  -        SLP Diet Recommendation NPO: unsafe for food/liquid intake  -        Recommended Diagnostics reassess via clinical swallow (non-instrumental exam)  -        SLP Rec. for Method of Medication Administration meds via alternate route  -        Oral Motor Structure and Function    Oral Motor Assessment Comment generalized weakness. Pt unable to follow single step directions for oral mech exam. Min manipulation of ice chip.  -        Clinical Swallow Exam    Mode of Presentation fed by clinician;spoon  -        Oral Preparation Concerns inability to open lips/jaw adequately;incomplete mouth closure around utensil;anterior loss  -        Oral Phase Results impaired oral phase, signs of dysfunction present  -        Pharyngeal Phase Results sign/symptoms of pharyngeal impairment;cough  -        Summary of Clinical Exam Clinical swallow eval complete. Pt with difficulty maintaining alertness. Limited oral movements observed w/ max cues. Anerior loss with tsp thins.  Pt not observed to use lips to remove pureed from spoon. Pt presents w/ s/s of asp c/b cough w/ thins. REC: NPO, meds via alt route. Re-assess at bedside on 2/12.  -          User Key  (r) = Recorded By, (t) = Taken By, (c) = Cosigned By    Initials Name Effective Dates    XAVI Vivas, MS [facility]-SLP 06/22/15 -         EDUCATION  The patient has been educated in the following areas:   Dysphagia (Swallowing Impairment) Oral Care/Hydration NPO rationale.    SLP Recommendation and Plan  SLP Swallowing Diagnosis: other (see comments) (r/o pharyngeal dysphagia)  SLP Diet Recommendation: NPO: unsafe for food/liquid intake     SLP Rec. for Method of Medication Administration: meds  "via alternate route     Recommended Diagnostics: reassess via clinical swallow (non-instrumental exam)  Criteria for Skilled Therapeutic Interventions Met: skilled criteria for dysphagia intervention met        Therapy Frequency: 5 times/wk             Plan of Care Review  Plan Of Care Reviewed With: patient  Progress: progress towards functional goals is fair  Outcome Summary/Follow up Plan: Cog/Comm eval complete. Pt presents with severe expressive/receptive and attention deficits. SLP unable to determine motor speech deficits 2' limited expressive language output. Pt needed max cues to maintain alertness. Pt not observed to follow single step directions given max cues. Pt vocalized \"yeah\" to yes/no questions but a \"no\" response was not established. Suspect \"yeah\" is an automatic response. REC: continue dx/tx for cog/comm     Clinical swallow eval complete. Pt with difficulty maintaining alertness. Limited oral movements observed w/ max cues. Anterior loss with tsp thins. Pt not observed to use lips to remove pureed from spoon. Pt presents w/ s/s of asp c/b cough w/ thins. REC: NPO, meds via alt route. Re-assess at bedside on 2/12.          IP SLP Goals       02/11/18 1511          Begin to Take Some PO Safely    Begin to Take Some PO Safely- SLP, Date Established 02/11/18  -LS      Begin to Take Some PO Safely- SLP, Time to Achieve by discharge  -LS      Begin to Take Some PO Safely- SLP, Outcome goal ongoing  -LS      Receptive Language- Optimal Participation in Care    Receptive Language Optimal Participation in Care- SLP, Date Established 02/11/18  -LS      Receptive Language Optimal Participation in Care- SLP, Time to Achieve by discharge  -LS      Receptive Language Optimal Participation in Care- SLP, Outcome goal ongoing  -LS      Expressive- Optimal Participation in Care    Expressive Optimal Participation in Care- SLP, Date Established 02/11/18  -LS      Expressive Optimal Participation in Care- SLP, Time " to Achieve by discharge  -      Expressive Optimal Participation in Care- SLP, Outcome goal ongoing  -LS        User Key  (r) = Recorded By, (t) = Taken By, (c) = Cosigned By    Initials Name Provider Type    XAVI Vivas MS CCC-SLP Speech and Language Pathologist               Time Calculation:         Time Calculation- SLP       02/11/18 1516          Time Calculation- SLP    SLP Start Time 1420  -LS      SLP Received On 02/11/18  -        User Key  (r) = Recorded By, (t) = Taken By, (c) = Cosigned By    Initials Name Provider Type    XAVI Vivas MS CCC-SLP Speech and Language Pathologist          Therapy Charges for Today     Code Description Service Date Service Provider Modifiers Qty    20971365767 HC ST EVAL SPEECH AND PROD W LANG  2 2/11/2018 Monique Vivas MS CCC-SLP GN 1    46387704018 HC ST EVAL ORAL PHARYNG SWALLOW 2 2/11/2018 Monique Vivas MS CCC-SLP GN 1               Monique Vivas MS CCC-CAMMY  2/11/2018

## 2018-02-11 NOTE — PLAN OF CARE
Problem: Patient Care Overview (Adult)  Goal: Plan of Care Review  Outcome: Ongoing (interventions implemented as appropriate)      Problem: Skin Integrity Impairment, Risk/Actual (Adult)  Goal: Identify Related Risk Factors and Signs and Symptoms  Outcome: Outcome(s) achieved Date Met: 02/11/18    Goal: Skin Integrity/Wound Healing  Outcome: Ongoing (interventions implemented as appropriate)      Problem: Craniotomy/Craniectomy/Cranioplasty (Adult)  Goal: Signs and Symptoms of Listed Potential Problems Will be Absent or Manageable (Craniotomy/Craniectomy/Cranioplasty)  Outcome: Ongoing (interventions implemented as appropriate)      Problem: Mechanical Ventilation, Invasive (Adult)  Goal: Signs and Symptoms of Listed Potential Problems Will be Absent or Manageable (Mechanical Ventilation, Invasive)  Outcome: Ongoing (interventions implemented as appropriate)

## 2018-02-11 NOTE — PLAN OF CARE
"Problem: Patient Care Overview (Adult)  Goal: Plan of Care Review  Outcome: Ongoing (interventions implemented as appropriate)   02/11/18 1511   Coping/Psychosocial Response Interventions   Plan Of Care Reviewed With patient   Patient Care Overview   Progress progress towards functional goals is fair   Outcome Evaluation   Outcome Summary/Follow up Plan Cog/Comm eval complete. Pt presents with severe expressive/receptive and attention deficits. SLP unable to determine motor speech deficits 2' limited expressive language output. Pt needed max cues to maintain alertness. Pt not observed to follow single step directions given max cues. Pt vocalized \"yeah\" to yes/no questions but a \"no\" response was not established. Suspect \"yeah\" is an automatic response. REC: continue dx/tx for cog/comm     Clinical swallow eval complete. Pt with difficulty maintaining alertness. Limited oral movements observed w/ max cues. Anterior loss with tsp thins. Pt not observed to use lips to remove pureed from spoon. Pt presents w/ s/s of asp c/b cough w/ thins. REC: NPO, meds via alt route. Re-assess at bedside on 2/12.       Problem: Inpatient SLP  Goal: Dysphagia- Patient will improve swallowing skills to begin to take some PO safely  Outcome: Ongoing (interventions implemented as appropriate)   02/11/18 1511   Begin to Take Some PO Safely   Begin to Take Some PO Safely- SLP, Date Established 02/11/18   Begin to Take Some PO Safely- SLP, Time to Achieve by discharge   Begin to Take Some PO Safely- SLP, Outcome goal ongoing     Goal: Expressive-Patient will improve expressive language skills to allow optimal participation in care  Outcome: Ongoing (interventions implemented as appropriate)   02/11/18 1511   Expressive- Optimal Participation in Care   Expressive Optimal Participation in Care- SLP, Date Established 02/11/18   Expressive Optimal Participation in Care- SLP, Time to Achieve by discharge   Expressive Optimal Participation in " Care- SLP, Outcome goal ongoing     Goal: Receptive Language-Patient will improve receptive language skills to allow optimal participation in care  Outcome: Ongoing (interventions implemented as appropriate)   02/11/18 1511   Receptive Language- Optimal Participation in Care   Receptive Language Optimal Participation in Care- SLP, Date Established 02/11/18   Receptive Language Optimal Participation in Care- SLP, Time to Achieve by discharge   Receptive Language Optimal Participation in Care- SLP, Outcome goal ongoing

## 2018-02-11 NOTE — THERAPY EVALUATION
Acute Care - Occupational Therapy Initial Evaluation  [facility]     Patient Name: Preeti Manning  : 1940  MRN: [id number]  Today's Date: 2018  Onset of Illness/Injury or Date of Surgery Date: 18  Date of Referral to OT: 18  Referring Physician: MD Osvaldo    Admit Date: 2018       ICD-10-CM ICD-9-CM   1. Intracranial hemorrhage I62.9 432.9   2. 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   • Anxiety - chronic benzodiazepines   • Chronic pain syndrome - chronic narcotic pain medications     Past Medical History:   Diagnosis Date   • Anxiety    • Arthritis    • CHF (congestive heart failure)    • COPD (chronic obstructive pulmonary disease)    • Coronary artery disease    • Depression    • Fibromyalgia      Past Surgical History:   Procedure Laterality Date   • CORONARY ANGIOPLASTY WITH STENT PLACEMENT     • FEMUR OPEN REDUCTION INTERNAL FIXATION Right 5/15/2017    Procedure: DISTAL FEMUR OPEN REDUCTION INTERNAL FIXATION RIGHT;  Surgeon: Pierre Velarde MD;  Location: [facility];  Service:    • TOTAL HIP ARTHROPLASTY Right 2017          OT ASSESSMENT FLOWSHEET (last 72 hours)      OT Evaluation       18 0740 02/10/18 1310 02/10/18 0935 18 2206       Rehab Evaluation    Document Type evaluation  -CL        Subjective Information agree to therapy;unable to respond   Pt orally intubated, RN cleared for tx.   -CL        Evaluation Not Performed  unable to evaluate, medical status change   Pt remains sedated and intubated; not appropriate for PT evaluation. Will check back tomorrow.   -KR unable to evaluate, medical status change   Pt currently intubated and sedated, not medically  appropriate for skilled IPOT intervention. Pt w/ possible extubation this date. Will defer eval until tomorrow.   -CL      Patient Effort, Rehab Treatment fair  -CL        Symptoms Noted During/After Treatment none  -CL        General Information    Patient Profile Review yes  -CL        Onset of Illness/Injury or Date of Surgery Date 02/09/18  -CL        Referring Physician MD Osvaldo  -CL        Pertinent History Of Current Problem Pt presented to ED w/ HA and LUE weakness. CT (+) intraparenchymal hemorrhage w/ subudral component. Pt s/p R frontal craniotomy and excision of intra parenchymal hemorrhage.   -CL        Precautions/Limitations fall precautions;oxygen therapy device and L/min;other (see comments)   NG, orally intubated, L sided weakness, R eye swollen shut  -CL        Prior Level of Function --   Pt poor historian, no family present  -CL        Equipment Currently Used at Home --   Pt poor historian, no family present  -CL   oxygen  -TRACI     Plans/Goals Discussed With patient;agreed upon  -CL        Risks Reviewed patient:;LOB;nausea/vomiting;dizziness;increased discomfort;change in vital signs;lines disloged  -CL        Benefits Reviewed patient:;improve function;increase independence;increase strength;increase balance;decrease pain;increase knowledge  -CL        Barriers to Rehab medically complex  -CL        Living Environment    Lives With child(bree), adult  -CL   child(bree), adult  -TRACI     Living Arrangements apartment  -CL   apartment  -TRACI     Home Accessibility --   walk-in shower  -CL   no concerns  -TRACI     Stair Railings at Home    none  -TRACI     Type of Financial/Environmental Concern    none  -TRACI     Transportation Available    car;family or friend will provide  -TRACI     Living Environment Comment Per CM, pt poor historian, no family present.   -CL        Clinical Impression    Date of Referral to OT 02/09/18  -CL        OT Diagnosis Decreased independence in ADLs.   -CL        Patient/Family  Goals Statement Return to PLOF.   -CL        Criteria for Skilled Therapeutic Interventions Met yes;treatment indicated  -CL        Rehab Potential good, to achieve stated therapy goals  -CL        Therapy Frequency daily  -CL        Anticipated Equipment Needs At Discharge --   TBA further  -CL        Anticipated Discharge Disposition inpatient rehabilitation facility  -CL        Functional Level Prior    Ambulation    0-->independent  -TRACI     Transferring    0-->independent  -TRACI     Toileting    0-->independent  -TRACI     Bathing    0-->independent  -TRACI     Dressing    0-->independent  -TRACI     Eating    0-->independent  -TRACI     Communication    0-->understands/communicates without difficulty  -TRACI     Swallowing    0-->swallows foods/liquids without difficulty  -TRACI     Prior Functional Level Comment    N/A  -TRACI     Vital Signs    Pre Systolic BP Rehab 133  -CL        Pre Treatment Diastolic BP 60  -CL        Post Systolic BP Rehab 103  -CL        Post Treatment Diastolic BP 49  -CL        Pretreatment Heart Rate (beats/min) 79  -CL        Posttreatment Heart Rate (beats/min) 71  -CL        Pre SpO2 (%) 96  -CL        O2 Delivery Pre Treatment supplemental O2   vent  -CL        Post SpO2 (%) 94  -CL        O2 Delivery Post Treatment supplemental O2  -CL        Pre Patient Position Supine  -CL        Intra Patient Position Supine  -CL        Post Patient Position Supine  -CL        Pain Assessment    Pain Assessment Blanchard-Javier FACES  -CL        Blanchard-Baker FACES Pain Rating 0  -CL        Vision Assessment/Intervention    Visual Impairment Comment Difficult to formally assess d/t cognitive status. Pt w/ noted R eye swollen shut and bruised.   -CL        Cognitive Assessment/Intervention    Current Cognitive/Communication Assessment impaired  -CL        Orientation Status oriented to;person   Pt reponds to name  -CL        Follows Commands/Answers Questions needs cueing;needs increased time;needs repetition   Pt followed  less than 25% of commands  -CL        Personal Safety severe impairment;decreased awareness, need for assist;decreased awareness, need for safety;decreased insight to deficits  -CL        Personal Safety Interventions fall prevention program maintained;supervised activity  -CL        ROM (Range of Motion)    General ROM no range of motion deficits identified  -CL        MMT (Manual Muscle Testing)    General MMT Assessment Detail Difficult to formally assess d/t cognitive status. Observed RUE grossly 3/5. Observed LUE shldr FE 1/5, bicep/tricep 1/5,  2-/5.   -CL        Bed Mobility, Assessment/Treatment    Bed Mobility, Comment Deferred mobility until PM w/ PT once extubated.   -CL        Therapy Exercises    Bilateral Upper Extremity AAROM:;PROM:;10 reps;elbow flexion/extension;hand pumps;pronation/supination;shoulder abduction/adduction;shoulder extension/flexion;shoulder ER/IR   wrist F/E  -CL        Sensory Assessment/Intervention    Light Touch --   Unable to formally assess d/t cognitive status.   -CL        Positioning and Restraints    Pre-Treatment Position in bed  -CL        Post Treatment Position bed  -CL        In Bed notified nsg;fowlers;call light within reach;encouraged to call for assist;with nsg;side rails up x3;L waffle boot;with other staff  -CL          User Key  (r) = Recorded By, (t) = Taken By, (c) = Cosigned By    Initials Name Effective Dates    CL Jannet Salguero OT 06/08/16 -     TRACI Javier, RN 12/13/17 -     EMMA Aviles, PT 09/25/17 -            Occupational Therapy Education     Title: PT OT SLP Therapies (Active)     Topic: Occupational Therapy (Active)     Point: ADL training (Active)    Description: Instruct learner(s) on proper safety adaptation and remediation techniques during self care or transfers.   Instruct in proper use of assistive devices.    Learning Progress Summary    Learner Readiness Method Response Comment Documented by Status   Patient Acceptance E,D  NR Pt educated on appropriate safety precautions and role of OT.  02/11/18 0810 Active               Point: Precautions (Active)    Description: Instruct learner(s) on prescribed precautions during self-care and functional transfers.    Learning Progress Summary    Learner Readiness Method Response Comment Documented by Status   Patient Acceptance E,D NR Pt educated on appropriate safety precautions and role of OT.  02/11/18 0810 Active                      User Key     Initials Effective Dates Name Provider Type Discipline     06/08/16 -  Jannet Salguero, OT Occupational Therapist OT                  OT Recommendation and Plan  Anticipated Equipment Needs At Discharge:  (TBA further)  Anticipated Discharge Disposition: inpatient rehabilitation facility  Therapy Frequency: daily  Plan of Care Review  Plan Of Care Reviewed With: patient  Progress: progress towards functional goals is fair  Outcome Summary/Follow up Plan: OT completed a brief chart review relating to presenting physical/cognitive deficits. Pt session limited d/t poor command following and attention to task. Recommend cont skilled IPOT intervention. Recommend pt DC to IP rehab.          OT Goals       02/11/18 0813          Transfer Training OT LTG    Transfer Training OT LTG, Date Established 02/11/18  -CL      Transfer Training OT LTG, Time to Achieve by discharge  -CL      Transfer Training OT LTG, Activity Type sit to stand/stand to sit;bed to chair /chair to bed;toilet  -CL      Transfer Training OT LTG, [place] Level maximum assist (25% patient effort);2 person assist required  -CL      Transfer Training OT LTG, Additional Goal AAD  -CL      Static Sitting Balance OT LTG    Static Sitting Balance OT LTG, Date Established 02/11/18  -CL      Static Sitting Balance OT LTG, Time to Achieve by discharge  -CL      Static Sitting Balance OT LTG, [place] Level minimum assist (75% patient effort)  -CL      Static Sitting Balance OT LTG,  Additional Goal AAD  -CL      Follow Directions OT LTG    Follow Directions OT LTG, Date Established 02/11/18  -CL      Follow Directions OT LTG, Time to Achieve by discharge  -CL      Follow Directions OT LTG, Activity Type 1-Step;75% treatment session  -CL      Follow Directions OT LTG, [place] Level with verbal cues;demonstration  -CL      Grooming OT LTG    Grooming Goal OT LTG, Date Established 02/11/18  -CL      Grooming Goal OT LTG, Time to Achieve by discharge  -CL      Grooming Goal OT LTG, Activity Type Pt will wash R/L side of face.   -CL      Grooming Goal OT LTG, [place] Level moderate assist (50% patient effort)  -CL      Grooming Goal OT LTG, Position sitting, edge of bed  -CL      Grooming Goal OT LTG, Additional Goal AAD  -CL        User Key  (r) = Recorded By, (t) = Taken By, (c) = Cosigned By    Initials Name Provider Type    CL Jannet Salguero OT Occupational Therapist                Outcome Measures       02/11/18 0740          How much help from another is currently needed...    Putting on and taking off regular lower body clothing? 1  -CL      Bathing (including washing, rinsing, and drying) 1  -CL      Toileting (which includes using toilet bed pan or urinal) 1  -CL      Putting on and taking off regular upper body clothing 1  -CL      Taking care of personal grooming (such as brushing teeth) 2  -CL      Eating meals 1  -CL      Score 7  -CL      Modified Florence Scale    Modified Rosey Scale 5 - Severe disability.  Bedridden, incontinent, and requiring constant nursing care and attention.  -CL      Functional Assessment    Outcome Measure Options AM-PAC 6 Clicks Daily Activity (OT);Modified [place]  -CL        User Key  (r) = Recorded By, (t) = Taken By, (c) = Cosigned By    Initials Name Provider Type    CL Jannet Salguero OT Occupational Therapist          Time Calculation:   OT Start Time: 0740    Therapy Charges for Today     Code Description Service Date Service Provider Modifiers  Qty    00205906067 HC OT EVAL LOW COMPLEXITY 3 2/11/2018 Jannet Salguero, OT GO 1               Jannet Salguero OT  2/11/2018

## 2018-02-11 NOTE — PLAN OF CARE
Problem: Patient Care Overview (Adult)  Goal: Plan of Care Review  Outcome: Ongoing (interventions implemented as appropriate)   02/11/18 0813   Coping/Psychosocial Response Interventions   Plan Of Care Reviewed With patient   Patient Care Overview   Progress progress towards functional goals is fair   Outcome Evaluation   Outcome Summary/Follow up Plan OT completed a brief chart review relating to presenting physical/cognitive deficits. Pt session limited d/t poor command following and attention to task. Recommend cont skilled IPOT intervention. Recommend pt DC to IP rehab.       Problem: Inpatient Occupational Therapy  Goal: Static Sitting Balance Goal LTG- OT  Outcome: Ongoing (interventions implemented as appropriate)   02/11/18 0813   Static Sitting Balance OT LTG   Static Sitting Balance OT LTG, Date Established 02/11/18   Static Sitting Balance OT LTG, Time to Achieve by discharge   Static Sitting Balance OT LTG, [place] Level minimum assist (75% patient effort)   Static Sitting Balance OT LTG, Additional Goal AAD     Goal: Follow Directions Goal LTG- OT  Outcome: Ongoing (interventions implemented as appropriate)   02/11/18 0813   Follow Directions OT LTG   Follow Directions OT LTG, Date Established 02/11/18   Follow Directions OT LTG, Time to Achieve by discharge   Follow Directions OT LTG, Activity Type 1-Step;75% treatment session   Follow Directions OT LTG, [place] Level with verbal cues;demonstration     Goal: Grooming Goal LTG- OT  Outcome: Ongoing (interventions implemented as appropriate)   02/11/18 0813   Grooming OT LTG   Grooming Goal OT LTG, Date Established 02/11/18   Grooming Goal OT LTG, Time to Achieve by discharge   Grooming Goal OT LTG, Activity Type Pt will wash R/L side of face.    Grooming Goal OT LTG, [place] Level moderate assist (50% patient effort)   Grooming Goal OT LTG, Position sitting, edge of bed   Grooming Goal OT LTG, Additional Goal AAD     Goal: Transfer  Training Goal 1 LTG- OT  Outcome: Ongoing (interventions implemented as appropriate)   02/11/18 0813   Transfer Training OT LTG   Transfer Training OT LTG, Date Established 02/11/18   Transfer Training OT LTG, Time to Achieve by discharge   Transfer Training OT LTG, Activity Type sit to stand/stand to sit;bed to chair /chair to bed;toilet   Transfer Training OT LTG, [place] Level maximum assist (25% patient effort);2 person assist required   Transfer Training OT LTG, Additional Goal AAD

## 2018-02-12 NOTE — PROGRESS NOTES
FOLLOW UP ENCOUNTER          WORKING DIAGNOSIS:   POD#3: Craniotomy and removal of the right frontal spontaneous intracerebral hemorrhage                          MEDICAL HISTORY SINCE LAST ENCOUNTER :       Patient has been extubated and clearly has improved neurologically.  She is alert.  Nevertheless appears to be a phasic which is consistent with the fact that she is left-handed sustaining a right frontal and deep ICH which would be anticipated to be in the anterior speech center unfortunately.    She also has a left hemiplegia.  Slight movement has been noted in her lower extremity.        Na = 141.                               ASSESSMENT/DISPOSITION :      1.  SLP to evaluate regarding by mouth intake and speech therapy.     2.  Continue ICU monitoring for the next 24-48 hours.  She will require extensive rehabilitation

## 2018-02-12 NOTE — PROGRESS NOTES
Adult Nutrition  Assessment/PES    Patient Name:  Preeti Manning  YOB: 1940  MRN: [id number]  Admit Date:  2/9/2018    Assessment Date:  2/12/2018    Comments:  Pt failed FEES evaluation ; consider EN support of Impact Peptide to goal rate of 55 ml/hr and daily goal volume of 1100ml.  Free water flushes w/ medication administration. This will provide 1650 kcal, 103 gm Pro and 847 ml H20 , additional free water prn once feeding at goal rate and Na+ level is appropriate.          Reason for Assessment       02/12/18 1501    Reason for Assessment    Reason For Assessment/Visit follow up protocol;multidisciplinary rounds;TF/PN    Identified At Risk By Screening Criteria tube feeding or parenteral nutrition    Time Spent (min) 45    Diagnosis --   per notesof this adm    Cardiac Dyslipidemia;HTN    Neurological Dysphagia;Craniotomy;ICH;Hemiparesis    Pulmonary/Critical Care Extubated;COPD              Nutrition/Diet History       02/12/18 1503    Nutrition/Diet History    Reported/Observed By RN    Other Pt failed FEES eval, extubated yesterday; waiting on neurosurgery to round ,RN reports pt w/ NG placed -needs advancement  feeding per KUB              Labs/Tests/Procedures/Meds       02/12/18 1506    Labs/Tests/Procedures/Meds    Labs/Tests Review Reviewed    Medication Review Reviewed, pertinent   propofol dc'd ; D5w NS w/ 20 KCL at 75 ml     Results from last 7 days  Lab Units 02/12/18  0441  02/09/18  1418   SODIUM mmol/L 141  < > 140   POTASSIUM mmol/L 3.9  < > 3.7   CHLORIDE mmol/L 111*  < > 106   CO2 mmol/L 26.0  < > 30.0   BUN mg/dL 17  < > 10   CREATININE mg/dL 0.50*  < > 0.60   CALCIUM mg/dL 7.9*  < > 8.4*   BILIRUBIN mg/dL  --   --  0.5   ALK PHOS U/L  --   --  62   ALT (SGPT) U/L  --   --  21   AST (SGOT) U/L  --   --  25   GLUCOSE mg/dL 136*  < > 120*   < > = values in this interval not displayed.             Physical Findings       02/12/18 1509    Physical Findings/Assessment    Additional  Documentation Physical Appearance (Group)    Physical Appearance    Skin surgical wound            Estimated/Assessed Needs       02/12/18 1510    Estimated/Assessed Protein Needs    Weight Used for Protein Calculation 57.2 kg (126 lb 1.7 oz)    Protein (gm/kg) 2.0    2.0 Gm Protein (gm) 114.4    Estimated Protein Range  gm pro /d  (1.5-2.0 s/p crani)            Nutrition Prescription Ordered       02/12/18 1511    Nutrition Prescription PO    Current PO Diet NPO    Nutrition Prescription EN    Enteral Route NG              Problem/Interventions:        Problem 1       02/12/18 1511    Nutrition Diagnoses Problem 1    Problem 1 Inadequate Intake/Infusion    Inadequate Intake Type Oral    Etiology (related to) Functional Diagnosis    Functional Diagnosis Dysphagia    Signs/Symptoms (evidenced by) NPO;SLP/Swallow eval    Swallow eval status Done    Type of SLP Evaluation --   FEES                    Intervention Goal       02/12/18 1512    Intervention Goal    General Nutrition support treatment    TF/PN Inititiate TF/PN            Nutrition Intervention       02/12/18 1512    Nutrition Intervention    RD/Tech Action Recommend/ordered;Follow Tx progress;Care plan reviewd    Recommended/Ordered EN            Nutrition Prescription       02/12/18 1512    Nutrition Prescription EN    Enteral Prescription Enteral begin/change    Enteral Route NG    Product Impact Peptide 1.5 fidelia    TF Delivery Method Continuous    Continuous TF Goal Rate (mL/hr) 55 mL/hr    Continuous TF Starting Rate (mL/hr) 25 mL/hr    Continuous TF Goal Volume (mL) 1100 mL    Continuous TF Starting Volume (mL) 600 mL    Water flush (mL)  --   w/ medication administration    New EN Prescription Ordered? No, recommended    EN to Supply    Kcal/Day 1650 Kcal/Day    Protein (gm/day) 103 gm/day    Meet Estimated Kcal Need (%) 100 %    Meet Estimated Protein Need (%) 100 %    Total Free H2O (mL/day) 847 mL/day    Fiber Per Day (gm/day) 0 gm/day             Education/Evaluation       02/12/18 1515    Monitor/Evaluation    Monitor Per protocol;I&O;Pertinent labs;TF delivery/tolerance;Skin status;Symptoms;Weight        Electronically signed by:  Amy Agustin RD  02/12/18 3:15 PM

## 2018-02-12 NOTE — PROGRESS NOTES
INTENSIVIST   PROGRESS NOTE     Hospital:  LOS: 3 days   Subjective   Ms. Preeti Manning, 77 y.o. 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     S     Interval History:  No complaints.  Uneventful night.     The patient's relevant past medical, surgical and social history were reviewed and updated in Epic as appropriate.      ROS:   ROS cannot be reliably obtained from the patient due to her aphasia.    Objective   O     Vitals:  Temp: 100.1 °F (37.8 °C) (02/12/18 0415) Temp  Min: 99.7 °F (37.6 °C)  Max: 100.4 °F (38 °C)   BP: 137/67 (02/12/18 1645) BP  Min: 118/70  Max: 151/83   Pulse: 115 (02/12/18 1645) Pulse  Min: 72  Max: 126   Resp: 20 (02/12/18 1619) Resp  Min: 18  Max: 25   SpO2: 93 % (02/12/18 1645) SpO2  Min: 89 %  Max: 100 %   Device: nasal cannula with humidification (02/12/18 1619)    Flow Rate: 3 (02/12/18 1619) Flow (L/min)  Min: 2  Max: 3     Intake/Ouptut 24 hrs (7:00AM - 6:59 AM)  Intake & Output (last 3 days)       02/09 0701 - 02/10 0700 02/10 0701 - 02/11 0700 02/11 0701 - 02/12 0700 02/12 0701 - 02/13 0700    I.V. (mL/kg) 3601.3 (63) 2705.2 (47.3) 1436.3 (25.1) 123 (2.2)    IV Piggyback 500       Irrigation 300       Total Intake(mL/kg) 4401.3 (77) 2705.2 (47.3) 1436.3 (25.1) 123 (2.2)    Urine (mL/kg/hr) 3700 1780 (1.3) 2070 (1.5) 1925 (3.4)    Blood 200       Total Output 3900 1780 2070 1925    Net +501.3 +925.2 -633.7 -1802                  Medications (drips):    dexmedetomidine Last Rate: Stopped (02/11/18 2030)   dexmedetomidine (PRECEDEX) IV infusion    dextrose 5 % and sodium chloride 0.9 % with KCl 20 mEq Last Rate: 75  mL/hr (02/12/18 1043)   niCARdipine Last Rate: 5 mg/hr (02/12/18 1513)   phenylephrine Last Rate: Stopped (02/12/18 0330)     Physical Examination  Telemetry:  Sinus Rhythm: sinus tachycardia (02/12/18 1600)         Constitutional:  No acute distress.   Cardiovascular: Normal rate, regular and rhythm. Normal heart sounds.  No murmurs, gallop or rub.   Respiratory: No respiratory distress. Normal respiratory effort.  Normal breath sounds  Clear to auscultation and percussion.    Abdominal:  Soft. No masses. Non-tender. No distension. No HSM.   Extremities: No digital cyanosis. No clubbing.  No peripheral edema.   Neurological:   Awake  Aphasic  Left hemiplegia  Best Eye Response: 4-->(E4) spontaneous (02/12/18 1600)  Best Motor Response: 6-->(M6) obeys commands (02/12/18 1600)  Best Verbal Response: 4-->(V4) confused (02/12/18 1600)  [place] Coma Scale Score: 14 (02/12/18 1600)   Lines/Drains/Airways: Peripheral IV(s)  MARIANA Benz         Hematology:    Results from last 7 days  Lab Units 02/12/18  0441 02/10/18  0440 02/09/18  1418   WBC 10*3/mm3 12.90* 16.21* 8.86   HEMOGLOBIN g/dL 9.1* 11.5 11.3*   MCV fL 93.8 93.5 94.6   PLATELETS 10*3/mm3 235 289 260     Electrolytes, Magnesium and Phosphorus:    Results from last 7 days  Lab Units 02/12/18 0441 02/11/18  0440 02/10/18  1526   SODIUM mmol/L 141 141 139   POTASSIUM mmol/L 3.9 4.1 4.1   CO2 mmol/L 26.0 26.0 27.0   MAGNESIUM mg/dL 2.0  --   --      Renal:    Results from last 7 days  Lab Units 02/12/18  0441 02/11/18  0440 02/10/18  1526   CREATININE mg/dL 0.50* 0.60 0.70   BUN mg/dL 17 22 15     Estimated Creatinine Clearance: 53.2 mL/min (by C-G formula based on Cr of 0.5).      Images:    Ct Head Without Contrast    Result Date: 2/11/2018  Evacuation identified of the large right parietal hemorrhage. There is precarinal hemorrhage again present with pneumocephalus identified. No change seen in the midline shift or surrounding mass effect or edema.  DICTATED:      02/11/2018 EDITED    :     02/11/2018   This report was finalized on 2/11/2018 2:00 PM by Dr. Nisha Nunez MD.      Xr Chest 1 View    Result Date: 2/12/2018  Removal of lines and tubes with underlying chronic and emphysematous changes seen diffusely throughout the lung fields. The heart is within normal limits. Degenerative changes seen within the spine.  D:  02/12/2018 E:  02/12/2018  This report was finalized on 2/12/2018 1:29 PM by Dr. Nisha Nunez MD.      Xr Chest 1 View    Result Date: 2/11/2018  Chronic changes seen within the lung fields bilaterally. No evidence of acute parenchymal disease.  DICTATED:     02/11/2018 EDITED    :     02/11/2018  This report was finalized on 2/11/2018 2:01 PM by Dr. Nisha Nunez MD.      Xr Abdomen Kub    Result Date: 2/12/2018  Nasogastric tube terminates near the GE junction with side-port in the distal esophagus more proximal. Recommend advancement.  D:  02/12/2018 E:  02/12/2018  This report was finalized on 2/12/2018 1:30 PM by Dr. Roberth Hebert.      Results: Reviewed.  I reviewed the patient's new laboratory and imaging results.  I independently reviewed the patient's new images.    Medications: Reviewed.    Assessment/Plan   A / P     77 y.o.female, admitted on 2/9/2018 with Stroke [I63.9]:     1. 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 S/P Crani and excision of intraparenchymal hemorrhage. 2/9/2018  2. Aphasia  3. Dexamethasone  2. COPD  1. Extubated 2/11/2018  2. Tobacco use  3. CAD  4. Heart Failure  1. No ECHO this admission  5. Dyslipidemia  6. Anemia, NC  7. Anxiety, on chronic BZD  8. Chronic Pain syndrome  9. Antibiotics:  10. Glucose: No h/o DM    Results from last 7 days  Lab Units 02/10/18  0018   GLUCOSE mg/dL 128       Lab Results  Lab Value Date/Time   HGBA1C 5.80 (H) 02/10/2018 0440   HGBA1C 5.10 05/12/2017 0433        Nutrition Support: No active supplement orders     Diet:  NPO Diet    Advance Directives: Full Code       Assessment / Plan:    1. Start Enteral Nutrition  2. Eventually Rehab Unit  3. BP control, Goal -140  4. Disposition: Keep in ICU.    Plan of care and goals reviewed during interdisciplinary rounds.  I discussed the patient's findings and my recommendations with patient and nursing staff    Time: was greater than 32 minutes.    (This excludes time spent performing separately reportable procedures and services).  Patient was at high risk of imminent or life-threatening deterioration in her condition due to CNS dysfunction.     Sherwin Brady MD, FACP, FCCP, CNSC  Intensive Care Medicine, Nutrition Support and Pulmonary Medicine

## 2018-02-12 NOTE — PROGRESS NOTES
Discharge Planning Assessment  [facility]     Patient Name: Preeti Manning  MRN: [id number]  Today's Date: 2/12/2018    Admit Date: 2/9/2018          Discharge Needs Assessment       02/12/18 0819    Living Environment    Lives With child(bree), adult    Living Arrangements apartment    Home Accessibility no concerns;bed and bath on same level    Stair Railings at Home none    Type of Financial/Environmental Concern --   Pt has [employer] insurance to cover her medication.    Transportation Available car;family or friend will provide    Living Environment Comment --   Pt lives with Adult children in apartment in [place].    Living Environment    Provides Primary Care For no one    Primary Care Provided By child(bree) (specify)    Quality Of Family Relationships supportive    Able to Return to Prior Living Arrangements yes    Discharge Needs Assessment    Concerns To Be Addressed adjustment to diagnosis/illness concerns;basic needs concerns    Readmission Within The Last 30 Days no previous admission in last 30 days    Equipment Currently Used at Home oxygen   Pt wears 02 at all times, family is not sure the name of the provider    Discharge Disposition still a patient    Discharge Contact Information if Applicable [facility]  [phone]            Discharge Plan       02/12/18 0823    Case Management/Social Work Plan    Plan SNF [facility] or [facility]    Patient/Family In Agreement With Plan yes    Additional Comments Met with patient and friend at bedside, pt lives with adult children in apartment in [place]. Pt was independent with ADL's and mobility prior to this admission. Pt has not had HH.  Pt wears 02 at all times. Dr Riley states pt is going to need rehab, family was interested [facility]  or [facility] I will make referrals to both facilities.        Discharge Placement     No information found                Demographic Summary       02/12/18 0818    Referral Information    Admission Type inpatient    Arrived  From admitted as an inpatient    Referral Source admission list    Reason For Consult discharge planning    Record Reviewed clinical discipline documentation;history and physical    Contact Information    Permission Granted to Share Information With     Primary Care Physician Information    Name Carmela Pollock            Functional Status       02/12/18 0818    Functional Status Prior    Ambulation 0-->independent    Transferring 0-->independent    Toileting 0-->independent    Bathing 0-->independent    Dressing 0-->independent    Eating 0-->independent    Communication 0-->understands/communicates without difficulty    Swallowing 0-->swallows foods/liquids without difficulty    Prior Functional Level Comment independent    IADL    Medications independent    Meal Preparation independent    Housekeeping independent    Laundry independent    Shopping independent    Oral Care independent    IADL Comments independent    Activity Tolerance    Usual Activity Tolerance good            Psychosocial     None            Abuse/Neglect     None            Legal     None            Substance Abuse     None            Patient Forms     None          Naida Easley RN

## 2018-02-12 NOTE — PLAN OF CARE
Problem: Patient Care Overview (Adult)  Goal: Plan of Care Review  Outcome: Ongoing (interventions implemented as appropriate)      Problem: Skin Integrity Impairment, Risk/Actual (Adult)  Goal: Skin Integrity/Wound Healing  Outcome: Ongoing (interventions implemented as appropriate)      Problem: Craniotomy/Craniectomy/Cranioplasty (Adult)  Goal: Signs and Symptoms of Listed Potential Problems Will be Absent or Manageable (Craniotomy/Craniectomy/Cranioplasty)  Outcome: Outcome(s) achieved Date Met: 02/12/18      Problem: Mechanical Ventilation, Invasive (Adult)  Goal: Signs and Symptoms of Listed Potential Problems Will be Absent or Manageable (Mechanical Ventilation, Invasive)  Outcome: Outcome(s) achieved Date Met: 02/12/18

## 2018-02-12 NOTE — PLAN OF CARE
"Problem: Patient Care Overview (Adult)  Goal: Plan of Care Review  Outcome: Ongoing (interventions implemented as appropriate)   02/12/18 1040   Coping/Psychosocial Response Interventions   Plan Of Care Reviewed With patient;family   Patient Care Overview   Progress improving  (BS re-eval, S/L tx)   Outcome Evaluation   Outcome Summary/Follow up Plan Repeat clinical dysphagia eval complete. Pt alert and cooperative, following 1-step oral commands today. L droop and decr'd oral ROM. Trials given of thins via ice and tsp, nectar-thick via tsp, and pureed. Overt s/s of aspriation c/b coughing w/ thins and nectar-thick and multiple swallows w/ pureed. Pt grimacing w/ PO and noted w/ incr'd WOB w/ trials. Not safe for any PO at this time, will give another day prior to completing FEES. RECS: NPO, meds alt route, FEES tomorrow.     S/L eval completed yesterday. Saw for S/L tx today. Pt slightly improved, following 1 step commands today and answering simple Y/N questions inconsistently. Able to count to two w/ max cues and model, although paraphasias noted. Pt spontaneously producing some speech today, including \"yeah\" and attempted \"I love you\" when speaking to family member. Cont S/L tx.        Problem: Inpatient SLP  Goal: Dysphagia- Patient will improve swallowing skills to begin to take some PO safely  Outcome: Ongoing (interventions implemented as appropriate)   02/11/18 1511 02/12/18 1040   Begin to Take Some PO Safely   Begin to Take Some PO Safely- SLP, Date Established 02/11/18 --    Begin to Take Some PO Safely- SLP, Time to Achieve by discharge --    Begin to Take Some PO Safely- SLP, Date Goal Reviewed --  02/12/18   Begin to Take Some PO Safely- SLP, Outcome --  goal ongoing     Goal: Expressive-Patient will improve expressive language skills to allow optimal participation in care  Outcome: Ongoing (interventions implemented as appropriate)   02/11/18 1511 02/12/18 1040   Expressive- Optimal Participation in " Care   Expressive Optimal Participation in Care- SLP, Date Established 02/11/18 --    Expressive Optimal Participation in Care- SLP, Time to Achieve by discharge --    Expressive Optimal Participation in Care- SLP, Date Goal Reviewed --  02/12/18   Expressive Optimal Participation in Care- SLP, Outcome --  goal ongoing     Goal: Receptive Language-Patient will improve receptive language skills to allow optimal participation in care  Outcome: Ongoing (interventions implemented as appropriate)   02/11/18 1511 02/12/18 1040   Receptive Language- Optimal Participation in Care   Receptive Language Optimal Participation in Care- SLP, Date Established 02/11/18 --    Receptive Language Optimal Participation in Care- SLP, Time to Achieve by discharge --    Receptive Language Optimal Participation in Care- SLP, Date Goal Reviewed --  02/12/18   Receptive Language Optimal Participation in Care- SLP, Outcome --  goal ongoing

## 2018-02-12 NOTE — CONSULTS
Patient does not meet diabetes education order criteria of educate if A1C >7.5% and hers was 5.8% , therefore patient was not seen for diabetes education at this time.  Please re consult as needed.

## 2018-02-12 NOTE — THERAPY RE-EVALUATION
Acute Care - Speech Language Pathology Treatment Note  [facility]     Patient Name: Preeti Manning  : 1940  MRN: [id number]  Today's Date: 2018         Admit Date: 2018    Visit Dx:      ICD-10-CM ICD-9-CM   1. Intracranial hemorrhage I62.9 432.9   2. Impaired mobility and ADLs Z74.09 799.89   3. Impaired functional mobility, balance, gait, and endurance Z74.09 V49.89   4. 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   • Anxiety - chronic benzodiazepines   • Chronic pain syndrome - chronic narcotic pain medications              Adult Rehabilitation Note       18 0900          Rehab Assessment/Intervention    Discipline speech language pathologist  -RD      Document Type re-evaluation;therapy note (daily note)  -RD      Recorded by [RD] Bessie aBrrera MS CCC-SLP      Improve ability to follow directions    Improve ability to follow directions: one-step directions with objects;80%;with consistent cues  -RD      Status: Improve ability to follow directions Progressing as expected  -RD      Ability to Follow Directions Progress 0%;with consistent cues;following model;continue to address  -RD      Comments: Improve ability to follow directions Pt unable to demonstrate w/ max cues and [place] for use of spoon or cup.   -RD      Recorded by [RD] Bessie Barrera MS CCC-SLP      Improve ability to comprehend questions    Improve ability to comprehend questions: simple yes/no questions;80%;with consistent cues  -RD      Status: Improve ability to comprehend questions Progressing as expected  -RD      Ability to Comprehend Questions Progress 30%;with consistent cues;continue to address  -RD       "Comments: Improve ability to comprehend questions Y/N ?'s regarding basic orientation and personal questions  -RD      Recorded by [RD] Bessie Barrera MS CCC-SLP      Improve word retrieval skills    Improve word retrieval skills by: completing automatic speech tasks counting;60%;with consistent cues  -RD      Status: Improve word retrieval skills Progressing as expected  -RD      Word Retrieval Skills Progress 20%;with consistent cues;following model;continue to address  -RD      Comments: Improve word retrieval skills Able to count to two w/ max cues and model, paraphasias noted. Pt spontaneously producing some speech including \"yeah\" and attempted \"I love you\" when speaking to family member.   -RD      Recorded by [RD] Bessie Barrera MS CCC-SLP      Improve attention    Improve attention by: looking at speaker;attending to task;80%;without cues  -RD      Status: Improve attention Progressing as expected  -RD      Attention Progress 50%;with inconsistent cues;continue to address  -RD      Recorded by [RD] Bessie Barrera MS CCC-SLP        User Key  (r) = Recorded By, (t) = Taken By, (c) = Cosigned By    Initials Name Effective Dates    RD Bessie Barrera, MS CCC-SLP 09/27/17 -               IP SLP Goals       02/12/18 1040 02/11/18 1511       Begin to Take Some PO Safely    Begin to Take Some PO Safely- SLP, Date Established  02/11/18  -LS     Begin to Take Some PO Safely- SLP, Time to Achieve  by discharge  -LS     Begin to Take Some PO Safely- SLP, Date Goal Reviewed 02/12/18  -RD      Begin to Take Some PO Safely- SLP, Outcome goal ongoing  -RD goal ongoing  -LS     Receptive Language- Optimal Participation in Care    Receptive Language Optimal Participation in Care- SLP, Date Established  02/11/18  -LS     Receptive Language Optimal Participation in Care- SLP, Time to Achieve  by discharge  -LS     Receptive Language Optimal Participation in Care- SLP, Date Goal Reviewed 02/12/18  -RD      " Receptive Language Optimal Participation in Care- SLP, Outcome goal ongoing  -RD goal ongoing  -LS     Expressive- Optimal Participation in Care    Expressive Optimal Participation in Care- SLP, Date Established  02/11/18  -LS     Expressive Optimal Participation in Care- SLP, Time to Achieve  by discharge  -LS     Expressive Optimal Participation in Care- SLP, Date Goal Reviewed 02/12/18  -RD      Expressive Optimal Participation in Care- SLP, Outcome goal ongoing  -RD goal ongoing  -LS       User Key  (r) = Recorded By, (t) = Taken By, (c) = Cosigned By    Initials Name Provider Type    LS Monique Vivas, MS CCC-SLP Speech and Language Pathologist    RD Bessie Barrera, MS CCC-SLP Speech and Language Pathologist          EDUCATION  The patient has been educated in the following areas:   Cognitive Impairment Communication Impairment.    SLP Recommendation and Plan                               Plan of Care Review  Plan Of Care Reviewed With: patient, family  Progress: improving (BS re-eval, S/L tx)  Outcome Summary/Follow up Plan: Repeat clinical dysphagia eval complete. Pt alert and cooperative, following 1-step oral commands today. L droop and decr'd oral ROM. Trials given of thins via ice and tsp, nectar-thick via tsp, and pureed. Overt s/s of aspriation c/b coughing w/ thins and nectar-thick and multiple swallows w/ pureed. Pt grimacing w/ PO and noted w/ incr'd WOB w/ trials. Not safe for any PO at this time, will give another day prior to completing FEES. RECS: NPO, meds alt route, FEES tomorrow.  S/L eval completed yesterday. Saw for S/L tx today. Pt slightly improved, following 1 step commands today and answering simple Y/N questions inconsistently. Cont S/L tx.           Time Calculation:         Time Calculation- SLP       02/12/18 1051          Time Calculation- SLP    SLP Start Time 0900  -RD      SLP Received On 02/12/18  -RD        User Key  (r) = Recorded By, (t) = Taken By, (c) = Cosigned By     Initials Name Provider Type    RD Bessie Barrera MS CCC-SLP Speech and Language Pathologist          Therapy Charges for Today     Code Description Service Date Service Provider Modifiers Qty    87915039508 HC ST EVAL ORAL PHARYNG SWALLOW 4 2018 Bessie Barrera, MS CCC-SLP GN 1    02599871929 HC ST TREATMENT SPEECH 3 2018 Bessie Barrera, MS CCC-SLP GN 1               Bessie Barrera, MS CCC-SLP  2018 and Acute Care - Speech Language Pathology   Swallow Re-Evaluation [facility]   Clinical Swallow Evaluation     Patient Name: Preeti Manning  : 1940  MRN: [id number]  Today's Date: 2018  Onset of Illness/Injury or Date of Surgery Date: 18            Admit Date: 2018    Visit Dx:     ICD-10-CM ICD-9-CM   1. Intracranial hemorrhage I62.9 432.9   2. Impaired mobility and ADLs Z74.09 799.89   3. Impaired functional mobility, balance, gait, and endurance Z74.09 V49.89   4. 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   • Anxiety - chronic benzodiazepines   • Chronic pain syndrome - chronic narcotic pain medications     Past Medical History:   Diagnosis Date   • Anxiety    • Arthritis    • CHF (congestive heart failure)    • COPD (chronic obstructive pulmonary disease)    • Coronary artery disease    • Depression    • Fibromyalgia      Past Surgical History:   Procedure Laterality Date   • CORONARY ANGIOPLASTY WITH STENT PLACEMENT     • CRANIOTOMY FOR ANEURYSM/ARTERIOVENOUS MALFORMATION N/A 2018    Procedure: CRANIOTOMY FOR INTRACRANIAL HEMORRHAGE;  Surgeon: Stef Riley MD;  Location: [facility];  Service:    • FEMUR OPEN REDUCTION INTERNAL FIXATION Right 5/15/2017     Procedure: DISTAL FEMUR OPEN REDUCTION INTERNAL FIXATION RIGHT;  Surgeon: Pierre Velarde MD;  Location: [facility];  Service:    • TOTAL HIP ARTHROPLASTY Right 2017          SWALLOW EVALUATION (last 72 hours)      Swallow Evaluation       02/12/18 0900 02/11/18 1420             Rehab Evaluation    Subjective Information no complaints;agree to therapy  -RD        Patient Effort, Rehab Treatment good  -RD        General Information    Patient Profile Review yes  -RD        Onset of Illness/Injury 02/09/18  -RD        Subjective Patient Observations alert and cooperative  -RD        Pertinent History Of Current Problem Adm w/ ICH R frontal lobe s/p R frontal craniotomy. Pt is L handed. Per stroke protocol.   -RD        Current Diet Limitations NPO  -RD        Precautions/Limitations, Hearing WFL;other (see comments)   for purposes of eval  -RD        Prior Level of Function- Communication functional in all spheres  -RD        Prior Level of Function- Swallowing no diet consistency restrictions  -RD        Plans/Goals Discussed With patient;family;agreed upon  -RD        Barriers to Rehab medically complex  -RD        Clinical Impression    Patient's Goals For Discharge patient did not state  -RD        Family Goals For Discharge patient able to return to all previous activities/roles  -RD        SLP Swallowing Diagnosis other (see comments)   r/o pharyngeal dysphagia  -RD other (see comments)   r/o pharyngeal dysphagia  -LS       Rehab Potential/Prognosis, Swallowing good, to achieve stated therapy goals  -RD        Criteria for Skilled Therapeutic Interventions Met skilled criteria for dysphagia intervention met  -RD skilled criteria for dysphagia intervention met  -LS       FCM, Swallowing 1-->Level 1  -RD 1-->Level 1  -LS       Therapy Frequency evaluation only;PRN  -RD 5 times/wk  -LS       Predicted Duration Therapy Interv (days) until discharge  -RD        SLP Diet Recommendation NPO: unsafe for food/liquid  intake;temporary alternative means of nutrition/hydration  -RD NPO: unsafe for food/liquid intake  -LS       Recommended Diagnostics FEES;other (see comments)   tomorrow  -RD reassess via clinical swallow (non-instrumental exam)  -LS       SLP Rec. for Method of Medication Administration meds via alternate route  -RD meds via alternate route  -LS       Pain Assessment    Pain Assessment Blanchard-Javier FACES  -RD        Blanchard-Baker FACES Pain Rating 2  -RD        Oral Motor Structure and Function    Oral Motor Anatomy and Physiology patient demonstrates anatomy that is WNL  -RD        Dentition Assessment missing teeth;poor oral hygiene  -RD        Secretion Management WNL/WFL  -RD        Mucosal Quality moist, healthy  -RD        Volitional Swallow mild to moderate difficulties initiating volitional swallow  -RD        Volitional Cough weak volitional cough  -RD        Oral Musculature General Assessment oral labial impairment;lingual impairment  -RD        Oral Motor Assessment Comment  generalized weakness. Pt unable to follow single step directions for oral mech exam. Min manipulation of ice chip.  -LS       Oral Labial Strength and Mobility reduced strength bilaterally;left labial droop;reduced ROM  -RD        Lingual Strength and Mobility reduced strength bilaterally;reduced ROM  -RD        General Feeding/Swallowing Observations    Current Feeding Method NPO  -RD        Respiratory Support Currently in Use nasal cannula in use  -RD        Flow (L/min) 3  -RD        Clinical Swallow Exam    Mode of Presentation fed by clinician;spoon  -RD fed by clinician;spoon  -LS       Oral Preparation Concerns incomplete mouth closure around utensil  -RD inability to open lips/jaw adequately;incomplete mouth closure around utensil;anterior loss  -LS       Oral Phase Results impaired oral phase, signs of dysfunction present  -RD impaired oral phase, signs of dysfunction present  -LS       Pharyngeal Phase Results sign/symptoms of  pharyngeal impairment;cough;multiple swallows  -RD sign/symptoms of pharyngeal impairment;cough  -LS       Summary of Clinical Exam Repeat clinical dysphagia eval complete. Pt alert and cooperative, following 1-step oral commands today. L droop and decr'd oral ROM. Trials given of thins via ice and tsp, nectar-thick via tsp, and pureed. Overt s/s of aspriation c/b coughing w/ thins and nectar-thick and multiple swallows w/ pureed. Pt grimacing w/ PO and noted w/ incr'd WOB w/ trials. Not safe for any PO at this time, will give another day prior to completing FEES. RECS: NPO, meds alt route, FEES tomorrow.  -RD Clinical swallow eval complete. Pt with difficulty maintaining alertness. Limited oral movements observed w/ max cues. Anerior loss with tsp thins.  Pt not observed to use lips to remove pureed from spoon. Pt presents w/ s/s of asp c/b cough w/ thins. REC: NPO, meds via alt route. Re-assess at bedside on 2/12.  -LS       Swallow Recommendations    Oral Care oral care with toothbrush and dentifrice BID and PRN  -RD        Other Recommendations instrumental exam;other (comment)   FEES 2/13/18 as appropriate  -RD        Recommended Diet NPO: unsafe for food/liquid intake;temproary alternative means of nutrition/hydration  -RD          User Key  (r) = Recorded By, (t) = Taken By, (c) = Cosigned By    Initials Name Effective Dates     Monique Vivas, MS [facility]SLP 06/22/15 -     RD Bessie Barrera, MS CCC-SLP 09/27/17 -         EDUCATION  The patient has been educated in the following areas:   Dysphagia (Swallowing Impairment) Oral Care/Hydration NPO rationale.    SLP Recommendation and Plan  SLP Swallowing Diagnosis: other (see comments) (r/o pharyngeal dysphagia)  SLP Diet Recommendation: NPO: unsafe for food/liquid intake, temporary alternative means of nutrition/hydration     SLP Rec. for Method of Medication Administration: meds via alternate route     Recommended Diagnostics: FEES, other (see comments)  (tomorrow)  Criteria for Skilled Therapeutic Interventions Met: skilled criteria for dysphagia intervention met     Rehab Potential/Prognosis, Swallowing: good, to achieve stated therapy goals  Therapy Frequency: evaluation only, PRN             Plan of Care Review  Plan Of Care Reviewed With: patient, family  Progress: improving (BS re-eval, S/L tx)  Outcome Summary/Follow up Plan: Repeat clinical dysphagia eval complete. Pt alert and cooperative, following 1-step oral commands today. L droop and decr'd oral ROM. Trials given of thins via ice and tsp, nectar-thick via tsp, and pureed. Overt s/s of aspriation c/b coughing w/ thins and nectar-thick and multiple swallows w/ pureed. Pt grimacing w/ PO and noted w/ incr'd WOB w/ trials. Not safe for any PO at this time, will give another day prior to completing FEES. RECS: NPO, meds alt route, FEES tomorrow.  S/L eval completed yesterday. Saw for S/L tx today. Pt slightly improved, following 1 step commands today and answering simple Y/N questions inconsistently. Cont S/L tx.           IP SLP Goals       02/12/18 1040 02/11/18 1511       Begin to Take Some PO Safely    Begin to Take Some PO Safely- SLP, Date Established  02/11/18  -LS     Begin to Take Some PO Safely- SLP, Time to Achieve  by discharge  -LS     Begin to Take Some PO Safely- SLP, Date Goal Reviewed 02/12/18  -RD      Begin to Take Some PO Safely- SLP, Outcome goal ongoing  -RD goal ongoing  -LS     Receptive Language- Optimal Participation in Care    Receptive Language Optimal Participation in Care- SLP, Date Established  02/11/18  -LS     Receptive Language Optimal Participation in Care- SLP, Time to Achieve  by discharge  -LS     Receptive Language Optimal Participation in Care- SLP, Date Goal Reviewed 02/12/18  -RD      Receptive Language Optimal Participation in Care- SLP, Outcome goal ongoing  -RD goal ongoing  -LS     Expressive- Optimal Participation in Care    Expressive Optimal Participation in  Care- SLP, Date Established  02/11/18  -LS     Expressive Optimal Participation in Care- SLP, Time to Achieve  by discharge  -LS     Expressive Optimal Participation in Care- SLP, Date Goal Reviewed 02/12/18  -RD      Expressive Optimal Participation in Care- SLP, Outcome goal ongoing  -RD goal ongoing  -LS       User Key  (r) = Recorded By, (t) = Taken By, (c) = Cosigned By    Initials Name Provider Type    XAVI Vivas MS CCC-SLP Speech and Language Pathologist    SHYANN Barrera MS CCC-SLP Speech and Language Pathologist               Time Calculation:         Time Calculation- SLP       02/12/18 1051          Time Calculation- SLP    SLP Start Time 0900  -RD      SLP Received On 02/12/18  -RD        User Key  (r) = Recorded By, (t) = Taken By, (c) = Cosigned By    Initials Name Provider Type    SHYANN Barrera MS CCC-SLP Speech and Language Pathologist          Therapy Charges for Today     Code Description Service Date Service Provider Modifiers Qty    08497597499 HC ST EVAL ORAL PHARYNG SWALLOW 4 2/12/2018 Bessie Barrera MS CCC-SLP GN 1    [id number] HC ST TREATMENT SPEECH 3 2/12/2018 Bessie Barrera MS CCC-CAMMY GN 1               MS HENNY Mora  2/12/2018

## 2018-02-13 NOTE — PROGRESS NOTES
Continued Stay Note   [facility]     Patient Name: Preeti Manning  MRN: [id number]  Today's Date: 2/13/2018    Admit Date: 2/9/2018          Discharge Plan       02/13/18 0846    Case Management/Social Work Plan    Plan [facility] vs [facility]    Additional Comments Per Dr Riley note pt is to remaine in ICU 24- 48hrs, pt may need peg. Spoke with Christelle at [facility] she is going to follow pt for medical readiness. I will update Marion at [facility].              Discharge Codes     None        Expected Discharge Date and Time     Expected Discharge Date Expected Discharge Time    Feb 16, 2018             Naida Easley RN

## 2018-02-13 NOTE — PROGRESS NOTES
INTENSIVIST   PROGRESS NOTE     Hospital:  LOS: 4 days   Subjective   Ms. Preeti Manning, 77 y.o. 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     S     Interval History:  Very restless last night. She pulled out NG X 3.  Now sedated.  Difficult to awake her up.     The patient's relevant past medical, surgical and social history were reviewed and updated in Epic as appropriate.      ROS:   ROS cannot be reliably obtained from the patient due to her aphasia.    Objective   O     Vitals:  Temp: 97.8 °F (36.6 °C) (02/13/18 0000) Temp  Min: 97.8 °F (36.6 °C)  Max: 99.1 °F (37.3 °C)   BP: 142/82 (02/13/18 0600) BP  Min: 102/70  Max: 151/75   Pulse: 67 (02/13/18 1148) Pulse  Min: 63  Max: 137   Resp: 22 (02/13/18 1148) Resp  Min: 18  Max: 24   SpO2: 98 % (02/13/18 1148) SpO2  Min: 87 %  Max: 98 %   Device: nasal cannula with humidification (02/13/18 1148)    Flow Rate: 5 (02/13/18 1148) Flow (L/min)  Min: 3  Max: 5     Intake/Ouptut 24 hrs (7:00AM - 6:59 AM)  Intake & Output (last 3 days)       02/10 0701 - 02/11 0700 02/11 0701 - 02/12 0700 02/12 0701 - 02/13 0700 02/13 0701 - 02/14 0700    I.V. (mL/kg) 2705.2 (47.3) 1436.3 (25.1) 2036.8 (35.6)     Other   120     NG/GT   171     IV Piggyback        Irrigation        Total Intake(mL/kg) 2705.2 (47.3) 1436.3 (25.1) 2327.8 (40.7)     Urine (mL/kg/hr) 1780 (1.3) 2070 (1.5) 3300 (2.4)     Stool   0 (0)     Blood        Total Output 1780 2070 3300      Net +925.2 -633.7 -972.2              Unmeasured Stool Occurrence   1 x           Medications (drips):    dexmedetomidine Last Rate: 1 mcg/kg/hr (02/13/18 1018)    dextrose 5 % and sodium chloride 0.9 % with KCl 20 mEq Last Rate: 75 mL/hr (02/13/18 1241)   niCARdipine Last Rate: Stopped (02/12/18 2041)   phenylephrine Last Rate: Stopped (02/12/18 0330)     Physical Examination  Telemetry:  Sinus Rhythm: sinus tachycardia (02/13/18 0600)   Constitutional:  No acute distress.   Cardiovascular: Normal rate, regular and rhythm. Normal heart sounds.  No murmurs, gallop or rub.   Respiratory: No respiratory distress. Normal respiratory effort.  Normal breath sounds  Clear to auscultation and percussion.    Abdominal:  Soft. No masses. Non-tender. No distension. No HSM.   Extremities: No digital cyanosis. No clubbing.  No peripheral edema.   Neurological:   Awake  Aphasic  Left hemiplegia  Best Eye Response: 4-->(E4) spontaneous (02/13/18 0600)  Best Motor Response: 6-->(M6) obeys commands (02/13/18 0600)  Best Verbal Response: 4-->(V4) confused (02/13/18 0600)  Michelle Coma Scale Score: 14 (02/13/18 0600)   Lines/Drains/Airways: Peripheral IV(s)  MARIANA Benz         Hematology:    Results from last 7 days  Lab Units 02/13/18  0341 02/12/18  0441 02/10/18  0440   WBC 10*3/mm3 10.49 12.90* 16.21*   HEMOGLOBIN g/dL 10.1* 9.1* 11.5   MCV fL 93.3 93.8 93.5   PLATELETS 10*3/mm3 258 235 289     Electrolytes, Magnesium and Phosphorus:    Results from last 7 days  Lab Units 02/13/18 0341 02/12/18 0441 02/11/18  0440   SODIUM mmol/L 140 141 141   POTASSIUM mmol/L 3.7 3.9 4.1   CO2 mmol/L 27.0 26.0 26.0   MAGNESIUM mg/dL 2.1 2.0  --    PHOSPHORUS mg/dL 2.8  --   --      Renal:    Results from last 7 days  Lab Units 02/13/18 0341 02/12/18 0441 02/11/18 0440   CREATININE mg/dL 0.60 0.50* 0.60   BUN mg/dL 21 17 22     Estimated Creatinine Clearance: 53.2 mL/min (by C-G formula based on Cr of 0.6).      Images:    Xr Chest 1 View    Result Date: 2/13/2018  Chronic lung changes without acute cardiopulmonary process.  D:  02/13/2018 E:  02/13/2018  This report was finalized on 2/13/2018 9:46 AM  by Dr. Roberth Hebert.      Results: Reviewed.  I reviewed the patient's new laboratory and imaging results.  I independently reviewed the patient's new images.    Medications: Reviewed.    Assessment/Plan   A / P     77 y.o.female, admitted on 2/9/2018 with Stroke [I63.9]:     1. 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 S/P Crani and excision of intraparenchymal hemorrhage. 2/9/2018  2. Aphasia  3. Dexamethasone  2. COPD  1. Extubated 2/11/2018  2. Tobacco use  3. CAD  4. Heart Failure  1. No ECHO this admission  5. Dyslipidemia  6. Anemia, NC  7. Anxiety, on chronic BZD  8. Chronic Pain syndrome  9. Antibiotics:  10. Glucose: No h/o DM    Results from last 7 days  Lab Units 02/10/18  0018   GLUCOSE mg/dL 128       Lab Results  Lab Value Date/Time   HGBA1C 5.80 (H) 02/10/2018 0440   HGBA1C 5.10 05/12/2017 0433        Nutrition Support:   Active Supplement Orders      Diet, Tube Feeding Tube Feeding Formula: Impact Peptide 1.5 (Peptide Based With Arginine, Calorically Dense)   Diet:  NPO Diet   Advance Directives: Full Code       Assessment / Plan:    1. Re-try NG and Enteral Nutrition  2. She may need PEG. We will discuss with family.  3. Eventually Rehab Unit  4. BP control, Goal -140  5. Disposition: Keep in ICU.    Plan of care and goals reviewed during interdisciplinary rounds.  I discussed the patient's findings and my recommendations with patient and nursing staff      Sherwin Brady MD, FACP, FCCP, CNSC  Intensive Care Medicine, Nutrition Support and Pulmonary Medicine

## 2018-02-13 NOTE — PLAN OF CARE
Problem: Patient Care Overview (Adult)  Goal: Plan of Care Review  Outcome: Ongoing (interventions implemented as appropriate)   02/13/18 1010   Outcome Evaluation   Outcome Summary/Follow up Plan Dysphagia: Plan was for FEES today. Per RN, rough night, now very sleepy. Could not effectively rouse pt. Pulled away from ice chip stim to lip. Continue NPO with short-term alternate means of nutrition. SLP will check back tomorrow for ? FEES readiness.

## 2018-02-13 NOTE — PROGRESS NOTES
Adult Nutrition  Assessment/PES    Patient Name:  Preeti Manning  YOB: 1940  MRN: [id number]  Admit Date:  2/9/2018    Assessment Date:  2/13/2018    Comments:  Cont current EN support once Ng placement confirmed.          Reason for Assessment       02/13/18 1505    Reason for Assessment    Reason For Assessment/Visit follow up protocol;multidisciplinary rounds;TF/PN    Identified At Risk By Screening Criteria tube feeding or parenteral nutrition    Time Spent (min) 30    Diagnosis --   per notes of this adm              Nutrition/Diet History       02/13/18 1506    Nutrition/Diet History    Reported/Observed By RN;MD    Other RN reports pt was wild overnight , pulled out NG on 3 occcasions.  Replace NG but may require PEG, MD tohave this discussion w/ family.              Labs/Tests/Procedures/Meds       02/13/18 1507    Labs/Tests/Procedures/Meds    Labs/Tests Review Reviewed    Medication Review Reviewed, pertinent     Results from last 7 days  Lab Units 02/13/18  0341  02/09/18  1418   SODIUM mmol/L 140  < > 140   POTASSIUM mmol/L 3.7  < > 3.7   CHLORIDE mmol/L 109  < > 106   CO2 mmol/L 27.0  < > 30.0   BUN mg/dL 21  < > 10   CREATININE mg/dL 0.60  < > 0.60   CALCIUM mg/dL 8.5*  < > 8.4*   BILIRUBIN mg/dL  --   --  0.5   ALK PHOS U/L  --   --  62   ALT (SGPT) U/L  --   --  21   AST (SGOT) U/L  --   --  25   GLUCOSE mg/dL 152*  < > 120*   < > = values in this interval not displayed.                 Nutrition Prescription Ordered       02/13/18 1507    Nutrition Prescription PO    Current PO Diet NPO    Nutrition Prescription EN    Enteral Route NG    Product Impact Peptide 1.5 fidelia    TF Delivery Method Continuous    Continuous TF Goal Rate (mL/hr) 55 mL/hr    Continuous TF Goal Volume (mL) 1100 mL            Evaluation of Received Nutrient/Fluid Intake       02/13/18 1508    EN Evaluation    Number of Days EN Intake Evaluated 1 day    EN Average Volume Delivered (mL/day) 171 mL/day    % Goal Volume   15 %            Problem/Interventions:        Problem 1       02/13/18 1509    Nutrition Diagnoses Problem 1    Problem 1 Inadequate Intake/Infusion    Inadequate Intake Type Enteral    Etiology (related to) Factors Affecting Nutrition    Mental State/Condition Confusion   pt pulled out NG on 3 occasions    Signs/Symptoms (evidenced by) EN Intake Delivery    Percent (%) of EN goal 15 %                    Intervention Goal       02/13/18 1510    Intervention Goal    General Nutrition support treatment;Meet nutritional needs for age/condition    TF/PN Inititiate TF/PN            Nutrition Intervention       02/13/18 1511    Nutrition Intervention    RD/Tech Action Follow Tx progress;Care plan reviewd            Nutrition Prescription       02/13/18 1511    Nutrition Prescription EN    Enteral Prescription Continue same protocol    Enteral Route --   once NG replaced and placement confirmed by [facility]            Education/Evaluation       02/13/18 1511    Monitor/Evaluation    Monitor Per protocol;I&O;Pertinent labs;TF delivery/tolerance;Symptoms        Electronically signed by:  Amy Agustin RD  02/13/18 3:11 PM

## 2018-02-13 NOTE — PROGRESS NOTES
FOLLOW UP ENCOUNTER          WORKING DIAGNOSIS:   [ PPOD # 4: CRANIOTOMY FOR ICH                         MEDICAL HISTORY SINCE LAST ENCOUNTER :   She is much more alert although failure is a phasic.  She has a left hemiparesis but does have movement.  Major concern is going to be speech recovery.    She is quite agitated with minimal dose of Ritalin; we will discontinue the time being.                                     ASSESSMENT/DISPOSITION :       1.  Continued ICU monitoring for the next 24-48 hours.    2.  Consider options for nutrition area and I would hope we could initiate by mouth intake.  I do not think she will tolerate a thing the less than a PEG should that be required.

## 2018-02-13 NOTE — THERAPY RE-EVALUATION
Acute Care - Speech Language Pathology   Swallow Re-Evaluation [facility]   Clinical Swallow Re-Evaluation     Patient Name: Preeti Manning  : 1940  MRN: [id number]  Today's Date: 2018  Onset of Illness/Injury or Date of Surgery Date: 18            Admit Date: 2018    Visit Dx:     ICD-10-CM ICD-9-CM   1. Intracranial hemorrhage I62.9 432.9   2. Impaired mobility and ADLs Z74.09 799.89   3. Impaired functional mobility, balance, gait, and endurance Z74.09 V49.89   4. 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   • Anxiety - chronic benzodiazepines   • Chronic pain syndrome - chronic narcotic pain medications     Past Medical History:   Diagnosis Date   • Anxiety    • Arthritis    • CHF (congestive heart failure)    • COPD (chronic obstructive pulmonary disease)    • Coronary artery disease    • Depression    • Fibromyalgia      Past Surgical History:   Procedure Laterality Date   • CORONARY ANGIOPLASTY WITH STENT PLACEMENT     • CRANIOTOMY FOR ANEURYSM/ARTERIOVENOUS MALFORMATION N/A 2018    Procedure: CRANIOTOMY FOR INTRACRANIAL HEMORRHAGE;  Surgeon: Stef Riley MD;  Location: [facility] OR;  Service:    • FEMUR OPEN REDUCTION INTERNAL FIXATION Right 5/15/2017    Procedure: DISTAL FEMUR OPEN REDUCTION INTERNAL FIXATION RIGHT;  Surgeon: Pierre Velarde MD;  Location: [facility] OR;  Service:    • TOTAL HIP ARTHROPLASTY Right 2017          SWALLOW EVALUATION (last 72 hours)      Swallow Evaluation       18 0950 18 0900 18 1420          Rehab Evaluation    Document Type re-evaluation  -SM        Subjective Information decreased LOC  -SM no complaints;agree to therapy  -RD        Patient Effort, Rehab Treatment  good  -RD       General Information    Patient Profile Review  yes  -RD       Onset of Illness/Injury  02/09/18  -RD       Subjective Patient Observations Decreased alertness  -SM alert and cooperative  -RD       Pertinent History Of Current Problem Rough night per pt  -SM Adm w/ ICH R frontal lobe s/p R frontal craniotomy. Pt is L handed. Per stroke protocol.   -RD       Current Diet Limitations  NPO  -RD       Precautions/Limitations, Hearing  WFL;other (see comments)   for purposes of eval  -RD       Prior Level of Function- Communication  functional in all spheres  -RD       Prior Level of Function- Swallowing  no diet consistency restrictions  -RD       Plans/Goals Discussed With  patient;family;agreed upon  -RD       Barriers to Rehab  medically complex  -RD       Clinical Impression    Patient's Goals For Discharge patient could not state  - patient did not state  -RD       Family Goals For Discharge  patient able to return to all previous activities/roles  -RD       SLP Swallowing Diagnosis  other (see comments)   r/o pharyngeal dysphagia  -RD other (see comments)   r/o pharyngeal dysphagia  -LS      Rehab Potential/Prognosis, Swallowing  good, to achieve stated therapy goals  -RD       Criteria for Skilled Therapeutic Interventions Met  skilled criteria for dysphagia intervention met  -RD skilled criteria for dysphagia intervention met  -LS      FCM, Swallowing  1-->Level 1  -RD 1-->Level 1  -LS      Therapy Frequency  evaluation only;PRN  -RD 5 times/wk  -LS      Predicted Duration Therapy Interv (days)  until discharge  -RD       SLP Diet Recommendation NPO: unsafe for food/liquid intake;temporary alternative means of nutrition/hydration  -SM NPO: unsafe for food/liquid intake;temporary alternative means of nutrition/hydration  -RD NPO: unsafe for food/liquid intake  -LS      Recommended Diagnostics reassess via clinical swallow (non-instrumental exam)   will check back  tomorrow for ? FEES readiness  -SM FEES;other (see comments)   tomorrow  -RD reassess via clinical swallow (non-instrumental exam)  -LS      SLP Rec. for Method of Medication Administration meds via alternate route  -SM meds via alternate route  -RD meds via alternate route  -LS      Pain Assessment    Pain Assessment Blanchard-Javier FACES  -SM Blanchard-Baker FACES  -RD       Blanchard-Javier FACES Pain Rating 4  -SM 2  -RD       Cognitive Assessment/Intervention    Current Cognitive/Communication Assessment could not assess   decreased alertness  -SM        Oral Motor Structure and Function    Oral Motor Anatomy and Physiology  patient demonstrates anatomy that is WNL  -RD       Dentition Assessment  missing teeth;poor oral hygiene  -RD       Secretion Management  WNL/WFL  -RD       Mucosal Quality  moist, healthy  -RD       Volitional Swallow  mild to moderate difficulties initiating volitional swallow  -RD       Volitional Cough  weak volitional cough  -RD       Oral Musculature General Assessment  oral labial impairment;lingual impairment  -RD       Oral Motor Assessment Comment   generalized weakness. Pt unable to follow single step directions for oral mech exam. Min manipulation of ice chip.  -LS      Oral Labial Strength and Mobility  reduced strength bilaterally;left labial droop;reduced ROM  -RD       Lingual Strength and Mobility  reduced strength bilaterally;reduced ROM  -RD       General Feeding/Swallowing Observations    Current Feeding Method  NPO  -RD       Respiratory Support Currently in Use  nasal cannula in use  -RD       Flow (L/min)  3  -RD       Clinical Swallow Exam    Mode of Presentation  fed by clinician;spoon  -RD fed by clinician;spoon  -LS      Oral Preparation Concerns  incomplete mouth closure around utensil  -RD inability to open lips/jaw adequately;incomplete mouth closure around utensil;anterior loss  -LS      Oral Phase Results  impaired oral phase, signs of dysfunction present  -RD impaired oral  phase, signs of dysfunction present  -LS      Pharyngeal Phase Results  sign/symptoms of pharyngeal impairment;cough;multiple swallows  -RD sign/symptoms of pharyngeal impairment;cough  -LS      Summary of Clinical Exam Dysphagia: Plan was for FEES today. Per RN, rough night, now very sleepy. Could not effectively rouse pt. Pulled away from ice chip stim to lip. Continue NPO with short-term alternate means of nutrition. SLP will check back tomorrow for ? FEES readiness.   -SM Repeat clinical dysphagia eval complete. Pt alert and cooperative, following 1-step oral commands today. L droop and decr'd oral ROM. Trials given of thins via ice and tsp, nectar-thick via tsp, and pureed. Overt s/s of aspriation c/b coughing w/ thins and nectar-thick and multiple swallows w/ pureed. Pt grimacing w/ PO and noted w/ incr'd WOB w/ trials. Not safe for any PO at this time, will give another day prior to completing FEES. RECS: NPO, meds alt route, FEES tomorrow.  -RD Clinical swallow eval complete. Pt with difficulty maintaining alertness. Limited oral movements observed w/ max cues. Anerior loss with tsp thins.  Pt not observed to use lips to remove pureed from spoon. Pt presents w/ s/s of asp c/b cough w/ thins. REC: NPO, meds via alt route. Re-assess at bedside on 2/12.  -LS      Swallow Recommendations    Oral Care  oral care with toothbrush and dentifrice BID and PRN  -RD       Other Recommendations  instrumental exam;other (comment)   FEES 2/13/18 as appropriate  -RD       Recommended Diet  NPO: unsafe for food/liquid intake;temproary alternative means of nutrition/hydration  -RD         User Key  (r) = Recorded By, (t) = Taken By, (c) = Cosigned By    Initials Name Effective Dates     Kasie Hopkins, MS CCC-SLP 06/22/15 -     LS Monique Vivas, MS CCC-SLP 06/22/15 -     RD Bessie Barrera, MS CCC-SLP 09/27/17 -         EDUCATION  The patient has been educated in the following areas:   Dysphagia (Swallowing  Impairment).    SLP Recommendation and Plan     SLP Diet Recommendation: NPO: unsafe for food/liquid intake, temporary alternative means of nutrition/hydration     SLP Rec. for Method of Medication Administration: meds via alternate route     Recommended Diagnostics: reassess via clinical swallow (non-instrumental exam) (will check back tomorrow for ? FEES readiness)                         Plan of Care Review  Outcome Summary/Follow up Plan: Dysphagia: Plan was for FEES today. Per RN, rough night, now very sleepy. Could not effectively rouse pt. Pulled away from ice chip stim to lip. Continue NPO with short-term alternate means of nutrition. SLP will check back tomorrow for ? FEES readiness.           IP SLP Goals       02/12/18 1040 02/11/18 1511       Begin to Take Some PO Safely    Begin to Take Some PO Safely- SLP, Date Established  02/11/18  -LS     Begin to Take Some PO Safely- SLP, Time to Achieve  by discharge  -LS     Begin to Take Some PO Safely- SLP, Date Goal Reviewed 02/12/18  -RD      Begin to Take Some PO Safely- SLP, Outcome goal ongoing  -RD goal ongoing  -LS     Receptive Language- Optimal Participation in Care    Receptive Language Optimal Participation in Care- SLP, Date Established  02/11/18  -LS     Receptive Language Optimal Participation in Care- SLP, Time to Achieve  by discharge  -LS     Receptive Language Optimal Participation in Care- SLP, Date Goal Reviewed 02/12/18  -RD      Receptive Language Optimal Participation in Care- SLP, Outcome goal ongoing  -RD goal ongoing  -LS     Expressive- Optimal Participation in Care    Expressive Optimal Participation in Care- SLP, Date Established  02/11/18  -LS     Expressive Optimal Participation in Care- SLP, Time to Achieve  by discharge  -LS     Expressive Optimal Participation in Care- SLP, Date Goal Reviewed 02/12/18  -RD      Expressive Optimal Participation in Care- SLP, Outcome goal ongoing  -RD goal ongoing  -LS       User Key  (r) =  Recorded By, (t) = Taken By, (c) = Cosigned By    Initials Name Provider Type    XAVI Vivas, MS CCC-SLP Speech and Language Pathologist    SHYANN Barrera, MS CCC-SLP Speech and Language Pathologist               Time Calculation:         Time Calculation- SLP       02/13/18 1011          Time Calculation- SLP    SLP Start Time 0950  -      SLP Received On 02/13/18  -        User Key  (r) = Recorded By, (t) = Taken By, (c) = Cosigned By    Initials Name Provider Type    ISIS Hopkins MS CCC-SLP Speech and Language Pathologist          Therapy Charges for Today     Code Description Service Date Service Provider Modifiers Qty    [id number] HC ST EVAL ORAL PHARYNG SWALLOW 2 2/13/2018 Kasie Hopkins MS CCC-SLP GN 1               Kasie Hopkins MS CCC-SLP  2/13/2018

## 2018-02-13 NOTE — DISCHARGE PLACEMENT REQUEST
"MikeGila gonzalez (77 y.o. Female) Naida Easley [phone]    Date of Birth Social Security Number Address Home Phone MRN    1940  [id number]  [place] [id number] [phone] [id number]    Hoahaoism Marital Status          None        Admission Date Admission Type Admitting Provider Attending Provider Department, Room/Bed    2/9/18 Elective Brandon Stewart MD Villaran, Yuri, MD [facility] 2B ICU, N235/1    Discharge Date Discharge Disposition Discharge Destination                      Attending Provider: Sherwin Brady MD     Allergies:  Diphenhydramine, Levaquin [Levofloxacin In D5w], Sulfa Antibiotics, Wellbutrin [Bupropion], Penicillins    Isolation:  None   Infection:  None   Code Status:  FULL    Ht:  165.1 cm (65\")   Wt:  57.2 kg (126 lb)    Admission Cmt:  None   Principal Problem:  None                Active Insurance as of 2/9/2018     Primary Coverage     Payor Plan Insurance Group Employer/Plan Group    HUMANA MEDICARE REPLACEMENT HUMANA MEDICARE REPL [id number]     Payor Plan Address Payor Plan Phone Number Effective From Effective To    [address] [address] [phone] 1/1/2017     [place] [address]       Subscriber Name Subscriber Birth Date Member ID       GILA MANNING 1940 [id number]                 Emergency Contacts      (Rel.) Home Phone Work Phone Mobile Phone    Sheffield,April (Power of ) -- -- [phone]    Nikkie Montano (Daughter) [phone] -- --            Insurance Information                HUMANA MEDICARE REPLACEMENT/HUMANA MEDICARE REPL Phone: [phone]    Subscriber: Gila Manning Subscriber#: [id number]    Group#: [id number] Precert#:              History & Physical      Stef Riley MD at 2/9/2018  2:28 PM          Reason for consultation; right frontal intra-cerebral hemorrhage      Narrative of present illness: 77-year-old presented to the emergency department with headache and weakness of the left upper " extremity.  This was thought to be an ischemic stroke however CT scan show this to be a intraparenchymal hemorrhage with subdural component.  Neurosurgical consultation was requested.      Neurological examination:     Mental status: The patient is a housing  This speech is unclear.  She follows commands however has weakness in the left upper extremity   There is no pupillary inequality.  Both pupils respond briskly to light.  Motor/sensory examination: She has a mild left-sided weakness as compared to the right with extinction.      Review of pertinent neurological data set:   IMPRESSION:  1.  A large intra-axial right frontal hemorrhage is noted with a  subdural component. A small amount of intraventricular breakthrough is  noted in the right lateral ventricular system involving the anterior  horn and the occipital horn. There is global edema and midline shift  from right to left of at least 1 cm perhaps slightly more.  2.  There is compression of the lateral and third ventricles and the  fourth ventricle is extremely small.  3.  The determination of 3-dimensional volume indicates a 30.1 cc  hemorrhage based on calculation.  4.  Other findings as above. The findings today are ominous and there  are no immediate or recent comparisons.           RECOMMENDATIONS: 1.  I would recommend craniotomy and decompression.  She has a large hematoma that has extended into the right frontal horn.            I greatly appreciate the opportunity of seeing this patient.  I have reviewed my observations and suggestions with the patient/family.        Electronically signed by Stef Riley MD at 2/9/2018  2:28 PM      Brandon Stewart MD at 2/9/2018  2:54 PM          Pulmonary/Critical Care History and Physical Exam     LOS: 0 days   Patient Care Team:  Carmela Pollock MD as PCP - General    Chief Complaint:  AMS / weakness    Subjective     HPI:   77-year-old female smoker with a history of coronary artery disease, prior  stents, COPD, fibromyalgia, chronic pain on chronic narcotics, anxiety on benzodiazepines was found to be confused by a family friend today.  She apparently had been called by a friend of her granddaughter who noticed that she was slurring words and confused.  The patient's daughter was informed of this and started to drive to her home.  Another friend arrived and noted the patient to be confused with slurring of words and weakness on the left.  EMS was called and she was brought to our emergency department for presumed ischemic stroke.  A stat CT scan of the head revealed a large right frontal intracerebral hemorrhage with subdural extension.  Dr. Riley with neurosurgery was consulted.  The patient was moved directly to the intensive care unit from the CT scanner.  On arrival to the intensive care unit she had somewhat garbled speech but was able to form words and answers some questions appropriately.  She complains of headache.  She is weak in the left upper extremity.  Dr. Riley has plans to take her to the operating room this afternoon.    History taken from: patient    Past Medical History:   Diagnosis Date   • Anxiety    • Arthritis    • CHF (congestive heart failure)    • COPD (chronic obstructive pulmonary disease)    • Coronary artery disease    • Depression    • Fibromyalgia        Past Surgical History:   Procedure Laterality Date   • CORONARY ANGIOPLASTY WITH STENT PLACEMENT     • FEMUR OPEN REDUCTION INTERNAL FIXATION Right 5/15/2017    Procedure: DISTAL FEMUR OPEN REDUCTION INTERNAL FIXATION RIGHT;  Surgeon: Pierre Velarde MD;  Location: [facility];  Service:    • TOTAL HIP ARTHROPLASTY Right 2017       History reviewed. No pertinent family history.    Social History     Social History   • Marital status:      Spouse name: N/A   • Number of children: N/A   • Years of education: N/A     Occupational History   • Not on file.     Social History Main Topics   • Smoking status: Current Every Day  Smoker     Packs/day: 0.50   • Smokeless tobacco: Never Used   • Alcohol use No   • Drug use: Defer   • Sexual activity: Defer     Other Topics Concern   • Not on file     Social History Narrative       Allergies   Allergen Reactions   • Diphenhydramine Other (See Comments)     TACHYCARDIA   • Levaquin [Levofloxacin In D5w] Hallucinations   • Sulfa Antibiotics Other (See Comments)     BLISTERS IN MOUTH AND AROUND MOUTH   • Wellbutrin [Bupropion] Mental Status Change   • Penicillins Rash       PMH/FH/SocH were reviewed by me and updates were made.     Review of Systems:    Review of 14 systems was completed with positives and pertinent negatives noted in the subjective section.  All other systems reviewed and are negative.   Exceptions are noted below:    Unable to obtain secondary to AMS / speech difficulty    Objective     Vital Signs  Temp:  [97.3 °F (36.3 °C)] 97.3 °F (36.3 °C)  Heart Rate:  [82-96] 96  Resp:  [16] 16  BP: (122-147)/(66-85) 122/66  Body mass index is 20.97 kg/(m^2).       Physical Exam:     General Appearance:    Drowsy, confused, cooperative, in no acute distress   Head:    Normocephalic, without obvious abnormality, atraumatic   Eyes:            Lids and lashes normal, conjunctivae and sclerae normal, no   icterus, no pallor, corneas clear, PERRL   ENMT:   Ears appear intact with no abnormalities noted      No oral lesions, no thrush, oral mucosa moist   Neck:   No adenopathy, supple, trachea midline, no thyromegaly, no   carotid bruit, no JVD       Lungs/resp:     Normal effort, symmetric chest rise, no crepitus, clear to      auscultation bilaterally, no chest wall tenderness                  Heart/CV:    Regular rhythm and normal rate, normal S1 and S2, no            murmur   Abdomen/GI:     Normal bowel sounds, no masses, no organomegaly, soft,        non-tender, non-distended   G/U:     Deferred   Extremities/MSK:   No clubbing, cyanosis or edema.  Normal tone.  No deformities.    Pulses:    Pulses palpable and equal bilaterally   Skin:   No bleeding, bruising or rash   Hem/Lymph:   No cervical or supraclavicular adenopathy.    Neurologic:   Moderately confused.  Garbled speech.  Weak LUE with drift.  Mild left facial droop.             Psychiatric:  Mildly agitated and anxious, oriented x 1.      Results Review:     I reviewed the patient's new clinical results.     Results from last 7 days  Lab Units 02/09/18  1418   SODIUM mmol/L 140   POTASSIUM mmol/L 3.7   CHLORIDE mmol/L 106   CO2 mmol/L 30.0   BUN mg/dL 10   CREATININE mg/dL 0.60   CALCIUM mg/dL 8.4*   BILIRUBIN mg/dL 0.5   ALK PHOS U/L 62   ALT (SGPT) U/L 21   AST (SGOT) U/L 25   GLUCOSE mg/dL 120*       Results from last 7 days  Lab Units 02/09/18  1418   WBC 10*3/mm3 8.86   HEMOGLOBIN g/dL 11.3*   HEMATOCRIT % 34.8   PLATELETS 10*3/mm3 260           I reviewed the patient's new imaging including images and reports.    CXR 2/9/18:  IMPRESSION:  Mild chronic appearing lung changes. No clearly acute chest  disease is seen.    CT head 2/9/18:  IMPRESSION:  1.  A large intra-axial right frontal hemorrhage is noted with a  subdural component. A small amount of intraventricular breakthrough is  noted in the right lateral ventricular system involving the anterior  horn and the occipital horn. There is global edema and midline shift  from right to left of at least 1 cm perhaps slightly more.  2.  There is compression of the lateral and third ventricles and the  fourth ventricle is extremely small.  3.  The determination of 3-dimensional volume indicates a 30.1 cc  hemorrhage based on calculation.  4.  Other findings as above. The findings today are ominous and there  are no immediate or recent comparisons.    Medication Review:     niCARdipine (CARDENE) infusion 3-15 mg/hr Intravenous Once       niCARdipine 5-15 mg/hr Last Rate: 5 mg/hr (02/09/18 1440)   phenylephrine 0.5-3 mcg/kg/min    sodium chloride 100 mL/hr Last Rate: 100 mL/hr (02/09/18 1506)  "      Assessment/Plan     Hospital Problem List     COPD (chronic obstructive pulmonary disease)    CAD (coronary artery disease)    CHF (congestive heart failure)    Stroke    Nontraumatic cortical hemorrhage of right cerebral hemisphere    Anxiety - chronic benzodiazepines    Chronic pain syndrome - chronic narcotic pain medications        76 YO smoker with tobacco abuse, congestive heart failure, CAD on plavix, who was found to have garbled speech and left upper extremity weakness after developing a severe headache.  She was brought to the emergency department where a CT scan showed a large right frontal hemorrhage.    Plan:  1.  Follow-up labs.  Correct coagulopathy if present.   2.  Dr. Riley plans to take patient to the operating room today.  3.  Gentle fluids.  4.  Watch for evidence of benzodiazepine withdrawal, avoid sedatives if possible.  5.  Hemorrhagic stroke order set utilized.  6.  Admit to ICU.   7.  Support breathing - ventilator support post-operatively if indicated.      Discussed with patient's daughter at bedside.     Brandon Stewart MD  02/09/18  3:18 PM    50 minutes of critical care provided. This time excludes other billable procedures. Time does include preparation of documents, medical consultations, review of old records, and direct bedside care. Patient was at high risk for life-threatening deterioration due to hemorrhagic CVA.          Electronically signed by Brandon Stewart MD at 2/9/2018  3:45 PM        Hospital Medications (active)       Dose Frequency Start End    acetaminophen (TYLENOL) suppository 650 mg 650 mg Every 4 Hours PRN 2/9/2018     Sig - Route: Insert 1 suppository into the rectum Every 4 (Four) Hours As Needed for Mild Pain  or Fever (Temperature greater than or equal to 37 C). - Rectal    Linked Group 1:  \"Or\" Linked Group Details        atorvastatin (LIPITOR) tablet 80 mg 80 mg Nightly 2/9/2018     Sig - Route: Take 2 tablets by mouth Every Night. - Oral    " "carvedilol (COREG) tablet 3.125 mg 3.125 mg 2 Times Daily With Meals 2/9/2018     Sig - Route: Take 1 tablet by mouth 2 (Two) Times a Day With Meals. - Oral    dexamethasone (DECADRON) injection 6 mg 6 mg Every 6 Hours 2/10/2018     Sig - Route: Infuse 1.5 mL into a venous catheter Every 6 (Six) Hours. - Intravenous    dexmedetomidine (PRECEDEX) 400 mcg/100 mL (4 mcg/mL) infusion 0.2-1.5 mcg/kg/hr × 57.2 kg Titrated 2/10/2018     Sig - Route: Infuse 11.44-85.8 mcg/hr into a venous catheter Dose Adjusted By Provider As Needed. - Intravenous    dextrose 5 % and sodium chloride 0.9 % with KCl 20 mEq/L infusion 75 mL/hr Continuous 2/10/2018     Sig - Route: Infuse 75 mL/hr into a venous catheter Continuous. - Intravenous    furosemide (LASIX) injection 40 mg 40 mg Daily 2/10/2018     Sig - Route: Infuse 4 mL into a venous catheter Daily. - Intravenous    HYDROmorphone (DILAUDID) injection 0.5 mg 0.5 mg Every 2 Hours PRN 2/9/2018 2/19/2018    Sig - Route: Infuse 0.5 mL into a venous catheter Every 2 (Two) Hours As Needed for Severe Pain . - Intravenous    Linked Group 2:  \"And\" Linked Group Details        ipratropium-albuterol (DUO-NEB) nebulizer solution 3 mL 3 mL 4 Times Daily - RT 2/9/2018     Sig - Route: Take 3 mL by nebulization 4 (Four) Times a Day. - Nebulization    magnesium sulfate 2g/50 mL (PREMIX) infusion 6 g As Needed 2/12/2018 2/26/2018    Sig - Route: Infuse 150 mL into a venous catheter As Needed (Magnesium 1.1-1.5). - Intravenous    magnesium sulfate 4g/100mL (PREMIX) infusion 4 g As Needed 2/12/2018 2/26/2018    Sig - Route: Infuse 100 mL into a venous catheter As Needed (For Magnesium 1.6-1.9 mg/dL). - Intravenous    methylphenidate (RITALIN) tablet 5 mg 5 mg Daily 2/12/2018 2/22/2018    Sig - Route: Take 0.5 tablets by mouth Daily. - Oral    mirtazapine (REMERON) tablet 15 mg 15 mg Nightly 2/9/2018     Sig - Route: Take 1 tablet by mouth Every Night. - Oral    niCARdipine (CARDENE-IV) 20 mg/200 mL " "(0.1 mg/mL) in 0.9% NaCl infusion 5-15 mg/hr Titrated 2/12/2018     Sig - Route: Infuse 5-15 mg/hr into a venous catheter Dose Adjusted By Provider As Needed. - Intravenous    ondansetron (ZOFRAN) injection 4 mg 4 mg Every 6 Hours PRN 2/9/2018     Sig - Route: Infuse 2 mL into a venous catheter Every 6 (Six) Hours As Needed for Nausea or Vomiting. - Intravenous    Linked Group 3:  \"Or\" Linked Group Details        phenylephrine (JUANA-SYNEPHRINE) 50 mg/250 mL (0.2 mg/mL) in 0.9% NS  infusion 0.5-3 mcg/kg/min × 57.2 kg Titrated 2/11/2018     Sig - Route: Infuse 28.6-171.6 mcg/min into a venous catheter Dose Adjusted By Provider As Needed. - Intravenous    potassium chloride (KLOR-CON) packet 40 mEq 40 mEq As Needed 2/12/2018     Sig - Route: Take 40 mEq by mouth As Needed (potassium replacement, see admin instructions). - Oral    Linked Group 4:  \"Or\" Linked Group Details        potassium phosphate 45 mmol in sodium chloride 0.9 % 500 mL infusion 45 mmol As Needed 2/12/2018 2/26/2018    Sig - Route: Infuse 45 mmol into a venous catheter As Needed (Phosphorus 1.2 mg/dL or less). - Intravenous    PRO-STAT 1 packet 1 packet Daily 2/12/2018     Sig - Route: 1 packet by Nasogastric route Daily. - Nasogastric    promethazine (PHENERGAN) injection 12.5 mg 12.5 mg Every 6 Hours PRN 2/9/2018     Sig - Route: Inject 0.5 mL into the shoulder, thigh, or buttocks Every 6 (Six) Hours As Needed for Nausea or Vomiting. - Intramuscular    sodium chloride 0.9 % flush 1-10 mL 1-10 mL As Needed 2/9/2018     Sig - Route: Infuse 1-10 mL into a venous catheter As Needed for Line Care. - Intravenous    dexmedetomidine HCl (PRECEDEX) 4 mcg/mL in sodium chloride 0.9 % 250 mL infusion (Discontinued) 0.2-1.5 mcg/kg/hr × 57.2 kg Titrated 2/11/2018 2/12/2018    Sig - Route: Infuse 11.44-85.8 mcg/hr into a venous catheter Dose Adjusted By Provider As Needed. - Intravenous    naloxone (NARCAN) injection 0.4 mg (Discontinued) 0.4 mg Every 5 Minutes PRN " "2018    Sig - Route: Infuse 1 mL into a venous catheter Every 5 (Five) Minutes As Needed for Respiratory Depression. - Intravenous    Linked Group 2:  \"And\" Linked Group Details        niCARdipine (CARDENE-IV) 40 mg/200 mL (0.2 mg/mL) in 0.9% NaCl infusion (Discontinued) 5-15 mg/hr Titrated 2018    Sig - Route: Infuse 5-15 mg/hr into a venous catheter Dose Adjusted By Provider As Needed. - Intravenous    Reason for Discontinue: Formulary change    ondansetron (ZOFRAN) tablet 4 mg (Discontinued) 4 mg Every 6 Hours PRN 2018    Sig - Route: Take 1 tablet by mouth Every 6 (Six) Hours As Needed for Nausea or Vomiting. - Oral    Linked Group 3:  \"Or\" Linked Group Details        potassium chloride (MICRO-K) CR capsule 40 mEq (Discontinued) 40 mEq As Needed 2018    Sig - Route: Take 4 capsules by mouth As Needed (potassium replacement.  see admin instructions). - Oral    Linked Group 4:  \"Or\" Linked Group Details        potassium chloride 20 mEq in 50 mL IVPB (Discontinued) 20 mEq Every 1 Hour PRN 2018    Sig - Route: Infuse 50 mL into a venous catheter Every 1 (One) Hour As Needed (potassium protocol CENTRAL IV - see admin instructions). - Intravenous    Linked Group 4:  \"Or\" Linked Group Details                 Physical Therapy Notes (most recent note)      Pattie Abreu, PT at 2018  2:49 PM  Version 1 of 1         Acute Care - Physical Therapy Initial Evaluation  [facility]     Patient Name: Preeti Manning  : 1940  MRN: [id number]  Today's Date: 2018   Onset of Illness/Injury or Date of Surgery Date: 18  Date of Referral to PT: 18  Referring Physician: MD Terry      Admit Date: 2018     Visit Dx:    ICD-10-CM ICD-9-CM   1. Intracranial hemorrhage I62.9 432.9   2. Impaired mobility and ADLs Z74.09 799.89   3. Impaired functional mobility, balance, gait, and endurance Z74.09 V49.89     Patient Active Problem List "   Diagnosis   • Closed fracture of distal end of right femur   • COPD (chronic obstructive pulmonary disease)   • CAD (coronary artery disease)   • Hyperlipidemia   • CHF (congestive heart failure)   • Stroke   • Nontraumatic cortical hemorrhage of right cerebral hemisphere   • Anxiety - chronic benzodiazepines   • Chronic pain syndrome - chronic narcotic pain medications     Past Medical History:   Diagnosis Date   • Anxiety    • Arthritis    • CHF (congestive heart failure)    • COPD (chronic obstructive pulmonary disease)    • Coronary artery disease    • Depression    • Fibromyalgia      Past Surgical History:   Procedure Laterality Date   • CORONARY ANGIOPLASTY WITH STENT PLACEMENT     • FEMUR OPEN REDUCTION INTERNAL FIXATION Right 5/15/2017    Procedure: DISTAL FEMUR OPEN REDUCTION INTERNAL FIXATION RIGHT;  Surgeon: Pierre Velarde MD;  Location: [facility];  Service:    • TOTAL HIP ARTHROPLASTY Right 2017          PT ASSESSMENT (last 72 hours)      PT Evaluation       02/11/18 1331 02/11/18 0740    Rehab Evaluation    Document Type evaluation  -LS evaluation  -CL    Subjective Information agree to therapy;no complaints  -LS agree to therapy;unable to respond   Pt orally intubated, RN cleared for tx.   -CL    Patient Effort, Rehab Treatment fair  -LS fair  -CL    Symptoms Noted During/After Treatment none  -LS none  -CL    General Information    Patient Profile Review yes  -LS yes  -CL    Onset of Illness/Injury or Date of Surgery Date 02/09/18  -LS 02/09/18  -CL    Referring Physician MD Terry  -LS MD Osvaldo  -CL    Pertinent History Of Current Problem To ED with HA, LUE weakness, confusion, slurred speech. CT (+) for intraparenchymal hemorrhage with subdural component. S/p R frontal crani and excision of hemorrhage on 2/9. Extubated 2/11.   -LS Pt presented to ED w/ HA and LUE weakness. CT (+) intraparenchymal hemorrhage w/ subudral component. Pt s/p R frontal craniotomy and excision of intra parenchymal  hemorrhage.   -CL    Precautions/Limitations fall precautions;oxygen therapy device and L/min   L-weakness  -LS fall precautions;oxygen therapy device and L/min;other (see comments)   NG, orally intubated, L sided weakness, R eye swollen shut  -CL    Prior Level of Function --   TBA further; pt unable to report; no family present  -LS --   Pt poor historian, no family present  -CL    Equipment Currently Used at Home --   TBA further  -LS --   Pt poor historian, no family present  -CL    Plans/Goals Discussed With patient;agreed upon  -LS patient;agreed upon  -CL    Risks Reviewed patient:;LOB;dizziness;increased discomfort  -LS patient:;LOB;nausea/vomiting;dizziness;increased discomfort;change in vital signs;lines disloged  -CL    Benefits Reviewed patient:;improve function;increase independence;increase strength;increase knowledge  -LS patient:;improve function;increase independence;increase strength;increase balance;decrease pain;increase knowledge  -CL    Barriers to Rehab medically complex;cognitive status  -LS medically complex  -CL    Living Environment    Lives With child(bree), adult  -LS child(bree), adult  -CL    Living Arrangements apartment  -LS apartment  -CL    Home Accessibility  --   walk-in shower  -CL    Living Environment Comment Per CM note; pt unable to report today. TBA further.   -LS Per CM, pt poor historian, no family present.   -CL    Clinical Impression    Date of Referral to PT 02/09/18  -LS     PT Diagnosis impaired functional mobility, balance, gait  -LS     Patient/Family Goals Statement return to PLOF  -LS     Criteria for Skilled Therapeutic Interventions Met yes;treatment indicated  -LS     Rehab Potential good, to achieve stated therapy goals  -LS     Vital Signs    Pre Systolic BP Rehab 133  -  -CL    Pre Treatment Diastolic BP 66  -LS 60  -CL    Post Systolic BP Rehab 128  -  -CL    Post Treatment Diastolic BP 69  -LS 49  -CL    Pretreatment Heart Rate (beats/min) 86   -LS 79  -CL    Posttreatment Heart Rate (beats/min) 82  -LS 71  -CL    Pre SpO2 (%) 96  -LS 96  -CL    O2 Delivery Pre Treatment supplemental O2   pt now on NC  -LS supplemental O2   vent  -CL    Post SpO2 (%)  94  -CL    O2 Delivery Post Treatment supplemental O2  -LS supplemental O2  -CL    Pre Patient Position Supine  -LS Supine  -CL    Intra Patient Position Standing  -LS Supine  -CL    Post Patient Position Supine  -LS Supine  -CL    Pain Assessment    Pain Assessment Blanchard-Javier FACES  -LS Blanchard-Javier FACES  -CL    Blanchard-Javier FACES Pain Rating 2  -LS 0  -CL    Pain Score 2  -LS     Post Pain Score 2  -LS     Pain Location Generalized  -LS     Pain Intervention(s) Repositioned;Ambulation/increased activity  -LS     Response to Interventions tolerated  -LS     Vision Assessment/Intervention    Visual Impairment Comment  Difficult to formally assess d/t cognitive status. Pt w/ noted R eye swollen shut and bruised.   -CL    Cognitive Assessment/Intervention    Current Cognitive/Communication Assessment impaired  -LS impaired  -CL    Orientation Status oriented to;person   responds to name  -LS oriented to;person   Pt reponds to name  -CL    Follows Commands/Answers Questions able to follow single-step instructions;25% of the time;needs cueing;needs increased time;needs repetition  -LS needs cueing;needs increased time;needs repetition   Pt followed less than 25% of commands  -CL    Personal Safety severe impairment;decreased awareness, need for safety;decreased awareness, need for assist;decreased insight to deficits  -LS severe impairment;decreased awareness, need for assist;decreased awareness, need for safety;decreased insight to deficits  -CL    Personal Safety Interventions fall prevention program maintained;gait belt;nonskid shoes/slippers when out of bed  -LS fall prevention program maintained;supervised activity  -CL    ROM (Range of Motion)    General ROM no range of motion deficits identified   BLEs  -LS  no range of motion deficits identified  -CL    MMT (Manual Muscle Testing)    General MMT Assessment lower extremity strength deficits identified  -LS     General MMT Assessment Detail  Difficult to formally assess d/t cognitive status. Observed RUE grossly 3/5. Observed LUE shldr FE 1/5, bicep/tricep 1/5,  2-/5.   -CL    Lower Extremity    Lower Ext Manual Muscle Testing Detail Pt unable to follow commands for formal MMT; BLEs grossly 3+/5 as demonstrate during STS at EOB today.   -LS     Bed Mobility, Assessment/Treatment    Bed Mobility, Assistive Device draw sheet;head of bed elevated  -LS     Bed Mob, Supine to Sit, Wake maximum assist (25% patient effort);2 person assist required;verbal cues required  -LS     Bed Mob, Sit to Supine, Wake maximum assist (25% patient effort);2 person assist required;verbal cues required  -LS     Bed Mobility, Comment  Deferred mobility until PM w/ PT once extubated.   -CL    Transfer Assessment/Treatment    Transfers, Sit-Stand Wake maximum assist (25% patient effort);2 person assist required;verbal cues required  -LS     Transfers, Stand-Sit Wake maximum assist (25% patient effort);2 person assist required;verbal cues required  -LS     Transfer, Impairments impaired balance;strength decreased  -LS     Transfer, Comment PT/tech on either side of pt; vc's for hand placement and achieving upright. Performed x2 from EOB.   -LS     Gait Assessment/Treatment    Gait, [place] Level maximum assist (25% patient effort);2 person assist required;verbal cues required  -LS     Gait, Distance (Feet) 1  -LS     Gait, Impairments impaired balance;strength decreased  -LS     Gait, Comment Constant cues for attention to task. Able to take 4 small side steps towards HOB with PT/tech on either side. Assist needed for weightshifting and for upright posture.   -LS     Motor Skills/Interventions    Additional Documentation Balance Skills Training (Group)  -LS      Balance Skills Training    Sitting-Level of Assistance Minimum assistance   progressed to CGA  -LS     Sitting-Balance Support Feet unsupported  -LS     Sitting-Balance Activities Trunk control activities  -LS     Sitting # of Minutes 4  -LS     Standing-Level of Assistance Maximum assistance;x2  -LS     Static Standing Balance Support Right upper extremity supported;Left upper extremity supported  -LS     Gait Balance-Level of Assistance Maximum assistance;x2  -LS     Gait Balance Support Right upper extremity supported;Left upper extremity supported  -LS     Gait Balance Activities side-stepping  -LS     Therapy Exercises    Bilateral Lower Extremities AAROM:;10 reps;supine;ankle pumps/circles;heel slides;hip abduction/adduction  -LS     Bilateral Upper Extremity  AAROM:;PROM:;10 reps;elbow flexion/extension;hand pumps;pronation/supination;shoulder abduction/adduction;shoulder extension/flexion;shoulder ER/IR   wrist F/E  -CL    Sensory Assessment/Intervention    Light Touch --   TBA further  -LS --   Unable to formally assess d/t cognitive status.   -CL    Positioning and Restraints    Pre-Treatment Position in bed  -LS in bed  -CL    Post Treatment Position bed  - bed  -CL    In Bed notified nsg;supine;call light within reach;encouraged to call for assist;exit alarm on;with family/caregiver;RUE elevated;LUE elevated;legs elevated;heels elevated  -LS notified nsg;fowlers;call light within reach;encouraged to call for assist;with nsg;side rails up x3;L waffle boot;with other staff  -CL      02/10/18 1310 02/10/18 0935    Rehab Evaluation    Evaluation Not Performed unable to evaluate, medical status change   Pt remains sedated and intubated; not appropriate for PT evaluation. Will check back tomorrow.   -KR unable to evaluate, medical status change   Pt currently intubated and sedated, not medically appropriate for skilled IPOT intervention. Pt w/ possible extubation this date. Will defer eval until tomorrow.    -CL      02/09/18 2205       General Information    Equipment Currently Used at Home oxygen  -TRACI     Living Environment    Lives With child(bree), adult  -TRACI     Living Arrangements apartment  -TRACI     Home Accessibility no concerns  -TRACI     Stair Railings at Home none  -TRACI     Type of Financial/Environmental Concern none  -TRACI     Transportation Available car;family or friend will provide  -TRACI       User Key  (r) = Recorded By, (t) = Taken By, (c) = Cosigned By    Initials Name Provider Type    LS Pattie Abreu, PT Physical Therapist    CL Jannet Salguero, OT Occupational Therapist    TRACI Javier, RN Registered Nurse    EMMA Aviles, PT Physical Therapist          Physical Therapy Education     Title: PT OT SLP Therapies (Active)     Topic: Physical Therapy (Active)     Point: Mobility training (Active)    Learning Progress Summary    Learner Readiness Method Response Comment Documented by Status   Patient Acceptance E,D NR   02/11/18 1444 Active               Point: Body mechanics (Active)    Learning Progress Summary    Learner Readiness Method Response Comment Documented by Status   Patient Acceptance E,D NR   02/11/18 1444 Active               Point: Precautions (Active)    Learning Progress Summary    Learner Readiness Method Response Comment Documented by Status   Patient Acceptance E,D NR   02/11/18 1444 Active                      User Key     Initials Effective Dates Name Provider Type Discipline     06/19/15 -  Pattie Abreu, PT Physical Therapist PT                PT Recommendation and Plan  Anticipated Discharge Disposition: inpatient rehabilitation facility  Plan of Care Review  Plan Of Care Reviewed With: patient  Outcome Summary/Follow up Plan: PT evaluation completed. Pt demonstrates decreased indep and safety re: functional mobility with unstable signs/symptoms, warranting further skilled PT services to promote PLOF. Limited today by decreased processing and difficulty attending to  task. Recommend IP rehab placement at d/c.           IP PT Goals       02/11/18 1444          Bed Mobility PT LTG    Bed Mobility PT LTG, Date Established 02/11/18  -LS      Bed Mobility PT LTG, Time to Achieve 2 wks  -LS      Bed Mobility PT LTG, Activity Type supine to sit/sit to supine  -LS      Bed Mobility PT LTG, [place] Level minimum assist (75% patient effort)  -LS      Transfer Training PT LTG    Transfer Training PT LTG, Date Established 02/11/18  -LS      Transfer Training PT LTG, Time to Achieve 2 wks  -LS      Transfer Training PT LTG, Activity Type sit to stand/stand to sit  -LS      Transfer Training PT LTG, [place] Level minimum assist (75% patient effort);2 person assist required  -LS      Transfer Training PT LTG, Assist Device walker, rolling  -LS      Gait Training PT LTG    Gait Training Goal PT LTG, Date Established 02/11/18  -LS      Gait Training Goal PT LTG, Time to Achieve 2 wks  -LS      Gait Training Goal PT LTG, [place] Level moderate assist (50% patient effort);2 person assist required  -LS      Gait Training Goal PT LTG, Assist Device walker, rolling  -LS      Gait Training Goal PT LTG, Distance to Achieve 10  -LS      Static Sitting Balance PT LTG    Static Sitting Balance PT LTG, Date Established 02/11/18  -LS      Static Sitting Balance PT LTG, Time to Achieve 2 wks  -LS      Static Sitting Balance PT LTG, [place] Level supervision required  -LS      Static Sitting Balance PT LTG, Assist Device UE Support  -LS        User Key  (r) = Recorded By, (t) = Taken By, (c) = Cosigned By    Initials Name Provider Type    LS Pattie Abreu, PT Physical Therapist                Outcome Measures       02/11/18 1331 02/11/18 0740       How much help from another person do you currently need...    Turning from your back to your side while in flat bed without using bedrails? 2  -LS      Moving from lying on back to sitting on the side of a flat bed without bedrails? 2  -LS       Moving to and from a bed to a chair (including a wheelchair)? 1  -LS      Standing up from a chair using your arms (e.g., wheelchair, bedside chair)? 2  -LS      Climbing 3-5 steps with a railing? 1  -LS      To walk in hospital room? 2  -LS      AM-PAC 6 Clicks Score 10  -LS      How much help from another is currently needed...    Putting on and taking off regular lower body clothing?  1  -CL     Bathing (including washing, rinsing, and drying)  1  -CL     Toileting (which includes using toilet bed pan or urinal)  1  -CL     Putting on and taking off regular upper body clothing  1  -CL     Taking care of personal grooming (such as brushing teeth)  2  -CL     Eating meals  1  -CL     Score  7  -CL     Modified [place] Scale    Modified [place] Scale 5 - Severe disability.  Bedridden, incontinent, and requiring constant nursing care and attention.  -LS 5 - Severe disability.  Bedridden, incontinent, and requiring constant nursing care and attention.  -CL     Functional Assessment    Outcome Measure Options AM-PAC 6 Clicks Basic Mobility (PT)  -LS AM-PAC 6 Clicks Daily Activity (OT);Modified [place]  -CL       User Key  (r) = Recorded By, (t) = Taken By, (c) = Cosigned By    Initials Name Provider Type    XAVI Abreu, PT Physical Therapist    CL Jannet Salguero, OT Occupational Therapist           Time Calculation:         PT Charges       02/11/18 1448          Time Calculation    Start Time 1331  -LS      PT Received On 02/11/18  -      PT Goal Re-Cert Due Date 02/21/18  -        User Key  (r) = Recorded By, (t) = Taken By, (c) = Cosigned By    Initials Name Provider Type    XAVI Abreu PT Physical Therapist          Therapy Charges for Today     Code Description Service Date Service Provider Modifiers Qty    71992583495 HC PT EVAL HIGH COMPLEXITY 4 2/11/2018 Pattie Abreu, PT GP 1    97035468399 HC PT THER SUPP EA 15 MIN 2/11/2018 Pattie Abreu, PT GP 2          PT G-Codes  Outcome Measure Options: AM-PAC  6 Clicks Basic Mobility (PT)      Pattie Abreu, PT  2018          Electronically signed by Pattie Abreu, PT at 2018  2:49 PM           Occupational Therapy Notes (most recent note)      Jannet Salguero, OT at 2018  8:16 AM  Version 1 of 1         Acute Care - Occupational Therapy Initial Evaluation   [place]     Patient Name: Preeti Manning  : 1940  MRN: [id number]  Today's Date: 2018  Onset of Illness/Injury or Date of Surgery Date: 18  Date of Referral to OT: 18  Referring Physician: MD Osvaldo    Admit Date: 2018       ICD-10-CM ICD-9-CM   1. Intracranial hemorrhage I62.9 432.9   2. 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   • Anxiety - chronic benzodiazepines   • Chronic pain syndrome - chronic narcotic pain medications     Past Medical History:   Diagnosis Date   • Anxiety    • Arthritis    • CHF (congestive heart failure)    • COPD (chronic obstructive pulmonary disease)    • Coronary artery disease    • Depression    • Fibromyalgia      Past Surgical History:   Procedure Laterality Date   • CORONARY ANGIOPLASTY WITH STENT PLACEMENT     • FEMUR OPEN REDUCTION INTERNAL FIXATION Right 5/15/2017    Procedure: DISTAL FEMUR OPEN REDUCTION INTERNAL FIXATION RIGHT;  Surgeon: Pierre Velarde MD;  Location: [facility];  Service:    • TOTAL HIP ARTHROPLASTY Right 2017          OT ASSESSMENT FLOWSHEET (last 72 hours)      OT Evaluation       18 0740 02/10/18 1310 02/10/18 0935 18 2206       Rehab Evaluation    Document Type evaluation  -CL        Subjective Information agree to therapy;unable to respond   Pt orally intubated, RN cleared for tx.   -CL         Evaluation Not Performed  unable to evaluate, medical status change   Pt remains sedated and intubated; not appropriate for PT evaluation. Will check back tomorrow.   -KR unable to evaluate, medical status change   Pt currently intubated and sedated, not medically appropriate for skilled IPOT intervention. Pt w/ possible extubation this date. Will defer eval until tomorrow.   -CL      Patient Effort, Rehab Treatment fair  -CL        Symptoms Noted During/After Treatment none  -CL        General Information    Patient Profile Review yes  -CL        Onset of Illness/Injury or Date of Surgery Date 02/09/18  -CL        Referring Physician MD Osvaldo  -CL        Pertinent History Of Current Problem Pt presented to ED w/ HA and LUE weakness. CT (+) intraparenchymal hemorrhage w/ subudral component. Pt s/p R frontal craniotomy and excision of intra parenchymal hemorrhage.   -CL        Precautions/Limitations fall precautions;oxygen therapy device and L/min;other (see comments)   NG, orally intubated, L sided weakness, R eye swollen shut  -CL        Prior Level of Function --   Pt poor historian, no family present  -CL        Equipment Currently Used at Home --   Pt poor historian, no family present  -CL   oxygen  -TRACI     Plans/Goals Discussed With patient;agreed upon  -CL        Risks Reviewed patient:;LOB;nausea/vomiting;dizziness;increased discomfort;change in vital signs;lines disloged  -CL        Benefits Reviewed patient:;improve function;increase independence;increase strength;increase balance;decrease pain;increase knowledge  -CL        Barriers to Rehab medically complex  -CL        Living Environment    Lives With child(bree), adult  -CL   child(bree), adult  -TRACI     Living Arrangements apartment  -CL   apartment  -TRACI     Home Accessibility --   walk-in shower  -CL   no concerns  -TRACI     Stair Railings at Home    none  -TRACI     Type of Financial/Environmental Concern    none  -TRACI     Transportation Available     car;family or friend will provide  -TRACI     Living Environment Comment Per CM, pt poor historian, no family present.   -CL        Clinical Impression    Date of Referral to OT 02/09/18  -CL        OT Diagnosis Decreased independence in ADLs.   -CL        Patient/Family Goals Statement Return to PLOF.   -CL        Criteria for Skilled Therapeutic Interventions Met yes;treatment indicated  -CL        Rehab Potential good, to achieve stated therapy goals  -CL        Therapy Frequency daily  -CL        Anticipated Equipment Needs At Discharge --   TBA further  -CL        Anticipated Discharge Disposition inpatient rehabilitation facility  -CL        Functional Level Prior    Ambulation    0-->independent  -TRACI     Transferring    0-->independent  -TRACI     Toileting    0-->independent  -TRACI     Bathing    0-->independent  -TRACI     Dressing    0-->independent  -TRACI     Eating    0-->independent  -TRACI     Communication    0-->understands/communicates without difficulty  -TRACI     Swallowing    0-->swallows foods/liquids without difficulty  -TRACI     Prior Functional Level Comment    N/A  -TRACI     Vital Signs    Pre Systolic BP Rehab 133  -CL        Pre Treatment Diastolic BP 60  -CL        Post Systolic BP Rehab 103  -CL        Post Treatment Diastolic BP 49  -CL        Pretreatment Heart Rate (beats/min) 79  -CL        Posttreatment Heart Rate (beats/min) 71  -CL        Pre SpO2 (%) 96  -CL        O2 Delivery Pre Treatment supplemental O2   vent  -CL        Post SpO2 (%) 94  -CL        O2 Delivery Post Treatment supplemental O2  -CL        Pre Patient Position Supine  -CL        Intra Patient Position Supine  -CL        Post Patient Position Supine  -CL        Pain Assessment    Pain Assessment Blanchard-Javier FACES  -CL        Blanchard-Baker FACES Pain Rating 0  -CL        Vision Assessment/Intervention    Visual Impairment Comment Difficult to formally assess d/t cognitive status. Pt w/ noted R eye swollen shut and bruised.   -CL         Cognitive Assessment/Intervention    Current Cognitive/Communication Assessment impaired  -CL        Orientation Status oriented to;person   Pt reponds to name  -CL        Follows Commands/Answers Questions needs cueing;needs increased time;needs repetition   Pt followed less than 25% of commands  -CL        Personal Safety severe impairment;decreased awareness, need for assist;decreased awareness, need for safety;decreased insight to deficits  -CL        Personal Safety Interventions fall prevention program maintained;supervised activity  -CL        ROM (Range of Motion)    General ROM no range of motion deficits identified  -CL        MMT (Manual Muscle Testing)    General MMT Assessment Detail Difficult to formally assess d/t cognitive status. Observed RUE grossly 3/5. Observed LUE shldr FE 1/5, bicep/tricep 1/5,  2-/5.   -CL        Bed Mobility, Assessment/Treatment    Bed Mobility, Comment Deferred mobility until PM w/ PT once extubated.   -CL        Therapy Exercises    Bilateral Upper Extremity AAROM:;PROM:;10 reps;elbow flexion/extension;hand pumps;pronation/supination;shoulder abduction/adduction;shoulder extension/flexion;shoulder ER/IR   wrist F/E  -CL        Sensory Assessment/Intervention    Light Touch --   Unable to formally assess d/t cognitive status.   -CL        Positioning and Restraints    Pre-Treatment Position in bed  -CL        Post Treatment Position bed  -CL        In Bed notified nsg;fowlers;call light within reach;encouraged to call for assist;with nsg;side rails up x3;L waffle boot;with other staff  -CL          User Key  (r) = Recorded By, (t) = Taken By, (c) = Cosigned By    Initials Name Effective Dates    CL Jannet Salguero OT 06/08/16 -     TRACI Javier RN 12/13/17 -     EMMA Aviles, PT 09/25/17 -            Occupational Therapy Education     Title: PT OT SLP Therapies (Active)     Topic: Occupational Therapy (Active)     Point: ADL training (Active)    Description:  Instruct learner(s) on proper safety adaptation and remediation techniques during self care or transfers.   Instruct in proper use of assistive devices.    Learning Progress Summary    Learner Readiness Method Response Comment Documented by Status   Patient Acceptance E,D NR Pt educated on appropriate safety precautions and role of OT.  02/11/18 0810 Active               Point: Precautions (Active)    Description: Instruct learner(s) on prescribed precautions during self-care and functional transfers.    Learning Progress Summary    Learner Readiness Method Response Comment Documented by Status   Patient Acceptance E,D NR Pt educated on appropriate safety precautions and role of OT.  02/11/18 0810 Active                      User Key     Initials Effective Dates Name Provider Type Discipline     06/08/16 -  Jannet Salguero, OT Occupational Therapist OT                  OT Recommendation and Plan  Anticipated Equipment Needs At Discharge:  (TBA further)  Anticipated Discharge Disposition: inpatient rehabilitation facility  Therapy Frequency: daily  Plan of Care Review  Plan Of Care Reviewed With: patient  Progress: progress towards functional goals is fair  Outcome Summary/Follow up Plan: OT completed a brief chart review relating to presenting physical/cognitive deficits. Pt session limited d/t poor command following and attention to task. Recommend cont skilled IPOT intervention. Recommend pt DC to IP rehab.          OT Goals       02/11/18 0813          Transfer Training OT LTG    Transfer Training OT LTG, Date Established 02/11/18  -CL      Transfer Training OT LTG, Time to Achieve by discharge  -CL      Transfer Training OT LTG, Activity Type sit to stand/stand to sit;bed to chair /chair to bed;toilet  -CL      Transfer Training OT LTG, [place] Level maximum assist (25% patient effort);2 person assist required  -CL      Transfer Training OT LTG, Additional Goal AAD  -CL      Static Sitting Balance OT LTG     Static Sitting Balance OT LTG, Date Established 02/11/18  -CL      Static Sitting Balance OT LTG, Time to Achieve by discharge  -CL      Static Sitting Balance OT LTG, [place] Level minimum assist (75% patient effort)  -CL      Static Sitting Balance OT LTG, Additional Goal AAD  -CL      Follow Directions OT LTG    Follow Directions OT LTG, Date Established 02/11/18  -CL      Follow Directions OT LTG, Time to Achieve by discharge  -CL      Follow Directions OT LTG, Activity Type 1-Step;75% treatment session  -CL      Follow Directions OT LTG, [place] Level with verbal cues;demonstration  -CL      Grooming OT LTG    Grooming Goal OT LTG, Date Established 02/11/18  -CL      Grooming Goal OT LTG, Time to Achieve by discharge  -CL      Grooming Goal OT LTG, Activity Type Pt will wash R/L side of face.   -CL      Grooming Goal OT LTG, [place] Level moderate assist (50% patient effort)  -CL      Grooming Goal OT LTG, Position sitting, edge of bed  -CL      Grooming Goal OT LTG, Additional Goal AAD  -CL        User Key  (r) = Recorded By, (t) = Taken By, (c) = Cosigned By    Initials Name Provider Type    CL Jannet Salguero OT Occupational Therapist                Outcome Measures       02/11/18 0740          How much help from another is currently needed...    Putting on and taking off regular lower body clothing? 1  -CL      Bathing (including washing, rinsing, and drying) 1  -CL      Toileting (which includes using toilet bed pan or urinal) 1  -CL      Putting on and taking off regular upper body clothing 1  -CL      Taking care of personal grooming (such as brushing teeth) 2  -CL      Eating meals 1  -CL      Score 7  -CL      Modified Rosey Scale    Modified Rosey Scale 5 - Severe disability.  Bedridden, incontinent, and requiring constant nursing care and attention.  -CL      Functional Assessment    Outcome Measure Options AM-PAC 6 Clicks Daily Activity (OT);Modified [place]  -CL        User Key  (r)  = Recorded By, (t) = Taken By, (c) = Cosigned By    Initials Name Provider Type    CL Jannet Salguero OT Occupational Therapist          Time Calculation:   OT Start Time: 0740    Therapy Charges for Today     Code Description Service Date Service Provider Modifiers Qty    51138296056 HC OT EVAL LOW COMPLEXITY 3 2/11/2018 Jannet Salguero OT GO 1               Jannet Salguero OT  2/11/2018     Electronically signed by Jannet Salguero OT at 2/11/2018  8:16 AM

## 2018-02-14 NOTE — PLAN OF CARE
Problem: Patient Care Overview (Adult)  Goal: Plan of Care Review  Outcome: Ongoing (interventions implemented as appropriate)   02/14/18 1257   Coping/Psychosocial Response Interventions   Plan Of Care Reviewed With patient   Patient Care Overview   Progress progress toward functional goals as expected   Outcome Evaluation   Outcome Summary/Follow up Plan Clinical swallow eval complete. Oral motor functional to accept PO via spoon, cup, straw. S/s of asp c/b cough with thins and delayed cough w/ nectar thick. No overt s/s of asp w/ pureed. REC: NPO, FEES on 2/15

## 2018-02-14 NOTE — CONSULTS
Patient Name:  Preeti Manning  YOB: 1940  [id number]       Patient Care Team:  Carmela Pollock MD as PCP - General      General Surgery Consult Note     Date of Consultation: 02/14/18    Consulting Physician - Sherwin Brady MD    Reason for Consult - Feeding difficulty    Subjective     I have been asked to see  Preeti Manning , a 77 y.o. female in consultation for feeding difficulty.  Ms. Pineda was admitted for hospital on 2/9/2018 with a right frontal hemorrhage with subdural component.  She underwent craniotomy and decompression.  Postoperatively she's had difficulty with eating.  After evaluation by the speech language pathologist was felt to be unsafe for by mouth intake and we've been asked to place a long-term feeding access.  She somewhat confusional history is taken from the hospital chart as well as the patient's family at the bedside.      Allergy:   Allergies   Allergen Reactions   • Diphenhydramine Other (See Comments)     TACHYCARDIA   • Levaquin [Levofloxacin In D5w] Hallucinations   • Sulfa Antibiotics Other (See Comments)     BLISTERS IN MOUTH AND AROUND MOUTH   • Wellbutrin [Bupropion] Mental Status Change   • Penicillins Rash       Medications:    atorvastatin 80 mg Oral Nightly   carvedilol 3.125 mg Oral BID With Meals   dexamethasone 6 mg Intravenous Q6H   insulin regular 0-9 Units Subcutaneous Q6H   ipratropium-albuterol 3 mL Nebulization 4x Daily - RT   mirtazapine 15 mg Oral Nightly   PRO-STAT 1 packet Nasogastric Daily       dexmedetomidine 0.2-1.5 mcg/kg/hr Last Rate: Stopped (02/14/18 1320)   dextrose 5 % and sodium chloride 0.9 % with KCl 20 mEq 75 mL/hr Last Rate: 75 mL/hr (02/14/18 1345)   niCARdipine 5-15 mg/hr Last Rate: Stopped (02/12/18 2041)     No current facility-administered medications on file prior to encounter.      Current Outpatient Prescriptions on File Prior to Encounter   Medication Sig   • atorvastatin (LIPITOR) 80 MG tablet Take 80 mg by mouth Daily.   •  "carvedilol (COREG) 3.125 MG tablet Take 3.125 mg by mouth 2 (Two) Times a Day With Meals.       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   • Anxiety - chronic benzodiazepines   • Chronic pain syndrome - chronic narcotic pain medications       Past Surgical History:  Past Surgical History:   Procedure Laterality Date   • CORONARY ANGIOPLASTY WITH STENT PLACEMENT     • CRANIOTOMY FOR ANEURYSM/ARTERIOVENOUS MALFORMATION N/A 2/9/2018    Procedure: CRANIOTOMY FOR INTRACRANIAL HEMORRHAGE;  Surgeon: Stef Riley MD;  Location:  [facility];  Service:    • FEMUR OPEN REDUCTION INTERNAL FIXATION Right 5/15/2017    Procedure: DISTAL FEMUR OPEN REDUCTION INTERNAL FIXATION RIGHT;  Surgeon: Pierre Velarde MD;  Location:  [facility];  Service:    • TOTAL HIP ARTHROPLASTY Right 2017         Family History: Noncontributory     Social History:   Tobacco use: 1/2 ppd    EtOH use : None    Illicit drug use: None      Review of Systems   Review of systems could not be obtained due to   patient confusion.            Objective     Physical Exam:      Vital Signs  /56  Pulse 61  Temp 97.1 °F (36.2 °C) (Axillary)   Resp 20  Ht 165.1 cm (65\")  Wt 57.2 kg (126 lb)  SpO2 100%  BMI 20.97 kg/m2    Intake/Output Summary (Last 24 hours) at 02/14/18 1527  Last data filed at 02/14/18 1400   Gross per 24 hour   Intake           2431.4 ml   Output             2930 ml   Net           -498.6 ml         Physical Exam:    Head: Normocephalic, atraumatic.   Eyes: Pupils equal, round, react to light and accomodation.   Mouth: Oral mucosa without lesions,   Neck: No masses, lymphadenopathy or carotid bruits bilaterally   CV: Rhythm  and rate regular , no  murmurs, rubs or " gallops  Lungs: Clear  to auscultation bilaterally   Abdomen: Bowel sounds positive  , soft, nontender. Scar likely from previous open appendectomy in RLQ  Groin : No obvious hernias bilaterally   Extremities:  No cyanosis, clubbing or edema bilaterally   Lymphatics: No abnormal lymphadenopathy appreciated   Neurologic: No gross deficits       Results Review: I have personally reviewed all of the lab and imaging results available at this time.        Assessment/Plan     Assessment and Plan:    Dysphagia - Plan for EGD/PEG tomorrow. I have discussed the risks and benefits with the patient's family, who agree to proceed.    Hospital Problem List     COPD (chronic obstructive pulmonary disease)        CAD (coronary artery disease)        CHF (congestive heart failure)        Stroke    Nontraumatic cortical hemorrhage of right cerebral hemisphere    Anxiety - chronic benzodiazepines    Chronic pain syndrome - chronic narcotic pain medications                  I discussed the patients findings and my recommendations with the patient and/or family, as well as the primary team     Rashel Zeng MD  02/14/18  3:27 PM        Please note that portions of this note were completed with a voice recognition program. Efforts were made to edit the dictations, but occasionally words are mistranscribed.

## 2018-02-14 NOTE — THERAPY TREATMENT NOTE
Acute Care - Physical Therapy Treatment Note  [facility]     Patient Name: Preeti Manning  : 1940  MRN: [id number]  Today's Date: 2018  Onset of Illness/Injury or Date of Surgery Date: 18  Date of Referral to PT: 18  Referring Physician: MD Terry    Admit Date: 2018    Visit Dx:    ICD-10-CM ICD-9-CM   1. Intracranial hemorrhage I62.9 432.9   2. Impaired mobility and ADLs Z74.09 799.89   3. Impaired functional mobility, balance, gait, and endurance Z74.09 V49.89   4. 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   • Anxiety - chronic benzodiazepines   • Chronic pain syndrome - chronic narcotic pain medications               Adult Rehabilitation Note       18 1125 18 0900       Rehab Assessment/Intervention    Discipline physical therapist  -LS speech language pathologist  -RD     Document Type therapy note (daily note)  -LS re-evaluation;therapy note (daily note)  -RD     Subjective Information agree to therapy;no complaints  -LS      Patient Effort, Rehab Treatment adequate  -LS      Precautions/Limitations fall precautions;oxygen therapy device and L/min  -LS      Recorded by [LS] Pattie Abreu, PT [RD] Bessie Barrera MS CCC-SLP     Vital Signs    Pre Systolic BP Rehab 115  -LS      Pre Treatment Diastolic BP 58  -LS      Post Systolic BP Rehab 131  -LS      Post Treatment Diastolic BP 60  -LS      Pretreatment Heart Rate (beats/min) 50  -LS      Posttreatment Heart Rate (beats/min) 55  -LS      Pre SpO2 (%) 100  -LS      O2 Delivery Pre Treatment supplemental O2  -LS      Post SpO2 (%) 100  -LS      O2 Delivery Post Treatment supplemental O2  -LS      Pre Patient  Position Supine  -LS      Intra Patient Position Standing  -LS      Post Patient Position Supine  -LS      Recorded by [LS] Pattie Abreu, PT      Pain Assessment    Pain Assessment Blanchard-Javier FACES  -LS      Blanchard-Javier FACES Pain Rating 2  -LS      Pain Score 0   pre  -LS      Post Pain Score 2   post  -LS      Pain Location Ankle  -LS      Pain Intervention(s) Repositioned;Ambulation/increased activity  -LS      Response to Interventions tolerated  -LS      Recorded by [LS] Pattie Abreu, PT      Cognitive Assessment/Intervention    Current Cognitive/Communication Assessment impaired  -LS      Orientation Status oriented to;person   responds to name  -LS      Follows Commands/Answers Questions able to follow single-step instructions;25% of the time;needs cueing;needs increased time;needs repetition  -LS      Personal Safety severe impairment;decreased awareness, need for assist;decreased awareness, need for safety;decreased insight to deficits  -LS      Recorded by [LS] Pattie Abreu, PT      Improve ability to follow directions    Improve ability to follow directions:  one-step directions with objects;80%;with consistent cues  -RD     Status: Improve ability to follow directions  Progressing as expected  -RD     Ability to Follow Directions Progress  0%;with consistent cues;following model;continue to address  -RD     Comments: Improve ability to follow directions  Pt unable to demonstrate w/ max cues and [place] for use of spoon or cup.   -RD     Recorded by  [RD] Bessie Barrera MS CCC-SLP     Improve ability to comprehend questions    Improve ability to comprehend questions:  simple yes/no questions;80%;with consistent cues  -RD     Status: Improve ability to comprehend questions  Progressing as expected  -RD     Ability to Comprehend Questions Progress  30%;with consistent cues;continue to address  -RD     Comments: Improve ability to comprehend questions  Y/N ?'s regarding basic orientation and personal  "questions  -RD     Recorded by  [RD] Bessie Barrera, MS CCC-SLP     Improve word retrieval skills    Improve word retrieval skills by:  completing automatic speech tasks counting;60%;with consistent cues  -RD     Status: Improve word retrieval skills  Progressing as expected  -RD     Word Retrieval Skills Progress  20%;with consistent cues;following model;continue to address  -RD     Comments: Improve word retrieval skills  Able to count to two w/ max cues and model, paraphasias noted. Pt spontaneously producing some speech including \"yeah\" and attempted \"I love you\" when speaking to family member.   -RD     Recorded by  [RD] Bessie Barrera, MS CCC-SLP     Improve attention    Improve attention by:  looking at speaker;attending to task;80%;without cues  -RD     Status: Improve attention  Progressing as expected  -RD     Attention Progress  50%;with inconsistent cues;continue to address  -RD     Recorded by  [RD] Bessie Barrera, MS CCC-SLP     Bed Mobility, Assessment/Treatment    Bed Mobility, Assistive Device head of bed elevated;draw sheet  -LS      Bed Mob, Supine to Sit, [place] maximum assist (25% patient effort);2 person assist required;verbal cues required  -LS      Bed Mob, Sit to Supine, [place] maximum assist (25% patient effort);2 person assist required;verbal cues required  -LS      Bed Mobility, Comment VC's for sequencing.   -LS      Recorded by [LS] Pattie Abreu, PT      Transfer Assessment/Treatment    Transfers, Sit-Stand [place] moderate assist (50% patient effort);2 person assist required;verbal cues required  -LS      Transfers, Stand-Sit [place] moderate assist (50% patient effort);2 person assist required;verbal cues required  -LS      Transfer, Impairments impaired balance;strength decreased  -LS      Transfer, Comment STS from EOB with PT/tech on either side.   -LS      Recorded by [LS] Pattie Abreu, PT      Gait Assessment/Treatment    Gait, [place] Level " maximum assist (25% patient effort);2 person assist required;verbal cues required  -LS      Gait, Distance (Feet) 1  -LS      Gait, Safety Issues weight-shifting ability decreased  -LS      Gait, Comment 2 small side steps towards HOB; req'd assist for advancement of LEs and for weightshifting.   -LS      Recorded by [LS] Pattie Abreu PT      Motor Skills/Interventions    Additional Documentation Balance Skills Training (Group)  -LS      Recorded by [LS] Pattie Abreu PT      Balance Skills Training    Sitting-Level of Assistance Contact guard  -LS      Sitting-Balance Support Feet supported;Right upper extremity supported;Left upper extremity supported  -LS      Sitting-Balance Activities Trunk control activities  -LS      Standing-Level of Assistance Moderate assistance;x2  -LS      Static Standing Balance Support Right upper extremity supported;Left upper extremity supported  -LS      Standing-Balance Activities Weight Shift R-L  -LS      Gait Balance-Level of Assistance Maximum assistance;x2  -LS      Gait Balance Support Right upper extremity supported;Left upper extremity supported  -LS      Recorded by [LS] Pattie Abreu, PT      Therapy Exercises    Bilateral Lower Extremities AAROM:;10 reps;supine;ankle pumps/circles;heel slides  -LS      Bilateral Upper Extremity AAROM:;10 reps;supine;elbow flexion/extension;shoulder extension/flexion  -LS      Recorded by [LS] Pattie Abreu PT      Positioning and Restraints    Pre-Treatment Position in bed  -LS      Post Treatment Position bed  -LS      In Bed notified nsg;call light within reach;fowlers;encouraged to call for assist;exit alarm on;with family/caregiver;RUE elevated;LUE elevated;legs elevated;heels elevated  -LS      Restraints released:;reapplied:;notified nsg:;soft limb  -LS      Recorded by [LS] Pattie Abreu PT        User Key  (r) = Recorded By, (t) = Taken By, (c) = Cosigned By    Initials Name Effective Dates    XAVI Abreu PT 06/19/15 -      SHYANN Barrera, MS CCC-SLP 09/27/17 -                 IP PT Goals       02/14/18 1213 02/11/18 1444       Bed Mobility PT LTG    Bed Mobility PT LTG, Date Established  02/11/18  -LS     Bed Mobility PT LTG, Time to Achieve  2 wks  -LS     Bed Mobility PT LTG, Activity Type  supine to sit/sit to supine  -LS     Bed Mobility PT LTG, [place] Level  minimum assist (75% patient effort)  -LS     Bed Mobility PT LTG, Outcome goal ongoing  -LS      Transfer Training PT LTG    Transfer Training PT LTG, Date Established  02/11/18  -LS     Transfer Training PT LTG, Time to Achieve  2 wks  -LS     Transfer Training PT LTG, Activity Type  sit to stand/stand to sit  -LS     Transfer Training PT LTG, [place] Level  minimum assist (75% patient effort);2 person assist required  -LS     Transfer Training PT LTG, Assist Device  walker, rolling  -LS     Transfer Training PT LTG, Outcome goal ongoing  -LS      Gait Training PT LTG    Gait Training Goal PT LTG, Date Established  02/11/18  -LS     Gait Training Goal PT LTG, Time to Achieve  2 wks  -LS     Gait Training Goal PT LTG, [place] Level  moderate assist (50% patient effort);2 person assist required  -LS     Gait Training Goal PT LTG, Assist Device  walker, rolling  -LS     Gait Training Goal PT LTG, Distance to Achieve  10  -LS     Gait Training Goal PT LTG, Outcome goal ongoing  -LS      Static Sitting Balance PT LTG    Static Sitting Balance PT LTG, Date Established  02/11/18  -LS     Static Sitting Balance PT LTG, Time to Achieve  2 wks  -LS     Static Sitting Balance PT LTG, [place] Level  supervision required  -LS     Static Sitting Balance PT LTG, Assist Device  UE Support  -LS     Static Sitting Balance PT LTG, Outcome goal ongoing  -LS        User Key  (r) = Recorded By, (t) = Taken By, (c) = Cosigned By    Initials Name Provider Type    XAVI Abreu PT Physical Therapist          Physical Therapy Education     Title: PT OT SLP Therapies  (Active)     Topic: Physical Therapy (Active)     Point: Mobility training (Active)    Learning Progress Summary    Learner Readiness Method Response Comment Documented by Status   Patient Acceptance E,D NR  LS 02/14/18 1213 Active    Acceptance E,D NR  LS 02/11/18 1444 Active   Family Acceptance E,D NR  LS 02/14/18 1213 Active               Point: Home exercise program (Active)    Learning Progress Summary    Learner Readiness Method Response Comment Documented by Status   Patient Acceptance E,D NR  LS 02/14/18 1213 Active   Family Acceptance E,D NR  LS 02/14/18 1213 Active               Point: Body mechanics (Active)    Learning Progress Summary    Learner Readiness Method Response Comment Documented by Status   Patient Acceptance E,D NR  LS 02/14/18 1213 Active    Acceptance E,D NR  LS 02/11/18 1444 Active   Family Acceptance E,D NR  LS 02/14/18 1213 Active               Point: Precautions (Active)    Learning Progress Summary    Learner Readiness Method Response Comment Documented by Status   Patient Acceptance E,D NR  LS 02/14/18 1213 Active    Acceptance E,D NR  LS 02/11/18 1444 Active   Family Acceptance E,D NR  LS 02/14/18 1213 Active                      User Key     Initials Effective Dates Name Provider Type Discipline     06/19/15 -  Pattie Abreu, PT Physical Therapist PT                    PT Recommendation and Plan  Anticipated Discharge Disposition: inpatient rehabilitation facility  Plan of Care Review  Plan Of Care Reviewed With: patient  Progress: improving  Outcome Summary/Follow up Plan: Pt demonstrated increased indep with sitting balance and STS transfer (at EOB); continues to be limited by lethargy and decreased following of commands. Will cont to progress as clinically warranted.           Outcome Measures       02/14/18 1125 02/11/18 1331       How much help from another person do you currently need...    Turning from your back to your side while in flat bed without using bedrails? 2  -LS  2  -LS     Moving from lying on back to sitting on the side of a flat bed without bedrails? 2  -LS 2  -LS     Moving to and from a bed to a chair (including a wheelchair)? 1  -LS 1  -LS     Standing up from a chair using your arms (e.g., wheelchair, bedside chair)? 2  -LS 2  -LS     Climbing 3-5 steps with a railing? 1  -LS 1  -LS     To walk in hospital room? 2  -LS 2  -LS     AM-PAC 6 Clicks Score 10  -LS 10  -LS     Modified Haakon Scale    Modified Haakon Scale  5 - Severe disability.  Bedridden, incontinent, and requiring constant nursing care and attention.  -LS     Functional Assessment    Outcome Measure Options AM-PAC 6 Clicks Basic Mobility (PT)  -LS AM-PAC 6 Clicks Basic Mobility (PT)  -LS       User Key  (r) = Recorded By, (t) = Taken By, (c) = Cosigned By    Initials Name Provider Type    XAVI Abreu, PT Physical Therapist           Time Calculation:         PT Charges       02/14/18 1215          Time Calculation    Start Time 1125  -LS      PT Received On 02/14/18  -LS      PT Goal Re-Cert Due Date 02/21/18  -      Time Calculation- PT    Total Timed Code Minutes- PT 23 minute(s)  -LS        User Key  (r) = Recorded By, (t) = Taken By, (c) = Cosigned By    Initials Name Provider Type    XAVI Abreu, PT Physical Therapist          Therapy Charges for Today     Code Description Service Date Service Provider Modifiers Qty    56938868370 HC PT THER PROC EA 15 MIN 2/14/2018 Pattie Abreu, PT GP 2    07335063145 HC PT THER SUPP EA 15 MIN 2/14/2018 Pattie Abreu, PT GP 2          PT G-Codes  Outcome Measure Options: AM-PAC 6 Clicks Basic Mobility (PT)    Pattie Abreu, PT  2/14/2018

## 2018-02-14 NOTE — PROGRESS NOTES
INTENSIVIST   PROGRESS NOTE     Hospital:  LOS: 5 days   Subjective   Ms. Preeti Manning, 77 y.o. 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     S     Interval History:  Periods of restlessness and calm.  Sleeping at present.     The patient's relevant past medical, surgical and social history were reviewed and updated in Epic as appropriate.      ROS:   ROS cannot be reliably obtained from the patient due to her aphasia.    Objective   O     Vitals:  Temp: 98.7 °F (37.1 °C) (02/14/18 0800) Temp  Min: 97.5 °F (36.4 °C)  Max: 98.7 °F (37.1 °C)   BP: 117/62 (02/14/18 1000) BP  Min: 91/60  Max: 148/84   Pulse: 55 (02/14/18 1235) Pulse  Min: 52  Max: 84   Resp: 20 (02/14/18 1235) Resp  Min: 20  Max: 28   SpO2: 100 % (02/14/18 1235) SpO2  Min: 96 %  Max: 100 %   Device: nasal cannula with humidification (02/14/18 1235)    Flow Rate: (S) 5 (lowered 3) (02/14/18 1235) Flow (L/min)  Min: 5  Max: 5     Intake/Ouptut 24 hrs (7:00AM - 6:59 AM)  Intake & Output (last 3 days)       02/11 0701 - 02/12 0700 02/12 0701 - 02/13 0700 02/13 0701 - 02/14 0700 02/14 0701 - 02/15 0700    I.V. (mL/kg) 1436.3 (25.1) 2036.8 (35.6) 1581.8 (27.7) 200 (3.5)    Other  120 60 30    NG/GT  171 234 86    Total Intake(mL/kg) 1436.3 (25.1) 2327.8 (40.7) 1875.8 (32.8) 316 (5.5)    Urine (mL/kg/hr) 2070 (1.5) 3300 (2.4) 1950 (1.4) 100 (0.3)    Stool  0 (0) 0 (0)     Total Output 2070 3300 1950 100    Net -633.7 -972.2 -74.2 +216            Unmeasured Stool Occurrence  1 x 1 x           Medications (drips):    dexmedetomidine Last Rate: 0.6 mcg/kg/hr (02/14/18 1425)   dextrose 5 % and  sodium chloride 0.9 % with KCl 20 mEq Last Rate: 75 mL/hr (02/14/18 0040)   niCARdipine Last Rate: Stopped (02/12/18 2041)     Physical Examination  Telemetry:  Sinus Rhythm: normal sinus rhythm (02/14/18 0800)   Constitutional:  No acute distress.   Cardiovascular: Normal rate, regular and rhythm. Normal heart sounds.  No murmurs, gallop or rub.   Respiratory: No respiratory distress. Normal respiratory effort.  Normal breath sounds  Clear to auscultation and percussion.    Abdominal:  Soft. No masses. Non-tender. No distension. No HSM.   Extremities: No digital cyanosis. No clubbing.  No peripheral edema.   Neurological:   Awake  Aphasic  Left hemiplegia  Best Eye Response: 4-->(E4) spontaneous (02/14/18 0800)  Best Motor Response: 6-->(M6) obeys commands (02/14/18 0800)  Best Verbal Response: 4-->(V4) confused (02/14/18 0800)  [place] Coma Scale Score: 14 (02/14/18 0800)   Lines/Drains/Airways: Peripheral IV(s)  NG  Tuyet         Hematology:    Results from last 7 days  Lab Units 02/14/18  0506 02/13/18  0341 02/12/18  0441   WBC 10*3/mm3 7.56 10.49 12.90*   HEMOGLOBIN g/dL 9.7* 10.1* 9.1*   MCV fL 92.7 93.3 93.8   PLATELETS 10*3/mm3 241 258 235     Electrolytes, Magnesium and Phosphorus:    Results from last 7 days  Lab Units 02/14/18  0506 02/13/18  0341 02/12/18  0441   SODIUM mmol/L 145 140 141   POTASSIUM mmol/L 3.8 3.7 3.9   CO2 mmol/L 25.0 27.0 26.0   MAGNESIUM mg/dL 2.2 2.1 2.0   PHOSPHORUS mg/dL 3.3 2.8  --      Renal:    Results from last 7 days  Lab Units 02/14/18  0506 02/13/18  0341 02/12/18  0441   CREATININE mg/dL 0.60 0.60 0.50*   BUN mg/dL 31* 21 17     Estimated Creatinine Clearance: 53.2 mL/min (by C-G formula based on Cr of 0.6).    Images:    Xr Chest 1 View    Result Date: 2/14/2018  Chronic change; no acute findings. There has been no change since 02/13/2018.  D:  02/14/2018 E:  02/14/2018        Results: Reviewed.  I reviewed the patient's new laboratory and imaging results.  I  independently reviewed the patient's new images.    Medications: Reviewed.    Assessment/Plan   A / P     77 y.o.female, admitted on 2/9/2018 with Stroke [I63.9]:     1. 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 S/P Crani and excision of intraparenchymal hemorrhage. 2/9/2018  2. Aphasia  3. Dexamethasone  2. COPD  1. Extubated 2/11/2018  2. Tobacco use  3. CAD  4. Heart Failure  1. No ECHO this admission  5. Dyslipidemia  6. Anemia, NC, stable  7. Anxiety, on chronic BZD  8. Chronic Pain syndrome  9. Antibiotics: None  10. Glucose: No h/o DM  1. Hyperglycemia due to Dexamethasone      Results from last 7 days  Lab Units 02/14/18  1208 02/10/18  0018   GLUCOSE mg/dL 212* 128       Lab Results  Lab Value Date/Time   HGBA1C 5.80 (H) 02/10/2018 0440   HGBA1C 5.10 05/12/2017 0433        Nutrition Support:   Active Supplement Orders      Diet, Tube Feeding Tube Feeding Formula: Impact Peptide 1.5 (Peptide Based With Arginine, Calorically Dense)   Diet:  NPO Diet   Advance Directives: Full Code     Assessment / Plan:    1. Discussed with family at bedside, OK to proceed with PEG  2. Eventually Rehab Unit  3. Re check CT on Fri 2/16 as per NS  4. BP control, Goal -140  5. Glucose control and insulin prn  6. Disposition: Keep in ICU.    Plan of care and goals reviewed during interdisciplinary rounds.  I discussed the patient's findings and my recommendations with patient and nursing staff    Sherwin Brady MD, FACP, FCCP, [facility]  Intensive Care Medicine, Nutrition Support and Pulmonary Medicine

## 2018-02-14 NOTE — PROGRESS NOTES
Adult Nutrition  Assessment/PES    Patient Name:  Preeti Manning  YOB: 1940  MRN: [id number]  Admit Date:  2/9/2018    Assessment Date:  2/14/2018    Comments:  PEG placement pending. Pt tolerating EN support.          Nutrition/Diet History       02/14/18 1305    Nutrition/Diet History    Reported/Observed By RN    Other RN reports pt has NG now; plan for PEG tomorrow. NPO after MN.              Labs/Tests/Procedures/Meds       02/14/18 1307    Labs/Tests/Procedures/Meds    Medication Review Reviewed, pertinent           Results from last 7 days  Lab Units 02/14/18  0506  02/09/18  1418   SODIUM mmol/L 145  < > 140   POTASSIUM mmol/L 3.8  < > 3.7   CHLORIDE mmol/L 113*  < > 106   CO2 mmol/L 25.0  < > 30.0   BUN mg/dL 31*  < > 10   CREATININE mg/dL 0.60  < > 0.60   CALCIUM mg/dL 8.0*  < > 8.4*   BILIRUBIN mg/dL  --   --  0.5   ALK PHOS U/L  --   --  62   ALT (SGPT) U/L  --   --  21   AST (SGOT) U/L  --   --  25   GLUCOSE mg/dL 145*  < > 120*   < > = values in this interval not displayed.                 Nutrition Prescription Ordered       02/14/18 1308    Nutrition Prescription PO    Current PO Diet NPO    Nutrition Prescription EN    Enteral Route NG    Product Impact Peptide 1.5 fidelia    TF Delivery Method Continuous    Continuous TF Goal Rate (mL/hr) 55 mL/hr    Continuous TF Goal Volume (mL) 1100 mL            Evaluation of Received Nutrient/Fluid Intake       02/14/18 1310    EN Evaluation    Number of Days EN Intake Evaluated 1 day    EN Average Volume Delivered (mL/day) 234 mL/day    % Goal Volume  21 %            Problem/Interventions:        Problem 1       02/14/18 1310    Nutrition Diagnoses Problem 1    Problem 1 Inadequate Intake/Infusion    Inadequate Intake Type Enteral    Etiology (related to) Factors Affecting Nutrition    Mental State/Condition Confusion    Signs/Symptoms (evidenced by) EN Intake Delivery    Percent (%) of EN goal 21 %                    Intervention Goal       02/14/18  1312    Intervention Goal    General Nutrition support treatment;Meet nutritional needs for age/condition    TF/PN Maintain TF/PN      02/14/18 1309    Intervention Goal    General Nutrition support treatment;Meet nutritional needs for age/condition            Nutrition Intervention       02/14/18 1313    Nutrition Intervention    RD/Tech Action Follow Tx progress;Care plan reviewd      02/14/18 1309    Nutrition Intervention    RD/Tech Action Follow Tx progress;Care plan reviewd            Nutrition Prescription       02/14/18 1309    Nutrition Prescription EN    Enteral Prescription Continue same protocol            Education/Evaluation       02/14/18 1309    Monitor/Evaluation    Monitor Per protocol;I&O;Pertinent labs;TF delivery/tolerance;Symptoms;Skin status        Electronically signed by:  Amy Agustin RD  02/14/18 1:14 PM

## 2018-02-14 NOTE — PROGRESS NOTES
Adult Nutrition  Assessment/PES    Patient Name:  Preeti Manning  YOB: 1940  MRN: [id number]  Admit Date:  2/9/2018    Assessment Date:  2/14/2018    Comments:  Cont current EN support; PEG placement scheduled for tomorrow.          Reason for Assessment       02/14/18 1317    Reason for Assessment    Reason For Assessment/Visit follow up protocol;multidisciplinary rounds;TF/PN    Identified At Risk By Screening Criteria tube feeding or parenteral nutrition    Time Spent (min) 30    Diagnosis --   per notes of this adm              Nutrition/Diet History       02/14/18 1305    Nutrition/Diet History    Reported/Observed By RN    Other RN reports pt has NG now; plan for PEG tomorrow. NPO after MN.              Labs/Tests/Procedures/Meds       02/14/18 1307    Labs/Tests/Procedures/Meds    Medication Review Reviewed, pertinent             Results from last 7 days  Lab Units 02/14/18  0506  02/09/18  1418   SODIUM mmol/L 145  < > 140   POTASSIUM mmol/L 3.8  < > 3.7   CHLORIDE mmol/L 113*  < > 106   CO2 mmol/L 25.0  < > 30.0   BUN mg/dL 31*  < > 10   CREATININE mg/dL 0.60  < > 0.60   CALCIUM mg/dL 8.0*  < > 8.4*   BILIRUBIN mg/dL  --   --  0.5   ALK PHOS U/L  --   --  62   ALT (SGPT) U/L  --   --  21   AST (SGOT) U/L  --   --  25   GLUCOSE mg/dL 145*  < > 120*   < > = values in this interval not displayed.                 Nutrition Prescription Ordered       02/14/18 1308    Nutrition Prescription PO    Current PO Diet NPO    Nutrition Prescription EN    Enteral Route NG    Product Impact Peptide 1.5 fidelia    TF Delivery Method Continuous    Continuous TF Goal Rate (mL/hr) 55 mL/hr    Continuous TF Goal Volume (mL) 1100 mL            Evaluation of Received Nutrient/Fluid Intake       02/14/18 1310    EN Evaluation    Number of Days EN Intake Evaluated 1 day    EN Average Volume Delivered (mL/day) 234 mL/day    % Goal Volume  21 %            Problem/Interventions:        Problem 1       02/14/18 1310     Nutrition Diagnoses Problem 1    Problem 1 Inadequate Intake/Infusion    Inadequate Intake Type Enteral    Etiology (related to) Factors Affecting Nutrition    Mental State/Condition Confusion    Signs/Symptoms (evidenced by) EN Intake Delivery    Percent (%) of EN goal 21 %                    Intervention Goal       02/14/18 1312    Intervention Goal    General Nutrition support treatment;Meet nutritional needs for age/condition    TF/PN Maintain TF/PN                Nutrition Intervention       02/14/18 1313    Nutrition Intervention    RD/Tech Action Follow Tx progress;Care plan reviewd      02/14/18 1309      Nutrition Prescription       02/14/18 1309    Nutrition Prescription EN    Enteral Prescription Continue same protocol            Education/Evaluation       02/14/18 1309    Monitor/Evaluation    Monitor Per protocol;I&O;Pertinent labs;TF delivery/tolerance;Symptoms;Skin status        Electronically signed by:  Amy Agustin RD  02/14/18 1:18 PM

## 2018-02-14 NOTE — PROGRESS NOTES
FOLLOW UP ENCOUNTER          WORKING DIAGNOSIS:   POD#5: Craniotomy for ICH                        MEDICAL HISTORY SINCE LAST ENCOUNTER :       She has remained stable.  She has failed her dysphasia a valve therefore will require a PEG.  I would prefer this rather than feeding tube because of the larger necessity and rehabilitation that will be required.    She moves her left side less than the right which is unchanged.  She apparently has said a few words although clearly a degree of aphasia.                                     ASSESSMENT/DISPOSITION :      1.  Continue ICU.  She is a bit confused and agitated at the present for floor care.  This should improve however shortly.    2.  General surgery consult for PEG.    3.  We'll check CT scan Friday until then continue dexamethasone at current dosage.

## 2018-02-14 NOTE — THERAPY EVALUATION
Acute Care - Speech Language Pathology   Swallow Initial Evaluation  Stephen   Clinical Swallow Evaluation       Patient Name: Preeti Manning  : 1940  MRN: [id number]  Today's Date: 2018  Onset of Illness/Injury or Date of Surgery Date: 18            Admit Date: 2018    Visit Dx:     ICD-10-CM ICD-9-CM   1. Intracranial hemorrhage I62.9 432.9   2. Impaired mobility and ADLs Z74.09 799.89   3. Impaired functional mobility, balance, gait, and endurance Z74.09 V49.89   4. 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   • Anxiety - chronic benzodiazepines   • Chronic pain syndrome - chronic narcotic pain medications     Past Medical History:   Diagnosis Date   • Anxiety    • Arthritis    • CHF (congestive heart failure)    • COPD (chronic obstructive pulmonary disease)    • Coronary artery disease    • Depression    • Fibromyalgia      Past Surgical History:   Procedure Laterality Date   • CORONARY ANGIOPLASTY WITH STENT PLACEMENT     • CRANIOTOMY FOR ANEURYSM/ARTERIOVENOUS MALFORMATION N/A 2018    Procedure: CRANIOTOMY FOR INTRACRANIAL HEMORRHAGE;  Surgeon: Stef Riley MD;  Location:  [facility];  Service:    • FEMUR OPEN REDUCTION INTERNAL FIXATION Right 5/15/2017    Procedure: DISTAL FEMUR OPEN REDUCTION INTERNAL FIXATION RIGHT;  Surgeon: Pierre Velarde MD;  Location:  [facility];  Service:    • TOTAL HIP ARTHROPLASTY Right 2017          SWALLOW EVALUATION (last 72 hours)      Swallow Evaluation       18 1600 18 0950 18 0900          Rehab Evaluation    Document Type evaluation  -LS re-evaluation  -SM       Subjective Information no complaints;agree to therapy  -LS  decreased LOC  -SM no complaints;agree to therapy  -RD      Patient Effort, Rehab Treatment   good  -RD      General Information    Patient Profile Review yes  -LS  yes  -RD      Onset of Illness/Injury   02/09/18  -RD      Subjective Patient Observations alert, cooperative, confused  -LS Decreased alertness  -SM alert and cooperative  -RD      Pertinent History Of Current Problem Large R CVA, left handed  -LS Rough night per pt  -SM Adm w/ ICH R frontal lobe s/p R frontal craniotomy. Pt is L handed. Per stroke protocol.   -RD      Current Diet Limitations NPO  -LS  NPO  -RD      Precautions/Limitations, Hearing   WFL;other (see comments)   for purposes of eval  -RD      Prior Level of Function- Communication functional in all spheres  -LS  functional in all spheres  -RD      Prior Level of Function- Swallowing no diet consistency restrictions  -LS  no diet consistency restrictions  -RD      Plans/Goals Discussed With patient;agreed upon  -LS  patient;family;agreed upon  -RD      Barriers to Rehab medically complex  -LS  medically complex  -RD      Clinical Impression    Patient's Goals For Discharge patient did not state  -LS patient could not state  -SM patient did not state  -RD      Family Goals For Discharge   patient able to return to all previous activities/roles  -RD      SLP Swallowing Diagnosis --   r/o pharyngeal dysphagia  -LS  other (see comments)   r/o pharyngeal dysphagia  -RD      Rehab Potential/Prognosis, Swallowing good, to achieve stated therapy goals  -LS  good, to achieve stated therapy goals  -RD      Criteria for Skilled Therapeutic Interventions Met skilled criteria for dysphagia intervention met  -LS  skilled criteria for dysphagia intervention met  -RD      FCM, Swallowing 1-->Level 1  -LS  1-->Level 1  -RD      Therapy Frequency PRN  -LS  evaluation only;PRN  -RD      Predicted Duration Therapy Interv (days)   until discharge  -RD      SLP Diet Recommendation NPO: unsafe for food/liquid  intake  -LS NPO: unsafe for food/liquid intake;temporary alternative means of nutrition/hydration  -SM NPO: unsafe for food/liquid intake;temporary alternative means of nutrition/hydration  -RD      Recommended Diagnostics FEES  -LS reassess via clinical swallow (non-instrumental exam)   will check back tomorrow for ? FEES readiness  -SM FEES;other (see comments)   tomorrow  -RD      SLP Rec. for Method of Medication Administration meds via alternate route  -LS meds via alternate route  -SM meds via alternate route  -RD      Pain Assessment    Pain Assessment  Blanchard-Javier FACES  -SM Blanchard-Baker FACES  -RD      Blanchard-Javier FACES Pain Rating  4  -SM 2  -RD      Cognitive Assessment/Intervention    Current Cognitive/Communication Assessment  could not assess   decreased alertness  -SM       Oral Motor Structure and Function    Oral Motor Anatomy and Physiology patient demonstrates anatomy that is WNL  -LS  patient demonstrates anatomy that is WNL  -RD      Dentition Assessment missing teeth;poor oral hygiene  -LS  missing teeth;poor oral hygiene  -RD      Secretion Management WNL/WFL  -LS  WNL/WFL  -RD      Mucosal Quality moist, healthy  -LS  moist, healthy  -RD      Volitional Swallow   mild to moderate difficulties initiating volitional swallow  -RD      Volitional Cough   weak volitional cough  -RD      Oral Musculature General Assessment   oral labial impairment;lingual impairment  -RD      Oral Motor Assessment Comment generalized weakness.   -LS        Oral Labial Strength and Mobility left labial droop  -LS  reduced strength bilaterally;left labial droop;reduced ROM  -RD      Lingual Strength and Mobility   reduced strength bilaterally;reduced ROM  -RD      General Feeding/Swallowing Observations    Current Feeding Method   NPO  -RD      Respiratory Support Currently in Use   nasal cannula in use  -RD      Flow (L/min)   3  -RD      Clinical Swallow Exam    Mode of Presentation fed by clinician;cup;spoon;straw   -LS  fed by clinician;spoon  -RD      Oral Preparation Concerns   incomplete mouth closure around utensil  -RD      Oral Phase Results intact oral phase without signs of dysfunction  -LS  impaired oral phase, signs of dysfunction present  -RD      Pharyngeal Phase Results sign/symptoms of pharyngeal impairment;cough  -LS  sign/symptoms of pharyngeal impairment;cough;multiple swallows  -RD      Summary of Clinical Exam Clinical swallow eval complete. Oral motor functional to accept PO via spoon, cup, straw. S/s of asp c/b cough with thins and delayed cough w/ nectar thick. No overt s/s of asp w/ pureed. REC: NPO, FEES on 2/15  -LS Dysphagia: Plan was for FEES today. Per RN, rough night, now very sleepy. Could not effectively rouse pt. Pulled away from ice chip stim to lip. Continue NPO with short-term alternate means of nutrition. SLP will check back tomorrow for ? FEES readiness.   -SM Repeat clinical dysphagia eval complete. Pt alert and cooperative, following 1-step oral commands today. L droop and decr'd oral ROM. Trials given of thins via ice and tsp, nectar-thick via tsp, and pureed. Overt s/s of aspriation c/b coughing w/ thins and nectar-thick and multiple swallows w/ pureed. Pt grimacing w/ PO and noted w/ incr'd WOB w/ trials. Not safe for any PO at this time, will give another day prior to completing FEES. RECS: NPO, meds alt route, FEES tomorrow.  -RD      Swallow Recommendations    Oral Care   oral care with toothbrush and dentifrice BID and PRN  -RD      Other Recommendations   instrumental exam;other (comment)   FEES 2/13/18 as appropriate  -RD      Recommended Diet   NPO: unsafe for food/liquid intake;temproary alternative means of nutrition/hydration  -RD        User Key  (r) = Recorded By, (t) = Taken By, (c) = Cosigned By    Initials Name Effective Dates     Kasie Hopkins, MS CCC-SLP 06/22/15 -     LS Monique Vivas, MS [facility]-SLP 06/22/15 -     RD Bessie Barrera, MS CCC-SLP 09/27/17 -          EDUCATION  The patient has been educated in the following areas:   Dysphagia (Swallowing Impairment) Oral Care/Hydration.    SLP Recommendation and Plan  SLP Swallowing Diagnosis:  (r/o pharyngeal dysphagia)  SLP Diet Recommendation: NPO: unsafe for food/liquid intake     SLP Rec. for Method of Medication Administration: meds via alternate route     Recommended Diagnostics: FEES  Criteria for Skilled Therapeutic Interventions Met: skilled criteria for dysphagia intervention met     Rehab Potential/Prognosis, Swallowing: good, to achieve stated therapy goals  Therapy Frequency: PRN             Plan of Care Review  Plan Of Care Reviewed With: patient  Progress: progress toward functional goals as expected  Outcome Summary/Follow up Plan: Clinical swallow eval complete. Oral motor functional to accept PO via spoon, cup, straw. S/s of asp c/b cough with thins and delayed cough w/ nectar thick. No overt s/s of asp w/ pureed. REC: NPO, FEES on 2/15          IP SLP Goals       02/12/18 1040 02/11/18 1511       Begin to Take Some PO Safely    Begin to Take Some PO Safely- SLP, Date Established  02/11/18  -LS     Begin to Take Some PO Safely- SLP, Time to Achieve  by discharge  -LS     Begin to Take Some PO Safely- SLP, Date Goal Reviewed 02/12/18  -RD      Begin to Take Some PO Safely- SLP, Outcome goal ongoing  -RD goal ongoing  -LS     Receptive Language- Optimal Participation in Care    Receptive Language Optimal Participation in Care- SLP, Date Established  02/11/18  -LS     Receptive Language Optimal Participation in Care- SLP, Time to Achieve  by discharge  -LS     Receptive Language Optimal Participation in Care- SLP, Date Goal Reviewed 02/12/18  -RD      Receptive Language Optimal Participation in Care- SLP, Outcome goal ongoing  -RD goal ongoing  -LS     Expressive- Optimal Participation in Care    Expressive Optimal Participation in Care- SLP, Date Established  02/11/18  -LS     Expressive Optimal  Participation in Care- SLP, Time to Achieve  by discharge  -LS     Expressive Optimal Participation in Care- SLP, Date Goal Reviewed 02/12/18  -RD      Expressive Optimal Participation in Care- SLP, Outcome goal ongoing  -RD goal ongoing  -LS       User Key  (r) = Recorded By, (t) = Taken By, (c) = Cosigned By    Initials Name Provider Type    XAVI Vivas, MS CCC-SLP Speech and Language Pathologist    SHYANN Barrera, MS CCC-SLP Speech and Language Pathologist               Time Calculation:         Time Calculation- SLP       02/14/18 1630          Time Calculation- SLP    SLP Start Time 1600  -LS      SLP Received On 02/14/18  -LS        User Key  (r) = Recorded By, (t) = Taken By, (c) = Cosigned By    Initials Name Provider Type    XAVI Vivas MS CCC-SLP Speech and Language Pathologist          Therapy Charges for Today     Code Description Service Date Service Provider Modifiers Qty    [id number] HC ST EVAL ORAL PHARYNG SWALLOW 3 2/14/2018 Monique Vivas MS CCC-SLP GN 1               Monique Vivas MS CCC-SLP  2/14/2018

## 2018-02-14 NOTE — PLAN OF CARE
Problem: Patient Care Overview (Adult)  Goal: Plan of Care Review  Outcome: Ongoing (interventions implemented as appropriate)   02/14/18 1213   Coping/Psychosocial Response Interventions   Plan Of Care Reviewed With patient   Patient Care Overview   Progress improving   Outcome Evaluation   Outcome Summary/Follow up Plan Pt demonstrated increased indep with sitting balance and STS transfer (at EOB); continues to be limited by lethargy and decreased following of commands. Will cont to progress as clinically warranted.        Problem: Inpatient Physical Therapy  Goal: Bed Mobility Goal LTG- PT  Outcome: Ongoing (interventions implemented as appropriate)   02/11/18 1444 02/14/18 1213   Bed Mobility PT LTG   Bed Mobility PT LTG, Date Established 02/11/18 --    Bed Mobility PT LTG, Time to Achieve 2 wks --    Bed Mobility PT LTG, Activity Type supine to sit/sit to supine --    Bed Mobility PT LTG, [place] Level minimum assist (75% patient effort) --    Bed Mobility PT LTG, Outcome --  goal ongoing     Goal: Transfer Training Goal 1 LTG- PT  Outcome: Ongoing (interventions implemented as appropriate)   02/11/18 1444 02/14/18 1213   Transfer Training PT LTG   Transfer Training PT LTG, Date Established 02/11/18 --    Transfer Training PT LTG, Time to Achieve 2 wks --    Transfer Training PT LTG, Activity Type sit to stand/stand to sit --    Transfer Training PT LTG, [place] Level minimum assist (75% patient effort);2 person assist required --    Transfer Training PT LTG, Assist Device walker, rolling --    Transfer Training PT LTG, Outcome --  goal ongoing     Goal: Gait Training Goal LTG- PT  Outcome: Ongoing (interventions implemented as appropriate)   02/11/18 1444 02/14/18 1213   Gait Training PT LTG   Gait Training Goal PT LTG, Date Established 02/11/18 --    Gait Training Goal PT LTG, Time to Achieve 2 wks --    Gait Training Goal PT LTG, [place] Level moderate assist (50% patient effort);2 person  assist required --    Gait Training Goal PT LTG, Assist Device walker, rolling --    Gait Training Goal PT LTG, Distance to Achieve 10 --    Gait Training Goal PT LTG, Outcome --  goal ongoing     Goal: Static Sitting Balance Goal LTG- PT  Outcome: Ongoing (interventions implemented as appropriate)   02/11/18 1444 02/14/18 1213   Static Sitting Balance PT LTG   Static Sitting Balance PT LTG, Date Established 02/11/18 --    Static Sitting Balance PT LTG, Time to Achieve 2 wks --    Static Sitting Balance PT LTG, [place] Level supervision required --    Static Sitting Balance PT LTG, Assist Device UE Support --    Static Sitting Balance PT LTG, Outcome --  goal ongoing

## 2018-02-15 NOTE — OP NOTE
"Operative Report    Patient Name:  Preeti Manning  YOB: 1940  [id number]    2/15/2018        PREOPERATIVE DIAGNOSIS:  Feeding difficulty (R63.3)      POSTOPERATIVE DIAGNOSIS: Same      PROCEDURE PERFORMED: EGD with PEG placement (86084)      SURGEON: Rashel Zeng MD      ASSISTANT: None       ANESTHESIA: Sedation with 4 mg Versed and 100 mcg Fentanyl.     I was present, directed and supervised the entire sedation and monitoring of this patient for a grand total of 10 minutes.      SPECIMENS: None      FINDINGS: 20 [place] PEG tube placed at the 4 cm level      INDICATIONS:       The patient is a 77 y.o. year old female with a history of feeding difficulty who we have been asked to see for long term feeding access. The risks and benefits of EGD with PEG placement were discussed at length with the patient and the patient's family and they agreed to proceed.      DESCRIPTION OF PROCEDURE:     After obtaining informed consent, the patient was taken to the endoscopy suite. They were placed in modified Miller position, and after appropriate sedation as detailed above, a bite block was placed into the patient's mouth. The endoscope was advanced into the mouth and into the esophagus without difficulty. It was advanced into the stomach, and into the duodenum, which was normal  . The scope was then returned to the stomach, and the stomach was maximally insufflated. An appropriate site for PEG placement was then determined by palpation, transillumination, and the \"safe track\" needle technique. This area was prepped and draped in standard sterile fashion, and after infiltrating the skin with local anesthetic, a 7mm skin incision was made in this location. A needle was advanced through this incision and into the stomach under endoscopic guidance. A wire was then placed through the needle, grasped with the endoscope, and brought out through the mouth. At this point, using the Ponsky Pull technique, a 20 Bol[place]ian " PEG tube was brought through the abdominal wall in this location. The endoscope was again advanced through the mouth and esophagus and into the stomach, where the PEG bumper could be seen abutting the anterior gastric wall in standard fashion without bleeding. The endoscope was then used to desufflate the stomach, and removed from the patient mouth without difficulty. The PEG tube was secured at the 4 cm level, with the bumper at the 4.5 cm level. It was sutured to the skin, dressed in standard sterile fashion, and placed to gravity drainage. The patient tolerated the procedure well. They were then transported to the recovery room in stable condition. All sponge and needle counts were correct times two at the completion of the case. There were no immediate complications.       Rashel Zeng MD  2/15/2018  12:46 PM

## 2018-02-15 NOTE — PROGRESS NOTES
INTENSIVIST / PULMONARY FOLLOW UP NOTE     Hospital:  LOS: 6 days   Ms. Preeti Manning, 77 y.o. female is followed for:   Active Problems:    COPD (chronic obstructive pulmonary disease)    CAD (coronary artery disease)    CHF (congestive heart failure)    Stroke    Nontraumatic cortical hemorrhage of right cerebral hemisphere    Anxiety - chronic benzodiazepines    Chronic pain syndrome - chronic narcotic pain medications          SUBJECTIVE   No major events overnight, getting PEG today    The patient's relevant past medical, surgical, family, and social history were reviewed    Allergies and medications were reviewed    ROS:  Per subjective, all other systems were reviewed and were negative        OBJECTIVE     Vital Sign Min/Max for last 24 hours:  Temp  Min: 97.3 °F (36.3 °C)  Max: 98.8 °F (37.1 °C)   BP  Min: 102/58  Max: 151/67   Pulse  Min: 54  Max: 107   Resp  Min: 16  Max: 22   SpO2  Min: 95 %  Max: 100 %   Flow (L/min)  Min: 3  Max: 5     Physical Exam:  General Appearance: alert, in no acute distress  Eyes:  No scleral icterus or pallor, pupils normal  Ears, Nose, Mouth, Throat:  Atraumatic, oropharynx clear  Neck:  Trachea midline, thyroid normal  Respiratory:  Clear to auscultation bilaterally, normal effort, no tenderness to palpation  Cardiovascular:  Regular rate and rhythm, no murmurs, no peripheral edema, no thrill  Gastrointestinal:  Soft, non-tender, non-distended, no hepatosplenomegaly  Skin:  Normal temperature, no rash  Psychiatric:  No agitation  Neuro:  No new focal neurologic deficits observed    Telemetry:  Sinus Rhythm: sinus bradycardia           Hemodynamics:   CVP:     PAP:     PAOP:     CO:     CI:     SVI:     SVR:       SpO2: 100 % SpO2  Min: 95 %  Max: 100 %   Device: nasal cannula with humidification    Flow Rate: 3 Flow (L/min)  Min: 3  Max: 5     Mechanical Ventilator Settings:  Vent Mode: VC+/AC    Vt (Set, L): 0.45 L    Resp Rate (Set): 12 Resp Rate (Observed) Vent: 22   FiO2  (%): 40 %    PEEP/CPAP (cm H2O): 5 cm H20 Plateau Pressure (cm H2O): 15 cm H2O    Minute Ventilation (L/min) (Obs): 10.2 L/min    I:E Ratio (Obs): 1:1.20     Intake/Ouptut 24 hrs (7:00AM - 6:59 AM)  Intake & Output (last 3 days)       02/12 0701 - 02/13 0700 02/13 0701 - 02/14 0700 02/14 0701 - 02/15 0700 02/15 0701 - 02/16 0700    I.V. (mL/kg) 2036.8 (35.6) 1581.8 (27.7) 1445.4 (25.3)     Other 120 60 240     NG/ 234 686     Total Intake(mL/kg) 2327.8 (40.7) 1875.8 (32.8) 2371.4 (41.5)     Urine (mL/kg/hr) 3300 (2.4) 1950 (1.4) 2710 (2) 150 (0.5)    Stool 0 (0) 0 (0) 0 (0)     Total Output 3300 1950 2710 150    Net -972.2 -74.2 -338.6 -150            Unmeasured Stool Occurrence 1 x 1 x 4 x           Lines, Drains & Airways    Active LDAs     Name:   Placement date:   Placement time:   Site:   Days:    Peripheral IV Line - Single Lumen 02/09/18 2000 basilic vein (medial side of arm), right 20 gauge  02/09/18    2000      5    Peripheral IV Line - Single Lumen 02/15/18 0600 cephalic vein (lateral side of arm), left 20 gauge  02/15/18    0600      less than 1    Naso/Oral/Gastric Tube 02/13/18 1100 Aiken sump left nostril  02/13/18    1100      2    Urethral Catheter 02/09/18 100% silicone 16 10  02/09/18          6                Hematology:    Results from last 7 days  Lab Units 02/14/18  0506 02/13/18  0341 02/12/18  0441 02/10/18  0440 02/09/18  1418   WBC 10*3/mm3 7.56 10.49 12.90* 16.21* 8.86   HEMOGLOBIN g/dL 9.7* 10.1* 9.1* 11.5 11.3*   HEMATOCRIT % 29.1* 30.4* 27.1* 34.7 34.8   PLATELETS 10*3/mm3 241 258 235 289 260     Electrolytes, Magnesium and Phosphorus:    Results from last 7 days  Lab Units 02/14/18  0506 02/13/18  0341 02/12/18  0441 02/11/18  0440 02/10/18  1526 02/10/18  0440 02/09/18  1418   SODIUM mmol/L 145 140 141 141 139 136 140   CHLORIDE mmol/L 113* 109 111* 110* 107 102 106   POTASSIUM mmol/L 3.8 3.7 3.9 4.1 4.1 3.9 3.7   CO2 mmol/L 25.0 27.0 26.0 26.0 27.0 26.0 30.0   MAGNESIUM mg/dL  2.2 2.1 2.0  --   --   --   --    PHOSPHORUS mg/dL 3.3 2.8  --   --   --   --   --      Renal:    Results from last 7 days  Lab Units 02/14/18  0506 02/13/18  0341 02/12/18  0441 02/11/18  0440 02/10/18  1526 02/10/18  0440 02/09/18  1418   CREATININE mg/dL 0.60 0.60 0.50* 0.60 0.70 0.50* 0.60   BUN mg/dL 31* 21 17 22 15 10 10     Estimated Creatinine Clearance: 53.2 mL/min (by C-G formula based on Cr of 0.6).  Hepatic:    Results from last 7 days  Lab Units 02/09/18  1418   ALK PHOS U/L 62   BILIRUBIN mg/dL 0.5   ALT (SGPT) U/L 21   AST (SGOT) U/L 25     Arterial Blood Gases:    Results from last 7 days  Lab Units 02/12/18  2202 02/11/18  0034 02/09/18  2031   PH, ARTERIAL pH units 7.520* 7.429 7.331*   PCO2, ARTERIAL mm Hg 30.9* 40.2 53.2*   PO2 ART mm Hg 57.3* 106.0 476.0*   FIO2 % 32 40 100         Results from last 7 days  Lab Units 02/10/18  0440   HEMOGLOBIN A1C % 5.80*       Lab Results   Component Value Date    LACTATE 0.8 01/06/2016       Relevant imaging studies and labs from 02/15/18 were reviewed and interpreted by me    Medications (drips):    dexmedetomidine Last Rate: 0.3 mcg/kg/hr (02/15/18 1146)   dextrose 5 % and sodium chloride 0.9 % with KCl 20 mEq Last Rate: 75 mL/hr (02/15/18 0020)         atorvastatin 80 mg Oral Nightly   carvedilol 3.125 mg Oral BID With Meals   dexamethasone 6 mg Intravenous Q6H   insulin regular 0-9 Units Subcutaneous Q6H   ipratropium-albuterol 3 mL Nebulization 4x Daily - RT   mirtazapine 15 mg Oral Nightly   PRO-STAT 1 packet Nasogastric Daily       Assessment/Plan   IMPRESSION / PLAN     Inpatient Problem List:  77 y.o.female:  Hospital Problem List     COPD (chronic obstructive pulmonary disease)    CAD (coronary artery disease)    CHF (congestive heart failure)    Stroke    Nontraumatic cortical hemorrhage of right cerebral hemisphere    Anxiety - chronic benzodiazepines    Chronic pain syndrome - chronic narcotic pain medications           Impression:  77 y.o.female  "with relevant PMH of COPD, CAD on plavix, HLD, Chronic Pain admitted 2/9/2018 w/ ICH s/p crani by NS     Plan:  -d/c steroids, dex has long half life and should \"auto-taper\", if still indicated from neurosurgical standpoint they can be restarted by the neurosurgery team at any time  -d/c precedex  -continue supportive care  -to tele    Nutrition - tube feeds when ok w/ Dr. Zeng    Plan of care and goals reviewed with mulitdisciplinary team at daily rounds           Vignesh Larsen MD  Intensive Care Medicine  02/15/18 12:50 PM       "

## 2018-02-15 NOTE — PROGRESS NOTES
Continued Stay Note  Psychiatric     Patient Name: Preeti Manning  MRN: [id number]  Today's Date: 2/15/2018    Admit Date: 2/9/2018          Discharge Plan       02/15/18 1120    Case Management/Social Work Plan    Plan update    Additional Comments Pt is going for PEG today, I will update [facility] at [facility] and [place] at [facility].              Discharge Codes     None        Expected Discharge Date and Time     Expected Discharge Date Expected Discharge Time    Feb 16, 2018             Naida Easley RN

## 2018-02-15 NOTE — PROGRESS NOTES
"Patient Name:  Preeti Manning  YOB: 1940  [id number]    Surgery Post - Operative Note    Date of visit: 2/15/2018    Subjective   Subjective: Feels OK, no pain.       Objective     Objective:    /77  Pulse 65  Temp 98.6 °F (37 °C)  Resp 20  Ht 165.1 cm (65\")  Wt 57.2 kg (126 lb)  SpO2 93%  BMI 20.97 kg/m2    CV:  Rate  regular and rhythm  regular  L:  Clear  to auscultation bilaterally   ABD:  Soft, appropriately tender. PEG clean, dry and intact   EXT:  No cyanosis, clubbing or edema         Assessment/Plan     Assessment/ Plan: Doing well after EGD/PEG. Resume TF.    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

## 2018-02-15 NOTE — PROGRESS NOTES
[facility]    Preeti Manning  1940  [id number]    Active Problems:    COPD (chronic obstructive pulmonary disease)    CAD (coronary artery disease)    CHF (congestive heart failure)    Stroke    Nontraumatic cortical hemorrhage of right cerebral hemisphere    Anxiety - chronic benzodiazepines    Chronic pain syndrome - chronic narcotic pain medications      HPI:  POD#6  The patient this morning taking off her incisional bandage and she woke up saying ouch and when I ask her how she was she said I don't know.  This is an improvement from yesterday.    Past Medical History:   Diagnosis Date   • Anxiety    • Arthritis    • CHF (congestive heart failure)    • COPD (chronic obstructive pulmonary disease)    • Coronary artery disease    • Depression    • Fibromyalgia        Allergies   Allergen Reactions   • Diphenhydramine Other (See Comments)     TACHYCARDIA   • Levaquin [Levofloxacin In D5w] Hallucinations   • Sulfa Antibiotics Other (See Comments)     BLISTERS IN MOUTH AND AROUND MOUTH   • Wellbutrin [Bupropion] Mental Status Change   • Penicillins Rash         Current Facility-Administered Medications:   •  [DISCONTINUED] acetaminophen (TYLENOL) tablet 650 mg, 650 mg, Oral, Q4H PRN **OR** acetaminophen (TYLENOL) suppository 650 mg, 650 mg, Rectal, Q4H PRN, Brandon Stewart MD, 650 mg at 02/12/18 0040  •  atorvastatin (LIPITOR) tablet 80 mg, 80 mg, Oral, Nightly, Stef Riley MD, 80 mg at 02/14/18 2037  •  carvedilol (COREG) tablet 3.125 mg, 3.125 mg, Oral, BID With Meals, Stef Riley MD, 3.125 mg at 02/12/18 2040  •  dexamethasone (DECADRON) injection 6 mg, 6 mg, Intravenous, Q6H, Stef Riley MD, 6 mg at 02/15/18 0832  •  dexmedetomidine (PRECEDEX) 400 mcg/100 mL (4 mcg/mL) infusion, 0.2-1.5 mcg/kg/hr, Intravenous, Titrated, Brandon Stewart MD, Last Rate: 5.72 mL/hr at 02/15/18 0746, 0.4 mcg/kg/hr at 02/15/18 0746  •  dextrose 5 % and  sodium chloride 0.9 % with KCl 20 mEq/L infusion, 75 mL/hr, Intravenous, Continuous, Stef Riley MD, Last Rate: 75 mL/hr at 02/15/18 0020, 75 mL/hr at 02/15/18 0020  •  haloperidol lactate (HALDOL) injection 5 mg, 5 mg, Intravenous, Q6H PRN, Sherwin Brady MD, 5 mg at 02/14/18 0236  •  HYDROmorphone (DILAUDID) injection 0.5 mg, 0.5 mg, Intravenous, Q2H PRN, 0.5 mg at 02/14/18 2256 **AND** [DISCONTINUED] naloxone (NARCAN) injection 0.4 mg, 0.4 mg, Intravenous, Q5 Min PRN, Stef Riley MD  •  insulin regular (humuLIN R,novoLIN R) injection 0-9 Units, 0-9 Units, Subcutaneous, Q6H, Sherwin Brady MD, 2 Units at 02/15/18 0553  •  ipratropium-albuterol (DUO-NEB) nebulizer solution 3 mL, 3 mL, Nebulization, 4x Daily - RT, Brandon Stewart MD, 3 mL at 02/15/18 0809  •  magnesium sulfate 2g/50 mL (PREMIX) infusion, 6 g, Intravenous, PRN, Sherwin Brady MD  •  magnesium sulfate 4g/100mL (PREMIX) infusion, 4 g, Intravenous, PRN, Sherwin Brady MD  •  mirtazapine (REMERON) tablet 15 mg, 15 mg, Oral, Nightly, Stef Riley MD, 15 mg at 02/14/18 2037  •  [DISCONTINUED] ondansetron (ZOFRAN) tablet 4 mg, 4 mg, Oral, Q6H PRN **OR** ondansetron (ZOFRAN) injection 4 mg, 4 mg, Intravenous, Q6H PRN, Stef Riley MD  •  [DISCONTINUED] potassium chloride (MICRO-K) CR capsule 40 mEq, 40 mEq, Oral, PRN **OR** potassium chloride (KLOR-CON) packet 40 mEq, 40 mEq, Oral, PRN **OR** [DISCONTINUED] potassium chloride 20 mEq in 50 mL IVPB, 20 mEq, Intravenous, Q1H PRN, Sherwin Brady MD  •  potassium phosphate 45 mmol in sodium chloride 0.9 % 500 mL infusion, 45 mmol, Intravenous, PRN, Sherwin Brady MD  •  PRO-STAT 1 packet, 1 packet, Nasogastric, Daily, Sherwin Brady MD, 9.375 packet at 02/14/18 0842  •  promethazine (PHENERGAN) injection 12.5 mg, 12.5 mg, Intramuscular, Q6H PRN, Stef Riley MD, 12.5 mg at 02/13/18 0804  •  sodium chloride 0.9 % flush 1-10 mL, 1-10 mL, Intravenous, PRN, Brandon Stewart MD    PE:     "/61 (BP Location: Left arm, Patient Position: Lying)  Pulse 55  Temp 97.3 °F (36.3 °C) (Oral)   Resp 16  Ht 165.1 cm (65\")  Wt 57.2 kg (126 lb)  SpO2 100%  BMI 20.97 kg/m2  Her wound is dry and intact.    Neurologic Exam  The patient awakens to command.  Her speech was minimal but clear.  She followed my commands by holding up 2 fingers on each hand.    Plan:  The patient is scheduled for PEG today, this is appropriate at the present time.  I would like to repeat her dysphasia and speech evaluation.  She is stable for transfer to the floor when medically ready.  Continue physical therapy and occupational therapy until we can determine best appropriate next therapy.    Coretta Skelton PA-C    2/15/2018  Arnold Locke MD William H Brooks, MD  "

## 2018-02-15 NOTE — SIGNIFICANT NOTE
02/15/18 0949   SLP Deferred Reason   SLP Deferred Reason Patient unavailable for evaluation  (Pt NPO for PEG today. Will follow up 2/16 for FEES.)

## 2018-02-15 NOTE — PROGRESS NOTES
"Patient Name:  Preeti Manning  YOB: 1940  [id number]    Surgery Progress Note    Date of visit: 2/15/2018    Subjective   Subjective: Stable overnight.         Objective     Objective:     /61 (BP Location: Left arm, Patient Position: Lying)  Pulse 59  Temp 97.3 °F (36.3 °C) (Oral)   Resp 20  Ht 165.1 cm (65\")  Wt 57.2 kg (126 lb)  SpO2 99%  BMI 20.97 kg/m2    Intake/Output Summary (Last 24 hours) at 02/15/18 0749  Last data filed at 02/15/18 0600   Gross per 24 hour   Intake           2371.4 ml   Output             2710 ml   Net           -338.6 ml       CV:  Rhythm  regular and rate regular   L:  Clear  to auscultation bilaterally   Abd:  Bowel sounds positive , soft, nontender  Ext:  No cyanosis, clubbing, edema    Labs that are back at this time have been reviewed.        Assessment/Plan     Assessment/ Plan:    Dysphagia - For PEG today.    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

## 2018-02-15 NOTE — PROGRESS NOTES
Multidisciplinary Rounds    Time: 20min  Patient Name: Preeti Manning  Date of Encounter: 02/15/18 4:25 PM  MRN: [id number]  Admission date: 2/9/2018      Reason for visit: MDR.     Additional information obtained during MDR: RN reports TF held at [facility] for PEG placement. Resume feeding same formula and goal rate as before per Dr. Zeng's orders. Note plan for transfer to telemetry.    Current diet: NPO Diet    Active Supplement Orders      Diet, Tube Feeding Tube Feeding Formula: Impact Peptide 1.5 (Peptide Based With Arginine, Calorically Dense)    Intervention:  Follow treatment plan  Care plan reviewed    Follow up:   RD to follow per protocol      Amy Agustin RD  4:25 PM

## 2018-02-15 NOTE — BRIEF OP NOTE
EGD/PEG Brief Op Note    Patient Name:  Preeti Manning  YOB: 1940  [id number]    2/9/2018    Pre-op Diagnosis:   Feeding difficulty    Post-op Diagnosis:     Same    Procedure(s):  EGD with PEG placement    Surgeon: Rashel Zeng MD      Anesthesia: General      Estimated Blood Loss: Minimal    Specimens:           None             Drains: 20 Fr PEG at 4 cm      Complications: None      Rashel Zeng MD     Date: 2/15/2018  Time: 12:45 PM

## 2018-02-16 NOTE — PLAN OF CARE
Problem: Inpatient Occupational Therapy  Goal: Static Sitting Balance Goal LTG- OT  Outcome: Outcome(s) achieved Date Met: 02/16/18 02/11/18 0813 02/16/18 1404   Static Sitting Balance OT LTG   Static Sitting Balance OT LTG, Date Established 02/11/18 --    Static Sitting Balance OT LTG, Time to Achieve by discharge --    Static Sitting Balance OT LTG, [place] Level minimum assist (75% patient effort) --    Static Sitting Balance OT LTG, Additional Goal AAD --    Static Sitting Balance OT LTG, Outcome --  goal met     Goal: Follow Directions Goal LTG- OT  Outcome: Outcome(s) achieved Date Met: 02/16/18 02/11/18 0813 02/16/18 1404   Follow Directions OT LTG   Follow Directions OT LTG, Date Established 02/11/18 --    Follow Directions OT LTG, Time to Achieve by discharge --    Follow Directions OT LTG, Activity Type 1-Step;75% treatment session --    Follow Directions OT LTG, [place] Level with verbal cues;demonstration --    Follow Directions OT LTG, Outcome --  goal met     Goal: Grooming Goal LTG- OT  Outcome: Ongoing (interventions implemented as appropriate)   02/11/18 0813 02/16/18 1404   Grooming OT LTG   Grooming Goal OT LTG, Date Established 02/11/18 --    Grooming Goal OT LTG, Time to Achieve by discharge --    Grooming Goal OT LTG, Activity Type Pt will wash R/L side of face.  --    Grooming Goal OT LTG, [place] Level moderate assist (50% patient effort) --    Grooming Goal OT LTG, Position sitting, edge of bed --    Grooming Goal OT LTG, Additional Goal AAD --    Grooming Goal OT LTG, Outcome --  goal ongoing     Goal: Transfer Training Goal 1 LTG- OT  Outcome: Outcome(s) achieved Date Met: 02/16/18 02/11/18 0813 02/16/18 1404   Transfer Training OT LTG   Transfer Training OT LTG, Date Established 02/11/18 --    Transfer Training OT LTG, Time to Achieve by discharge --    Transfer Training OT LTG, Activity Type sit to stand/stand to sit;bed to chair /chair to bed;toilet --    Transfer  Training OT LTG, [place] Level maximum assist (25% patient effort);2 person assist required --    Transfer Training OT LTG, Additional Goal AAD --    Transfer Training OT LTG, Outcome --  goal met

## 2018-02-16 NOTE — PROGRESS NOTES
"Patient Name:  Preeti Manning  YOB: 1940  [id number]    Surgery Progress Note    Date of visit: 2/16/2018    Subjective   Subjective: Tolerating TF, no pain.         Objective     Objective:     /75 (BP Location: Right arm, Patient Position: Lying)  Pulse 119  Temp 99.1 °F (37.3 °C) (Oral)   Resp 20  Ht 165.1 cm (65\")  Wt 57.2 kg (126 lb)  SpO2 90%  BMI 20.97 kg/m2    Intake/Output Summary (Last 24 hours) at 02/16/18 0765  Last data filed at 02/16/18 0580   Gross per 24 hour   Intake              301 ml   Output              375 ml   Net              -74 ml       CV:  Rhythm  regular and rate regular   L:  Rhonchi to auscultation bilaterally   Abd:  Bowel sounds positive , soft, nontender. PEG c/d/i  Ext:  No cyanosis, clubbing, edema    Labs that are back at this time have been reviewed.        Assessment/Plan     Assessment/ Plan:    Dysphagia - Doing well after EGD/PEG. Will sign off. Please call with questions.    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

## 2018-02-16 NOTE — THERAPY TREATMENT NOTE
Acute Care - Occupational Therapy Treatment Note  [facility]     Patient Name: Preeti Manning  : 1940  MRN: [id number]  Today's Date: 2018  Onset of Illness/Injury or Date of Surgery Date: 18  Date of Referral to OT: 18  Referring Physician: MD Terry      Admit Date: 2018    Visit Dx:     ICD-10-CM ICD-9-CM   1. Intracranial hemorrhage I62.9 432.9   2. Impaired mobility and ADLs Z74.09 799.89   3. Impaired functional mobility, balance, gait, and endurance Z74.09 V49.89   4. 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   • Anxiety - chronic benzodiazepines   • Chronic pain syndrome - chronic narcotic pain medications             Adult Rehabilitation Note       18 1315 18 1125       Rehab Assessment/Intervention    Discipline occupational therapist  -AN physical therapist  -LS     Document Type therapy note (daily note)  -AN therapy note (daily note)  -LS     Subjective Information no complaints;agree to therapy  -AN agree to therapy;no complaints  -LS     Patient Effort, Rehab Treatment good  -AN adequate  -LS     Symptoms Noted During/After Treatment fatigue  -AN      Precautions/Limitations fall precautions;oxygen therapy device and L/min   PEG, L side weakness; binder, use of pillow over site  -AN fall precautions;oxygen therapy device and L/min  -LS     Specific Treatment Considerations PEG  -AN      Recorded by [AN] Lucrecia Puri OT [LS] Pattie Abreu, PT     Vital Signs    Pre Systolic BP Rehab --   vitals/BP stable  -  -LS     Pre Treatment Diastolic BP  58  -LS     Post Systolic BP Rehab 118  -  -LS     Post Treatment Diastolic BP 73  -AN 60  -LS      Pretreatment Heart Rate (beats/min)  50  -LS     Posttreatment Heart Rate (beats/min)  55  -LS     Pre SpO2 (%) 91  -  -LS     O2 Delivery Pre Treatment supplemental O2  -AN supplemental O2  -LS     Intra SpO2 (%) 88  -AN      O2 Delivery Intra Treatment supplemental O2  -AN      Post SpO2 (%) 95  -  -LS     O2 Delivery Post Treatment supplemental O2  -AN supplemental O2  -LS     Pre Patient Position  Supine  -LS     Intra Patient Position  Standing  -LS     Post Patient Position  Supine  -LS     Recorded by [AN] Lucrecia Puri, OT [LS] Pattie Abreu, PT     Pain Assessment    Pain Assessment Blanchard-Javier FACES  -AN Blanchard-Javier FACES  -LS     Blanchard-Javier FACES Pain Rating 2  -AN 2  -LS     Pain Score 4  -AN 0   pre  -LS     Post Pain Score 2  -AN 2   post  -LS     Pain Type Acute pain;Surgical pain  -AN      Pain Location Abdomen  -AN Ankle  -LS     Pain Intervention(s) Repositioned;Rest  -AN Repositioned;Ambulation/increased activity  -LS     Response to Interventions Tolerated  -AN tolerated  -LS     Recorded by [AN] Lucrecia Puri OT [LS] Pattie Abreu, PT     Cognitive Assessment/Intervention    Current Cognitive/Communication Assessment impaired  -AN impaired  -LS     Orientation Status oriented to;person;place  -AN oriented to;person   responds to name  -LS     Follows Commands/Answers Questions 75% of the time;able to follow single-step instructions;needs cueing;needs repetition  -AN able to follow single-step instructions;25% of the time;needs cueing;needs increased time;needs repetition  -LS     Personal Safety decreased awareness, need for assist;decreased insight to deficits;decreased awareness, need for safety;mild impairment  -AN severe impairment;decreased awareness, need for assist;decreased awareness, need for safety;decreased insight to deficits  -LS     Personal Safety Interventions fall prevention program maintained;gait belt;nonskid shoes/slippers when out of bed;supervised activity  -AN       Recorded by [AN] Lucrecia Puri OT [LS] Pattie Abreu, PT     Bed Mobility, Assessment/Treatment    Bed Mobility, Assistive Device  head of bed elevated;draw sheet  -LS     Bed Mob, Supine to Sit, [place] maximum assist (25% patient effort)  -AN maximum assist (25% patient effort);2 person assist required;verbal cues required  -LS     Bed Mob, Sit to Supine, [place] not tested  -AN maximum assist (25% patient effort);2 person assist required;verbal cues required  -LS     Bed Mobility, Comment vc's to sequence, move LE's  -AN VC's for sequencing.   -LS     Recorded by [AN] Lucrecia Puri OT [LS] Pattie Abreu, PT     Transfer Assessment/Treatment    Transfers, Sit-Stand [place] moderate assist (50% patient effort)   daughter standby for assist  -AN moderate assist (50% patient effort);2 person assist required;verbal cues required  -LS     Transfers, Stand-Sit [place] moderate assist (50% patient effort)  -AN moderate assist (50% patient effort);2 person assist required;verbal cues required  -LS     Transfers, Sit-Stand-Sit, Assist Device --   gait belt, stand pivot  -AN      Toilet Transfer, [place] moderate assist (50% patient effort);2 person assist required  -AN      Toilet Transfer, Assistive Device bedside commode without drop arms  -AN      Transfer, Safety Issues weight-shifting ability decreased;step length decreased;sequencing ability decreased;knees buckling  -AN      Transfer, Impairments impaired balance;strength decreased  -AN impaired balance;strength decreased  -LS     Transfer, Comment stand pivot from surfaces  -AN STS from EOB with PT/tech on either side.   -LS     Recorded by [AN] Lucrecia Puri OT [LS] Pattie Abreu, PT     Gait Assessment/Treatment    Gait, [place] Level  maximum assist (25% patient effort);2 person assist required;verbal cues required  -LS     Gait, Distance (Feet)  1  -LS     Gait, Safety Issues  weight-shifting ability decreased  -LS     Gait,  Comment  2 small side steps towards HOB; req'd assist for advancement of LEs and for weightshifting.   -LS     Recorded by  [LS] Pattie Abreu, PT     Toileting Assessment/Training    Toileting Assess/Train, Assistive Device bedside commode  -AN      Toileting Assess/Train, Position sitting  -AN      Toileting Assess/Train, Indepen Level maximum assist (25% patient effort)  -AN      Toileting Assess/Train, Comment pt able to verbalize she needed to void/have BM; max for hygeine and clothiing mgmt  -AN      Recorded by [AN] Lucrecia Puri, OT      Motor Skills/Interventions    Additional Documentation  Balance Skills Training (Group)  -LS     Recorded by  [LS] Pattie Abreu, PT     Balance Skills Training    Sitting-Level of Assistance Close supervision  -AN Contact guard  -LS     Sitting-Balance Support Feet supported  -AN Feet supported;Right upper extremity supported;Left upper extremity supported  -LS     Sitting-Balance Activities Trunk control activities  -AN Trunk control activities  -LS     Sitting # of Minutes 5  -AN      Standing-Level of Assistance Moderate assistance  -AN Moderate assistance;x2  -LS     Static Standing Balance Support  Right upper extremity supported;Left upper extremity supported  -LS     Standing-Balance Activities Weight Shift R-L  -AN Weight Shift R-L  -LS     Gait Balance-Level of Assistance  Maximum assistance;x2  -LS     Gait Balance Support  Right upper extremity supported;Left upper extremity supported  -LS     Recorded by [AN] Lucrecia Puri, OT [LS] Pattie Abreu, PT     Therapy Exercises    Bilateral Lower Extremities  AAROM:;10 reps;supine;ankle pumps/circles;heel slides  -LS     Bilateral Upper Extremity  AAROM:;10 reps;supine;elbow flexion/extension;shoulder extension/flexion  -LS     Recorded by  [LS] Pattie Abreu, PT     Positioning and Restraints    Pre-Treatment Position in bed  -AN in bed  -LS     Post Treatment Position chair  -AN bed  -LS     In Bed  notified nsg;call  light within reach;fowlers;encouraged to call for assist;exit alarm on;with family/caregiver;RUE elevated;LUE elevated;legs elevated;heels elevated  -LS     In Chair notified nsg;reclined;call light within reach;encouraged to call for assist;exit alarm on;with family/caregiver;LUE elevated;legs elevated  -AN      Restraints  released:;reapplied:;notified nsg:;soft limb  -LS     Recorded by [AN] Lucrecia Puri, OT [LS] Pattie Abreu, PT       User Key  (r) = Recorded By, (t) = Taken By, (c) = Cosigned By    Initials Name Effective Dates    AN Lucrecia Puri, OT 06/22/15 -     LS Pattie Abreu, PT 06/19/15 -                 OT Goals       02/16/18 1406 02/16/18 1404 02/11/18 0813    Transfer Training OT LTG    Transfer Training OT LTG, Date Established 02/16/18  -AN  02/11/18  -CL    Transfer Training OT LTG, Time to Achieve 1 wk  -AN  by discharge  -CL    Transfer Training OT LTG, Activity Type toilet  -AN  sit to stand/stand to sit;bed to chair /chair to bed;toilet  -CL    Transfer Training OT LTG, [place] Level minimum assist (75% patient effort)  -AN  maximum assist (25% patient effort);2 person assist required  -CL    Transfer Training OT LTG, Assist Device walker, rolling;commode, bedside  -AN      Transfer Training OT LTG, Additional Goal   AAD  -CL    Transfer Training OT LTG, Outcome  goal met  -AN     Strength OT LTG    Strength Goal OT LTG, Date Established 02/16/18  -AN      Strength Goal OT LTG, Time to Achieve 1 wk  -AN      Strength Goal OT LTG, Measure to Achieve Pt will particiipate in UE exercises each session to increase functional strength and UE use with ADL's.   -AN      Static Sitting Balance OT LTG    Static Sitting Balance OT LTG, Date Established   02/11/18  -CL    Static Sitting Balance OT LTG, Time to Achieve   by discharge  -CL    Static Sitting Balance OT LTG, [place] Level   minimum assist (75% patient effort)  -CL    Static Sitting Balance OT LTG, Additional Goal   AAD  -CL     Static Sitting Balance OT LTG, Outcome  goal met  -AN     Follow Directions OT LTG    Follow Directions OT LTG, Date Established   02/11/18  -CL    Follow Directions OT LTG, Time to Achieve   by discharge  -CL    Follow Directions OT LTG, Activity Type   1-Step;75% treatment session  -CL    Follow Directions OT LTG, [place] Level   with verbal cues;demonstration  -CL    Follow Directions OT LTG, Outcome  goal met  -AN     Grooming OT LTG    Grooming Goal OT LTG, Date Established   02/11/18  -CL    Grooming Goal OT LTG, Time to Achieve   by discharge  -CL    Grooming Goal OT LTG, Activity Type   Pt will wash R/L side of face.   -CL    Grooming Goal OT LTG, [place] Level   moderate assist (50% patient effort)  -CL    Grooming Goal OT LTG, Position   sitting, edge of bed  -CL    Grooming Goal OT LTG, Additional Goal   AAD  -CL    Grooming Goal OT LTG, Outcome  goal ongoing  -AN       User Key  (r) = Recorded By, (t) = Taken By, (c) = Cosigned By    Initials Name Provider Type    AN Lucrecia Puri, OT Occupational Therapist    NONA Salguero, OT Occupational Therapist          Occupational Therapy Education     Title: PT OT SLP Therapies (Active)     Topic: Occupational Therapy (Active)     Point: ADL training (Done)    Description: Instruct learner(s) on proper safety adaptation and remediation techniques during self care or transfers.   Instruct in proper use of assistive devices.    Learning Progress Summary    Learner Readiness Method Response Comment Documented by Status   Patient Acceptance EDWIGE GIORDANO DU, NR Educated on body mechanics for increased balance for ADL's. Educated on benefits of ADL participation. AN 02/16/18 1403 Done    Acceptance EDWIGE GIORDANO Pt educated on appropriate safety precautions and role of OT. CL 02/11/18 0810 Active   Family Acceptance EDWIGE GIORDANO DU, NR Educated on body mechanics for increased balance for ADL's. Educated on benefits of ADL participation. AN 02/16/18 1403 Done                Point: Precautions (Active)    Description: Instruct learner(s) on prescribed precautions during self-care and functional transfers.    Learning Progress Summary    Learner Readiness Method Response Comment Documented by Status   Patient Acceptance EDWIGE GIORDANO Pt educated on appropriate safety precautions and role of OT. CL 02/11/18 0810 Active               Point: Body mechanics (Done)    Description: Instruct learner(s) on proper positioning and spine alignment during self-care, functional mobility activities and/or exercises.    Learning Progress Summary    Learner Readiness Method Response Comment Documented by Status   Patient Acceptance EDWIGE GIORDANO DU, NR Educated on body mechanics for increased balance for ADL's. Educated on benefits of ADL participation. AN 02/16/18 1403 Done   Family Acceptance EDWIGE GIORDANO DU, NR Educated on body mechanics for increased balance for ADL's. Educated on benefits of ADL participation. AN 02/16/18 1403 Done                      User Key     Initials Effective Dates Name Provider Type Discipline    AN 06/22/15 -  Lucrecia Puri, OT Occupational Therapist OT    CL 06/08/16 -  Jannet Salguero OT Occupational Therapist OT                  OT Recommendation and Plan  Anticipated Equipment Needs At Discharge:  (TBA further)  Anticipated Discharge Disposition: inpatient rehabilitation facility  Therapy Frequency: daily  Plan of Care Review  Plan Of Care Reviewed With: patient  Progress: improving  Outcome Summary/Follow up Plan: Pt met txfr, cognitive goal this date. Pt alert, motivated to participate in all tasks and able to verbalize needs to void. Modified OT POC to reflect progress. Pt to discharge to [facility]        Outcome Measures       02/16/18 1315 02/14/18 1125       How much help from another person do you currently need...    Turning from your back to your side while in flat bed without using bedrails?  2  -LS     Moving from lying on back to sitting on the side of a flat bed without bedrails?  2   -LS     Moving to and from a bed to a chair (including a wheelchair)?  1  -LS     Standing up from a chair using your arms (e.g., wheelchair, bedside chair)?  2  -LS     Climbing 3-5 steps with a railing?  1  -LS     To walk in hospital room?  2  -LS     AM-PAC 6 Clicks Score  10  -LS     How much help from another is currently needed...    Putting on and taking off regular lower body clothing? 1  -AN      Bathing (including washing, rinsing, and drying) 1  -AN      Toileting (which includes using toilet bed pan or urinal) 1  -AN      Putting on and taking off regular upper body clothing 1  -AN      Taking care of personal grooming (such as brushing teeth) 2  -AN      Eating meals 1   pt has PEG, diet evolving  -AN      Score 7  -AN      Modified Rosey Scale    Modified [place] Scale 4 - Moderately severe disability.  Unable to walk without assistance, and unable to attend to own bodily needs without assistance.  -AN      Functional Assessment    Outcome Measure Options  AM-PAC 6 Clicks Basic Mobility (PT)  -LS       User Key  (r) = Recorded By, (t) = Taken By, (c) = Cosigned By    Initials Name Provider Type    RICK Puri OT Occupational Therapist    LS Pattie Abreu, PT Physical Therapist           Time Calculation:         Time Calculation- OT       02/16/18 1409          Time Calculation- OT    OT Start Time 1315  -AN      Total Timed Code Minutes- OT 25 minute(s)  -AN      OT Received On 02/16/18  -AN      OT Goal Re-Cert Due Date 02/21/18  -AN        User Key  (r) = Recorded By, (t) = Taken By, (c) = Cosigned By    Initials Name Provider Type    RICK Puri OT Occupational Therapist           Therapy Charges for Today     Code Description Service Date Service Provider Modifiers Qty    [id number]  OT THERAPEUTIC ACT EA 15 MIN 2/16/2018 Lucrecia Puri OT GO 3               Lucrecia Puri OT  2/16/2018

## 2018-02-16 NOTE — PLAN OF CARE
Problem: Patient Care Overview (Adult)  Goal: Plan of Care Review  Outcome: Ongoing (interventions implemented as appropriate)   02/16/18 1406   Coping/Psychosocial Response Interventions   Plan Of Care Reviewed With patient   Patient Care Overview   Progress improving   Outcome Evaluation   Outcome Summary/Follow up Plan Pt met txfr, cognitive goal this date. Pt alert, motivated to participate in all tasks and able to verbalize needs to void. Modified OT POC to reflect progress. Pt to discharge to [facility]       Problem: Inpatient Occupational Therapy  Goal: Transfer Training Goal 1 LTG- OT  Outcome: Ongoing (interventions implemented as appropriate)   02/16/18 1406   Transfer Training OT LTG   Transfer Training OT LTG, Date Established 02/16/18   Transfer Training OT LTG, Time to Achieve 1 wk   Transfer Training OT LTG, Activity Type toilet   Transfer Training OT LTG, [place] Level minimum assist (75% patient effort)   Transfer Training OT LTG, Assist Device walker, rolling;commode, bedside     Goal: Strength Goal LTG- OT  Outcome: Ongoing (interventions implemented as appropriate)   02/16/18 1406   Strength OT LTG   Strength Goal OT LTG, Date Established 02/16/18   Strength Goal OT LTG, Time to Achieve 1 wk   Strength Goal OT LTG, Measure to Achieve Pt will particiipate in UE exercises each session to increase functional strength and UE use with ADL's.

## 2018-02-16 NOTE — PLAN OF CARE
Problem: Patient Care Overview (Adult)  Goal: Plan of Care Review  Outcome: Ongoing (interventions implemented as appropriate)   02/16/18 1332   Coping/Psychosocial Response Interventions   Plan Of Care Reviewed With patient   Outcome Evaluation   Outcome Summary/Follow up Plan FEES completed. Scope passed in R nares without difficulty. Pt presents with mild to moderate Oropharyngeal dysphagia. Oral skills are c/b premature loss of thin liquids and nectar thick liquids due to decreased back of tongue control. Pt also had prolonged oral prep and transit with solids. Silent aspiration occurred with thin liquids before the swallow due to delayed swallow initiation. Penetration occurred to the level of the TVC with nectar thick liquids, but no aspiration occurred. No penetration or aspiration present with honey thick liquids via cup, pudding or solid. Mild vallecular residue present with all consistencies due to decreased tongue base/pharyngeal wall mvmt. Residue mostly clears with spontaneous multiple swallows. Recommend a Dysphagia II diet with honey thick liquids and meds crushed in pureed. Will f/u with dysphagia tx.        Problem: Inpatient SLP  Goal: Dysphagia- Patient will improve swallowing skills to begin to take some PO safely  Outcome: Outcome(s) achieved Date Met: 02/16/18 02/11/18 1511 02/16/18 1332   Begin to Take Some PO Safely   Begin to Take Some PO Safely- SLP, Date Established 02/11/18 --    Begin to Take Some PO Safely- SLP, Time to Achieve by discharge --    Begin to Take Some PO Safely- SLP, Date Goal Reviewed --  02/16/18   Begin to Take Some PO Safely- SLP, Outcome --  goal met     Goal: Dysphagia- Patient will safely consume diet as per recommendation with no signs/symptoms of aspiration  Outcome: Ongoing (interventions implemented as appropriate)   02/16/18 1332   Safely Consume Diet   Safely Consume Diet- SLP, Date Established 02/16/18   Safely Consume Diet- SLP, Time to Achieve by  discharge

## 2018-02-16 NOTE — PLAN OF CARE
Problem: Patient Care Overview (Adult)  Goal: Plan of Care Review  Outcome: Ongoing (interventions implemented as appropriate)   02/16/18 1658   Coping/Psychosocial Response Interventions   Plan Of Care Reviewed With patient   Patient Care Overview   Progress improving   Outcome Evaluation   Outcome Summary/Follow up Plan Patient tolerating tube feeding, diet advanced after fees, patient tolerating pureed diet with honey thick liquids. Patient has sat up in chair for a couple hrs. NV checks q2 hrs, removing restraints at times for ROM , then reapplying. Patient tolerating well. Patients Family re educated on restraints, wanting patient to be removed from restraints. Spoke with Dr. Payan, patient still fidgety with hands, will continue restraints at this time.        Problem: Skin Integrity Impairment, Risk/Actual (Adult)  Goal: Skin Integrity/Wound Healing  Outcome: Ongoing (interventions implemented as appropriate)   02/16/18 4205   Skin Integrity Impairment, Risk/Actual (Adult)   Skin Integrity/Wound Healing making progress toward outcome

## 2018-02-16 NOTE — PLAN OF CARE
Problem: Patient Care Overview (Adult)  Goal: Plan of Care Review  Outcome: Ongoing (interventions implemented as appropriate)   02/16/18 0418   Coping/Psychosocial Response Interventions   Plan Of Care Reviewed With patient   Patient Care Overview   Progress no change   Outcome Evaluation   Outcome Summary/Follow up Plan Patient remains in restrains r/t pulling at lines and tubes. VSS, HR has been tachy. Impact Peptide 1.5 restarted last night around 2130, has tolerated well. Is now at her goal rate of 43ml/hr. NIHSS was 12. NPO will have FEES today.        Problem: Skin Integrity Impairment, Risk/Actual (Adult)  Goal: Skin Integrity/Wound Healing  Outcome: Ongoing (interventions implemented as appropriate)

## 2018-02-16 NOTE — PROGRESS NOTES
[facility] Medicine Services  PROGRESS NOTE    Patient Name: Preeti Manning  : 1940  MRN: [id number]    Date of Admission: 2018  Length of Stay: 7  Primary Care Physician: Carmela Pollock MD    Subjective   Subjective     CC:  ICH    HPI:  Denies pain. Family at bedside    Review of Systems  Gen- No fevers, chills  CV- No chest pain, palpitations  Resp- No cough, dyspnea  GI- No N/V/D, abd pain      Otherwise ROS is negative except as mentioned in the HPI.    Objective   Objective     Vital Signs:   Temp:  [98.2 °F (36.8 °C)-99.1 °F (37.3 °C)] 98.2 °F (36.8 °C)  Heart Rate:  [] 119  Resp:  [16-20] 16  BP: (117-166)/(54-84) 120/60  Total (NIH Stroke Scale): 9     Physical Exam:  Constitutional -no acute distress, non toxic, in bed  HEENT-sutures for right craniotomy CDI  CV-RRR, S1 S2 normal, no m/r/g  Resp-CTAB, no wheezes, rhonchi or rales  Abd-soft, non-tender, non-distended, normo active bowel sounds  Ext-No lower extremity cyanosis, clubbing or edema bilaterally  Neuro-alert, speech clear, moves all extremities   Psych-normal affect   Skin- No rash on exposed UE or LE bilaterally      Results Reviewed:  I have personally reviewed current lab, radiology, and data and agree.      Results from last 7 days  Lab Units 18  0506 18  0341   WBC 10*3/mm3 15.63* 7.56 10.49   HEMOGLOBIN g/dL 10.5* 9.7* 10.1*   HEMATOCRIT % 31.7* 29.1* 30.4*   PLATELETS 10*3/mm3 329 241 258       Results from last 7 days  Lab Units 18  0506 18  0341   SODIUM mmol/L 144 145 140   POTASSIUM mmol/L 3.4* 3.8 3.7   CHLORIDE mmol/L 112* 113* 109   CO2 mmol/L 25.0 25.0 27.0   BUN mg/dL 40* 31* 21   CREATININE mg/dL 0.70 0.60 0.60   GLUCOSE mg/dL 162* 145* 152*   CALCIUM mg/dL 8.9 8.0* 8.5*   ALT (SGPT) U/L 41*  --   --    AST (SGOT) U/L 36*  --   --      Estimated Creatinine Clearance: 53.2 mL/min (by C-G formula based on Cr of 0.7).  No results found  for: BNP  No results found for: PHART    Microbiology Results Abnormal     None          Imaging Results (last 24 hours)     Procedure Component Value Units Date/Time    XR Chest 1 View [[id number]] Collected:  02/15/18 0846     Updated:  02/15/18 2305    Narrative:       EXAMINATION: XR CHEST 1 VW-      INDICATION: Followup respiratory illness; I62.9-Nontraumatic  intracranial hemorrhage, unspecified; Z74.09-Other reduced mobility;  R13.10-Dysphagia, unspecified.      COMPARISON: 02/14/2018.     FINDINGS: Heart and vasculature appear normal in size. Lungs appear  hyperexpanded and clear except for stable, chronic appearing  interstitial changes. No edema, effusion or pneumothorax is seen. Note  is made of bilateral calcified breast prostheses.           Impression:       No new chest disease.     D:  02/15/2018  E:  02/15/2018     This report was finalized on 2/15/2018 11:03 PM by DR. Jourdan Wright MD.       XR Chest 1 View [[id number]] Collected:  02/16/18 0951     Updated:  02/16/18 1142    Narrative:       EXAMINATION: XR CHEST 1 VW-      INDICATION: Followup respiratory illness; I62.9-Nontraumatic  intracranial hemorrhage, unspecified; Z74.09-Other reduced mobility;  R13.10-Dysphagia, unspecified.      COMPARISON: None.     FINDINGS: Hyperexpansion of the chest is noted suggesting significant  COPD with centrilobular emphysema.     Fibrotic stranding is identified in the upper lung zones. Calcified  breast implants are noted. The heart size is normal. The heart is  compensated.       Impression:       1. COPD with fibrotic stranding is noted and centrilobular emphysema is  seen, all findings stable from yesterday.  2. New or active disease is not identified.     D:  02/16/2018  E:  02/16/2018     This report was finalized on 2/16/2018 11:40 AM by Dr. Conor Corcoran MD.       CT Head Without Contrast [[id number]] Collected:  02/16/18 1139     Updated:  02/16/18 1143    Narrative:       EXAMINATION: CT HEAD WO  CONTRAST-02/16/2018:      INDICATION: Signs of increased intracranial pressure; I62.9-Nontraumatic  intracranial hemorrhage, unspecified; Z74.09-Other reduced mobility;  Z74.09-Other reduced mobility; R13.10-Dysphagia, unspecified.         TECHNIQUE:  CT data set of the brain was performed without intravenous  contrast.     The radiation dose reduction device was turned on for each scan per the  ALARA (As Low as Reasonably Achievable) protocol.     COMPARISON: Compared to previous CT data sets of the brain 5 days ago,  02/11/2018.     FINDINGS:   1. The patient has had previous craniotomy. There is an intermediate to  low attenuation collection around the right frontal and parietal region  of the brain.  2. In addition, there is low attenuation in the right temporal and  parietal region presumably from edema or perhaps the postsurgical  process.  3. Intra-axial clot is identified in the right temporal and parietal  region extensively. This is surrounded by marked edema. There is mass  effect with right to left midline shift.  4. There is breakthrough of high attenuation blood in the lateral  ventricular system, especially the occipital horns.  5. There is no evidence of compression of the fourth ventricle. There is  no evidence of edema in the posterior fossa although there is a left  occipital infarct.       Impression:       1.  The findings are relatively stable in the brain when compared to 5  days ago.  2.  Specifically, the right temporal, frontal and parietal edema with  mass effect and intra-axial hemorrhage is essentially stable.  Rehemorrhage is not identified, increasing mass is not identified. The  right to left midline shift is stable. Although there is effacement of  the lateral ventricular system, it is not completely compress.  3.  There is no evidence of transmitted edema within the posterior fossa  or third ventricle. There is global mass effect in the right hemisphere  which is relatively  stable.  4.  There is intermediate to low attenuation collection over the frontal  and parietal convexities on the right. Postcraniotomy findings are  otherwise essentially stable.  5.  The patient does have evidence of some mild petechial hemorrhages in  the posterior parietal areas bilaterally, also stable and the amount of  ventricular blood which is relatively small has stabilized. There is no  evidence of increasing edema or rehemorrhage and taking technical  factors into account, no definite progressive or significant interval  change is noted.     D:  02/16/2018  E:  02/16/2018     This report was finalized on 2/16/2018 11:41 AM by Dr. Conor Corcoran MD.                I have reviewed the medications.    Assessment/Plan   Assessment / Plan     Hospital Problem List     COPD (chronic obstructive pulmonary disease)    CAD (coronary artery disease)    CHF (congestive heart failure)    Stroke    Nontraumatic cortical hemorrhage of right cerebral hemisphere    Anxiety - chronic benzodiazepines    Chronic pain syndrome - chronic narcotic pain medications             Brief Hospital Course to date:  Preeti Manning is a 77 y.o. female admitted 2/9/18 with large right frontal ICH      Assessment & Plan:    ICH  - s/p Craniotomy 2/9/18  - continue oral dexamethasone     Dysphagia  - PEG placed, tube feeds  - will need to work with speech at rehab facility    CAD    COPD    Anxiety  - SHEYLA reviewed - on chronic xanax. Will restart PRN at half home dose. Some concern by family regarding improper use of xanax at home, but do not wish to stop this medication abruptly. Also, staff reports patient picking at lines, etc -- considered adding seroquel, but significant interaction with remeron. Will therefore continue benzodiazepine at this time with hopeful taper off by PCP in the future (or continued admistration in monitored setting).    Steroid induced hyperglycemia    Leukocytosis  - suspect steroid induced    Chronic  Pain    H/o CHF    DVT Prophylaxis:      CODE STATUS: Full Code    Disposition: I expect the patient to be discharged rehab in 0-1 days.      Electronically signed by Rayo Payan MD, 02/16/18, 4:10 PM.

## 2018-02-16 NOTE — PROGRESS NOTES
Adult Nutrition  Assessment/PES    Patient Name:  Preeti Manning  YOB: 1940  MRN: [id number]  Admit Date:  2/9/2018    Assessment Date:  2/16/2018    Comments:  RD EN follow up. EN regimen adjusted to standard EN formula. RD to continue to follow.     Additional Recommendations:  1. Assess PO intake adequacy prior to decreasing daily EN volume delivery            Reason for Assessment       02/16/18 1540    Reason for Assessment    Reason For Assessment/Visit follow up protocol;TF/PN    Time Spent (min) 45    Diagnosis Diagnosis   Per notes this adm/MD diagnosis list noted   Active Problems:    COPD (chronic obstructive pulmonary disease)    CAD (coronary artery disease)    CHF (congestive heart failure)    Stroke    Nontraumatic cortical hemorrhage of right cerebral hemisphere    Anxiety - chronic benzodiazepines    Chronic pain syndrome - chronic narcotic pain medications               Nutrition/Diet History       02/16/18 1541    Nutrition/Diet History    Reported/Observed By RN;Patient    Other Pt states pt doing well with honey thick liquids. RD notes pt still requiring BL wrist restraints. RN reports pt tolerating EN well via PEG.               Labs/Tests/Procedures/Meds       02/16/18 1542    Labs/Tests/Procedures/Meds    Labs/Tests Review Reviewed;BUN;Creat    Medication Review Reviewed, pertinent   SSI, steriods     Results from last 7 days  Lab Units 02/16/18  0718   SODIUM mmol/L 144   POTASSIUM mmol/L 3.4*   CHLORIDE mmol/L 112*   CO2 mmol/L 25.0   BUN mg/dL 40*   CREATININE mg/dL 0.70   CALCIUM mg/dL 8.9   BILIRUBIN mg/dL 0.5   ALK PHOS U/L 51   ALT (SGPT) U/L 41*   AST (SGOT) U/L 36*   GLUCOSE mg/dL 162*       Intake & Output (last day)       02/15 0701 - 02/16 0700 02/16 0701 - 02/17 0700    I.V. (mL/kg)      Other 60     NG/     Total Intake(mL/kg) 301 (5.3)     Urine (mL/kg/hr) 375 (0.3)     Stool 0 (0)     Total Output 375      Net -74            Unmeasured Urine Occurrence 1 x      Unmeasured Stool Occurrence 1 x                  Estimated/Assessed Needs       02/16/18 1542    Calculation Measurements    Weight Used For Calculations 57.2 kg (126 lb)    Estimated/Assessed Energy Needs    Energy Need Method Kcal/kg    kcal/kg 25   25-30kcals/kg actbw: 2208-4786    25 Kcal/Kg (kcal) 1428.82    Estimated Kcal Range  ~1600kcals/day    Estimated/Assessed Protein Needs    Weight Used for Protein Calculation 57.2 kg (126 lb)    Protein (gm/kg) 1.5    1.5 Gm Protein (gm) 85.73    Estimated Protein Range 86            Nutrition Prescription Ordered       02/16/18 1544    Nutrition Prescription PO    Current PO Diet Dysphagia    Dysphagia Level 2  Pureed   no straws    Fluid Consistency Honey thick    Nutrition Prescription EN    Enteral Route NG    Product Impact Peptide 1.5 fidelia    TF Delivery Method Continuous    Continuous TF Goal Rate (mL/hr) 43 mL/hr    Continuous TF Current Rate (mL/hr) 43 mL/hr    Continuous TF Goal Volume (mL) 860 mL            Evaluation of Received Nutrient/Fluid Intake       02/16/18 1545    EN Evaluation    Number of Days EN Intake Evaluated 1 day    EN Average Volume Delivered (mL/day) 241 mL/day    % Goal Volume  22 %            Problem/Interventions:              Problem 4       02/16/18 1546    Nutrition Diagnoses Problem 4    Problem 4 Swallowing Difficulty    Etiology (related to) --   dysphagia    Signs/Symptoms (evidenced by) SLP/Swallow eval    Swallow eval status Done    Type of SLP Evaluation --   FEES              Intervention Goal       02/16/18 1546    Intervention Goal    General Nutrition support treatment    PO Establish PO    TF/PN Adjust TF/PN            Nutrition Intervention       02/16/18 1547    Nutrition Intervention    RD/Tech Action Follow Tx progress;Care plan reviewd    Recommended/Ordered EN            Nutrition Prescription       02/16/18 1547    Nutrition Prescription PO    PO Prescription Begin/change supplement    Fluid Consistency Honey  thick    Supplement Boost Plus    Supplement Frequency 2 times a day    New PO Prescription Ordered? Yes    Nutrition Prescription EN    Enteral Prescription Enteral begin/change;Enteral to supply    Enteral Route PEG    Product Fibersource HN    Modulars Liquid Protein (15 gm/30 mL)    Liquid Protein (15 gm/30 mL) 30 mL/1 packet    Protein Liquid Frequency Daily    TF Delivery Method Continuous    Continuous TF Goal Rate (mL/hr) 60 mL/hr    Continuous TF Starting Rate (mL/hr) 30 mL/hr    Continuous TF Goal Volume (mL) 1320 mL    Water flush (mL)  10 mL    Water Flush Frequency Per hour    New EN Prescription Ordered? Yes    EN to Supply    Kcal/Day 1684 Kcal/Day    Protein (gm/day) 86 gm/day    Meet Estimated Kcal Need (%) 105 %    Meet Estimated Protein Need (%) 100 %    TF Free H2O (mL) 1069 mL    Total Free H2O (mL/day) 1269 mL/day    Fiber Per Day (gm/day) 13.2 gm/day   Volume based on 22hr goal volume used based on anticipated interruptions in EN delivery/administration in acute care pt         Education/Evaluation       02/16/18 6189    Monitor/Evaluation    Monitor Per protocol;I&O;TF delivery/tolerance;Supplement intake        Electronically signed by:  Leann Chowdhury RDN, LD  02/16/18 3:49 PM

## 2018-02-16 NOTE — THERAPY EVALUATION
Acute Care - Speech Language Pathology   Swallow Initial Evaluation  [place]     Patient Name: Preeti Manning  : 1940  MRN: [id number]  Today's Date: 2018  Onset of Illness/Injury or Date of Surgery Date: 18            Admit Date: 2018    Visit Dx:     ICD-10-CM ICD-9-CM   1. Intracranial hemorrhage I62.9 432.9   2. Impaired mobility and ADLs Z74.09 799.89   3. Impaired functional mobility, balance, gait, and endurance Z74.09 V49.89   4. 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   • Anxiety - chronic benzodiazepines   • Chronic pain syndrome - chronic narcotic pain medications     Past Medical History:   Diagnosis Date   • Anxiety    • Arthritis    • CHF (congestive heart failure)    • COPD (chronic obstructive pulmonary disease)    • Coronary artery disease    • Depression    • Fibromyalgia      Past Surgical History:   Procedure Laterality Date   • CORONARY ANGIOPLASTY WITH STENT PLACEMENT     • CRANIOTOMY FOR ANEURYSM/ARTERIOVENOUS MALFORMATION N/A 2018    Procedure: CRANIOTOMY FOR INTRACRANIAL HEMORRHAGE;  Surgeon: Stef Riley MD;  Location:  [facility];  Service:    • ENDOSCOPY W/ PEG TUBE PLACEMENT N/A 2/15/2018    Procedure: ESOPHAGOGASTRODUODENOSCOPY AT BEDSIDE WITH PEG PLACEMENT;  Surgeon: Rashel Zeng MD;  Location:  [facility];  Service:    • FEMUR OPEN REDUCTION INTERNAL FIXATION Right 5/15/2017    Procedure: DISTAL FEMUR OPEN REDUCTION INTERNAL FIXATION RIGHT;  Surgeon: Pierre Velarde MD;  Location:  [facility];  Service:    • TOTAL HIP ARTHROPLASTY Right 2017          SWALLOW EVALUATION (last 72 hours)      Swallow Evaluation       18 1130 18 1600              Rehab Evaluation    Document Type evaluation  -LR evaluation  -LS       Subjective Information no complaints;agree to therapy  -LR no complaints;agree to therapy  -LS       General Information    Patient Profile Review yes  -LR yes  -LS       Subjective Patient Observations  alert, cooperative, confused  -LS       Pertinent History Of Current Problem  Large R CVA, left handed  -LS       Current Diet Limitations  NPO  -LS       Prior Level of Function- Communication  functional in all spheres  -LS       Prior Level of Function- Swallowing  no diet consistency restrictions  -LS       Plans/Goals Discussed With patient and family  -LR patient;agreed upon  -       Barriers to Rehab medically complex  -LR medically complex  -LS       Clinical Impression    Patient's Goals For Discharge return to PO diet  -LR patient did not state  -LS       Family Goals For Discharge family did not state  -LR        SLP Swallowing Diagnosis mild dysphagia;moderate dysphagia;oral dysfunction;pharyngeal dysfunction   mild to moderate Oropharyngeal dysphagia  -LR --   r/o pharyngeal dysphagia  -LS       Rehab Potential/Prognosis, Swallowing good, to achieve stated therapy goals  -LR good, to achieve stated therapy goals  -LS       Criteria for Skilled Therapeutic Interventions Met skilled criteria for dysphagia intervention met  -LR skilled criteria for dysphagia intervention met  -LS       FCM, Swallowing  1-->Level 1  -LS       Therapy Frequency 5 times/wk  -LR PRN  -LS       SLP Diet Recommendation II - pureed;honey-thick liquids  -LR NPO: unsafe for food/liquid intake  -LS       Recommended Diagnostics  FEES  -LS       Recommended Feeding/Eating Techniques maintain upright posture during/after eating for 30 mins;small sips/bites  -LR        SLP Rec. for Method of Medication Administration meds crushed in pudding/applesauce  -LR meds via alternate route  -LS       Oral Motor Structure and Function    Oral Motor Anatomy and  Physiology  patient demonstrates anatomy that is WNL  -LS       Dentition Assessment teeth are in poor condition;missing teeth   limited lower dentition and edetulous on top  -LR missing teeth;poor oral hygiene  -LS       Secretion Management  WNL/WFL  -LS       Mucosal Quality  moist, healthy  -LS       Oral Motor Assessment Comment  generalized weakness.   -LS       Oral Labial Strength and Mobility  left labial droop  -LS       General Feeding/Swallowing Observations    Respiratory Support Currently in Use nasal cannula in use  -LR        Clinical Swallow Exam    Mode of Presentation  fed by clinician;cup;spoon;straw  -LS       Oral Phase Results  intact oral phase without signs of dysfunction  -LS       Pharyngeal Phase Results  sign/symptoms of pharyngeal impairment;cough  -LS       Summary of Clinical Exam  Clinical swallow eval complete. Oral motor functional to accept PO via spoon, cup, straw. S/s of asp c/b cough with thins and delayed cough w/ nectar thick. No overt s/s of asp w/ pureed. REC: NPO, FEES on 2/15  -LS       Fiberoptic Endoscopic Swallowing Exam    Risks/Benefits Reviewed risks/benefits explained;agreed to eval;patient  -LR        Nasal Entry, Topical Anesthetic right:  -LR        Anatomy and Physiology    Velopharyngeal WFL  -LR        Base of Tongue range reduced  -LR        Epiglottis WFL  -LR        Laryngeal Function Breathing symmetrical  -LR        Laryngeal Function Phonation symmetrical  -LR        Laryngeal Function Breath Hold TVF contact  -LR        Secretions 1- Secretions present around the laryngeal vestibule  -LR        Secretion Description thick;discolored  -LR        Ice Chips elicited swallow;fully cleared secretions;not aspirated  -LR        Spontaneous Swallow frequency adequate  -LR        Sensory senses scope  -LR        Oral-Phary Transit increased prep time;increased transit time  -LR        Anatomy Hypopharynx    Observation Anatomic Considerations anatomic deviation  observed   erythema to L TVC and false vocal fold  -LR        FEES    Mode of Presentation thin:;nectar:;honey:;cohesive solid:;fed by clinician;cup;spoon  -LR        Pharyngeal Phase Impairment thin:;nectar:;impaired timing with aspiration before;bolus to pyriforms before initiation of response;tongue base retraction reduced;reduced pharyngeal wall contraction  -LR        Post Swallow Residue thin:;nectar:;honey:;pudding:;post swallow residue present;residue present in valleculae;clears residue valleculae with cue  -LR        Rosenbek's Scale thin:;6-->Level 6  -LR        Response to Aspiration absent response, silent aspiration  -LR        Pharyngeal Phase Results impaired pharyngeal phase of swallowing  -LR        FEES Summary FEES completed. Scope passed in R nares without difficulty. Pt presents with mild to moderate Oropharyngeal dysphagia. Oral skills are c/b premature loss of thin liquids and nectar thick liquids due to decreased back of tongue control. Pt also had prolonged oral prep and transit with solids. Silent aspiration occurred with thin liquids before the swallow due to delayed swallow initiation. Penetration occurred to the level of the TVC with nectar thick liquids, but no aspiration occurred. No penetration or aspiration present with honey thick liquids via cup, pudding or solid. Mild vallecular residue present with all consistencies due to decreased tongue base/pharyngeal wall mvmt. Residue mostly clears with spontaneous multiple swallows. Recommend a Dysphagia II diet with honey thick liquids and meds crushed in pureed. Will f/u with dysphagia tx.   -LR        FEES Swallow Recommendations    Eating Assistance requires caregiver assistance for eating  -LR        Dysphagia Treatment Objectives and Progress    Dysphagia Treatment Objectives Improve timing of pharyngeal response;Improve oral skills;Improve tongue base & pharyngeal wall squeeze  -LR        Improve oral skills    To Improve Oral  Skills, patient will: Increase back of tongue control;80%;with consistent cues  -LR        Improve timing of pharyngeal response    To improve timing of pharyngeal response, patient will: Swallow in timely way after sensory input;Swallow in timely way using suck-swallow response;Swallow in timely way using three second prep;80%;with consistent cues  -LR        Improve tongue base & pharyngeal wall squeeze    To improve tongue base & pharyngeal wall squeeze, patient will: Complete effortful swallow;Complete tongue hold swallow;80%;with consistent cues  -LR          User Key  (r) = Recorded By, (t) = Taken By, (c) = Cosigned By    Initials Name Effective Dates    LR Debbie Neumann, MS CCC-SLP 06/22/15 -     LS Monique Vivas, MS [facility]-SLP 06/22/15 -         EDUCATION  The patient has been educated in the following areas:   Dysphagia (Swallowing Impairment).    SLP Recommendation and Plan  SLP Swallowing Diagnosis: mild dysphagia, moderate dysphagia, oral dysfunction, pharyngeal dysfunction (mild to moderate Oropharyngeal dysphagia)  SLP Diet Recommendation: II - pureed, honey-thick liquids  Recommended Feeding/Eating Techniques: maintain upright posture during/after eating for 30 mins, small sips/bites  SLP Rec. for Method of Medication Administration: meds crushed in pudding/applesauce        Criteria for Skilled Therapeutic Interventions Met: skilled criteria for dysphagia intervention met     Rehab Potential/Prognosis, Swallowing: good, to achieve stated therapy goals  Therapy Frequency: 5 times/wk             Plan of Care Review  Plan Of Care Reviewed With: patient  Outcome Summary/Follow up Plan: FEES completed. Scope passed in R nares without difficulty. Pt presents with mild to moderate Oropharyngeal dysphagia. Oral skills are c/b premature loss of thin liquids and nectar thick liquids due to decreased back of tongue control. Pt also had prolonged oral prep and transit with solids. Silent aspiration occurred with  thin liquids before the swallow due to delayed swallow initiation. Penetration occurred to the level of the TVC with nectar thick liquids, but no aspiration occurred. No penetration or aspiration present with honey thick liquids via cup, pudding or solid. Mild vallecular residue present with all consistencies due to decreased tongue base/pharyngeal wall mvmt. Residue mostly clears with spontaneous multiple swallows. Recommend a Dysphagia II diet with honey thick liquids and meds crushed in pureed. Will f/u with dysphagia tx.           IP SLP Goals       02/16/18 1332 02/12/18 1040 02/11/18 1511    Safely Consume Diet    Safely Consume Diet- SLP, Date Established 02/16/18  -LR      Safely Consume Diet- SLP, Time to Achieve by discharge  -LR      Begin to Take Some PO Safely    Begin to Take Some PO Safely- SLP, Date Established   02/11/18  -LS    Begin to Take Some PO Safely- SLP, Time to Achieve   by discharge  -LS    Begin to Take Some PO Safely- SLP, Date Goal Reviewed 02/16/18  -LR 02/12/18  -RD     Begin to Take Some PO Safely- SLP, Outcome goal met  -LR goal ongoing  -RD goal ongoing  -LS    Receptive Language- Optimal Participation in Care    Receptive Language Optimal Participation in Care- SLP, Date Established   02/11/18  -LS    Receptive Language Optimal Participation in Care- SLP, Time to Achieve   by discharge  -LS    Receptive Language Optimal Participation in Care- SLP, Date Goal Reviewed  02/12/18  -RD     Receptive Language Optimal Participation in Care- SLP, Outcome  goal ongoing  -RD goal ongoing  -LS    Expressive- Optimal Participation in Care    Expressive Optimal Participation in Care- SLP, Date Established   02/11/18  -LS    Expressive Optimal Participation in Care- SLP, Time to Achieve   by discharge  -LS    Expressive Optimal Participation in Care- SLP, Date Goal Reviewed  02/12/18  -RD     Expressive Optimal Participation in Care- SLP, Outcome  goal ongoing  -RD goal ongoing  -LS      User  Key  (r) = Recorded By, (t) = Taken By, (c) = Cosigned By    Initials Name Provider Type    LR Debbie Neumann, MS CCC-SLP Speech and Language Pathologist    XAVI Vivas, MS CCC-SLP Speech and Language Pathologist    SHYANN Barrera, MS CCC-SLP Speech and Language Pathologist               Time Calculation:         Time Calculation- SLP       02/16/18 1336          Time Calculation- SLP    SLP Start Time 1130  -LR      SLP Received On 02/16/18  -        User Key  (r) = Recorded By, (t) = Taken By, (c) = Cosigned By    Initials Name Provider Type    LR Debbie Neumann, MS CCC-SLP Speech and Language Pathologist          Therapy Charges for Today     Code Description Service Date Service Provider Modifiers Qty    [id number]  ST FIBEROPTIC ENDO EVAL SWALL 6 2/16/2018 Debbie Neumann MS CCC-SLP GN 1               Debbie Neumann MS CCC-SLP  2/16/2018

## 2018-02-16 NOTE — PLAN OF CARE
Problem: Patient Care Overview (Adult)  Goal: Plan of Care Review  Outcome: Ongoing (interventions implemented as appropriate)   02/16/18 1450   Coping/Psychosocial Response Interventions   Plan Of Care Reviewed With patient;family   Patient Care Overview   Progress improving   Outcome Evaluation   Outcome Summary/Follow up Plan PT ABLE TO INITIATE AMBULATION X 8 FEET WITH MOD ASSIST OF 2. CONTINUES TO BE IN B WRIST RESTRAINTS ( PER DR FUNSE) DUE TO PEG. PT ALERT AND FOLLOWING COMMANDS 75%.        Problem: Inpatient Physical Therapy  Goal: Bed Mobility Goal LTG- PT  Outcome: Ongoing (interventions implemented as appropriate)   02/11/18 1444 02/16/18 1450   Bed Mobility PT LTG   Bed Mobility PT LTG, Date Established 02/11/18 --    Bed Mobility PT LTG, Time to Achieve 2 wks --    Bed Mobility PT LTG, Activity Type supine to sit/sit to supine --    Bed Mobility PT LTG, [place] Level minimum assist (75% patient effort) --    Bed Mobility PT LTG, Outcome --  goal ongoing     Goal: Transfer Training Goal 1 LTG- PT  Outcome: Ongoing (interventions implemented as appropriate)   02/11/18 1444 02/16/18 1450   Transfer Training PT LTG   Transfer Training PT LTG, Date Established 02/11/18 --    Transfer Training PT LTG, Time to Achieve 2 wks --    Transfer Training PT LTG, Activity Type sit to stand/stand to sit --    Transfer Training PT LTG, [place] Level minimum assist (75% patient effort);2 person assist required --    Transfer Training PT LTG, Assist Device walker, rolling --    Transfer Training PT LTG, Outcome --  goal ongoing     Goal: Gait Training Goal LTG- PT  Outcome: Ongoing (interventions implemented as appropriate)   02/11/18 1444 02/16/18 1450   Gait Training PT LTG   Gait Training Goal PT LTG, Date Established 02/11/18 --    Gait Training Goal PT LTG, Time to Achieve 2 wks --    Gait Training Goal PT LTG, [place] Level moderate assist (50% patient effort);2 person assist required --    Gait  Training Goal PT LTG, Assist Device walker, rolling --    Gait Training Goal PT LTG, Distance to Achieve 10 --    Gait Training Goal PT LTG, Outcome --  goal partially met  (FOR ASSIST NEEDED X 8 FEET. )     Goal: Static Sitting Balance Goal LTG- PT  Outcome: Ongoing (interventions implemented as appropriate)   02/11/18 1444 02/16/18 1450   Static Sitting Balance PT LTG   Static Sitting Balance PT LTG, Date Established 02/11/18 --    Static Sitting Balance PT LTG, Time to Achieve 2 wks --    Static Sitting Balance PT LTG, [place] Level supervision required --    Static Sitting Balance PT LTG, Assist Device UE Support --    Static Sitting Balance PT LTG, Outcome --  goal ongoing

## 2018-02-16 NOTE — PROGRESS NOTES
FOLLOW UP ENCOUNTER          WORKING DIAGNOSIS:   POD# 7: CRANIOTOMY FOR ICH                          MEDICAL HISTORY SINCE LAST ENCOUNTER :       She has made steady improvement and is ready for rehabilitation.  PEG has been inserted in stable and usable.    Neurologic examination indicates the presence of moderate aphasia mild left hemiparesis                                      ASSESSMENT/DISPOSITION :     1.  Check CT scan today in anticipation of transfer.    2.  She will need to resume dexamethasone for another 5-7 days.      3.  To rehabilitation when bed available. ]

## 2018-02-16 NOTE — THERAPY TREATMENT NOTE
Acute Care - Physical Therapy Treatment Note  [facility]     Patient Name: Preeti Manning  : 1940  MRN: [id number]  Today's Date: 2018  Onset of Illness/Injury or Date of Surgery Date: 18  Date of Referral to PT: 18  Referring Physician: MD Terry    Admit Date: 2018    Visit Dx:    ICD-10-CM ICD-9-CM   1. Intracranial hemorrhage I62.9 432.9   2. Impaired mobility and ADLs Z74.09 799.89   3. Impaired functional mobility, balance, gait, and endurance Z74.09 V49.89   4. 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   • Anxiety - chronic benzodiazepines   • Chronic pain syndrome - chronic narcotic pain medications               Adult Rehabilitation Note       18 1410 18 1315 18 1125    Rehab Assessment/Intervention    Discipline physical therapist  -CD occupational therapist  -AN physical therapist  -LS    Document Type therapy note (daily note)  -CD therapy note (daily note)  -AN therapy note (daily note)  -LS    Subjective Information agree to therapy;no complaints  -CD no complaints;agree to therapy  -AN agree to therapy;no complaints  -LS    Patient Effort, Rehab Treatment good  -CD good  -AN adequate  -LS    Symptoms Noted During/After Treatment fatigue  -CD fatigue  -AN     Precautions/Limitations fall precautions   PEG WITH ABDOMINAL BINDER, B WRIST RESTRAINTS, L WEAK.   -CD fall precautions;oxygen therapy device and L/min   PEG, L side weakness; binder, use of pillow over site  -AN fall precautions;oxygen therapy device and L/min  -LS    Specific Treatment Considerations PT UIC FROM WORKING WITH O.T. BUT UNABLE TO SECURE RESTRAINTS. OBTAINED APPROPRIATE RECLINER TO  ALLOW RESTRAITS TO BE SECURED SO PT COULD STAY [facility] FOR AWHILE.,   -CD PEG  -AN     Recorded by [CD] Kirsty Radford, PT [AN] Lucrecia Puri, OT [LS] Pattie Abreu, PT    Vital Signs    Pre Systolic BP Rehab --   VSS, NSG CLEARED FOR TREATMENT.   -CD --   vitals/BP stable  -  -LS    Pre Treatment Diastolic BP   58  -LS    Post Systolic BP Rehab  118  -  -LS    Post Treatment Diastolic BP  73  -AN 60  -LS    Pretreatment Heart Rate (beats/min)   50  -LS    Posttreatment Heart Rate (beats/min)   55  -LS    Pre SpO2 (%)  91  -  -LS    O2 Delivery Pre Treatment  supplemental O2  -AN supplemental O2  -LS    Intra SpO2 (%)  88  -AN     O2 Delivery Intra Treatment  supplemental O2  -AN     Post SpO2 (%)  95  -  -LS    O2 Delivery Post Treatment  supplemental O2  -AN supplemental O2  -LS    Pre Patient Position   Supine  -LS    Intra Patient Position   Standing  -LS    Post Patient Position   Supine  -LS    Recorded by [CD] Kirsty Radford, PT [AN] Lucrecia Puri, OT [LS] Pattie Abreu, PT    Pain Assessment    Pain Assessment Blanchard-Javier FACES  -CD Blanchard-Baker FACES  -AN Blanchard-Javier FACES  -LS    Blanchard-Javier FACES Pain Rating 2  -CD 2  -AN 2  -LS    Pain Score 2  -CD 4  -AN 0   pre  -LS    Post Pain Score 2  -CD 2  -AN 2   post  -LS    Pain Type Acute pain  -CD Acute pain;Surgical pain  -AN     Pain Location Abdomen  -CD Abdomen  -AN Ankle  -LS    Pain Intervention(s) Repositioned;Rest  -CD Repositioned;Rest  -AN Repositioned;Ambulation/increased activity  -LS    Response to Interventions TOLERATED.   -CD Tolerated  -AN tolerated  -LS    Recorded by [CD] Kirsty Radford, PT [AN] Lucrecia Puri, OT [LS] Pattie Abreu, PT    Cognitive Assessment/Intervention    Current Cognitive/Communication Assessment impaired  -CD impaired  -AN impaired  -LS    Orientation Status oriented to;person;place  -CD oriented to;person;place  -AN oriented to;person   responds to name  -LS    Follows Commands/Answers Questions 75% of the  time  -CD 75% of the time;able to follow single-step instructions;needs cueing;needs repetition  -AN able to follow single-step instructions;25% of the time;needs cueing;needs increased time;needs repetition  -LS    Personal Safety decreased awareness, need for assist;decreased awareness, need for safety;decreased insight to deficits;impulsive;moderate impairment  -CD decreased awareness, need for assist;decreased insight to deficits;decreased awareness, need for safety;mild impairment  -AN severe impairment;decreased awareness, need for assist;decreased awareness, need for safety;decreased insight to deficits  -LS    Personal Safety Interventions fall prevention program maintained;gait belt;muscle strengthening facilitated;nonskid shoes/slippers when out of bed;supervised activity  -CD fall prevention program maintained;gait belt;nonskid shoes/slippers when out of bed;supervised activity  -AN     Recorded by [CD] Kirsty Radford, PT [AN] Lucrecia Puri, OT [LS] Pattie Abreu, PT    Bed Mobility, Assessment/Treatment    Bed Mobility, Assistive Device   head of bed elevated;draw sheet  -LS    Bed Mob, Supine to Sit, [place]  maximum assist (25% patient effort)  -AN maximum assist (25% patient effort);2 person assist required;verbal cues required  -LS    Bed Mob, Sit to Supine, [place]  not tested  -AN maximum assist (25% patient effort);2 person assist required;verbal cues required  -LS    Bed Mobility, Comment PT UIC AND RETURNED TO CHAIR.   -CD vc's to sequence, move LE's  -AN VC's for sequencing.   -LS    Recorded by [CD] Kirsty Radford, PT [AN] Lucrecia Puri, OT [LS] Pattie Abreu, PT    Transfer Assessment/Treatment    Transfers, Sit-Stand [place] minimum assist (75% patient effort);2 person assist required;verbal cues required  -CD moderate assist (50% patient effort)   daughter standby for assist  -AN moderate assist (50% patient effort);2 person assist required;verbal cues required  -LS     Transfers, Stand-Sit [place] minimum assist (75% patient effort);2 person assist required;verbal cues required  -CD moderate assist (50% patient effort)  -AN moderate assist (50% patient effort);2 person assist required;verbal cues required  -LS    Transfers, Sit-Stand-Sit, Assist Device --   VIA B UE SUPPORT AND GAIT BELT.   -CD --   gait belt, stand pivot  -AN     Toilet Transfer, [place]  moderate assist (50% patient effort);2 person assist required  -AN     Toilet Transfer, Assistive Device  bedside commode without drop arms  -AN     Transfer, Safety Issues weight-shifting ability decreased  -CD weight-shifting ability decreased;step length decreased;sequencing ability decreased;knees buckling  -AN     Transfer, Impairments impaired balance;strength decreased  -CD impaired balance;strength decreased  -AN impaired balance;strength decreased  -LS    Transfer, Comment PERFORMED FROM RECLINER.   -CD stand pivot from surfaces  -AN STS from EOB with PT/tech on either side.   -LS    Recorded by [CD] Kirsty Radford, PT [AN] Lucrecia Puri, OT [LS] Pattie Abreu, PT    Gait Assessment/Treatment    Gait, [place] Level moderate assist (50% patient effort);2 person assist required;verbal cues required  -CD  maximum assist (25% patient effort);2 person assist required;verbal cues required  -LS    Gait, Assistive Device --   VIA B UE SUPPORT AND GAIT BELT. CHAIR IN TOW.   -CD      Gait, Distance (Feet) 8  -CD  1  -LS    Gait, Gait Deviations weight-shifting ability decreased;juan decreased;decreased heel strike  -CD      Gait, Safety Issues weight-shifting ability decreased;step length decreased;balance decreased during turns  -CD  weight-shifting ability decreased  -LS    Gait, Impairments strength decreased;impaired balance  -CD      Gait, Comment CUES FOR UPRIGHT POSTURE.   -CD  2 small side steps towards HOB; req'd assist for advancement of LEs and for weightshifting.   -LS    Recorded by [CD] Kirsty STREET  Prabhakar, PT  [LS] Pattie Abreu, PT    Toileting Assessment/Training    Toileting Assess/Train, Assistive Device  bedside commode  -AN     Toileting Assess/Train, Position  sitting  -AN     Toileting Assess/Train, Indepen Level  maximum assist (25% patient effort)  -AN     Toileting Assess/Train, Comment  pt able to verbalize she needed to void/have BM; max for hygeine and clothiing mgmt  -AN     Recorded by  [AN] Lucrecia Puri, OT     Motor Skills/Interventions    Additional Documentation   Balance Skills Training (Group)  -LS    Recorded by   [LS] Pattie Abreu, PT    Balance Skills Training    Sitting-Level of Assistance Close supervision  -CD Close supervision  -AN Contact guard  -LS    Sitting-Balance Support Feet supported  -CD Feet supported  -AN Feet supported;Right upper extremity supported;Left upper extremity supported  -LS    Sitting-Balance Activities  Trunk control activities  -AN Trunk control activities  -LS    Sitting # of Minutes  5  -AN     Standing-Level of Assistance Minimum assistance;x2  -CD Moderate assistance  -AN Moderate assistance;x2  -LS    Static Standing Balance Support Right upper extremity supported;Left upper extremity supported  -CD  Right upper extremity supported;Left upper extremity supported  -LS    Standing-Balance Activities Weight Shift R-L;Weight Shift A-P  -CD Weight Shift R-L  -AN Weight Shift R-L  -LS    Standing Balance # of Minutes 1  -CD      Gait Balance-Level of Assistance Moderate assistance;x2  -CD  Maximum assistance;x2  -LS    Gait Balance Support Right upper extremity supported;Left upper extremity supported  -CD  Right upper extremity supported;Left upper extremity supported  -LS    Recorded by [CD] Kirsty Radford, PT [AN] Lucrecia Puri, OT [LS] Pattie Abreu, PT    Therapy Exercises    Bilateral Lower Extremities AAROM:;AROM:;10 reps;sitting;ankle pumps/circles;hip flexion;LAQ   MANUAL ASSIST TO GET STARTED.   -CD  AAROM:;10 reps;supine;ankle pumps/circles;heel  slides  -LS    Bilateral Upper Extremity   AAROM:;10 reps;supine;elbow flexion/extension;shoulder extension/flexion  -LS    Recorded by [CD] Kirsty Radford, PT  [LS] Pattie Abreu, PT    Positioning and Restraints    Pre-Treatment Position sitting in chair/recliner  -CD in bed  -AN in bed  -LS    Post Treatment Position chair   CHANGED RECLINERS.   -CD chair  -AN bed  -LS    In Bed   notified nsg;call light within reach;fowlers;encouraged to call for assist;exit alarm on;with family/caregiver;RUE elevated;LUE elevated;legs elevated;heels elevated  -LS    In Chair reclined;call light within reach;notified nsg;encouraged to call for assist;with family/caregiver;legs elevated;exit alarm on  -CD notified nsg;reclined;call light within reach;encouraged to call for assist;exit alarm on;with family/caregiver;LUE elevated;legs elevated  -AN     Restraints soft limb   B WRIST RESTRAINTS SECURED, L PER O.T., R PER NSG.   -CD  released:;reapplied:;notified nsg:;soft limb  -LS    Recorded by [CD] Kirsty Radford, PT [AN] Lucrecia Puri, OT [LS] Pattie Abreu, PT      User Key  (r) = Recorded By, (t) = Taken By, (c) = Cosigned By    Initials Name Effective Dates    CD Kirsty Radford, PT 06/19/15 -     AN Lucrecia Puri, OT 06/22/15 -     LS Pattie Abreu, PT 06/19/15 -                 IP PT Goals       02/16/18 1450 02/14/18 1213 02/11/18 1444    Bed Mobility PT LTG    Bed Mobility PT LTG, Date Established   02/11/18  -LS    Bed Mobility PT LTG, Time to Achieve   2 wks  -LS    Bed Mobility PT LTG, Activity Type   supine to sit/sit to supine  -LS    Bed Mobility PT LTG, [place] Level   minimum assist (75% patient effort)  -LS    Bed Mobility PT LTG, Outcome goal ongoing  -CD goal ongoing  -LS     Transfer Training PT LTG    Transfer Training PT LTG, Date Established   02/11/18  -LS    Transfer Training PT LTG, Time to Achieve   2 wks  -LS    Transfer Training PT LTG, Activity Type   sit to stand/stand to sit  -LS    Transfer  Training PT LTG, [place] Level   minimum assist (75% patient effort);2 person assist required  -LS    Transfer Training PT LTG, Assist Device   walker, rolling  -LS    Transfer Training PT LTG, Outcome goal ongoing  -CD goal ongoing  -LS     Gait Training PT LTG    Gait Training Goal PT LTG, Date Established   02/11/18  -LS    Gait Training Goal PT LTG, Time to Achieve   2 wks  -LS    Gait Training Goal PT LTG, [place] Level   moderate assist (50% patient effort);2 person assist required  -LS    Gait Training Goal PT LTG, Assist Device   walker, rolling  -LS    Gait Training Goal PT LTG, Distance to Achieve   10  -LS    Gait Training Goal PT LTG, Outcome goal partially met   FOR ASSIST NEEDED X 8 FEET.   -CD goal ongoing  -LS     Static Sitting Balance PT LTG    Static Sitting Balance PT LTG, Date Established   02/11/18  -LS    Static Sitting Balance PT LTG, Time to Achieve   2 wks  -LS    Static Sitting Balance PT LTG, [place] Level   supervision required  -LS    Static Sitting Balance PT LTG, Assist Device   UE Support  -LS    Static Sitting Balance PT LTG, Outcome goal ongoing  -CD goal ongoing  -LS       User Key  (r) = Recorded By, (t) = Taken By, (c) = Cosigned By    Initials Name Provider Type    CD Kirsty Radford, PT Physical Therapist    LS Pattie Abreu, PT Physical Therapist          Physical Therapy Education     Title: PT OT SLP Therapies (Active)     Topic: Physical Therapy (Done)     Point: Mobility training (Done)    Learning Progress Summary    Learner Readiness Method Response Comment Documented by Status   Patient Acceptance E VU,NR BENEFITS OF OOB ACTIVITY, SAFETY WITH MOBILITY, TRANSFER TRAINING, GAIT TRAINING,  02/16/18 1449 Done    Acceptance E,D NR   02/14/18 1213 Active    Acceptance E,D NR   02/11/18 1444 Active   Family Acceptance E VU,NR BENEFITS OF OOB ACTIVITY, SAFETY WITH MOBILITY, TRANSFER TRAINING, GAIT TRAINING,  02/16/18 1449 Done    Acceptance E,D NR    02/14/18 1213 Active               Point: Home exercise program (Done)    Learning Progress Summary    Learner Readiness Method Response Comment Documented by Status   Patient Acceptance E VU,NR BENEFITS OF OOB ACTIVITY, SAFETY WITH MOBILITY, TRANSFER TRAINING, GAIT TRAINING,  02/16/18 1449 Done    Acceptance E,D NR   02/14/18 1213 Active   Family Acceptance E VU,NR BENEFITS OF OOB ACTIVITY, SAFETY WITH MOBILITY, TRANSFER TRAINING, GAIT TRAINING,  02/16/18 1449 Done    Acceptance E,D NR   02/14/18 1213 Active               Point: Body mechanics (Done)    Learning Progress Summary    Learner Readiness Method Response Comment Documented by Status   Patient Acceptance E VU,NR BENEFITS OF OOB ACTIVITY, SAFETY WITH MOBILITY, TRANSFER TRAINING, GAIT TRAINING,  02/16/18 1449 Done    Acceptance E,D NR   02/14/18 1213 Active    Acceptance E,D NR   02/11/18 1444 Active   Family Acceptance E VU,NR BENEFITS OF OOB ACTIVITY, SAFETY WITH MOBILITY, TRANSFER TRAINING, GAIT TRAINING,  02/16/18 1449 Done    Acceptance E,D NR   02/14/18 1213 Active               Point: Precautions (Done)    Learning Progress Summary    Learner Readiness Method Response Comment Documented by Status   Patient Acceptance E VU,NR BENEFITS OF OOB ACTIVITY, SAFETY WITH MOBILITY, TRANSFER TRAINING, GAIT TRAINING,  02/16/18 1449 Done    Acceptance E,D NR   02/14/18 1213 Active    Acceptance E,D NR   02/11/18 1444 Active   Family Acceptance E VU,NR BENEFITS OF OOB ACTIVITY, SAFETY WITH MOBILITY, TRANSFER TRAINING, GAIT TRAINING,  02/16/18 1449 Done    Acceptance E,D NR   02/14/18 1213 Active                      User Key     Initials Effective Dates Name Provider Type Discipline     06/19/15 -  Kirsty Radford, PT Physical Therapist PT     06/19/15 -  Pattie Abreu, PT Physical Therapist PT                    PT Recommendation and Plan  Anticipated Discharge Disposition: inpatient rehabilitation facility  PT Frequency: daily  Plan  of Care Review  Plan Of Care Reviewed With: patient, family  Progress: improving  Outcome Summary/Follow up Plan: PT ABLE TO INITIATE AMBULATION X 8 FEET WITH MOD ASSIST OF 2. CONTINUES TO BE IN B WRIST RESTRAINTS ( PER DR FUNES) DUE TO PEG. PT ALERT AND FOLLOWING COMMANDS 75%.           Outcome Measures       02/16/18 1410 02/16/18 1315 02/14/18 1125    How much help from another person do you currently need...    Turning from your back to your side while in flat bed without using bedrails? 2  -CD  2  -LS    Moving from lying on back to sitting on the side of a flat bed without bedrails? 2  -CD  2  -LS    Moving to and from a bed to a chair (including a wheelchair)? 2  -CD  1  -LS    Standing up from a chair using your arms (e.g., wheelchair, bedside chair)? 2  -CD  2  -LS    Climbing 3-5 steps with a railing? 1  -CD  1  -LS    To walk in hospital room? 2  -CD  2  -LS    AM-PAC 6 Clicks Score 11  -CD  10  -LS    How much help from another is currently needed...    Putting on and taking off regular lower body clothing?  1  -AN     Bathing (including washing, rinsing, and drying)  1  -AN     Toileting (which includes using toilet bed pan or urinal)  1  -AN     Putting on and taking off regular upper body clothing  1  -AN     Taking care of personal grooming (such as brushing teeth)  2  -AN     Eating meals  1   pt has PEG, diet evolving  -AN     Score  7  -AN     Modified Rosey Scale    Modified [place] Scale 4 - Moderately severe disability.  Unable to walk without assistance, and unable to attend to own bodily needs without assistance.  -CD 4 - Moderately severe disability.  Unable to walk without assistance, and unable to attend to own bodily needs without assistance.  -AN     Functional Assessment    Outcome Measure Options AM-PAC 6 Clicks Basic Mobility (PT);Modified [place]  -CD  AM-PAC 6 Clicks Basic Mobility (PT)  -LS      User Key  (r) = Recorded By, (t) = Taken By, (c) = Cosigned By    Initials Name Provider  Type    CD Kirsty Radford, PT Physical Therapist    RICK Puri, OT Occupational Therapist    XAVI Abreu, PT Physical Therapist           Time Calculation:         PT Charges       02/16/18 1453          Time Calculation    Start Time 1410  -CD      PT Received On 02/16/18  -      PT Goal Re-Cert Due Date 02/21/18  -      Time Calculation- PT    Total Timed Code Minutes- PT 25 minute(s)  -CD        User Key  (r) = Recorded By, (t) = Taken By, (c) = Cosigned By    Initials Name Provider Type    CD Kirsty Radford, PT Physical Therapist          Therapy Charges for Today     Code Description Service Date Service Provider Modifiers Qty    77726690651 HC GAIT TRAINING EA 15 MIN 2/16/2018 Kirsty Radford, PT GP 1    62952360720 HC PT THER PROC EA 15 MIN 2/16/2018 Kirsty Radford, PT GP 1    13673812794 HC PT THER SUPP EA 15 MIN 2/16/2018 Kirsty Radford, PT GP 2          PT G-Codes  Outcome Measure Options: AM-PAC 6 Clicks Basic Mobility (PT), Modified Rosey Radford, PT  2/16/2018

## 2018-02-17 PROBLEM — K63.89 PNEUMATOSIS COLI: Status: ACTIVE | Noted: 2018-01-01

## 2018-02-17 PROBLEM — R33.9 URINARY RETENTION: Status: ACTIVE | Noted: 2018-01-01

## 2018-02-17 PROBLEM — K56.7 ILEUS (HCC): Status: ACTIVE | Noted: 2018-01-01

## 2018-02-17 PROBLEM — J18.9 PNEUMONIA: Status: ACTIVE | Noted: 2018-01-01

## 2018-02-17 PROBLEM — J96.00 ACUTE RESPIRATORY FAILURE (HCC): Status: ACTIVE | Noted: 2018-01-01

## 2018-02-17 PROBLEM — Z98.890 S/P CRANIOTOMY: Status: ACTIVE | Noted: 2018-01-01

## 2018-02-17 NOTE — CODE DOCUMENTATION
Dr Payan at bedside. Rapid response called for change in the work of breathing. New orders received, and will send patient back to ICU. House will continue to monitor.

## 2018-02-17 NOTE — PROCEDURES
Critical Care  Performed by: NISHA QUIÑONEZ  Authorized by: NISHA QUIÑONEZ   Total critical care time: 37 minutes  Critical care was necessary to treat or prevent imminent or life-threatening deterioration of the following conditions: respiratory failure and cardiac failure.  Critical care was time spent personally by me on the following activities: blood draw for specimens, evaluation of patient's response to treatment, obtaining history from patient or surrogate, ordering and review of laboratory studies, pulse oximetry, review of old charts, development of treatment plan with patient or surrogate, discussions with primary provider, interpretation of cardiac output measurements, examination of patient, ordering and performing treatments and interventions, ordering and review of radiographic studies and re-evaluation of patient's condition.  Comments: Called per nursing stating that pt had rapid respiratory decline. Nursing entered room and pt was on 3L nc which is her baseline and had decompensated into the low 80's. Nursing felt pt was having anxiety attack and went to get Xanax for pt, upon return, pt sating in the 70's, placed on 100% non-rebreather without improvement, rapid called. Upon arrival to room, pt with audible congestion. House supervisor performed NT suction with good response. Pt oxygen saturation improved. Able to return to 3L nc. ABG's showed hypoxia. CXR pending. EKG showing sinus tachycardia with fusion complexes.     Constitutional: acute distress, awake, alert, anxious   Respiratory: Mild rales in bases, coarse lung sounds throughout, tachypnea, upper airway with congestion   Cardiovascular: RRR, no murmurs, rubs, or gallops, palpable pedal pulses bilaterally  Gastrointestinal: bowel sounds present, pt with complaints of abdominal pain, minimally TTP  Musculoskeletal: No bilateral ankle edema  Psychiatric: Anxious, follows commands   Neurologic: Follows commands, grossly intact, BUE  restraints in place at time of arrival

## 2018-02-17 NOTE — PROGRESS NOTES
FOLLOW UP ENCOUNTER          WORKING DIAGNOSIS:   POD #8: CRANIOTOMY FOR ICH                          MEDICAL HISTORY SINCE LAST ENCOUNTER :       She has remained stable.  CT scan shows significant improvement with less edema and resolution of remaining hematoma.    Examination finds her with weakness of her left upper and lower extremity; still with significant speech dysfunction.  This, however, is better over the last 48 hours.                                    ASSESSMENT/DISPOSITION :       1.  Will attempt to remove from restraints.  Cannot be transferred to rehabilitative facility while restrained.  At present there is little else to do other than wait..

## 2018-02-17 NOTE — PROGRESS NOTES
[facility] Medicine Services  PROGRESS NOTE    Patient Name: Preeti Manning  : 1940  MRN: [id number]    Date of Admission: 2018  Length of Stay: 8  Primary Care Physician: Carmela Pollock MD    Subjective   Subjective     CC:  ICH    HPI:  Hypoxia and respiratory distress overnight, improved this morning but patient still tachypneic.  Currently patient denies pain.      Review of Systems   Neurological: Positive for syncope.     Gen- No fevers, chills  CV- No chest pain, palpitations  Resp- No cough, but increased rate or respiration.  GI- No N/V/D, abd pain      Otherwise ROS is negative except as mentioned in the HPI.    Objective   Objective     Vital Signs:   Temp:  [98.2 °F (36.8 °C)-99.8 °F (37.7 °C)] 98.2 °F (36.8 °C)  Heart Rate:  [101-145] 124  Resp:  [16-42] 26  BP: ()/(60-95) 94/69  Total (NIH Stroke Scale): 8     Physical Exam:  Constitutional -frail, in bed, somnolent but awakens to voice.  HEENT-right crainotomy sutures clean, dry and intact  CV-tachycardia, regular  Resp-diminished bilaterally, particularly at bases, no rhonchi or rales heard  Abd-soft, non-tender, non-distended, normo active bowel sounds, PEG in place with tube feeds running. Binder on.  Ext-No lower extremity cyanosis, clubbing or edema bilaterally. Specifically no cords felt in calves bilaterally.  Neuro-somnolent but awakens to voice.  5/5 right, 4/5 left. Speech clear. Dorsi and plantarflexion 5/5 bilaterlly  Psych-flat affect   Skin- No rash on exposed UE or LE bilaterally        Results Reviewed:  I have personally reviewed current lab, radiology, and data and agree.      Results from last 7 days  Lab Units 18  0718 18  0506 18  0341   WBC 10*3/mm3 15.63* 7.56 10.49   HEMOGLOBIN g/dL 10.5* 9.7* 10.1*   HEMATOCRIT % 31.7* 29.1* 30.4*   PLATELETS 10*3/mm3 329 241 258       Results from last 7 days  Lab Units 18  0655 18  0718 18  0506   SODIUM  mmol/L 141 144 145   POTASSIUM mmol/L 3.9 3.4* 3.8   CHLORIDE mmol/L 109 112* 113*   CO2 mmol/L 23.0 25.0 25.0   BUN mg/dL 43* 40* 31*   CREATININE mg/dL 0.80 0.70 0.60   GLUCOSE mg/dL 172* 162* 145*   CALCIUM mg/dL 8.4* 8.9 8.0*   ALT (SGPT) U/L 40 41*  --    AST (SGOT) U/L 36* 36*  --      Estimated Creatinine Clearance: 53.2 mL/min (by C-G formula based on Cr of 0.8).  BNP   Date Value Ref Range Status   02/17/2018 199.0 (H) 0.0 - 100.0 pg/mL Final     Comment:     Results may be falsely decreased if patient taking Biotin.     pH, Arterial   Date Value Ref Range Status   02/17/2018 7.531 (H) 7.350 - 7.450 pH units Final       Microbiology Results Abnormal     None          Imaging Results (last 24 hours)     Procedure Component Value Units Date/Time    XR Chest 1 View [[id number]] Collected:  02/16/18 0951     Updated:  02/16/18 1142    Narrative:       EXAMINATION: XR CHEST 1 VW-      INDICATION: Followup respiratory illness; I62.9-Nontraumatic  intracranial hemorrhage, unspecified; Z74.09-Other reduced mobility;  R13.10-Dysphagia, unspecified.      COMPARISON: None.     FINDINGS: Hyperexpansion of the chest is noted suggesting significant  COPD with centrilobular emphysema.     Fibrotic stranding is identified in the upper lung zones. Calcified  breast implants are noted. The heart size is normal. The heart is  compensated.       Impression:       1. COPD with fibrotic stranding is noted and centrilobular emphysema is  seen, all findings stable from yesterday.  2. New or active disease is not identified.     D:  02/16/2018  E:  02/16/2018     This report was finalized on 2/16/2018 11:40 AM by Dr. Conor Corcoran MD.       CT Head Without Contrast [[id number]] Collected:  02/16/18 1139     Updated:  02/16/18 1143    Narrative:       EXAMINATION: CT HEAD WO CONTRAST-02/16/2018:      INDICATION: Signs of increased intracranial pressure; I62.9-Nontraumatic  intracranial hemorrhage, unspecified; Z74.09-Other reduced  mobility;  Z74.09-Other reduced mobility; R13.10-Dysphagia, unspecified.         TECHNIQUE:  CT data set of the brain was performed without intravenous  contrast.     The radiation dose reduction device was turned on for each scan per the  ALARA (As Low as Reasonably Achievable) protocol.     COMPARISON: Compared to previous CT data sets of the brain 5 days ago,  02/11/2018.     FINDINGS:   1. The patient has had previous craniotomy. There is an intermediate to  low attenuation collection around the right frontal and parietal region  of the brain.  2. In addition, there is low attenuation in the right temporal and  parietal region presumably from edema or perhaps the postsurgical  process.  3. Intra-axial clot is identified in the right temporal and parietal  region extensively. This is surrounded by marked edema. There is mass  effect with right to left midline shift.  4. There is breakthrough of high attenuation blood in the lateral  ventricular system, especially the occipital horns.  5. There is no evidence of compression of the fourth ventricle. There is  no evidence of edema in the posterior fossa although there is a left  occipital infarct.       Impression:       1.  The findings are relatively stable in the brain when compared to 5  days ago.  2.  Specifically, the right temporal, frontal and parietal edema with  mass effect and intra-axial hemorrhage is essentially stable.  Rehemorrhage is not identified, increasing mass is not identified. The  right to left midline shift is stable. Although there is effacement of  the lateral ventricular system, it is not completely compress.  3.  There is no evidence of transmitted edema within the posterior fossa  or third ventricle. There is global mass effect in the right hemisphere  which is relatively stable.  4.  There is intermediate to low attenuation collection over the frontal  and parietal convexities on the right. Postcraniotomy findings are  otherwise  essentially stable.  5.  The patient does have evidence of some mild petechial hemorrhages in  the posterior parietal areas bilaterally, also stable and the amount of  ventricular blood which is relatively small has stabilized. There is no  evidence of increasing edema or rehemorrhage and taking technical  factors into account, no definite progressive or significant interval  change is noted.     D:  02/16/2018  E:  02/16/2018     This report was finalized on 2/16/2018 11:41 AM by Dr. Conor Corcoran MD.       XR Chest 1 View [[id number]] Collected:  02/17/18 0600     Updated:  02/17/18 0631    Narrative:       EXAM:    XR Chest, 1 View    EXAM DATE/TIME:    2/17/2018 6:00 AM    CLINICAL HISTORY:    77 years old, female; Nontraumatic intracranial hemorrhage, unspecified;   Dysphagia, unspecified; Other reduced mobility; Other reduced mobility; Signs   and symptoms; Other: Drop in o2 stats; Patient HX: Drop in o2 stats- rapid   response; Additional info: F/u respiratory illness    TECHNIQUE:    Frontal view of the chest.    COMPARISON:    CR - XR CHEST 1 VW 2018-02-16 06:13    FINDINGS:    Lungs: Coarsening of bilateral apical markings.    Pleural space:  Unremarkable.  No pneumothorax.    Heart: Borderline vascularity.  No cardiomegaly.    Mediastinum:  Unremarkable.    Bones/joints:  Unremarkable.    Other findings: Bilateral breast implants.      Impression:       Coarsening of bilateral apical markings. Infiltrates cannot be excluded.  Borderline vascularity.  Bilateral breast implants.    THIS DOCUMENT HAS BEEN ELECTRONICALLY SIGNED BY CHANEL MENDEZ MD             I have reviewed the medications.    Assessment/Plan   Assessment / Plan     Hospital Problem List     COPD (chronic obstructive pulmonary disease)    CAD (coronary artery disease)    CHF (congestive heart failure)    Stroke    Nontraumatic cortical hemorrhage of right cerebral hemisphere    Anxiety - chronic benzodiazepines    Chronic pain syndrome -  chronic narcotic pain medications             Brief Hospital Course to date:  Preeti Manning is a 77 y.o. female admitted 2/9/18 with large right frontal ICH      Assessment & Plan:    Acute on chronic hypoxic respiratory failure  - rapid response overnight, with oxygen sats in 70's noted -- ABG 7.5/31/57. Patient now with oxygen saturation of 100% on 2L NC but remains tachycardic (120's) and tachypneic (RR in low 30's).  - likely due to underlying COPD, with h/o CHF and modestly elevated BNP (199), with possible aspiration from known dysphagia (recent PEG). CXR overnight shows slight increase in vascularity and coarsening of biapical markings. Elevated WBC (16), however suspect steroid induced as patient on dexamethasone due to recent ICH. D-dimer elevated (9.9) in setting craniotomy on 2/9/18.  - will check CT angiogram to evaluate both for VTE and possible pulmonary infiltrate  - Echo requsted  --- Addendum: CT angio shows no PE but LLL infiltrate present. Will start rocephin/doxy.    ICH  - s/p Craniotomy 2/9/18  - continue oral dexamethasone   - hopefully out of restraints soon with transfer to rehab    Dysphagia  - PEG placed, tube feeds    CAD    COPD    Tachycardia  - sinus    Tobacco abuse  - ongoing, nicotine replacement    Anxiety/depression  - continue remeron  - SHEYLA reviewed - on chronic xanax. Will restart PRN at half home dose. Some concern by family regarding improper use of xanax at home, but do not wish to stop this medication abruptly. Also, staff reports patient picking at lines, etc -- considered adding seroquel, but significant interaction with remeron. Will therefore continue benzodiazepine at this time with hopeful taper off by PCP in the future (or continued admistration in monitored setting).    Steroid induced hyperglycemia    Leukocytosis  - suspect steroid induced    Chronic Pain    H/o CHF    CAD  - ENDY 2013    Chronic pain      DVT Prophylaxis:      CODE STATUS: Full  Code    Disposition: I expect the patient to be discharged rehab in 0-1 days.      Electronically signed by Rayo Payan MD, 02/17/18, 9:06 AM.

## 2018-02-17 NOTE — PROGRESS NOTES
Intensive Care Follow-up      LOS: 8 days     Ms. Preeti Manning, 77 y.o. female is followed for: Nontraumatic cortical hemorrhage of right cerebral hemisphere     Subjective - Interval History     Ms. Manning is a 77 year old female transferred from the floor today (2/17/18) after an RRT for respiratory distress. This afternoon she was found to be severely tachypneic, mildly hypotensive, and tachycardic with heart rate in the 130s to 160s.  She was started on BiPAP and ABGs showed pH 7.52, PCO2 28, PCO2 50, bicarbonate 23.  She was subsequently transferred to the ICU and on arrival was on BiPAP with a respiratory rate of 60 and accessory muscle use.  She was noted to be mottled in her lower extremities up to mid thigh and pulse oximeter will not  secondary to cool extremities.  She was awake and moaning and her abdomen was noted to be significantly distended and she cried out whenever her abdomen was palpated.  Benz catheter was inserted with immediate return of 1200 mL of dark yellow urine.PEG feeding tube was placed to drainage with over 300 mL of curdled tube feeding.  She was taken to CT for a stat abdomen and pelvis and continued to show respiratory decline.  Upon return to the room she would awaken and follow commands and was intubated and placed on mechanical ventilation without difficulty.  She was hypertensive on arrival and continued to maintain hypertension after premedication with etomidate for intubation. When she was intubated however she had a significant drop in her blood pressure requiring bolus fluids.    CT scan of the abdomen and pelvis showed small bowel thickening and pneumatosis, portal vein gas.  Dr. Geronimo spoke with Dr. Balderas from general surgery who will review the CT scan and see if surgical intervention is warranted.    She had a previous RRT at 4 AM this morning (2/17/18) for tachypnea and tachycardia.  ABG at that time showed pH of 7.53, PCO2 31, PO2 57, and bicarbonate  26.4.  She was started on BiPAP and underwent NT suctioning with improvement.  She was also given a Xanax for anxiety.    Patient was originally admitted on 2/9/18 secondary to confusion and word speech.  She was brought to the emergency department where a stat CT scan of the head revealed a large right frontal intracerebral hemorrhage with subdural extension.  She was taken to the operating room by Dr. Riley underwent a craniotomy for aneurysm/AV malformation.  She was on mechanical ventilation postoperatively and was able to wean and extubate on 2/11.  She underwent a speech evaluation on 2/11 and showed signs and symptoms of aspiration and was maintained nothing by mouth.  Repeat FEES on 2/12 continued to show dysphasia.  Ultimately a PEG tube was placed per Dr. Zeng on 2/15.  Today prior to transfer the PEG was placed to drain and she had over 300 ML's.    The patient's relevant past medical, surgical and social history were reviewed and updated in Epic as appropriate.     Objective     Infusions:    norepinephrine 0.02-0.3 mcg/kg/min Last Rate: 0.1 mcg/kg/min (02/17/18 1945)   Pharmacy to dose vancomycin     propofol 5-50 mcg/kg/min Last Rate: 40 mcg/kg/min (02/17/18 1900)   sodium chloride 125 mL/hr Last Rate: 50 mL/hr (02/17/18 1035)     Medications:    [START ON 2/19/2018] Pharmacy Consult  Does not apply Once   atorvastatin 80 mg Oral Nightly   carvedilol 3.125 mg Oral BID With Meals   chlorhexidine 15 mL Mouth/Throat Q12H   dexamethasone 4 mg Intravenous Q8H   insulin regular 0-9 Units Subcutaneous Q6H   ipratropium-albuterol 3 mL Nebulization 4x Daily - RT   meropenem 1 g Intravenous Once   [START ON 2/18/2018] meropenem 1 g Intravenous Q12H   mirtazapine 15 mg Oral Nightly   PRO-STAT 1 packet Nasogastric Daily   saccharomyces boulardii 250 mg Oral BID   sodium chloride 500 mL Intravenous Once   vancomycin 20 mg/kg Intravenous Once   [START ON 2/18/2018] vancomycin 750 mg Intravenous Q24H      Intake/Output       02/16/18 0700 - 02/17/18 0659 02/17/18 0700 - 02/18/18 0659    Intake (ml) 1480 1040    Output (ml) -- 1200    Net (ml) 1480 -160        Vital Sign Min/Max for last 24 hours  Temp  Min: 98.2 °F (36.8 °C)  Max: 99.8 °F (37.7 °C)   BP  Min: 86/20  Max: 157/95   Pulse  Min: 101  Max: 145   Resp  Min: 16  Max: 42   SpO2  Min: 77 %  Max: 100 %   Flow (L/min)  Min: 2  Max: 4        Physical Exam:   GENERAL: Anxious, restless, in acute respiratory distress with respiratory rate of approximately 44-50   HEENT: Right frontal/temporal incision with staples, pupils equally round and reactive, no JVD   LUNGS: Diminished in bases bilaterally   HEART: Tachycardic, regular rate and rhythm without murmur rub or gallop   ABDOMEN: Distended, tender, hypoactive bowel sounds. After PEG feeding tube placed to drainage within 300 mL came out              Pelvis: Distended with 1200 mL residual after placement of Benz   EXTREMITIES: Mottled, cool with palpable pulses   NEURO/PSYCH: Alert and follows commands. Squeezed my right hand but of course remains weak on the left upper and lower extremity.      Results from last 7 days  Lab Units 02/17/18  1706 02/17/18  0958 02/16/18  0718   WBC 10*3/mm3 15.98* 13.66* 15.63*   HEMOGLOBIN g/dL 13.3 12.0 10.5*   PLATELETS 10*3/mm3 307 304 329       Results from last 7 days  Lab Units 02/17/18  1706 02/17/18  0655 02/16/18  0718 02/14/18  0506 02/13/18  0341   SODIUM mmol/L 143 141 144 145 140   POTASSIUM mmol/L 4.1 3.9 3.4* 3.8 3.7   CO2 mmol/L 22.0 23.0 25.0 25.0 27.0   BUN mg/dL 49* 43* 40* 31* 21   CREATININE mg/dL 1.50* 0.80 0.70 0.60 0.60   MAGNESIUM mg/dL  --   --  2.3 2.2 2.1   PHOSPHORUS mg/dL  --   --  3.9 3.3 2.8   GLUCOSE mg/dL 228* 172* 162* 145* 152*     Estimated Creatinine Clearance: 28.4 mL/min (by C-G formula based on Cr of 1.5).            Results from last 7 days  Lab Units 02/17/18  1849   PH, ARTERIAL pH units 7.456*   PCO2, ARTERIAL mm Hg 29.8*   PO2  ART mm Hg 220.0*     Lab Results   Component Value Date    LACTATE 2.6 (C) 02/17/2018          Images:     I reviewed the patient's results and images.     Impression      Hospital Problem List     * (Principal)Nontraumatic cortical hemorrhage of right frontal cerebral hemisphere    Acute respiratory failure requiring intubation and mechanical ventilation, 2/17/18    S/P right frontal craniotomy and excision of intraparenchymal hemorrhage 2/9/18    Pneumonia LLL. Probable aspiration    Ileus versus small bowel obstruction 2/17/18    Pneumatosis coli and biliary air    COPD/emphysema    CAD (coronary artery disease)    Urinary retention requiring Benz placement 2/17/18 (1200 mL residual)    History CHF     Anxiety - chronic benzodiazepines    Chronic pain syndrome - chronic narcotic pain medications            Discussion: Difficult formulation to explain why she deteriorated over the last 48 hours. She has no fever and only a mild leukocytosis. She does have a left lower lobe pneumonia and I suspect aspirated probably due to an ileus with reflux of retained gastric secretions. In addition had marked urinary retention and distention and rapid drainage of 1200 mL also played a role in dropping her pressure as well as intubation and subsequent decrease in preload. The CT of her abdomen however is apparently very concerning according to surgery. Her exam is not dramatic and in fact feels fairly soft but I am told there is evidence of pneumatosis and possibly some biliary air. I discussed situation with Dr. Balderas and he will talk to the family as to whether or not surgery is indicated     Plan        1.  Monitor in ICU  2.  Intubate and place on mechanical ventilation  3.  Vancomycin/MerremFor broad coverage  4.  Propofol for sedation  5.  Repeat ABG's  6.  Blood pressure wall motion really drop and will need pressors. Central access being placed  7. Consult general surgery( Dr. Balderas) regarding possible need for  surgery       Plan of care and goals reviewed with mulitdisciplinary team at daily rounds   I discussed the patient's findings and my recommendations with patient and nursing staff and Dr. Balderas      60 minutes of critical care time    WILLIAM Ferrer, ACNP-BC  02/17/18 6:20 PM  Pulmonary & Critical Care    Barry Geronimo MD  Pulmonary and Critical Care Medicine  02/17/18 8:01 PM        Intensivist Note     SUBJECTIVE     Subjective  OBJECTIVE     Objective

## 2018-02-17 NOTE — NURSING NOTE
Patient had rested well this shift until 0400 this morning, with no complaints of pain, difficulty breathing, or shortness of air. Upon assessment of patient at 0400 rounding, patient was found to be anxious, tachycardic, and short of air, O2 sats in the low 80s on 3L NC. PRN PO Xanax given to patient at 0407 to attempt to calm patient. At 0407 pt O2 sat had dropped into 70s. Placed patient on non-rebreather and called respiratory therapy to the bedside. Patient 77% on non-rebreather with respirations in the 30s-40s. Pagematthias Gill APRN at 0422 and was advised to initiate rapid response. Patient was NT suctioned by house supervisor during the RRT and O2 sat improved. At 0504 patient was able to be weaned back to 4L NC, with O2 sats in high 90s, and eventually weaned to 2L NC at 0510. Vital signs were monitored every 15 minutes following rapid response. 0633 pagematthias Gill APRN to report patient's respiratory rate was still in the 40s, but O2 sat had remained in the high 90s on 2L NC with humidification. D-dimer and BNP ordered. Will continue to monitor patient closely for any changes in status.

## 2018-02-17 NOTE — PROGRESS NOTES
"Pharmacy Consult-Vancomycin Dosing  Preeti Manning is a  77 y.o. female receiving vancomycin therapy.     Indication: Pneumonia  Consulting Provider: Dr. Payan  ID Consult: No    Goal Trough:    Current Antimicrobial Therapy  Anti-Infectives       Ordered     Dose/Rate Route Frequency Start Stop      02/17/18 1525  doxycycline (VIBRAMYCIN) capsule 100 mg     Ordering Provider:  Rayo Payan MD    100 mg Oral Every 12 Hours Scheduled 02/17/18 2100 02/24/18 2059    02/17/18 1701  Pharmacy to dose vancomycin     Ordering Provider:  Rayo Payan MD     Does not apply Continuous PRN 02/17/18 1701 02/24/18 1700    02/17/18 1525  cefTRIAXone (ROCEPHIN) IVPB 1 g     Ordering Provider:  Rayo Payan MD    1 g  100 mL/hr over 30 Minutes Intravenous Every 24 Hours 02/17/18 1600 02/24/18 1559    02/1940  vancomycin in dextrose 5% 150 mL (VANCOCIN) IVPB 750 mg     Ordering Provider:  Stef Riley MD    15 mg/kg × 57.2 kg Intravenous Every 12 Hours 02/10/18 0600 02/10/18 0603    02/09/18 1541  vancomycin (VANCOCIN) in iso-osmotic dextrose IVPB 1 g (premix) 200 mL     Ordering Provider:  Stef Riley MD    1 g  over 60 Minutes Intravenous Once 02/09/18 1615 02/09/18 1545            Allergies  Allergies as of 02/09/2018 - Nick as Reviewed 02/09/2018   Allergen Reaction Noted   • Diphenhydramine Other (See Comments) 05/11/2017   • Levaquin [levofloxacin in d5w] Hallucinations 08/26/2017   • Sulfa antibiotics Other (See Comments) 05/11/2017   • Wellbutrin [bupropion] Mental Status Change 08/26/2017   • Penicillins Rash 05/11/2017       Labs      Results from last 7 days     Lab Units 02/17/18  0655 02/16/18  0718 02/14/18  0506   BUN mg/dL 43* 40* 31*   CREATININE mg/dL 0.80 0.70 0.60         Results from last 7 days     Lab Units 02/17/18  0958 02/16/18  0718 02/14/18  0506   WBC 10*3/mm3 13.66* 15.63* 7.56       Evaluation of Dosing    Ht - 165.1 cm (65\")  Wt - 57.2 kg (126 lb)    Estimated Creatinine " Clearance: 53.2 mL/min (by C-G formula based on Cr of 0.8).    Intake & Output (last 3 days)         02/14 0701 - 02/15 0700 02/15 0701 - 02/16 0700 02/16 0701 - 02/17 0700 02/17 0701 - 02/18 0700      I.V. (mL/kg) 1445.4 (25.3)   980 (17.1)    Other 240 60 354 60    NG/ 051 2368     Total Intake(mL/kg) 2371.4 (41.5) 301 (5.3) 1480 (25.9) 1040 (18.2)    Urine (mL/kg/hr) 2710 (2) 375 (0.3)      Stool 0 (0) 0 (0)      Total Output 2710 375        Net -338.6 -74 +1480 +1040            Unmeasured Urine Occurrence  1 x 4 x 1 x    Unmeasured Stool Occurrence 4 x 1 x 2 x             Microbiology and Radiology  Microbiology Results (last 10 days)       ** No results found for the last 240 hours. **            Evaluation of Level    No results found for: KELLY MACKAY VANCORANDOM    Assessment/Plan:  1. Will load the patient on Vancomycin 1250mg IV x 1 dose. Will then start Vancomycin 750mg IV q24h.   2. A Vancomycin trough will be assessed on 2/19 at 1730 prior to the third dose.  3. Pharmacy will continue to follow and adjust dose as needed.    Thank you for this consult,    Marielos Wagner, PharmD  2/17/2018  5:13 PM

## 2018-02-17 NOTE — SIGNIFICANT NOTE
RRT called due to sudden decrease in sats.   Sats 77% on 100 NRB (increased from 3L/NC).   Patient was NT suctioned and recovered quickly. We were able to wean her O2 back down to a NC at 2L.   Patient not coughing well with shallow breathing due to abdominal pain from PEG tube placement. Instructed RN to encourage pulmonary toilet.   See RRT documentation for more info.    Judit Monsalve RN/[facility]

## 2018-02-17 NOTE — PLAN OF CARE
Problem: Patient Care Overview (Adult)  Goal: Plan of Care Review  Outcome: Ongoing (interventions implemented as appropriate)   02/17/18 0328   Coping/Psychosocial Response Interventions   Plan Of Care Reviewed With patient   Patient Care Overview   Progress no change   Outcome Evaluation   Outcome Summary/Follow up Plan Patient remains in bilateral soft wrist restraints r/t pulling at lines. Dr. Payan did not feel comfortable removing restraints yesterday. VSS, HR tachy. Tube feed continued, Fibersource HN now going at goal rate of 60ml/hr. Patient passed FEES and is on a dysphagia 2 diet with honey thick liquids. No issues through the night.        Problem: Skin Integrity Impairment, Risk/Actual (Adult)  Goal: Skin Integrity/Wound Healing  Outcome: Ongoing (interventions implemented as appropriate)

## 2018-02-18 NOTE — PROCEDURES
"Insert Central Line At Bedside  Date/Time: 2/17/2018 7:55 PM  Performed by: LATONIA LOVELACE  Authorized by: LATONIA LOVELACE   Consent: The procedure was performed in an emergent situation. Verbal consent not obtained. Written consent not obtained.  Patient identity confirmed: arm band and provided demographic data  Time out: Immediately prior to procedure a \"time out\" was called to verify the correct patient, procedure, equipment, support staff and site/side marked as required.  Indications: vascular access  Anesthesia: local infiltration    Anesthesia:  Local Anesthetic: lidocaine 1% without epinephrine  Anesthetic total: 5 mL    Sedation:  Patient sedated: yes  Sedatives: propofol (Continuous infusion)  Vitals: Vital signs were monitored during sedation.  Preparation: skin prepped with 2% chlorhexidine  Skin prep agent dried: skin prep agent completely dried prior to procedure  Sterile barriers: all five maximum sterile barriers used - cap, mask, sterile gown, sterile gloves, and large sterile sheet  Hand hygiene: hand hygiene performed prior to central venous catheter insertion  Location details: right internal jugular  Patient position: Trendelenburg  Catheter type: triple lumen  Catheter size: 7 Fr  Pre-procedure: landmarks identified  Ultrasound guidance: yes  Sterile ultrasound techniques: sterile gel and sterile probe covers were used  Number of attempts: 1  Successful placement: yes  Post-procedure: line sutured and dressing applied  Assessment: blood return through all ports,  placement verified by x-ray and no pneumothorax on x-ray  Patient tolerance: Patient tolerated the procedure well with no immediate complications  Comments: Line inserted with dark red, non-pulsatile blood. Wire viewed across Ij with ultrasound. CXR no PTX.         WILLIAM Ferrer, ACNP-BC  02/17/18 7:56 PM  Pulmonary & Critical Care        "

## 2018-02-18 NOTE — PLAN OF CARE
Problem: Patient Care Overview (Adult)  Goal: Plan of Care Review  Outcome: Ongoing (interventions implemented as appropriate)    Goal: Adult Individualization and Mutuality  Outcome: Ongoing (interventions implemented as appropriate)    Goal: Discharge Needs Assessment  Outcome: Ongoing (interventions implemented as appropriate)      Problem: Skin Integrity Impairment, Risk/Actual (Adult)  Goal: Skin Integrity/Wound Healing  Outcome: Ongoing (interventions implemented as appropriate)      Problem: Mechanical Ventilation, Invasive (Adult)  Goal: Signs and Symptoms of Listed Potential Problems Will be Absent or Manageable (Mechanical Ventilation, Invasive)  Outcome: Ongoing (interventions implemented as appropriate)      Problem: Pressure Ulcer Risk (Kamran Scale) (Adult,Obstetrics,Pediatric)  Goal: Identify Related Risk Factors and Signs and Symptoms  Outcome: Ongoing (interventions implemented as appropriate)    Goal: Skin Integrity  Outcome: Ongoing (interventions implemented as appropriate)      Problem: Perioperative Period (Adult)  Goal: Signs and Symptoms of Listed Potential Problems Will be Absent or Manageable (Perioperative Period)  Outcome: Ongoing (interventions implemented as appropriate)

## 2018-02-18 NOTE — PROGRESS NOTES
INTENSIVIST   PROGRESS NOTE     Hospital:  LOS: 9 days   Subjective   Ms. Preeti Manning, 77 y.o. 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     S     Interval History:  POD: 1 Day Post-Op  S/p Exploratory Lap  Sedated on Propofol, on pressors and Mech. Ventilation.     The patient's relevant past medical, surgical and social history were reviewed and updated in Epic as appropriate.      ROS:   ROS cannot be reliably obtained from the patient due to her Acuity of condition, Intubated and aphasia.    Objective   O     Vitals:  Temp: 99.9 °F (37.7 °C) (02/18/18 1200) Temp  Min: 97.9 °F (36.6 °C)  Max: 99.9 °F (37.7 °C)   BP: 135/77 (02/18/18 1500) BP  Min: 77/54  Max: 158/77   Pulse: 109 (02/18/18 1500) Pulse  Min: 98  Max: 140   Resp: (!) 34 (02/18/18 1400) Resp  Min: 24  Max: 36   SpO2: 95 % (02/18/18 1500) SpO2  Min: 89 %  Max: 100 %   Device: mechanical ventilator (02/18/18 1400)    Flow Rate: 2 (02/17/18 1553) Flow (L/min)  Min: 2  Max: 2       Intake/Ouptut 24 hrs (7:00AM - 6:59 AM)  Intake & Output (last 3 days)       02/15 0701 - 02/16 0700 02/16 0701 - 02/17 0700 02/17 0701 - 02/18 0700 02/18 0701 - 02/19 0700    I.V. (mL/kg)   2620.3 (45.8) 1551.2 (27.1)    Other 60 354 60     NG/ 1126      IV Piggyback   1000 350    Total Intake(mL/kg) 301 (5.3) 1480 (25.9) 3680.3 (64.4) 1901.2 (33.3)    Urine (mL/kg/hr) 375 (0.3)  1290 (0.9) 330 (0.7)    Other    225 (0.5)    Stool 0 (0)       Total Output 375   1290 555    Net -74 +1480 +2390.3 +1346.2            Unmeasured Urine Occurrence 1 x 4 x 1 x     Unmeasured Stool Occurrence 1 x 2 x           Mechanical Ventilator Settings:  Vent Mode: VC+/AC (02/18/18 1550)    Vt (Set, L): 0.4 L (02/18/18 1550)    Resp Rate (Set): 20 (02/18/18 1550) Resp Rate (Observed) Vent: 35 (02/18/18 1550)   FiO2 (%): 30 % (02/18/18 1550)    PEEP/CPAP (cm H2O): 5 cm H20 (02/18/18 1550) Plateau Pressure (cm H2O): 16 cm H2O (02/18/18 0045)    Minute Ventilation (L/min) (Obs): 15.5 L/min (02/18/18 1550)    I:E Ratio (Obs): 1.00:1 (02/18/18 1550)       Medications (drips):    hydromorphone 1 mg/ml Last Rate: 0.2 mg/hr (02/18/18 1515)   norepinephrine Last Rate: 0.2 mcg/kg/min (02/18/18 1417)   Pharmacy to dose vancomycin    propofol Last Rate: 40 mcg/kg/min (02/17/18 1900)   sodium chloride Last Rate: 75 mL/hr (02/18/18 1135)     Physical Examination  Telemetry:  Sinus Rhythm: sinus tachycardia (02/18/18 1400)   Cardiovascular: Normal rate, regular and rhythm. Normal heart sounds.  No murmurs, gallop or rub.   Respiratory: No respiratory distress. Normal respiratory effort.  Normal breath sounds  Clear to auscultation and percussion.    Abdominal:  Soft. No masses. Non-tender. No distension. No HSM.  Post op  PEG   Extremities: No digital cyanosis. No clubbing.  No peripheral edema.   Neurological:   Sedated  Aphasic  Left hemiplegia  Best Eye Response: 2-->(E2) to pain (02/18/18 1400)  Best Motor Response: 5-->(M5) localizes pain (02/18/18 1400)  Best Verbal Response: 1-->(V1) none (Intubated) (02/18/18 1400)  Michelle Coma Scale Score: 8 (02/18/18 1400)   Lines/Drains/Airways: Peripheral IV(s)  NG  PEG  Benz         Hematology:    Results from last 7 days  Lab Units 02/18/18  0417 02/17/18  1706 02/17/18  0958   WBC 10*3/mm3 11.78* 15.98* 13.66*   HEMOGLOBIN g/dL 9.8* 13.3 12.0   MCV fL 93.2 94.8 93.6   PLATELETS 10*3/mm3 238 307 304     Electrolytes, Magnesium and Phosphorus:    Results from last 7 days  Lab Units 02/18/18  1230 02/18/18  0417 02/17/18  1706 02/17/18  0655 02/16/18  0718 02/14/18  0506   SODIUM mmol/L  --  149*  143 141 144 145   POTASSIUM mmol/L 3.4* 3.3* 4.1 3.9 3.4* 3.8   CO2 mmol/L  --  22.0 22.0 23.0 25.0 25.0   MAGNESIUM mg/dL  --  1.6  --   --  2.3 2.2   PHOSPHORUS mg/dL  --  4.4  --   --  3.9 3.3     Renal:    Results from last 7 days  Lab Units 02/18/18  0417 02/17/18  1706 02/17/18  0655   CREATININE mg/dL 1.30 1.50* 0.80   BUN mg/dL 51* 49* 43*     Estimated Creatinine Clearance: 32.7 mL/min (by C-G formula based on Cr of 1.3).      Results from last 7 days  Lab Units 02/18/18  1203 02/18/18  0341 02/17/18  1849 02/17/18  1705 02/17/18  0456   PH, ARTERIAL pH units 7.531* 7.533* 7.456* 7.524* 7.531*   PCO2, ARTERIAL mm Hg 26.8* 26.4* 29.8* 28.3* 31.6*   PO2 ART mm Hg 102.0 58.6* 220.0* 50.9* 57.1*   FIO2 % 30 30 70 28 100       Images:  Ct Abdomen Pelvis Without Contrast    Result Date: 2/17/2018    Small bowel thickening and pneumatosis, portal venous gas, compatible with bowel necrosis. Pneumoperitoneum of uncertain significance, as gastrostomy tube was recently placed. Minimal free fluid. THIS REPORT CONTAINS FINDINGS THAT MAY BE CRITICAL TO PATIENT CARE. The findings were verbally communicated via telephone conference with Yaz Mahmood at 6:42 PM EST on 2/17/2018. The findings were acknowledged and understood.  THIS DOCUMENT HAS BEEN ELECTRONICALLY SIGNED BY KELLE NUNEZ MD    Ct Angiogram Chest With & Without Contrast    Result Date: 2/17/2018  1. There is no evidence of pulmonary embolus 2. Small airspace inflammatory process in the posterior segment of the right lower lobe.  DICTATED:     02/17/2018 EDITED    :     02/17/2018   This report was finalized on 2/17/2018 2:44 PM by Dr. Thang Wolfe MD.      Xr Chest 1 View    Result Date: 2/18/2018  emphysematous changes seen within the lung fields bilaterally with no evidence of acute parenchymal disease. No new focal parenchymal opacification present.  DICTATED:     02/18/2018 EDITED    :     02/18/2018      Xr Chest 1 View    Result Date: 2/17/2018    Right  central venous catheter tip in superior vena cava. THIS DOCUMENT HAS BEEN ELECTRONICALLY SIGNED BY KELLE NUNEZ MD    Xr Chest 1 View    Result Date: 2/17/2018    Endotracheal tube in satisfactory position. No acute findings. THIS DOCUMENT HAS BEEN ELECTRONICALLY SIGNED BY KELLE NUNEZ MD    Xr Chest 1 View    Result Date: 2/17/2018  Coarsening of bilateral apical markings. Infiltrates cannot be excluded. Borderline vascularity. Bilateral breast implants. THIS DOCUMENT HAS BEEN ELECTRONICALLY SIGNED BY CHANEL MENDEZ MD    Results for orders placed during the hospital encounter of 02/09/18   Adult Transthoracic Echo Complete W/ Cont if Necessary Per Protocol    Narrative · Left ventricular systolic function is normal. Estimated EF = 55%.  · There is no evidence of pericardial effusion.  · Normal right ventricular cavity size, wall thickness, systolic function   and septal motion noted.  · Technically difficult and very limited study; no IV Lumason used.            Results: Reviewed.  I reviewed the patient's new laboratory and imaging results.  I independently reviewed the patient's new images.    Medications: Reviewed.    Assessment/Plan   A / P     77 y.o.female, admitted on 2/9/2018 with Stroke [I63.9]:     1. Leaking gastrostomy with intraabdominal contamination  Procedure(s) (LRB):  LAPAROTOMY EXPLORATORY WITH GASTROTOMY, DEBREEDMENT AND G TUBE PLACEMENT (N/A). WASHOUT OF INTRAABDOMINAL ABSCESS.  Dr. Balderas  2/17/2018  2. 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 S/P Crani and excision of intraparenchymal hemorrhage. 2/9/2018  2. Aphasia  3. Dexamethasone 4MG Q 8 hrs } NS  3. Respiratory failure  1. Emphysema  2. Extubated 2/11/2018  3. Intubated 2/17/2018  4. Mechanical Ventilation  5. Tobacco use  4. CAD  5. HFpEF  1. EF 55% (ECHO 2/9/2018)  6. Dyslipidemia  7. Anemia, NC, stable  8. Anxiety, on chronic BZD  9. Chronic Pain syndrome  10. Antibiotics: MRP and Vanco  IV  11. Glucose: No h/o DM  1. Hyperglycemia due to Dexamethasone      Results from last 7 days  Lab Units 02/18/18  1142 02/18/18  0600 02/18/18  0010 02/17/18  1636 02/17/18  0658 02/17/18  0427   GLUCOSE mg/dL 148* 116 130 234* 161* 150*       Lab Results  Lab Value Date/Time   HGBA1C 5.80 (H) 02/10/2018 0440   HGBA1C 5.10 05/12/2017 0433        Nutrition Support: No active supplement orders     Diet:  NPO Diet   Advance Directives: Full Code     Assessment / Plan:    1. Discussed with family at bedside  2. Discussed with Dr. Cartagena  3. Hemodynamic support  4. Flotrac  5. Fluids  6. Optimize CI and SVI  7. As pressors weaned, then assess for weaning vent.  8. Glucose control   9. Disposition: Keep in ICU.    Plan of care and goals reviewed during interdisciplinary rounds.  I discussed the patient's findings and my recommendations with patient and nursing staff     Time: was greater than 40 minutes.    (This excludes time spent performing separately reportable procedures and services).  Patient was at high risk of imminent or life-threatening deterioration in her condition due to Cardiovascular failure and Respiratory failure.         Sherwin Brady MD, FACP, FCCP, CNSC  Intensive Care Medicine, Nutrition Support and Pulmonary Medicine

## 2018-02-18 NOTE — SIGNIFICANT NOTE
Patient returned to ICU post-op.  She remains sedated on ventilator and Levophed drip.  Dr Balderas did a exploratory lap and found intraabdominal contamination with gastric contents likely from leaking Peg site.  He performed a washout of intraabdominal abscess, removal of PEG tube, gastric wall debridement, and placement of g-tube.  No gastric nor duodenal ulceration with perforation, no small bowel ischemia, and no colonic or rectal ischemia with perforation was noted.  An Art-lined was placed in OR and the patient received an additional Liter of albumin and Liter of NS as well as bicarb.  Vancomycin and Merrem were ordered prior to surgery.

## 2018-02-18 NOTE — CONSULTS
General Surgery Consultation Note    Date of Service: 2/17/2018  Preeti Mckeonlisa  [id number]  1940      Referring Provider: Rayo Payan MD    Location of Consult: ICU      Reason for Consultation: Free air        History of Present Illness:  I am seeing, Preeti Kerri, in consultation for angela Geronimo MD regarding free air, pneumatosis of the small bowel with portal venous gas.  This is a 77-year-old lady was admitted to the hospital on 2/9/2018 with a large intraparenchymal frontal lobe hemorrhage for which she was taken to the operating room by neurosurgery for craniectomy and evacuation.  She had been making reasonable progress after this operation.  She had a PEG placed approximately 2 days ago which was uneventful per the operative note.  She had an acute change earlier today and was transferred to the ICU with hemodynamic instability, confusion, and abdominal pain.  Upon obtaining a CT scan of the abdomen and pelvis: portal venous gas, pneumatosis of the small bowel, and a small amount of free air were noted.  The the patient has been intubated, a central line placed and is requiring significant resuscitation.    Past Medical History:   Diagnosis Date   • Anxiety    • Arthritis    • CHF (congestive heart failure)    • COPD (chronic obstructive pulmonary disease)    • Coronary artery disease    • Depression    • Fibromyalgia        Allergies   Allergen Reactions   • Diphenhydramine Other (See Comments)     TACHYCARDIA   • Levaquin [Levofloxacin In D5w] Hallucinations   • Sulfa Antibiotics Other (See Comments)     BLISTERS IN MOUTH AND AROUND MOUTH   • Wellbutrin [Bupropion] Mental Status Change   • Penicillins Rash       No current facility-administered medications on file prior to encounter.      Current Outpatient Prescriptions on File Prior to Encounter   Medication Sig Dispense Refill   • atorvastatin (LIPITOR) 80 MG tablet Take 80 mg by mouth Daily.     • carvedilol (COREG) 3.125 MG tablet Take 3.125  mg by mouth 2 (Two) Times a Day With Meals.           Current Facility-Administered Medications:   •  [START ON 2/19/2018] ! Vancomcyin trough 2/19 at 1730. HOLD 1800 dose, , Does not apply, Once, Marielos Wagner [facility]  •  [DISCONTINUED] acetaminophen (TYLENOL) tablet 650 mg, 650 mg, Oral, Q4H PRN **OR** acetaminophen (TYLENOL) suppository 650 mg, 650 mg, Rectal, Q4H PRN, Brandon Stewart MD, 650 mg at 02/12/18 0040  •  ALPRAZolam (XANAX) tablet 0.5 mg, 0.5 mg, Oral, TID PRN, Rayo Payan MD, 0.5 mg at 02/17/18 1604  •  atorvastatin (LIPITOR) tablet 80 mg, 80 mg, Oral, Nightly, Stef Riley MD, 80 mg at 02/16/18 2046  •  carvedilol (COREG) tablet 3.125 mg, 3.125 mg, Oral, BID With Meals, Stef Riley MD, Stopped at 02/17/18 0816  •  chlorhexidine (PERIDEX) 0.12 % solution 15 mL, 15 mL, Mouth/Throat, Q12H, Barry Geronimo MD  •  dexamethasone (DECADRON) injection 4 mg, 4 mg, Intravenous, Q8H, Michelle Sandoval [facility]  •  haloperidol lactate (HALDOL) injection 5 mg, 5 mg, Intravenous, Q6H PRN, Sherwin Brady MD, 5 mg at 02/14/18 0236  •  hydrALAZINE (APRESOLINE) injection 20 mg, 20 mg, Intravenous, Q6H PRN, Derrell Wagner PA-C, 20 mg at 02/15/18 1746  •  HYDROmorphone (DILAUDID) injection 0.5 mg, 0.5 mg, Intravenous, Q2H PRN, 0.5 mg at 02/17/18 1604 **AND** [DISCONTINUED] naloxone (NARCAN) injection 0.4 mg, 0.4 mg, Intravenous, Q5 Min PRN, Stef Riley MD  •  insulin regular (humuLIN R,novoLIN R) injection 0-9 Units, 0-9 Units, Subcutaneous, Q6H, Sherwin Brady MD, 2 Units at 02/17/18 0020  •  ipratropium-albuterol (DUO-NEB) nebulizer solution 3 mL, 3 mL, Nebulization, 4x Daily - RT, Brandon Stewart MD, 3 mL at 02/17/18 1553  •  magnesium sulfate 2g/50 mL (PREMIX) infusion, 6 g, Intravenous, PRN, Sherwin Brady MD  •  magnesium sulfate 4g/100mL (PREMIX) infusion, 4 g, Intravenous, PRN, Sherwin Brady MD  •  meropenem (MERREM) 1 g/100 mL 0.9% NS VTB (mbp), 1 g, Intravenous, Once, Barry Geronimo MD  •   [START ON 2/18/2018] meropenem (MERREM) 1 g/100 mL 0.9% NS VTB (mbp), 1 g, Intravenous, Q12H, Barry Geronimo MD  •  mirtazapine (REMERON) tablet 15 mg, 15 mg, Oral, Nightly, Stef Riley MD, 15 mg at 02/16/18 2046  •  norepinephrine (LEVOPHED) 8 mg/250 mL (32 mcg/mL) in sodium chloride 0.9% infusion (premix), 0.02-0.3 mcg/kg/min, Intravenous, Titrated, Barry Geronimo MD, Last Rate: 10.73 mL/hr at 02/17/18 1945, 0.1 mcg/kg/min at 02/17/18 1945  •  [DISCONTINUED] ondansetron (ZOFRAN) tablet 4 mg, 4 mg, Oral, Q6H PRN **OR** ondansetron (ZOFRAN) injection 4 mg, 4 mg, Intravenous, Q6H PRN, Stef Riley MD, 4 mg at 02/15/18 2053  •  Pharmacy to dose vancomycin, , Does not apply, Continuous PRN, Rayo Payan MD  •  [DISCONTINUED] potassium chloride (MICRO-K) CR capsule 40 mEq, 40 mEq, Oral, PRN **OR** potassium chloride (KLOR-CON) packet 40 mEq, 40 mEq, Oral, PRN **OR** [DISCONTINUED] potassium chloride 20 mEq in 50 mL IVPB, 20 mEq, Intravenous, Q1H PRN, Sherwin Brady MD  •  potassium phosphate 45 mmol in sodium chloride 0.9 % 500 mL infusion, 45 mmol, Intravenous, PRN, Sherwin Brady MD  •  PRO-STAT 1 packet, 1 packet, Nasogastric, Daily, Sherwin Brady MD, 1 packet at 02/17/18 0940  •  promethazine (PHENERGAN) injection 12.5 mg, 12.5 mg, Intramuscular, Q6H PRN, Stef Riley MD, 12.5 mg at 02/13/18 0804  •  propofol (DIPRIVAN) infusion 10 mg/mL 100 mL, 5-50 mcg/kg/min, Intravenous, Titrated, aBrry Geronimo MD, Last Rate: 13.73 mL/hr at 02/17/18 1900, 40 mcg/kg/min at 02/17/18 1900  •  saccharomyces boulardii (FLORASTOR) capsule 250 mg, 250 mg, Oral, BID, Rayo Payan MD  •  sodium chloride 0.9 % bolus 500 mL, 500 mL, Intravenous, Once, Barry Geronimo MD  •  sodium chloride 0.9 % flush 1-10 mL, 1-10 mL, Intravenous, PRN, Stef Riley MD  •  sodium chloride 0.9 % flush 1-10 mL, 1-10 mL, Intravenous, PRN, Brandon Stewart MD  •  sodium chloride 0.9 % infusion, 125 mL/hr, Intravenous,  "Continuous, Barry Geronimo MD, Last Rate: 50 mL/hr at 02/17/18 1035, 50 mL/hr at 02/17/18 1035  •  vancomycin 1250 mg/250 mL 0.9% NS IVPB ([facility]), 20 mg/kg, Intravenous, Once, Marielos Wagner [facility]  •  [START ON 2/18/2018] vancomycin in dextrose 5% 150 mL (VANCOCIN) IVPB 750 mg, 750 mg, Intravenous, Q24H, Marielos Wagner [facility]    Past Surgical History:   Procedure   • CORONARY ANGIOPLASTY WITH STENT PLACEMENT   • CRANIOTOMY FOR ANEURYSM/ARTERIOVENOUS MALFORMATION    Procedure: CRANIOTOMY FOR INTRACRANIAL HEMORRHAGE;  Surgeon: Stef Riley MD;  Location:  [facility];  Service:    • ENDOSCOPY W/ PEG TUBE PLACEMENT    Procedure: ESOPHAGOGASTRODUODENOSCOPY AT BEDSIDE WITH PEG PLACEMENT;  Surgeon: Rashel Zeng MD;  Location:  [facility];  Service:    • FEMUR OPEN REDUCTION INTERNAL FIXATION    Procedure: DISTAL FEMUR OPEN REDUCTION INTERNAL FIXATION RIGHT;  Surgeon: Pierre Velarde MD;  Location:  [facility];  Service:    • TOTAL HIP ARTHROPLASTY       History reviewed. No pertinent family history.  Social History     Social History   • Marital status:      Spouse name: N/A   • Number of children: N/A   • Years of education: N/A     Occupational History   • Not on file.     Social History Main Topics   • Smoking status: Current Every Day Smoker     Packs/day: 0.50   • Smokeless tobacco: Never Used   • Alcohol use No   • Drug use: Defer   • Sexual activity: Defer     Other Topics Concern   • Not on file     Social History Narrative       Review of Systems:  Review of Systems  Unobtainable secondary to intubation.    /62  Pulse (!) 139  Temp 98.2 °F (36.8 °C) (Oral)   Resp (!) 36  Ht 165.1 cm (65\")  Wt 57.2 kg (126 lb)  SpO2 96%  BMI 20.97 kg/m2  Body mass index is 20.97 kg/(m^2).    General: Intubated on the ventilator  HEENT: PER, no icterus, normal sclera  Cardiac: regular rhythm,  no audible rubs  Pulmonary: bilateral breath sounds, non labored  Abdominal: Soft, PEG site to drainage with " foodstuffs, tender.  Neurologic: Intubated and sedated  Extremities: warm, no edema  Skin: no obvious rashes nor worrisome lesions seen    CBC    Results from last 7 days  Lab Units 02/17/18  1706   WBC 10*3/mm3 15.98*   HEMOGLOBIN g/dL 13.3   HEMATOCRIT % 40.2   PLATELETS 10*3/mm3 307       CMP    Results from last 7 days  Lab Units 02/17/18  1706   SODIUM mmol/L 143   POTASSIUM mmol/L 4.1   CHLORIDE mmol/L 110*   CO2 mmol/L 22.0   BUN mg/dL 49*   CREATININE mg/dL 1.50*   CALCIUM mg/dL 9.1   BILIRUBIN mg/dL 0.5   ALK PHOS U/L 50   ALT (SGPT) U/L 38   AST (SGOT) U/L 29   GLUCOSE mg/dL 228*       Radiology  Imaging Results (last 72 hours)     Procedure Component Value Units Date/Time    XR Chest 1 View [[id number]] Collected:  02/15/18 0846     Updated:  02/15/18 2305    Narrative:       EXAMINATION: XR CHEST 1 VW-      INDICATION: Followup respiratory illness; I62.9-Nontraumatic  intracranial hemorrhage, unspecified; Z74.09-Other reduced mobility;  R13.10-Dysphagia, unspecified.      COMPARISON: 02/14/2018.     FINDINGS: Heart and vasculature appear normal in size. Lungs appear  hyperexpanded and clear except for stable, chronic appearing  interstitial changes. No edema, effusion or pneumothorax is seen. Note  is made of bilateral calcified breast prostheses.           Impression:       No new chest disease.     D:  02/15/2018  E:  02/15/2018     This report was finalized on 2/15/2018 11:03 PM by DR. Jourdan Wright MD.       XR Chest 1 View [[id number]] Collected:  02/16/18 0951     Updated:  02/16/18 1142    Narrative:       EXAMINATION: XR CHEST 1 VW-      INDICATION: Followup respiratory illness; I62.9-Nontraumatic  intracranial hemorrhage, unspecified; Z74.09-Other reduced mobility;  R13.10-Dysphagia, unspecified.      COMPARISON: None.     FINDINGS: Hyperexpansion of the chest is noted suggesting significant  COPD with centrilobular emphysema.     Fibrotic stranding is identified in the upper lung zones.  Calcified  breast implants are noted. The heart size is normal. The heart is  compensated.       Impression:       1. COPD with fibrotic stranding is noted and centrilobular emphysema is  seen, all findings stable from yesterday.  2. New or active disease is not identified.     D:  02/16/2018  E:  02/16/2018     This report was finalized on 2/16/2018 11:40 AM by Dr. Conor Corcoran MD.       CT Head Without Contrast [[id number]] Collected:  02/16/18 1139     Updated:  02/16/18 1143    Narrative:       EXAMINATION: CT HEAD WO CONTRAST-02/16/2018:      INDICATION: Signs of increased intracranial pressure; I62.9-Nontraumatic  intracranial hemorrhage, unspecified; Z74.09-Other reduced mobility;  Z74.09-Other reduced mobility; R13.10-Dysphagia, unspecified.         TECHNIQUE:  CT data set of the brain was performed without intravenous  contrast.     The radiation dose reduction device was turned on for each scan per the  ALARA (As Low as Reasonably Achievable) protocol.     COMPARISON: Compared to previous CT data sets of the brain 5 days ago,  02/11/2018.     FINDINGS:   1. The patient has had previous craniotomy. There is an intermediate to  low attenuation collection around the right frontal and parietal region  of the brain.  2. In addition, there is low attenuation in the right temporal and  parietal region presumably from edema or perhaps the postsurgical  process.  3. Intra-axial clot is identified in the right temporal and parietal  region extensively. This is surrounded by marked edema. There is mass  effect with right to left midline shift.  4. There is breakthrough of high attenuation blood in the lateral  ventricular system, especially the occipital horns.  5. There is no evidence of compression of the fourth ventricle. There is  no evidence of edema in the posterior fossa although there is a left  occipital infarct.       Impression:       1.  The findings are relatively stable in the brain when compared to  5  days ago.  2.  Specifically, the right temporal, frontal and parietal edema with  mass effect and intra-axial hemorrhage is essentially stable.  Rehemorrhage is not identified, increasing mass is not identified. The  right to left midline shift is stable. Although there is effacement of  the lateral ventricular system, it is not completely compress.  3.  There is no evidence of transmitted edema within the posterior fossa  or third ventricle. There is global mass effect in the right hemisphere  which is relatively stable.  4.  There is intermediate to low attenuation collection over the frontal  and parietal convexities on the right. Postcraniotomy findings are  otherwise essentially stable.  5.  The patient does have evidence of some mild petechial hemorrhages in  the posterior parietal areas bilaterally, also stable and the amount of  ventricular blood which is relatively small has stabilized. There is no  evidence of increasing edema or rehemorrhage and taking technical  factors into account, no definite progressive or significant interval  change is noted.     D:  02/16/2018  E:  02/16/2018     This report was finalized on 2/16/2018 11:41 AM by Dr. Conor Corcoran MD.       XR Chest 1 View [[id number]] Collected:  02/17/18 0600     Updated:  02/17/18 0631    Narrative:       EXAM:    XR Chest, 1 View    EXAM DATE/TIME:    2/17/2018 6:00 AM    CLINICAL HISTORY:    77 years old, female; Nontraumatic intracranial hemorrhage, unspecified;   Dysphagia, unspecified; Other reduced mobility; Other reduced mobility; Signs   and symptoms; Other: Drop in o2 stats; Patient HX: Drop in o2 stats- rapid   response; Additional info: F/u respiratory illness    TECHNIQUE:    Frontal view of the chest.    COMPARISON:    CR - XR CHEST 1 VW 2018-02-16 06:13    FINDINGS:    Lungs: Coarsening of bilateral apical markings.    Pleural space:  Unremarkable.  No pneumothorax.    Heart: Borderline vascularity.  No cardiomegaly.     Mediastinum:  Unremarkable.    Bones/joints:  Unremarkable.    Other findings: Bilateral breast implants.      Impression:       Coarsening of bilateral apical markings. Infiltrates cannot be excluded.  Borderline vascularity.  Bilateral breast implants.    THIS DOCUMENT HAS BEEN ELECTRONICALLY SIGNED BY CHANEL MENDEZ MD    CT Angiogram Chest With & Without Contrast [[id number]] Collected:  02/17/18 1400     Updated:  02/17/18 1446    Narrative:       EXAMINATION: CT ANGIOGRAM CHEST WWO CONTRAST - 02/17/2018      INDICATION: J43.9-Emphysema, unspecified; I62.9-Nontraumatic  intracranial hemorrhage, unspecified; Z74.09-Other reduced mobility;  R13.10-Dysphagia, unspecified; F41.9-Anxiety disorder, unspecified.     TECHNIQUE: CT angiogram of the chest was performed following bolus  infusion of contrast. Images were obtained in the axial plane and  displayed in the axial as well as the coronal reconstructed projections  (2D).     The radiation dose reduction device was turned on for each scan per the  ALARA (As Low as Reasonably Achievable) protocol.     COMPARISON: None.     FINDINGS: There is no axillary lymphadenopathy. There is no mediastinal  or hilar adenopathy. There is no pericardial or pleural effusion. There  is no pulmonary embolus. Images displayed at lung window settings  demonstrate chronic pulmonary change with superimposed acute  inflammatory process in the posterior segment of the left lower lobe.       Impression:       1. There is no evidence of pulmonary embolus  2. Small airspace inflammatory process in the posterior segment of the  right lower lobe.     DICTATED:     02/17/2018  EDITED    :     02/17/2018         This report was finalized on 2/17/2018 2:44 PM by Dr. Thang Wolfe MD.       XR Chest 1 View [[id number]] Collected:  02/17/18 4999     Updated:  02/17/18 2957    Narrative:       EXAM:    XR Chest, 1 View    CLINICAL HISTORY:    The patient is 77 years-old, female; Anxiety disorder,  unspecified;   Nontraumatic intracranial hemorrhage, unspecified; Emphysema, unspecified;   Dysphagia, unspecified; Other reduced mobility; Other reduced mobility; Device   placement; Ett placement (vent status); Additional info: Respiratory failure    TECHNIQUE:    Frontal view of the chest.    COMPARISON:    CR - XR CHEST 1 VW 2018-02-17 05:02    FINDINGS:    Lungs:  Minimal interstitial thickening. No consolidation or CHF.    Pleural space:  No pneumothorax.    Heart:  Unremarkable.  No cardiomegaly.    Mediastinum:  Unremarkable.    Bones/joints:  No acute findings.    Tubes, lines and devices:  Endotracheal tube tip at the level of aortic arch.    Other findings:  Bilateral breast implants.      Impression:         Endotracheal tube in satisfactory position. No acute findings.    THIS DOCUMENT HAS BEEN ELECTRONICALLY SIGNED BY KELLE NUNEZ MD    CT Abdomen Pelvis Without Contrast [[id number]] Collected:  02/17/18 1759     Updated:  02/17/18 9595    Narrative:       EXAM:    CT Abdomen and Pelvis Without Intravenous Contrast    CLINICAL HISTORY:    The patient is 77 years-old, female; Anxiety disorder, unspecified;   Nontraumatic intracranial hemorrhage, unspecified; Emphysema, unspecified;   Dysphagia, unspecified; Other reduced mobility; Other reduced mobility; Pain;   Abdominal pain; Additional info: Abd pain, gastroenteritis or colitis suspected    TECHNIQUE:    Axial computed tomography images of the abdomen and pelvis without   intravenous contrast.  All CT scans at this facility use one or more dose   reduction techniques, viz.: automated exposure control; ma/kV adjustment per   patient size (including targeted exams where dose is matched to indication;   i.e. head); or iterative reconstruction technique.    Coronal reformatted images were created and reviewed.    COMPARISON:    CT ABDOMEN PELVIS WO CONTRAST 2017-08-27 00:39    FINDINGS:    Lower thorax:  Partially calcified bilateral breast implants.      ABDOMEN:    Liver:  Portal venous gas.    Gallbladder and bile ducts: Cholelithiasis.  No ductal dilation.    Pancreas: No acute findings.  No ductal dilation.    Spleen:  Unremarkable.  No splenomegaly.    Adrenals:  Unremarkable.  No mass.    Kidneys and ureters:  No obstructing stones.  No hydronephrosis.    Stomach and bowel:  Small bowel wall thickening and small bowel pneumatosis   seen in the left and right abdomen.  No obstruction.     PELVIS:    Bladder:  Bladder catheter.      Reproductive:  No acute findings.     ABDOMEN and PELVIS:    Intraperitoneal space:  Pneumoperitoneum. Minimal free fluid.    Bones/joints:  No acute findings.    Soft tissues:  Unremarkable.    Vasculature: Gas within a mesenteric vein branch is thought to be present in   the left abdomen No abdominal aortic aneurysm.    Lymph nodes:  No significant adenopathy.    Tubes, lines and devices:  Percutaneous gastrostomy tube, tip in the stomach.          Impression:         Small bowel thickening and pneumatosis, portal venous gas, compatible with   bowel necrosis. Pneumoperitoneum of uncertain significance, as gastrostomy tube   was recently placed. Minimal free fluid. THIS REPORT CONTAINS FINDINGS THAT MAY   BE CRITICAL TO PATIENT CARE. The findings were verbally communicated via   telephone conference with Yaz Mahmood at 6:42 PM EST on 2/17/2018. The   findings were acknowledged and understood.      THIS DOCUMENT HAS BEEN ELECTRONICALLY SIGNED BY KELLE NUNEZ MD          Hospital Problem List     * (Principal)Nontraumatic cortical hemorrhage of right frontal cerebral hemisphere    COPD/emphysema        CAD (coronary artery disease)        History CHF         Anxiety - chronic benzodiazepines    Chronic pain syndrome - chronic narcotic pain medications    Acute respiratory failure requiring intubation and mechanical ventilation, 2/17/18    Urinary retention requiring Benz placement 2/17/18 (1200 mL residual)    S/P right frontal  craniotomy and excision of intraparenchymal hemorrhage 2/9/18    Pneumonia LLL. Probable aspiration    Ileus versus small bowel obstruction 2/17/18          Assessment:  Ischemic enteritis  Recent stroke status post craniectomy  Coronary artery disease with a history of congestive heart failure  Chronic obstructive pulmonary disease with acute respiratory failure  Possible aspiration pneumonitis    Plan:  From an abdominal standpoint I think she most likely has taken a significant ischemic hit to her small bowel and cecum.  The etiology of this is unclear.  I do think that exploratory laparotomy with possible bowel resection is most appropriate in this patient currently.  I think this will be a very large second insult to this patient from a overall recovery standpoint.  I had a discussion regarding the risks and benefits and possible outcomes of surgical intervention with the patient's family.  Our discussion included but was not limited to: non-surgical treatment with maximal medical therapy, surgical intervention with laparotomy and bowel resection, comfort measures, anastomotic leak, stoma, need for re-intervention, possible tracheostomy, long-term ventilation, and death.  They understand this and wish to proceed with intervention.    I will get her moving towards the operating room in an emergent fashion.  Exploratory laparotomy with possible bowel resection and stoma creation.      Darin Balderas MD  02/17/18  7:51 PM

## 2018-02-18 NOTE — OP NOTE
General Surgery Operative Note    Preeti Manning  [id number]  1940    Date of Surgery:  2/17/2018 11:15 PM    Pre-op Diagnosis: Free air with ischemic enteritis    Post-op Diagnosis: Leaking gastrostomy with intraabdominal contamination    Procedure: Exploratory laparotomy with washout of intraabdominal abscess                       Removal of PEG tube            Gastric wall debridement                       Placement 20 leonard catheter g-tube, Martin.    Procedure(s):  LAPAROTOMY EXPLORATORY WITH GASTROTOMY, DEBREEDMENT AND G TUBE PLACEMENT    Surgeon(s):  Darin Balderas MD    Anesthesia: General    Surgeon: Darin Balderas MD    Assistant: CHELA Fernández    Estimated Blood Loss: Less than 25 mL    Urine Voided: 20 mL    Specimens:  None                ID Type Source Tests Collected by Time Destination   1 : arterial blood Blood Blood, Arterial Line OR SURGERY PANEL Jarvis Gomez MD 2/17/2018 2203    2 :  Blood Blood, Arterial Line OR SURGERY PANEL Darin Balderas MD 2/17/2018 2306          Drains: None    Findings: Intraabdominal contamination with gastric contents likely from leaking Peg site    No Gastric nor duodenal ulceration with perforation                          No small bowel full thickness ischemia with perforation                          No colonic or rectal full thickness ischemia with perforation    Complications: None apparent    History:   78 yo lady presents with abdominal pain and hemodynamic instability with transfer to the ICU.  She was intubated volume resuscitated and a CT scan of the abdomen and pelvis had been obtained.  This demonstrated portal venous air, pneumatosis and free air.  She had a PEG placed 2 days prior.       The risks and benefits of exploratory laparotomy with possible bowel resection and stoma creation were rehashed.  Our discussion included but was not limited to: bleeding, infection, injury to adjacent viscera (colon, small bowel, spleen,  stomach, ureters), postoperative abscess formation, need for re-intervention, medical issues from a cardiopulmonary/deep venous thrombosis standpoint, and death.  All questions were answered and her family understands and wishes to proceed.    Procedure:      After informed consent the patient was taken to the operating room and placed in the supineposition.  General anesthesia was induced, the abdomen was then prepped and draped in the standard sterile fashion.  A timeout was observed.     A midline laparotomy incision was performed in the standard fashion opening the peritoneum sharply.  This demonstrated intra-abdominal fluid which was murky though not foul-smelling.  The small bowel was run from the ligament of Treitz to the terminal ileum.  This appeared grossly normal without any obvious full thickness ischemic mucosal changes.  The colon was inspected from the cecum through to the rectum.  There was no obvious perforation or full-thickness ischemic pathologic changes noted.  The liver was within normal limits.  The gallbladder contained stones but appeared grossly normal.  Her spleen was grossly normal.  Covering the general peritoneal cavity she did have what appeared to be proteinaceous exudate and tube feed.  At this point the PEG was removed from the stomach.  I opened the lesser sac through the pars flaccida and inspected the posterior gastric wall down to the duodenum.  There was no evidence of gastric ulceration with perforation nor duodenal ulceration with perforation.  The opening around the PEG tube was quite large with some ischemic changes.  The gastrotomy was debrided and then closed in 2 layers of interrupted 3-0 silk.  At the bottom of our gastrotomy I placed our 20 Romansh Benz catheter.  The entire gastrotomy and PEG tube were buttressed to the anterior abdominal wall with 3-0 silk suture.  A nasogastric tube was placed across the pylorus into the duodenum.  The abdominal cavity was irrigated  with copious amounts of normal saline irrigation until clear.  The midline fascial defect was reapproximated with interrupted 0 Vicryl.  The skin was reapproximated with staples and 3 Telfa rosy soaked in Betadine paint were placed in the incision.      Sterile dressings were placed on the wounds.  The gastrostomy tube is to be placed to gravity drainage  All lap and needle counts were correct at the end of the procedure ×2.  The patient was then transferred to the ICU in stable but critical condition.    Darin Balderas MD     Date: 2/17/2018  Time: 11:15 PM

## 2018-02-18 NOTE — PLAN OF CARE
Problem: Mechanical Ventilation, Invasive (Adult)  Goal: Signs and Symptoms of Listed Potential Problems Will be Absent or Manageable (Mechanical Ventilation, Invasive)  Outcome: Ongoing (interventions implemented as appropriate)   02/18/18 0332   Mechanical Ventilation, Invasive   Problems Assessed (Mechanical Ventilation, Invasive) all   Problems Present (Mechanical Ventilation, Invasive) inability to wean;immobility

## 2018-02-18 NOTE — PLAN OF CARE
Problem: Mechanical Ventilation, Invasive (Adult)  Goal: Signs and Symptoms of Listed Potential Problems Will be Absent or Manageable (Mechanical Ventilation, Invasive)  Outcome: Ongoing (interventions implemented as appropriate)      Problem: Pressure Ulcer Risk (Kamran Scale) (Adult,Obstetrics,Pediatric)  Goal: Skin Integrity  Outcome: Ongoing (interventions implemented as appropriate)

## 2018-02-18 NOTE — ANESTHESIA PREPROCEDURE EVALUATION
Anesthesia Evaluation                  Airway   Comment: intubated  Dental      Pulmonary    (+) COPD,   Cardiovascular     (+) CAD, dysrhythmias Atrial Fib, CHF,       Neuro/Psych  (+) CVA (Intraparenchymal hemorrhage/s/p craniotomy),     GI/Hepatic/Renal/Endo      Musculoskeletal     Abdominal    Substance History      OB/GYN          Other                      Anesthesia Plan    ASA 4 - emergent     general     intravenous induction        leg pain R

## 2018-02-18 NOTE — ANESTHESIA POSTPROCEDURE EVALUATION
Patient: Preeti Manning    Procedure Summary     Date Anesthesia Start Anesthesia Stop Room / Location    02/17/18 2120   [facility]       Procedure Diagnosis Surgeon Provider    LAPAROTOMY EXPLORATORY WITH GASTROTOMY, DEBREEDMENT AND G TUBE PLACEMENT (N/A Abdomen) No diagnosis on file. MD Jarvis Meyer MD          Anesthesia Type: general  Last vitals  BP   119/69 (02/17/18 2100)   Temp   98.2 °F (36.8 °C) (02/17/18 0723)   Pulse   113 (02/17/18 2100)   Resp   27 (02/17/18 2026)     SpO2   95 % (02/17/18 2000)     Post Anesthesia Care and Evaluation    Patient location during evaluation: ICU  Patient participation: complete - patient cannot participate  Post-procedure mental status: asleep.  Pain score: 0  Pain management: adequate  Airway patency: patent  Anesthetic complications: No anesthetic complications  PONV Status: none  Cardiovascular status: acceptable  Respiratory status: acceptable  Hydration status: acceptable

## 2018-02-18 NOTE — PROGRESS NOTES
"NEUROSURGERY PROGRESS NOTE     LOS: 9 days   Patient Care Team:  Carmela Pollock MD as PCP - General    Chief Complaint: Right frontal ICH.    POD#: 1 Day Post-Op  Procedures:  Right frontal craniotomy for evacuation of ICH.    Interval History:   Patient Complaints: Patient is intubated and sedated.  Patient Denies: As above.    Vital Signs: Blood pressure 111/58, pulse 120, temperature 98.1 °F (36.7 °C), temperature source Axillary, resp. rate 27, height 165.1 cm (65\"), weight 57.2 kg (126 lb), SpO2 93 %, not currently breastfeeding.  Intake/Output:   Intake/Output Summary (Last 24 hours) at 02/18/18 0826  Last data filed at 02/18/18 0600   Gross per 24 hour   Intake          3620.32 ml   Output             1290 ml   Net          2330.32 ml       Physical Exam:  The patient is sedated and intubated.  Pupils are midsize and reactive.     Data Review:      Results from last 7 days  Lab Units 02/18/18  0417   WBC 10*3/mm3 11.78*   HEMOGLOBIN g/dL 9.8*   HEMATOCRIT % 30.0*   PLATELETS 10*3/mm3 238       Results from last 7 days  Lab Units 02/18/18  0417   SODIUM mmol/L 149*   POTASSIUM mmol/L 3.3*   CHLORIDE mmol/L 118*   CO2 mmol/L 22.0   BUN mg/dL 51*   CREATININE mg/dL 1.30   GLUCOSE mg/dL 108*   CALCIUM mg/dL 5.9*         Assessment/Plan:  1.  Status post craniotomy for right frontal lobar hemorrhage.  2.  Status post laparotomy given concerns for ischemic bowel.  3.  Respiratory failure/pneumonia: Intubated and sedated.  4.  Coronary artery disease and history of congestive heart failure.  5.  History of chronic pain syndrome.  6.  Disposition: Continue to monitor neurologic status.    Juno Cartagena MD  02/18/18  8:26 AM    "

## 2018-02-18 NOTE — ANESTHESIA PROCEDURE NOTES
Arterial Line    Patient location during procedure: OR  Start time: 2/17/2018 9:56 PM   Line placed for hemodynamic monitoring.  Preanesthetic Checklist  Completed: patient identified, site marked, surgical consent, pre-op evaluation, timeout performed, IV checked, risks and benefits discussed and monitors and equipment checked  Arterial Line Prep   Sterile Tech: cap, gloves and sterile barriers  Prep: ChloraPrep  Patient monitoring: blood pressure monitoring, continuous pulse oximetry and EKG  Arterial Line Procedure   Laterality:right  Location:  radial artery  Catheter size: 20 G   Guidance: palpation technique  Number of attempts: 2  Successful placement: yes          Post Assessment   Dressing Type: occlusive dressing applied, secured with tape and wrist guard applied.   Complications no  Circ/Move/Sens Assessment: normal and unchanged.   Patient Tolerance: patient tolerated the procedure well with no apparent complications

## 2018-02-18 NOTE — PLAN OF CARE
Problem: Mechanical Ventilation, Invasive (Adult)  Intervention: Prevent Airway Displacement/Mechanical Dysfunction   02/18/18 0332   Prevent Airway Displacement/Mechanical Dysfunction   Airway Safety Measures manual resuscitator at bedside;oxygen flow meter at bedside;suction at bedside

## 2018-02-18 NOTE — PROGRESS NOTES
"General Surgery Post op Follow Up Note    Subjective:   No new changes.    /57 (BP Location: Left arm, Patient Position: Lying)  Pulse 111  Temp 98.9 °F (37.2 °C) (Axillary)   Resp 26  Ht 165.1 cm (65\")  Wt 57.2 kg (126 lb)  SpO2 93%  BMI 20.97 kg/m2    General Appearance:  Intubated.  Abdomen: incision is dressed.    CBC    Results from last 7 days  Lab Units 02/18/18  0417   WBC 10*3/mm3 11.78*   HEMOGLOBIN g/dL 9.8*   HEMATOCRIT % 30.0*   PLATELETS 10*3/mm3 238       CMP/BMP    Results from last 7 days  Lab Units 02/18/18  0417   SODIUM mmol/L 149*   POTASSIUM mmol/L 3.3*   CHLORIDE mmol/L 118*   CO2 mmol/L 22.0   BUN mg/dL 51*   CREATININE mg/dL 1.30   CALCIUM mg/dL 5.9*   BILIRUBIN mg/dL 0.5   ALK PHOS U/L 23*   ALT (SGPT) U/L 53*   AST (SGOT) U/L 52*   GLUCOSE mg/dL 108*         ASSESSMENT/PLAN:    Remains on levophed and intubated.  WBC down and making urine.  Keep the NGT to LOW wall suction and g-tube to gravity drainage. Supportive care for now.    Darin Balderas MD  2/18/2018  11:02 AM  "

## 2018-02-18 NOTE — NURSING NOTE
From [id number]; I was in patient's room following an intubation, deep line, giving medicines to increase BP, talking to physician and Nurse prac regarding her case of going to the OR. Transported patient straight to OR around 21:30.

## 2018-02-18 NOTE — PROGRESS NOTES
"Patient Name:  Preeti Manning  YOB: 1940  [id number]    Surgery Progress Note    Date of visit: 2/18/2018    Subjective   Subjective: Events of last night noted. Clinical deterioration leading to CT scan with worrisome findings for bowel ischemia. Subsequent laparotomy shows no bowel injury, but likely leaking around PEG, which itself was well-positioned. Washout and placement of new gastrostomy. Currently in ICU, intubated.         Objective     Objective:     BP 94/56  Pulse 116  Temp 98.1 °F (36.7 °C) (Axillary)   Resp 27  Ht 165.1 cm (65\")  Wt 57.2 kg (126 lb)  SpO2 92%  BMI 20.97 kg/m2    Intake/Output Summary (Last 24 hours) at 02/18/18 0855  Last data filed at 02/18/18 0600   Gross per 24 hour   Intake          3620.32 ml   Output             1290 ml   Net          2330.32 ml       CV:  Rhythm  regular and rate regular   L:  Rhonchi to auscultation bilaterally   Abd:  Bowel sounds hypoactive, soft, dressings c/d/i, G-tube with gastric output.  Ext:  No cyanosis, clubbing, edema    Labs that are back at this time have been reviewed.        Assessment/Plan     Assessment/ Plan:    Ileus versus small bowel obstruction 2/17/18 - s/p Ex-lap and washout. Continue current treatment. I discussed the events with her daughter at the bedside, and answered all questions. Will follow.    Hospital Problem List     * (Principal)Nontraumatic R ICH     COPD/emphysema        CAD (coronary artery disease)                Anxiety - chronic benzodiazepines    Chronic pain syndrome - chronic narcotic pain medications    Acute respiratory failure requiring intubation and mechanical ventilation, 2/17/18    Urinary retention requiring Benz placement 2/17/18 (1200 mL residual)    S/P right frontal craniotomy and excision of intraparenchymal hemorrhage 2/9/18    Pneumonia LLL. Probable aspiration                      Rashel Zeng MD  2/18/2018  8:55 AM      "

## 2018-02-19 NOTE — PROGRESS NOTES
Continued Stay Note  [facility]     Patient Name: Preeti Manning  MRN: [id number]  Today's Date: 2/19/2018    Admit Date: 2/9/2018          Discharge Plan       02/19/18 1401    Case Management/Social Work Plan    Plan update    Additional Comments Krys lockwood [facility] still following pt. Pt is not ready for d/c at this time. CM will continue to follow.              Discharge Codes     None        Expected Discharge Date and Time     Expected Discharge Date Expected Discharge Time    Feb 26, 2018             Melissa Soares, RN

## 2018-02-19 NOTE — PROGRESS NOTES
Pharmacy Consult-Vancomycin Dosing  Preeti Manning is a  77 y.o. female receiving vancomycin therapy.     Indication: Pneumonia  Consulting Provider: Dr. Payan  ID Consult: No  Goal Trough: 15-20    Current Antimicrobial Therapy  Anti-Infectives       Ordered     Dose/Rate Route Frequency Start Stop      02/19/18 0649  vancomycin in dextrose 5% 150 mL (VANCOCIN) IVPB 750 mg     Ordering Provider:  Autumn Dominguez, RPH    15 mg/kg × 57.2 kg  over 60 Minutes Intravenous Every 24 Hours Scheduled 02/19/18 0800 02/26/18 0759    02/18/18 0031  meropenem (MERREM) 1 g/100 mL 0.9% NS VTB (mbp)     Ordering Provider:  Darin Balderas MD    1 g  over 3 Hours Intravenous Every 12 Hours 02/18/18 1200 02/25/18 1159    02/18/18 0947  vancomycin 1250 mg/250 mL 0.9% NS IVPB ([facility])     Ordering Provider:  Stef Duarte IV, RPH    1,250 mg  over 90 Minutes Intravenous Once 02/18/18 1100 02/18/18 1302    02/17/18 1836  meropenem (MERREM) 1 g/100 mL 0.9% NS VTB (mbp)     Ordering Provider:  Barry Geronimo MD    1 g  over 30 Minutes Intravenous Once 02/17/18 1915 02/18/18 0037    02/17/18 1701  Pharmacy to dose vancomycin     Ordering Provider:  Rayo Payan MD     Does not apply Continuous PRN 02/17/18 1701 02/24/18 1700    02/1940  vancomycin in dextrose 5% 150 mL (VANCOCIN) IVPB 750 mg     Ordering Provider:  Stef Riley MD    15 mg/kg × 57.2 kg Intravenous Every 12 Hours 02/10/18 0600 02/10/18 0603    02/09/18 1541  vancomycin (VANCOCIN) in iso-osmotic dextrose IVPB 1 g (premix) 200 mL     Ordering Provider:  Stef Riley MD    1 g  over 60 Minutes Intravenous Once 02/09/18 1615 02/09/18 1545          Allergies  Allergies as of 02/09/2018 - Nick as Reviewed 02/09/2018   Allergen Reaction Noted   • Diphenhydramine Other (See Comments) 05/11/2017   • Levaquin [levofloxacin in d5w] Hallucinations 08/26/2017   • Sulfa antibiotics Other (See Comments) 05/11/2017   • Wellbutrin [bupropion] Mental Status Change  "08/26/2017   • Penicillins Rash 05/11/2017     Labs    Results from last 7 days   Lab Units 02/19/18  0340 02/18/18  0417 02/17/18  1706   BUN mg/dL 41* 51* 49*   CREATININE mg/dL 1.00 1.30 1.50*   CREATININE: 1 mg/dL (02/19/18 0340)  Estimated creatinine clearance: 42.5 mL/min  Results from last 7 days   Lab Units 02/19/18  0340 02/18/18  0417 02/17/18  1706   WBC 10*3/mm3 15.36* 11.78* 15.98*     Evaluation of Dosing    Ht - 165.1 cm (65\")  Wt - 57.2 kg (126 lb)    I/O last 3 completed shifts:  In: 7724.4 [I.V.:6264.4; Other:60; IV Piggyback:1400]  Out: 2420 [Urine:2195; Other:225]    Microbiology and Radiology  Microbiology Results (last 10 days)       Procedure Component Value - Date/Time      Urine Culture - Urine, Urine, Clean Catch [[id number]]  (Normal) Collected:  02/17/18 1806    Lab Status:  Preliminary result Specimen:  Urine from Urine, Clean Catch Updated:  02/18/18 0854     Urine Culture Culture in progress    Blood Culture - Blood, [[id number]]  (Normal) Collected:  02/17/18 1552    Lab Status:  Preliminary result Specimen:  Blood from Hand, Left Updated:  02/18/18 1616     Blood Culture No growth at 24 hours    Blood Culture - Blood, [[id number]]  (Normal) Collected:  02/17/18 1552    Lab Status:  Preliminary result Specimen:  Blood from Hand, Left Updated:  02/18/18 1616     Blood Culture No growth at 24 hours          Evaluation of Level    Results from last 7 days     Lab Units 02/19/18  0340   VANCOMYCIN RM mcg/mL 10.20     Per documentation, vancomycin dose on 2/17 was not administered  Received vancomycin 1250 mg IV x1 dose on 2/18 at 1132    Assessment/Plan:  1. Patient's renal function and UOP improving  2. Recommend Vancomycin 750mg IV q24h. (~ 15 mg/kg)  3. Recommend vancomycin trough 2/21 prior to dose if vancomycin continued  Pharmacy will continue to follow and adjust dose as needed.    Thank you for this consult,    Autumn Dominguez, PharmD        "

## 2018-02-19 NOTE — PROGRESS NOTES
"General Surgery Post op Follow Up Note    Subjective:   No new changes.    /49  Pulse 92  Temp 98.5 °F (36.9 °C) (Axillary)   Resp 21  Ht 165.1 cm (65\")  Wt 57.2 kg (126 lb)  SpO2 93%  BMI 20.97 kg/m2    General Appearance:  in no acute distress  Abdomen: incision is clean and dry, rosy times 2 in place    CBC    Results from last 7 days  Lab Units 02/19/18  0340   WBC 10*3/mm3 15.36*   HEMOGLOBIN g/dL 7.5*   HEMATOCRIT % 23.2*   PLATELETS 10*3/mm3 146*       CMP/BMP    Results from last 7 days  Lab Units 02/19/18  0340   SODIUM mmol/L 149*   POTASSIUM mmol/L 4.2   CHLORIDE mmol/L 123*   CO2 mmol/L 22.0   BUN mg/dL 41*   CREATININE mg/dL 1.00   CALCIUM mg/dL 6.4*   BILIRUBIN mg/dL 0.5   ALK PHOS U/L 45   ALT (SGPT) U/L 47*   AST (SGOT) U/L 48*   GLUCOSE mg/dL 112*         ASSESSMENT/PLAN:    Keep NGT to low wall suction and g-tube to gravity.  Supportive care, awaiting [facility] mcgovern.    aDrin Balderas MD  2/19/2018  10:43 AM  "

## 2018-02-19 NOTE — PATIENT CARE CONFERENCE
ICU Rounds: Pt s/p ex-lap w/ washout of abdominal abscess w/ g-tube placement on 02/17. Pt now intubated and sedated, not appropriate for skilled OT/PT intervention. Will place pt on OT/PT HOLD and await resume orders once medically appropriate.

## 2018-02-19 NOTE — PROGRESS NOTES
FOLLOW UP ENCOUNTER          WORKING DIAGNOSIS:   POD#10 : CRANIOTOMY                          MEDICAL HISTORY SINCE LAST ENCOUNTER :    Events noted.  Her repeat CT scan is unchanged                                     ASSESSMENT/DISPOSITION :   1.  We will continue to monitor and follow.

## 2018-02-19 NOTE — PROGRESS NOTES
INTENSIVIST   PROGRESS NOTE     Hospital:  LOS: 10 days   Subjective   Ms. Preeti Manning, 77 y.o. 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     S     Interval History:  POD: 2 Days Post-Op    Open eyes and follows commands.  Still on pressors, being weaned.     The patient's relevant past medical, surgical and social history were reviewed and updated in Epic as appropriate.      ROS:   ROS cannot be reliably obtained from the patient due to her Acuity of condition, Intubated and aphasia.    Objective   O     Vitals:  Temp: 98.5 °F (36.9 °C) (02/19/18 1030) Temp  Min: 97.8 °F (36.6 °C)  Max: 98.7 °F (37.1 °C)   BP: 120/49 (02/19/18 1030) BP  Min: 95/52  Max: 158/77   Pulse: 89 (02/19/18 1318) Pulse  Min: 89  Max: 116   Resp: 21 (02/19/18 1030) Resp  Min: 20  Max: 37   SpO2: 93 % (02/19/18 1318) SpO2  Min: 91 %  Max: 99 %   Device: mechanical ventilator (02/19/18 1318)    Flow Rate: 2 (02/17/18 1553) No Data Recorded       Intake/Ouptut 24 hrs (7:00AM - 6:59 AM)  Intake & Output (last 3 days)       02/16 0701 - 02/17 0700 02/17 0701 - 02/18 0700 02/18 0701 - 02/19 0700 02/19 0701 - 02/20 0700    I.V. (mL/kg)  2620.3 (45.8) 5315.6 (93) 189.3 (3.3)    Other 354 60      NG/GT 1126       IV Piggyback  1000 400     Total Intake(mL/kg) 1480 (25.9) 3680.3 (64.4) 5715.6 (100) 189.3 (3.3)    Urine (mL/kg/hr)  1290 (0.9) 2215 (1.6) 275 (0.7)    Other   825 (0.6) 25 (0.1)    Stool        Total Output   1290 3040 300    Net +1480 +2390.3 +2675.6 -110.7            Unmeasured Urine Occurrence 4 x 1 x      Unmeasured Stool Occurrence 2 x           Mechanical  Ventilator Settings:  Vent Mode: VC+/AC (02/19/18 1318)    Vt (Set, L): 0.4 L (02/19/18 1318)    Resp Rate (Set): 20 (02/19/18 1318) Resp Rate (Observed) Vent: 23 (02/19/18 1318)   FiO2 (%): 30 % (02/19/18 1318)    PEEP/CPAP (cm H2O): 5 cm H20 (02/19/18 1318) Plateau Pressure (cm H2O): 14 cm H2O (02/19/18 0721)    Minute Ventilation (L/min) (Obs): 9.46 L/min (02/19/18 1318)    I:E Ratio (Obs): 1:2.00 (02/19/18 1318)       Medications (drips):    Adult Central 2-in-1 TPN    dexmedetomidine Last Rate: Stopped (02/18/18 1820)   hydromorphone 1 mg/ml Last Rate: 0.6 mg/hr (02/19/18 1249)   norepinephrine Last Rate: Stopped (02/19/18 1043)   sodium chloride Last Rate: 75 mL/hr (02/19/18 1245)     Physical Examination  Telemetry:  Sinus Rhythm: sinus tachycardia (02/19/18 0800)   Cardiovascular: Normal rate, regular and rhythm. Normal heart sounds.  No murmurs, gallop or rub.   Respiratory: No respiratory distress. Normal respiratory effort.  Normal breath sounds  Clear to auscultation and percussion.    Abdominal:  Soft. No masses. Non-tender. No distension. No HSM.  Post op  PEG   Extremities: No digital cyanosis. No clubbing.  No peripheral edema.   Neurological:   Sedated  Aphasic  Left hemiplegia  Best Eye Response: 3-->(E3) to speech (02/19/18 0800)  Best Motor Response: 6-->(M6) obeys commands (02/19/18 0800)  Best Verbal Response: 1-->(V1) none (Intubated) (02/19/18 0800)  Michelle Coma Scale Score: 10 (02/19/18 0800)   Lines/Drains/Airways: R IJ 3 L  NG  PEG  Benz  R radial arterial line - not working.     Hematology:    Results from last 7 days  Lab Units 02/19/18  0340 02/18/18  0417 02/17/18  1706   WBC 10*3/mm3 15.36* 11.78* 15.98*   HEMOGLOBIN g/dL 7.5* 9.8* 13.3   MCV fL 95.1 93.2 94.8   PLATELETS 10*3/mm3 146* 238 307     Electrolytes, Magnesium and Phosphorus:    Results from last 7 days  Lab Units 02/19/18  0340 02/18/18  2332 02/18/18  1230 02/18/18  0417 02/17/18  1706  02/16/18  0718   SODIUM mmol/L  149*  --   --  149* 143  < > 144   POTASSIUM mmol/L 4.2 4.1 3.4* 3.3* 4.1  < > 3.4*   CO2 mmol/L 22.0  --   --  22.0 22.0  < > 25.0   MAGNESIUM mg/dL 2.9*  --   --  1.6  --   --  2.3   PHOSPHORUS mg/dL 3.3  --   --  4.4  --   --  3.9   < > = values in this interval not displayed.  Renal:    Results from last 7 days  Lab Units 02/19/18  0340 02/18/18  0417 02/17/18  1706   CREATININE mg/dL 1.00 1.30 1.50*   BUN mg/dL 41* 51* 49*     Estimated Creatinine Clearance: 42.5 mL/min (by C-G formula based on Cr of 1).      Results from last 7 days  Lab Units 02/19/18  0406 02/18/18  1203 02/18/18  0341 02/17/18  1849 02/17/18  1705   PH, ARTERIAL pH units 7.413 7.531* 7.533* 7.456* 7.524*   PCO2, ARTERIAL mm Hg 34.1* 26.8* 26.4* 29.8* 28.3*   PO2 ART mm Hg 61.1* 102.0 58.6* 220.0* 50.9*   FIO2 % 30 30 30 70 28     Lab Results   Component Value Date    CRP 20.70 (H) 02/19/2018    PREALBUMIN <5.0 (L) 02/19/2018    TRIG 109 02/19/2018    TRIG 76 02/10/2018       Images:  Ct Abdomen Pelvis Without Contrast    Result Date: 2/17/2018    Small bowel thickening and pneumatosis, portal venous gas, compatible with bowel necrosis. Pneumoperitoneum of uncertain significance, as gastrostomy tube was recently placed. Minimal free fluid. THIS REPORT CONTAINS FINDINGS THAT MAY BE CRITICAL TO PATIENT CARE. The findings were verbally communicated via telephone conference with Yaz Mahmood at 6:42 PM EST on 2/17/2018. The findings were acknowledged and understood.  THIS DOCUMENT HAS BEEN ELECTRONICALLY SIGNED BY KELLE NUNEZ MD    Xr Chest 1 View    Result Date: 2/19/2018  No change is noted when compared to yesterday.  The supportive catheters and tubes are well positioned.  D:  02/19/2018 E:  02/19/2018      Xr Chest 1 View    Result Date: 2/18/2018  emphysematous changes seen within the lung fields bilaterally with no evidence of acute parenchymal disease. No new focal parenchymal opacification present.  DICTATED:     02/18/2018 EDITED    :      02/18/2018  This report was finalized on 2/18/2018 5:00 PM by Dr. Nisha Nunez MD.      Xr Chest 1 View    Result Date: 2/17/2018    Right central venous catheter tip in superior vena cava. THIS DOCUMENT HAS BEEN ELECTRONICALLY SIGNED BY KELLE NUNEZ MD    Xr Chest 1 View    Result Date: 2/17/2018    Endotracheal tube in satisfactory position. No acute findings. THIS DOCUMENT HAS BEEN ELECTRONICALLY SIGNED BY KELLE NUNEZ MD    Results for orders placed during the hospital encounter of 02/09/18   Adult Transthoracic Echo Complete W/ Cont if Necessary Per Protocol    Narrative · Left ventricular systolic function is normal. Estimated EF = 55%.  · There is no evidence of pericardial effusion.  · Normal right ventricular cavity size, wall thickness, systolic function   and septal motion noted.  · Technically difficult and very limited study; no IV Lumason used.        Results: Reviewed.  I reviewed the patient's new laboratory and imaging results.  I independently reviewed the patient's new images.    Medications: Reviewed.    Assessment/Plan   A / P     77 y.o.female, admitted on 2/9/2018 with Stroke [I63.9]:     1. Leaking gastrostomy with intraabdominal contamination  Procedure(s) (LRB):  LAPAROTOMY EXPLORATORY WITH GASTROTOMY, DEBREEDMENT AND G TUBE PLACEMENT (N/A). WASHOUT OF INTRAABDOMINAL ABSCESS.  Dr. Balderas  2/17/2018  1. UTI: GNB  2. Antibiotics: MRP and Vanco IV  2. 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 S/P Crani and excision of intraparenchymal hemorrhage. 2/9/2018  2. Aphasia  3. Dexamethasone 4MG Q 8 hrs } NS  3. Respiratory failure  1. Emphysema  2. Extubated 2/11/2018  3. Intubated 2/17/2018  1. Mechanical Ventilation  4. Tobacco use  4. CAD  5. HFpEF  1. EF 55% (ECHO 2/9/2018)  6. Dyslipidemia  7. Anemia, NC, stable  8. Anxiety, on chronic BZD  9. Chronic Pain syndrome  10. Nutrition. Malnourished. Start Parenteral Nutrition as she won't be able  to resume Enteral Nutrition anytime soon.  MST Total Score: MST score: 5    Step 1   Have you recently lost weight without trying?  If yes, leave this row unanswered and move to next row: 2--> Unsure (02/09/18 2206)   If yes, how much weight have you lost?: 2--> unsure (02/09/18 2206)    Have you been eating poorly because of a decreased appetite?: 1--> Yes (02/09/18 2206)    Step 2  If MST>=2: Then Patient is At Risk    Documented weights    02/09/18 1430   Weight: 57.2 kg (126 lb)       11. Glucose: No h/o DM  1. Hyperglycemia due to Dexamethasone      Results from last 7 days  Lab Units 02/19/18  1143 02/19/18  0515 02/18/18  2326 02/18/18  1724 02/18/18  1142 02/18/18  0600   GLUCOSE mg/dL 118 114 119 130 148* 116       Lab Results  Lab Value Date/Time   HGBA1C 5.80 (H) 02/10/2018 0440   HGBA1C 5.10 05/12/2017 0433        Nutrition Support: No active supplement orders     Diet:  NPO Diet  Adult Central 2-in-1 TPN   Advance Directives: Full Code     Assessment / Plan:    1. Discussed with family at bedside  2. Discussed with Dr. Balderas  1. She won't be able to use GI tract for several days.  2. Start Parenteral Nutrition 1,243 Twin/d, 100 g prot/d  3. Weaning NE  4. Hemodynamic support  5. Glucose control   6. Discontinue Vancomycin  7. Watch Na  8. Transfuse PRBCs  9. Discontinue arterial line.  10. Disposition: Keep in ICU.    Plan of care and goals reviewed during interdisciplinary rounds.  I discussed the patient's findings and my recommendations with patient and nursing staff     Time: was greater than 45 minutes.    (This excludes time spent performing separately reportable procedures and services).  Patient was at high risk of imminent or life-threatening deterioration in her condition due to Cardiovascular failure and Respiratory failure.     Sherwin Brady MD, FACP, FCCP, [facility]  Intensive Care Medicine, Nutrition Support and Pulmonary Medicine     Microbiology Results Abnormal     Procedure Component  Value - Date/Time    Urine Culture - Urine, Urine, Clean Catch [[id number]]  (Abnormal) Collected:  02/17/18 1806    Lab Status:  Preliminary result Specimen:  Urine from Urine, Clean Catch Updated:  02/19/18 1033     Urine Culture --      >100,000 CFU/mL Gram Negative Bacilli (A)      >100,000 CFU/mL Gram Negative Bacilli (A)    Blood Culture - Blood, [[id number]]  (Normal) Collected:  02/17/18 1552    Lab Status:  Preliminary result Specimen:  Blood from Hand, Left Updated:  02/18/18 1616     Blood Culture No growth at 24 hours    Blood Culture - Blood, [[id number]]  (Normal) Collected:  02/17/18 1552    Lab Status:  Preliminary result Specimen:  Blood from Hand, Left Updated:  02/18/18 1616     Blood Culture No growth at 24 hours

## 2018-02-19 NOTE — PROGRESS NOTES
Adult Nutrition  Assessment/PES    Patient Name:  Preeti Manning  YOB: 1940  MRN: [id number]  Admit Date:  2/9/2018    Assessment Date:  2/19/2018    Comments:  Consider PN support if unable to use gut soon.          Reason for Assessment       02/19/18 1454    Reason for Assessment    Reason For Assessment/Visit follow up protocol    Identified At Risk By Screening Criteria tube feeding or parenteral nutrition    Time Spent (min) 30    Diagnosis --   per notes of this adm    Gastrointestinal Other (comment)   s/p emergent exp lap  w/ wash out for intraabdominal abscess r/t to leaking PEG; PEG removed and gastrostomy created (2/17)              Nutrition/Diet History       02/19/18 1458    Nutrition/Diet History    Reported/Observed By RN;MD    Other pt still intubated; on levophed. MD to check w/ surgeon re: initiation of  nutrition EN vs PN              Labs/Tests/Procedures/Meds       02/19/18 1502    Labs/Tests/Procedures/Meds    Diagnostic Test/Procedure Review reviewed, pertinent    Labs/Tests Review Reviewed;Na+;Pre Albumin;C-React PRO    Medication Review Reviewed, pertinent     Results from last 7 days  Lab Units 02/19/18  0340   SODIUM mmol/L 149*   POTASSIUM mmol/L 4.2   CHLORIDE mmol/L 123*   CO2 mmol/L 22.0   BUN mg/dL 41*   CREATININE mg/dL 1.00   CALCIUM mg/dL 6.4*   BILIRUBIN mg/dL 0.5   ALK PHOS U/L 45   ALT (SGPT) U/L 47*   AST (SGOT) U/L 48*   GLUCOSE mg/dL 112*                Physical Findings       02/19/18 1503    Physical Appearance    Skin surgical wound            Estimated/Assessed Needs       02/19/18 1513    Estimated/Assessed Energy Needs    Energy Need Method Kcal/kg;Stirling State (modified);[place]    kcal/kg 25    RMR  (Nic State Equation) 1443    Estimated REE  ([place]) 1139    Diagnoses Factors 1.4    Estimated Kcal ([place])  1595    Estimated/Assessed Protein Needs    Weight Used for Protein Calculation 57.2 kg (126 lb 1.7 oz)    Protein (gm/kg)  1.5    1.5 Gm Protein (gm) 85.8            Nutrition Prescription Ordered       02/19/18 1520    Nutrition Prescription PO    Current PO Diet NPO              Problem/Interventions:        Problem 1       02/19/18 1522    Nutrition Diagnoses Problem 1    Problem 1 Inadequate Intake/Infusion    Etiology (related to) MNT for Treatment/Condition    Signs/Symptoms (evidenced by) NPO    Percent (%) of EN goal --   Pt w/ inadequate po intake, EN since adm now NPO s/p surgery                    Intervention Goal       02/19/18 1524    Intervention Goal    General Nutrition support treatment    TF/PN Inititiate TF/PN            Nutrition Intervention       02/19/18 1524    Nutrition Intervention    RD/Tech Action Follow Tx progress;Care plan reviewd              Education/Evaluation       02/19/18 1525    Monitor/Evaluation    Monitor Per protocol;I&O;Pertinent labs;Skin status;Weight        Electronically signed by:  Amy Agustin RD  02/19/18 3:25 PM

## 2018-02-19 NOTE — PLAN OF CARE
Problem: Mechanical Ventilation, Invasive (Adult)  Intervention: Prevent Airway Displacement/Mechanical Dysfunction   02/19/18 0409   Prevent Airway Displacement/Mechanical Dysfunction   Airway Safety Measures manual resuscitator at bedside;manual resuscitator/mask in room;oxygen flow meter at bedside;suction at bedside     Intervention: Prevent Ventilator-induced Lung Injury   02/19/18 0409   Prevent Ventilator-induced Lung Injury   Rhythm/Pattern (Respiratory) no shortness of breath reported;depth regular;pattern regular;unlabored     Intervention: Optimize Oxygenation/Ventilation   02/19/18 0409   Respiratory Interventions   Airway/Ventilation Management airway patency maintained;pulmonary hygiene promoted;calming measures promoted;humidification applied   Positioning   Head Of Bed (HOB) Position HOB at 30-45 degrees   Positioning semi-Fowlers

## 2018-02-20 NOTE — PROGRESS NOTES
Multidisciplinary Rounds    Time: 20min  Patient Name: Preeti Manning  Date of Encounter: 02/20/18 4:42 PM  MRN: [id number]  Admission date: 2/9/2018      Reason for visit: MDR.     Additional information obtained during MDR: Nutrition support initiated, PN plus SMOF ILE.    Current diet: NPO Diet  Adult Central 2-in-1 TPN  Adult Central 2-in-1 TPN  No active supplement orders      Intervention:  Follow treatment plan  Care plan reviewed    Follow up:   Per protocol      Amy Agustin RD  4:42 PM

## 2018-02-20 NOTE — PROGRESS NOTES
"           FOLLOW UP ENCOUNTER          WORKING DIAGNOSIS:   POD# 11: CRANIOTOMY                          MEDICAL HISTORY SINCE LAST ENCOUNTER :       Remains intubated on ventilator with sedation.  She will open eyes spontaneously and seemed to \"track\".  I've seen her move her upper or lower extremities yet does not do so to command.                                      ASSESSMENT/DISPOSITION :      1.  I will continue to monitor him a: CT scan later this week.  At present, no changes in her neurosurgical care are indicated.           "

## 2018-02-20 NOTE — PLAN OF CARE
Problem: Mechanical Ventilation, Invasive (Adult)  Intervention: Optimize Oxygenation/Ventilation   02/20/18 0450   Respiratory Interventions   Airway/Ventilation Management airway patency maintained;humidification applied   Positioning   Head Of Bed (HOB) Position HOB at 30-45 degrees   Maintain Airway Patency   Suction Method endotracheal;subglottal

## 2018-02-20 NOTE — PROGRESS NOTES
"General Surgery Post op Follow Up Note    Subjective:   Intubated but awake.      /85 (BP Location: Left arm, Patient Position: Lying)  Pulse 118  Temp 98.3 °F (36.8 °C) (Axillary)   Resp 21  Ht 165.1 cm (65\")  Wt 57.2 kg (126 lb)  SpO2 94%  BMI 20.97 kg/m2    General Appearance:  in no acute distress  Abdomen: incision is clean and dry, rosy removed.     CBC    Results from last 7 days  Lab Units 02/20/18  0632   WBC 10*3/mm3 17.96*   HEMOGLOBIN g/dL 10.3*   HEMATOCRIT % 30.9*   PLATELETS 10*3/mm3 130*       CMP/BMP    Results from last 7 days  Lab Units 02/20/18  0632   SODIUM mmol/L 152*   POTASSIUM mmol/L 4.2   CHLORIDE mmol/L 123*   CO2 mmol/L 24.0   BUN mg/dL 40*   CREATININE mg/dL 0.70   CALCIUM mg/dL 7.1*   BILIRUBIN mg/dL 0.7   ALK PHOS U/L 55   ALT (SGPT) U/L 43*   AST (SGOT) U/L 42*   GLUCOSE mg/dL 163*         ASSESSMENT/PLAN:    Supportive care.  Await GI function.  NGT to low suction and g-tube to gravity.  TPN for nutrition.  IV abx per ID, urine with gram neg, 2/17/2018.    Darin Balderas MD  2/20/2018  8:45 AM  "

## 2018-02-20 NOTE — PROGRESS NOTES
INTENSIVIST   PROGRESS NOTE     Hospital:  LOS: 11 days   Subjective   Ms. Preeti Manning, 77 y.o. 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     S     Interval History:  POD: 3 Days Post-Op    More alert this morning.  Open eyes and trying to focus.  Off pressors since yesterday morning.     The patient's relevant past medical, surgical and social history were reviewed and updated in Epic as appropriate.      ROS:   ROS cannot be reliably obtained from the patient due to her Acuity of condition, Intubated and aphasia.    Objective   O     Vitals:  Temp: 98.3 °F (36.8 °C) (02/20/18 0800) Temp  Min: 97.5 °F (36.4 °C)  Max: 98.7 °F (37.1 °C)   BP: 155/88 (02/20/18 1100) BP  Min: 114/62  Max: 173/90   Pulse: 108 (02/20/18 1157) Pulse  Min: 76  Max: 118   Resp: 21 (02/20/18 0800) Resp  Min: 20  Max: 26   SpO2: 92 % (02/20/18 1157) SpO2  Min: 88 %  Max: 99 %   Device: mechanical ventilator (02/20/18 1157)    Flow Rate: 2 (02/17/18 1553) No Data Recorded       Intake/Ouptut 24 hrs (7:00AM - 6:59 AM)  Intake & Output (last 3 days)       02/17 0701 - 02/18 0700 02/18 0701 - 02/19 0700 02/19 0701 - 02/20 0700 02/20 0701 - 02/21 0700    I.V. (mL/kg) 2620.3 (45.8) 5315.6 (93) 2004.8 (35.1) 120 (2.1)    Blood   669.9     Other 60       NG/GT        IV Piggyback 1000 400      TPN   321.4 90.2    Total Intake(mL/kg) 3680.3 (64.4) 5715.6 (100) 2996.1 (52.4) 210.2 (3.7)    Urine (mL/kg/hr) 1290 (0.9) 2215 (1.6) 2380 (1.7) 420 (1.3)    Other  825 (0.6) 1275 (0.9) 90 (0.3)    Total Output 1290 3040 3655 510    Net +2390.3 +2675.6 -658.9 -299.8            Unmeasured  Urine Occurrence 1 x           Mechanical Ventilator Settings:  Vent Mode: PS (02/20/18 1025)    Vt (Set, L): 0.4 L (02/20/18 1035)    Resp Rate (Set): 20 (02/20/18 0816) Resp Rate (Observed) Vent: 18 (02/20/18 1100)   FiO2 (%): 30 % (02/20/18 1035)    PEEP/CPAP (cm H2O): 5 cm H20 (02/20/18 1035) Plateau Pressure (cm H2O): 15 cm H2O (02/20/18 0816)    Minute Ventilation (L/min) (Obs): 14.5 L/min (02/20/18 1035)    I:E Ratio (Obs): 1:2.90 (02/20/18 1035)       Medications (drips):    Adult Central 2-in-1 TPN Last Rate: 60 mL/hr at 02/19/18 1825   dexmedetomidine Last Rate: Stopped (02/18/18 1820)   hydromorphone 1 mg/ml Last Rate: 0.6 mg/hr (02/20/18 0344)   norepinephrine Last Rate: Stopped (02/19/18 1043)     Physical Examination  Telemetry:  Sinus Rhythm: sinus tachycardia (02/20/18 1000)   Cardiovascular: Normal rate, regular and rhythm. Normal heart sounds.  No murmurs, gallop or rub.   Respiratory: No respiratory distress. Normal respiratory effort.  Normal breath sounds  Clear to auscultation and percussion.    Abdominal:  Soft. No masses. Non-tender. No distension. No HSM.  Post op  PEG   Extremities: No digital cyanosis. No clubbing.  No peripheral edema.   Neurological:   Aphasic  Left hemiplegia  Best Eye Response: 4-->(E4) spontaneous (02/20/18 1000)  Best Motor Response: 6-->(M6) obeys commands (02/20/18 1000)  Best Verbal Response: 1-->(V1) none (Intubated) (02/20/18 1000)  Michelle Coma Scale Score: 11 (02/20/18 1000)   Lines/Drains/Airways: R IJ 3 L  NG  PEG  Benz     Hematology:    Results from last 7 days  Lab Units 02/20/18  0632 02/19/18  0340 02/18/18  0417   WBC 10*3/mm3 17.96* 15.36* 11.78*   HEMOGLOBIN g/dL 10.3* 7.5* 9.8*   MCV fL 90.9 95.1 93.2   PLATELETS 10*3/mm3 130* 146* 238     Electrolytes, Magnesium and Phosphorus:    Results from last 7 days  Lab Units 02/20/18  0632 02/19/18  0340 02/18/18  2332  02/18/18  0417   SODIUM mmol/L 152* 149*  --   --  149*   POTASSIUM mmol/L 4.2 4.2 4.1   "< > 3.3*   CO2 mmol/L 24.0 22.0  --   --  22.0   MAGNESIUM mg/dL 2.9* 2.9*  --   --  1.6   PHOSPHORUS mg/dL 2.4 3.3  --   --  4.4   < > = values in this interval not displayed.  Renal:    Results from last 7 days  Lab Units 02/20/18  0632 02/19/18  0340 02/18/18  0417   CREATININE mg/dL 0.70 1.00 1.30   BUN mg/dL 40* 41* 51*     Estimated Creatinine Clearance: 53.2 mL/min (by C-G formula based on Cr of 0.7).      Results from last 7 days  Lab Units 02/20/18  1154 02/20/18  0433 02/19/18  0406 02/18/18  1203 02/18/18  0341   PH, ARTERIAL pH units 7.443 7.493* 7.413 7.531* 7.533*   PCO2, ARTERIAL mm Hg 33.8* 30.1* 34.1* 26.8* 26.4*   PO2 ART mm Hg 66.0* 49.2* 61.1* 102.0 58.6*   FIO2 % 30 30 30 30 30     Images:  Ct Head Without Contrast    Result Date: 2/19/2018  There is significant intra-axial hemorrhage in the operative \"bed\" with surrounding edema and a small low density extra-axial fluid collection. There is also probable subarachnoid blood in the surgical region. However, the blood and extra-axial fluid collection are slightly smaller than on the previous examination 02/16/2018.  D:  02/19/2018 E:  02/19/2018    This report was finalized on 2/19/2018 4:00 PM by Dr. Thang Wolfe MD.      Xr Chest 1 View    Result Date: 2/20/2018  Stable chronic pulmonary findings. There has been no change since 02/19/2018.  D:  02/20/2018 E:  02/20/2018  This report was finalized on 2/20/2018 12:32 PM by Dr. Thang Wolfe MD.      Xr Chest 1 View    Result Date: 2/19/2018  No change is noted when compared to yesterday.  The supportive catheters and tubes are well positioned.  D:  02/19/2018 E:  02/19/2018  This report was finalized on 2/19/2018 4:04 PM by Dr. Conor Corcoran MD.      Results for orders placed during the hospital encounter of 02/09/18   Adult Transthoracic Echo Complete W/ Cont if Necessary Per Protocol    Narrative · Left ventricular systolic function is normal. Estimated EF = 55%.  · There is no evidence of " pericardial effusion.  · Normal right ventricular cavity size, wall thickness, systolic function   and septal motion noted.  · Technically difficult and very limited study; no IV Lumason used.        Results: Reviewed.  I reviewed the patient's new laboratory and imaging results.  I independently reviewed the patient's new images.    Medications: Reviewed.    Assessment/Plan   A / P     77 y.o.female, admitted on 2/9/2018 with Stroke [I63.9]:     1. Leaking gastrostomy with intraabdominal contamination  Procedure(s) (LRB):  LAPAROTOMY EXPLORATORY WITH GASTROTOMY, DEBREEDMENT AND G TUBE PLACEMENT (N/A). WASHOUT OF INTRAABDOMINAL ABSCESS.  Dr. Balderas  2/17/2018  1. UTI: E coli, K pneumoniae  2. Antibiotics: MRP  2. 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 S/P Crani and excision of intraparenchymal hemorrhage. 2/9/2018  2. Aphasia  3. Dexamethasone 4 mg Q 8 hrs } NS  3. Respiratory failure  1. Emphysema  2. Extubated 2/11/2018  3. Intubated 2/17/2018  1. Mechanical Ventilation  4. Tobacco use  4. CAD  5. HFpEF  1. EF 55% (ECHO 2/9/2018)  6. Dyslipidemia  7. Anemia, NC, Better after transfusion on 2/19/2018  8. Anxiety, on chronic BZD  9. Chronic Pain syndrome  10. Nutrition. Malnourished. Parenteral Nutrition   MST Total Score: MST score: 5  11. Glucose: No h/o DM  1. Hyperglycemia due to Dexamethasone      Results from last 7 days  Lab Units 02/20/18  0630 02/19/18  2333 02/19/18  1744 02/19/18  1143 02/19/18  0515 02/18/18  2326   GLUCOSE mg/dL 151* 140* 113 118 114 119       Lab Results  Lab Value Date/Time   HGBA1C 5.80 (H) 02/10/2018 0440   HGBA1C 5.10 05/12/2017 0433        Nutrition Support: No active supplement orders     Diet:  NPO Diet  Adult Central 2-in-1 TPN + Smoflipids   Advance Directives: Full Code     Assessment / Plan:    1. Watch PLT count, decreasing but still > 100 K  2. Discussed with family at bedside  3. Continue MRP, as GNB and she is allergic  to PCN.  4. Follow up labs in AM  5. Continue PN  6. D/C IVF NS  7. RSBI: 19 (02/20/18 1035)   8. SBT today, getting close to weaning ventilator  9. Disposition: Keep in ICU.    Plan of care and goals reviewed during interdisciplinary rounds.  I discussed the patient's findings and my recommendations with patient and nursing staff     Time: was greater than 32 minutes.    (This excludes time spent performing separately reportable procedures and services).  Patient was at high risk of imminent or life-threatening deterioration in her condition due to Respiratory failure.     Sherwin Brady MD, FACP, FCCP, CNSC  Intensive Care Medicine, Nutrition Support and Pulmonary Medicine     Microbiology Results Abnormal     Procedure Component Value - Date/Time    Urine Culture - Urine, Urine, Clean Catch [[id number]]  (Abnormal)  (Susceptibility) Collected:  02/17/18 1806    Lab Status:  Final result Specimen:  Urine from Urine, Clean Catch Updated:  02/20/18 1009     Urine Culture --      >100,000 CFU/mL Escherichia coli (A)      >100,000 CFU/mL Klebsiella pneumoniae (A)    Susceptibility      Escherichia coli     SAWYER     Ampicillin >16 ug/ml Resistant     Ampicillin + Sulbactam <=8/4 ug/ml Susceptible     Aztreonam <=8 ug/ml Susceptible     Cefepime <=8 ug/ml Susceptible     Cefotaxime <=2 ug/ml Susceptible     Ceftriaxone <=8 ug/ml Susceptible     Cefuroxime sodium <=4 ug/ml Susceptible     Cephalothin 16 ug/ml Intermediate     Ertapenem <=1 ug/ml Susceptible     Gentamicin <=4 ug/ml Susceptible     Levofloxacin <=2 ug/ml Susceptible     Meropenem <=1 ug/ml Susceptible     Nitrofurantoin <=32 ug/ml Susceptible     Piperacillin + Tazobactam <=16 ug/ml Susceptible     Tetracycline >8 ug/ml Resistant     Tobramycin <=4 ug/ml Susceptible     Trimethoprim + Sulfamethoxazole >2/38 ug/ml Resistant                Susceptibility      Klebsiella pneumoniae     SAWYER     Ampicillin >16 ug/ml Resistant     Ampicillin + Sulbactam <=8/4  ug/ml Susceptible     Aztreonam <=8 ug/ml Susceptible     Cefepime <=8 ug/ml Susceptible     Cefotaxime <=2 ug/ml Susceptible     Ceftriaxone <=8 ug/ml Susceptible     Cefuroxime sodium <=4 ug/ml Susceptible     Cephalothin <=8 ug/ml Susceptible     Ertapenem <=1 ug/ml Susceptible     Gentamicin <=4 ug/ml Susceptible     Levofloxacin <=2 ug/ml Susceptible     Meropenem <=1 ug/ml Susceptible     Nitrofurantoin <=32 ug/ml Susceptible     Piperacillin + Tazobactam <=16 ug/ml Susceptible     Tetracycline <=4 ug/ml Susceptible     Tobramycin <=4 ug/ml Susceptible     Trimethoprim + Sulfamethoxazole <=2/38 ug/ml Susceptible                    Blood Culture - Blood, [[id number]]  (Normal) Collected:  02/17/18 1552    Lab Status:  Preliminary result Specimen:  Blood from Hand, Left Updated:  02/19/18 1616     Blood Culture No growth at 2 days    Blood Culture - Blood, [[id number]]  (Normal) Collected:  02/17/18 1552    Lab Status:  Preliminary result Specimen:  Blood from Hand, Left Updated:  02/19/18 1616     Blood Culture No growth at 2 days

## 2018-02-21 NOTE — PLAN OF CARE
Problem: Patient Care Overview (Adult)  Goal: Plan of Care Review  Outcome: Ongoing (interventions implemented as appropriate)      Problem: Skin Integrity Impairment, Risk/Actual (Adult)  Goal: Skin Integrity/Wound Healing  Outcome: Ongoing (interventions implemented as appropriate)      Problem: Pressure Ulcer Risk (Kamran Scale) (Adult,Obstetrics,Pediatric)  Goal: Identify Related Risk Factors and Signs and Symptoms  Outcome: Outcome(s) achieved Date Met: 02/21/18    Goal: Skin Integrity  Outcome: Ongoing (interventions implemented as appropriate)      Problem: Perioperative Period (Adult)  Goal: Signs and Symptoms of Listed Potential Problems Will be Absent or Manageable (Perioperative Period)  Outcome: Outcome(s) achieved Date Met: 02/21/18

## 2018-02-21 NOTE — PROGRESS NOTES
INTENSIVIST   PROGRESS NOTE     Hospital:  LOS: 12 days   Subjective   Ms. Preeti Manning, 77 y.o. 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     S     Interval History:  POD: 4 Days Post-Op  Extubated yesterday.  Doing fair extubated.  Confused.  Not talking as much as she did last week.     The patient's relevant past medical, surgical and social history were reviewed and updated in Epic as appropriate.      ROS:   ROS cannot be reliably obtained from the patient due to her Altered Mental Status and aphasia.    Objective   O     Vitals:  Temp: 98.6 °F (37 °C) (02/21/18 1200) Temp  Min: 98.3 °F (36.8 °C)  Max: 99.2 °F (37.3 °C)   BP: 150/77 (02/21/18 1500) BP  Min: 142/71  Max: 174/84   Pulse: 84 (02/21/18 1500) Pulse  Min: 72  Max: 115   Resp: 14 (02/21/18 1400) Resp  Min: 12  Max: 18   SpO2: 92 % (02/21/18 1500) SpO2  Min: 90 %  Max: 94 %   Device: nasal cannula with humidification (02/21/18 1400)    Flow Rate: 6 (02/21/18 1400) Flow (L/min)  Min: 4  Max: 6       Intake/Ouptut 24 hrs (7:00AM - 6:59 AM)  Intake & Output (last 3 days)       02/18 0701 - 02/19 0700 02/19 0701 - 02/20 0700 02/20 0701 - 02/21 0700 02/21 0701 - 02/22 0700    I.V. (mL/kg) 5315.6 (93) 2004.8 (35.1) 482 (8.4) 38.7 (0.7)    Blood  669.9      Other        IV Piggyback 400  200     TPN  321.4 1384.5 352.3    Total Intake(mL/kg) 5715.6 (100) 2996.1 (52.4) 2066.5 (36.2) 391 (6.8)    Urine (mL/kg/hr) 2215 (1.6) 2380 (1.7) 2115 (1.5) 885 (1.7)    Other 825 (0.6) 1275 (0.9) 890 (0.6) 550 (1)    Total Output 3040 3655 3005 1435    Net +2675.6 -658.9 -938.5 -1044                 Medications (drips):    Adult Central 2-in-1 TPN    Adult Central 2-in-1 TPN    dexmedetomidine Last Rate: Stopped (02/18/18 1820)   hydromorphone 1 mg/ml Last Rate: Stopped (02/21/18 0546)     Physical Examination  Telemetry:  Sinus Rhythm: normal sinus rhythm (02/21/18 1400)   Cardiovascular: Normal rate, regular and rhythm. Normal heart sounds.  No murmurs, gallop or rub.   Respiratory: No respiratory distress. Normal respiratory effort.  Normal breath sounds  Clear to auscultation and percussion.    Abdominal:  Soft. No masses. Non-tender. No distension. No HSM.  Post op  PEG   Extremities: No digital cyanosis. No clubbing.  No peripheral edema.   Neurological:   Aphasic  Left hemiplegia  Best Eye Response: 4-->(E4) spontaneous (02/21/18 1400)  Best Motor Response: 6-->(M6) obeys commands (02/21/18 1400)  Best Verbal Response: 4-->(V4) confused (Intubated) (02/21/18 1400)  Michelle Coma Scale Score: 14 (02/21/18 1400)   Lines/Drains/Airways: R IJ 3 L  NG  PEG  Benz     Hematology:    Results from last 7 days  Lab Units 02/21/18  0506 02/20/18  0632 02/19/18  0340   WBC 10*3/mm3 14.88* 17.96* 15.36*   HEMOGLOBIN g/dL 11.0* 10.3* 7.5*   MCV fL 91.8 90.9 95.1   PLATELETS 10*3/mm3 113* 130* 146*     Electrolytes, Magnesium and Phosphorus:    Results from last 7 days  Lab Units 02/21/18  0506 02/20/18  0632 02/19/18  0340   SODIUM mmol/L 151* 152* 149*   POTASSIUM mmol/L 4.7 4.2 4.2   CO2 mmol/L 24.0 24.0 22.0   MAGNESIUM mg/dL 2.5 2.9* 2.9*   PHOSPHORUS mg/dL 2.6 2.4 3.3     Renal:    Results from last 7 days  Lab Units 02/21/18  0506 02/20/18  0632 02/19/18  0340   CREATININE mg/dL 0.60 0.70 1.00   BUN mg/dL 45* 40* 41*     Estimated Creatinine Clearance: 53.2 mL/min (by C-G formula based on Cr of 0.6).      Results from last 7 days  Lab Units 02/20/18  1734 02/20/18  1154 02/20/18  0433 02/19/18  0406 02/18/18  1203   PH, ARTERIAL pH units 7.450 7.443 7.493* 7.413 7.531*   PCO2, ARTERIAL mm Hg 33.8* 33.8*  30.1* 34.1* 26.8*   PO2 ART mm Hg 69.2* 66.0* 49.2* 61.1* 102.0   FIO2 % 36 30 30 30 30     Images:  Xr Chest 1 View    Result Date: 2/21/2018  Post extubation radiograph with mild new left basilar atelectasis.  D:  02/21/2018 E:  02/21/2018       Xr Chest 1 View    Result Date: 2/20/2018  Stable chronic pulmonary findings. There has been no change since 02/19/2018.  D:  02/20/2018 E:  02/20/2018  This report was finalized on 2/20/2018 12:32 PM by Dr. Thang Wolfe MD.      Results for orders placed during the hospital encounter of 02/09/18   Adult Transthoracic Echo Complete W/ Cont if Necessary Per Protocol    Narrative · Left ventricular systolic function is normal. Estimated EF = 55%.  · There is no evidence of pericardial effusion.  · Normal right ventricular cavity size, wall thickness, systolic function   and septal motion noted.  · Technically difficult and very limited study; no IV Lumason used.        Results: Reviewed.  I reviewed the patient's new laboratory and imaging results.  I independently reviewed the patient's new images.    Medications: Reviewed.    Assessment/Plan   A / P     77 y.o.female, admitted on 2/9/2018 with Stroke [I63.9]:     1. Leaking gastrostomy with intraabdominal contamination  Procedure(s) (LRB):  LAPAROTOMY EXPLORATORY WITH GASTROTOMY, DEBREEDMENT AND G TUBE PLACEMENT (N/A). WASHOUT OF INTRAABDOMINAL ABSCESS.  Dr. Balderas  2/17/2018  1. UTI: E coli, K pneumoniae  2. Antibiotics: MRP  2. 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 S/P Crani and excision of intraparenchymal hemorrhage. 2/9/2018  2. Aphasia  3. Dexamethasone 4 mg Q 8 hrs } NS  3. Respiratory failure  1. Emphysema  2. Extubated 2/11/2018  3. Intubated 2/17/2018  1. Mechanical Ventilation  4. Extubated 2/20/2018  5. Tobacco use  4. CAD  5. HFpEF  1. EF 55% (ECHO 2/9/2018)  6. Dyslipidemia  7. Anemia, NC, Better after transfusion on 2/19/2018  8. Anxiety, on chronic  BZD  9. Chronic Pain syndrome  10. Nutrition. Malnourished. Parenteral Nutrition   MST Total Score: MST score: 5  11. Glucose: No h/o DM  1. Hyperglycemia due to Dexamethasone      Results from last 7 days  Lab Units 02/21/18  1215 02/21/18  0517 02/20/18  2334 02/20/18  1721 02/20/18  0630 02/19/18  2333   GLUCOSE mg/dL 175* 173* 207* 171* 151* 140*       Lab Results  Lab Value Date/Time   HGBA1C 5.80 (H) 02/10/2018 0440   HGBA1C 5.10 05/12/2017 0433        Nutrition Support: No active supplement orders     Diet:  NPO Diet  Adult Central 2-in-1 TPN  Adult Central 2-in-1 TPN + Smoflipids   Advance Directives: Full Code     Assessment / Plan:    1. Watch PLT count, decreasing but still > 100 K  2. Continue PN  3. Watch Na, We will add D5W.X 1 liter  4. Disposition: Keep in ICU.    Plan of care and goals reviewed during interdisciplinary rounds.  I discussed the patient's findings and my recommendations with patient and nursing staff     Sherwin Brady MD, FACP, FCCP, CNSC  Intensive Care Medicine, Nutrition Support and Pulmonary Medicine

## 2018-02-21 NOTE — THERAPY RE-EVALUATION
Acute Care - Physical Therapy Re-Evaluation  [facility]     Patient Name: Preeti Manning  : 1940  MRN: [id number]  Today's Date: 2018   Onset of Illness/Injury or Date of Surgery Date: 18  Date of Referral to PT: 18  Referring Physician: MD Caitlyn      Admit Date: 2018     Visit Dx:    ICD-10-CM ICD-9-CM   1. Pulmonary emphysema, unspecified emphysema type J43.9 492.8   2. Intracranial hemorrhage I62.9 432.9   3. Impaired mobility and ADLs Z74.09 799.89   4. Impaired functional mobility, balance, gait, and endurance Z74.09 V49.89   5. Dysphagia, unspecified type R13.10 787.20   6. Anxiety - chronic benzodiazepines F41.9 300.00   7. Ileus versus small bowel obstruction 18 K56.7 560.1   8. Bowel perforation K63.1 569.83     Patient Active Problem List   Diagnosis   • Closed fracture of distal end of right femur   • COPD/emphysema   • CAD (coronary artery disease)   • Hyperlipidemia   • History CHF    • Stroke   • Nontraumatic R ICH    • Anxiety - chronic benzodiazepines   • Chronic pain syndrome - chronic narcotic pain medications   • Acute respiratory failure requiring intubation and mechanical ventilation, 18   • Urinary retention requiring Benz placement 18 (1200 mL residual)   • S/P right frontal craniotomy and excision of intraparenchymal hemorrhage 18   • Pneumonia LLL. Probable aspiration   • Ileus versus small bowel obstruction 18   • Pneumatosis coli and biliary air     Past Medical History:   Diagnosis Date   • Anxiety    • Arthritis    • CHF (congestive heart failure)    • COPD (chronic obstructive pulmonary disease)    • Coronary artery disease    • Depression    • Fibromyalgia      Past Surgical History:   Procedure Laterality Date   • CORONARY ANGIOPLASTY WITH STENT PLACEMENT     • CRANIOTOMY FOR ANEURYSM/ARTERIOVENOUS MALFORMATION N/A 2018    Procedure: CRANIOTOMY FOR INTRACRANIAL HEMORRHAGE;  Surgeon: Stef Riley MD;  Location: [facility]  OR;  Service:    • ENDOSCOPY W/ PEG TUBE PLACEMENT N/A 2/15/2018    Procedure: ESOPHAGOGASTRODUODENOSCOPY AT BEDSIDE WITH PEG PLACEMENT;  Surgeon: Rashel Zeng MD;  Location:  [facility];  Service:    • EXPLORATORY LAPAROTOMY N/A 2/17/2018    Procedure: LAPAROTOMY EXPLORATORY WITH GASTROTOMY, DEBRIDEMENT AND G TUBE PLACEMENT;  Surgeon: Darin Balderas MD;  Location:  [facility];  Service:    • FEMUR OPEN REDUCTION INTERNAL FIXATION Right 5/15/2017    Procedure: DISTAL FEMUR OPEN REDUCTION INTERNAL FIXATION RIGHT;  Surgeon: Pierre Velarde MD;  Location:  [facility];  Service:    • TOTAL HIP ARTHROPLASTY Right 2017          PT ASSESSMENT (last 72 hours)      PT Evaluation       02/21/18 1400 02/21/18 1337    Rehab Evaluation    Document Type  re-evaluation  -KR    Subjective Information  agree to therapy;no complaints  -KR    Patient Effort, Rehab Treatment  fair  -KR    Symptoms Noted During/After Treatment  none  -KR    General Information    Patient Profile Review  yes  -KR    Onset of Illness/Injury or Date of Surgery Date  02/09/18  -KR    Referring Physician  MD Caitlyn  -KR    Pertinent History Of Current Problem  Please see IE for original admit diagnosis. PT re-evaluation s/p ex-lap with washout of abdominal abscess with g-tube placement on 2/17 with pt intubated and sedated.   -KR    Precautions/Limitations  fall precautions;oxygen therapy device and L/min   NG tube  -KR    Prior Level of Function  --   please see IE  -KR    Plans/Goals Discussed With  patient;agreed upon  -KR    Risks Reviewed  patient:;LOB;dizziness;increased discomfort;change in vital signs  -KR    Benefits Reviewed  patient:;improve function;increase independence;increase balance;increase strength  -KR    Barriers to Rehab  medically complex;cognitive status  -KR    Living Environment    Living Environment Comment  please see IE for living environment  -KR    Clinical Impression    Date of Referral to PT  02/21/18  -KR     PT Diagnosis  impaired functional mobility  -KR    Patient/Family Goals Statement  return to PLOF  -KR    Criteria for Skilled Therapeutic Interventions Met  yes;treatment indicated  -KR    Rehab Potential  good, to achieve stated therapy goals  -KR    Vital Signs    Pre Systolic BP Rehab  152  -KR    Pre Treatment Diastolic BP  82  -KR    Post Systolic BP Rehab  159  -KR    Post Treatment Diastolic BP  87  -KR    Pretreatment Heart Rate (beats/min)  83  -KR    Posttreatment Heart Rate (beats/min)  85  -KR    Pre SpO2 (%)  92  -KR    O2 Delivery Pre Treatment  supplemental O2  -KR    Post SpO2 (%)  93  -KR    O2 Delivery Post Treatment  supplemental O2  -KR    Pre Patient Position  Supine  -KR    Intra Patient Position  Sitting  -KR    Post Patient Position  Supine  -KR    Pain Assessment    Pain Assessment  0-10  -KR    Pain Score  0  -KR    Post Pain Score  0  -KR    Cognitive Assessment/Intervention    Current Cognitive/Communication Assessment  impaired  -KR    Orientation Status  disoriented x 4  -KR    Follows Commands/Answers Questions  able to follow single-step instructions;25% of the time;needs cueing;needs increased time;needs repetition  -KR    Personal Safety  severe impairment;decreased awareness, need for assist;decreased awareness, need for safety;decreased insight to deficits  -KR    Personal Safety Interventions  fall prevention program maintained;nonskid shoes/slippers when out of bed;supervised activity  -KR    ROM (Range of Motion)    General ROM  lower extremity range of motion deficits identified  -KR    General ROM Detail  BLE AROM impaired 25%; pt unable to follow commands; PROM WFL  -KR    MMT (Manual Muscle Testing)    General MMT Assessment  lower extremity strength deficits identified  -KR    General MMT Assessment Detail  unable to formally assess  -KR    Muscle Tone Assessment    LUE Muscle Tone Assessment mildly decreased tone  -WB     Bed Mobility, Assessment/Treatment    Bed Mob,  Supine to Sit, [place]  maximum assist (25% patient effort);2 person assist required;verbal cues required  -KR    Bed Mob, Sit to Supine, [place]  maximum assist (25% patient effort);2 person assist required;verbal cues required  -KR    Bed Mobility, Safety Issues  cognitive deficits limit understanding;decreased use of arms for pushing/pulling;decreased use of legs for bridging/pushing  -KR    Bed Mobility, Impairments  strength decreased;impaired balance;coordination impaired  -KR    Bed Mobility, Comment  VC's for sequencing. Pt had increased difficulty following commands to provide assistance.  -KR    Transfer Assessment/Treatment    Transfers, Sit-Stand [place]  not tested  -KR    Transfers, Stand-Sit [place]  not tested  -KR    Transfer, Comment  STS transfers deferred due to decreased cognition and pt's decreased ability to follow commands.   -KR    Gait Assessment/Treatment    Gait, [place] Level  not appropriate to assess;unable to perform  -KR    Motor Skills/Interventions    Additional Documentation  Balance Skills Training (Group)  -KR    Balance Skills Training    Sitting-Level of Assistance  Moderate assistance;x2   progressed to periods of CGA  -KR    Sitting-Balance Support  Right upper extremity supported;Left upper extremity supported;Feet supported  -KR    Sensory Assessment/Intervention    Light Touch  LLE;RLE  -KR    LLE Light Touch  --   unable to formally assess due to cognitive status  -KR    RLE Light Touch  --   unable to formally assess due to cognitive status  -KR    Positioning and Restraints    Pre-Treatment Position  in bed  -KR    Post Treatment Position  bed  -KR    In Bed  notified nsg;supine;call light within reach;encouraged to call for assist;exit alarm on;side rails up x3;RUE elevated;LUE elevated;CPM RLE;CPM LLE;R waffle boot;L waffle boot  -KR      02/21/18 1200 02/21/18 1000    Muscle Tone Assessment    LUE Muscle Tone Assessment mildly decreased tone   -WB mildly decreased tone  -WB      02/21/18 0800       Muscle Tone Assessment    LUE Muscle Tone Assessment mildly decreased tone  -WB       User Key  (r) = Recorded By, (t) = Taken By, (c) = Cosigned By    Initials Name Provider Type    WB Rachel Schuler, RN Registered Nurse    EMMA Aviles PT Physical Therapist          Physical Therapy Education     Title: PT OT SLP Therapies (Active)     Topic: Physical Therapy (Active)     Point: Mobility training (Active)    Learning Progress Summary    Learner Readiness Method Response Comment Documented by Status   Patient Acceptance E NR  KR 02/21/18 1558 Active    Acceptance E NR,[facility] 02/20/18 0321 Active    Acceptance E NR,NL   02/18/18 2206 Active    Acceptance E NR   02/18/18 0450 Active    Acceptance E VU,NR BENEFITS OF OOB ACTIVITY, SAFETY WITH MOBILITY, TRANSFER TRAINING, GAIT TRAINING, CD 02/16/18 1449 Done    Acceptance E,D NR   02/14/18 1213 Active    Acceptance E,D NR   02/11/18 1444 Active   Family Acceptance E NR,[facility] 02/18/18 2206 Active    Acceptance E NR   02/18/18 0450 Active    Acceptance E VU,NR BENEFITS OF OOB ACTIVITY, SAFETY WITH MOBILITY, TRANSFER TRAINING, GAIT TRAINING, CD 02/16/18 1449 Done    Acceptance E,D NR   02/14/18 1213 Active               Point: Home exercise program (Active)    Learning Progress Summary    Learner Readiness Method Response Comment Documented by Status   Patient Acceptance E NR  KR 02/21/18 1558 Active    Acceptance E NR,NL   02/20/18 0321 Active    Acceptance E NR,NL   02/18/18 2206 Active    Acceptance E NR   02/18/18 0450 Active    Acceptance E VU,NR BENEFITS OF OOB ACTIVITY, SAFETY WITH MOBILITY, TRANSFER TRAINING, GAIT TRAINING, CD 02/16/18 1449 Done    Acceptance E,D NR   02/14/18 1213 Active   Family Acceptance E NR,[facility] 02/18/18 2206 Active    Acceptance E NR   02/18/18 0450 Active    Acceptance E VU,NR BENEFITS OF OOB ACTIVITY, SAFETY WITH MOBILITY, TRANSFER TRAINING, GAIT  TRAINING,  02/16/18 1449 Done    Acceptance E,D NR   02/14/18 1213 Active               Point: Body mechanics (Active)    Learning Progress Summary    Learner Readiness Method Response Comment Documented by Status   Patient Acceptance E NR  KR 02/21/18 1558 Active    Acceptance E NR,NL   02/20/18 0321 Active    Acceptance E NR,NL   02/18/18 2206 Active    Acceptance E NR   02/18/18 0450 Active    Acceptance E VU,NR BENEFITS OF OOB ACTIVITY, SAFETY WITH MOBILITY, TRANSFER TRAINING, GAIT TRAINING,  02/16/18 1449 Done    Acceptance E,D NR   02/14/18 1213 Active    Acceptance E,D NR   02/11/18 1444 Active   Family Acceptance E NR,NL   02/18/18 2206 Active    Acceptance E NR   02/18/18 0450 Active    Acceptance E VU,NR BENEFITS OF OOB ACTIVITY, SAFETY WITH MOBILITY, TRANSFER TRAINING, GAIT TRAINING,  02/16/18 1449 Done    Acceptance E,D NR   02/14/18 1213 Active               Point: Precautions (Active)    Learning Progress Summary    Learner Readiness Method Response Comment Documented by Status   Patient Acceptance E NR  KR 02/21/18 1558 Active    Acceptance E NR,NL   02/20/18 0321 Active    Acceptance E NR,NL   02/18/18 2206 Active    Acceptance E NR   02/18/18 0450 Active    Acceptance E VU,NR BENEFITS OF OOB ACTIVITY, SAFETY WITH MOBILITY, TRANSFER TRAINING, GAIT TRAINING,  02/16/18 1449 Done    Acceptance E,D NR   02/14/18 1213 Active    Acceptance E,D NR   02/11/18 1444 Active   Family Acceptance E NR,[facility] 02/18/18 2206 Active    Acceptance E NR   02/18/18 0450 Active    Acceptance E VU,NR BENEFITS OF OOB ACTIVITY, SAFETY WITH MOBILITY, TRANSFER TRAINING, GAIT TRAINING,  02/16/18 1449 Done    Acceptance E,D NR   02/14/18 1213 Active                      User Key     Initials Effective Dates Name Provider Type Discipline     06/19/15 -  Kirsty Radford, PT Physical Therapist PT     06/19/15 -  Pattie Abreu, PT Physical Therapist PT     07/03/17 -  Bárbara Byrd, RN  Registered Nurse Nurse    EMMA 09/25/17 -  Rhina Aviles, PT Physical Therapist PT                PT Recommendation and Plan  Anticipated Discharge Disposition: skilled nursing facility  PT Frequency: daily  Plan of Care Review  Plan Of Care Reviewed With: patient  Outcome Summary/Follow up Plan: PT re-evaluation completed for pt s/p exploratory laparotomy with g-tube placement who presents with decreased strength and impaired cognition. Pt required maxA x2 for bed mobility. STS transfer deferred due to pt's difficulty following commands. Pt would continue to benefit from PT skilled care. Recommend D/C to SNF.          IP PT Goals       02/21/18 1558 02/16/18 1450 02/14/18 1213    Bed Mobility PT LTG    Bed Mobility PT LTG, Date Goal Reviewed 02/21/18  -KR      Bed Mobility PT LTG, Outcome goal ongoing  -KR goal ongoing  -CD goal ongoing  -LS    Transfer Training PT LTG    Transfer Training PT  LTG, Date Goal Reviewed 02/21/18  -KR      Transfer Training PT LTG, Outcome goal ongoing  -KR goal ongoing  -CD goal ongoing  -LS    Gait Training PT LTG    Gait Training Goal PT LTG, Date Goal Reviewed 02/21/18  -KR      Gait Training Goal PT LTG, Outcome goal ongoing  -KR goal partially met   FOR ASSIST NEEDED X 8 FEET.   -CD goal ongoing  -LS    Static Sitting Balance PT LTG    Static Sitting Balance PT LTG, Date Goal Reviewed 02/21/18  -KR      Static Sitting Balance PT LTG, Outcome goal ongoing  -KR goal ongoing  -CD goal ongoing  -LS      02/11/18 1444          Bed Mobility PT LTG    Bed Mobility PT LTG, Date Established 02/11/18  -LS      Bed Mobility PT LTG, Time to Achieve 2 wks  -LS      Bed Mobility PT LTG, Activity Type supine to sit/sit to supine  -LS      Bed Mobility PT LTG, [place] Level minimum assist (75% patient effort)  -LS      Transfer Training PT LTG    Transfer Training PT LTG, Date Established 02/11/18  -LS      Transfer Training PT LTG, Time to Achieve 2 wks  -LS      Transfer Training PT LTG,  Activity Type sit to stand/stand to sit  -LS      Transfer Training PT LTG, [place] Level minimum assist (75% patient effort);2 person assist required  -LS      Transfer Training PT LTG, Assist Device walker, rolling  -LS      Gait Training PT LTG    Gait Training Goal PT LTG, Date Established 02/11/18  -LS      Gait Training Goal PT LTG, Time to Achieve 2 wks  -LS      Gait Training Goal PT LTG, [place] Level moderate assist (50% patient effort);2 person assist required  -LS      Gait Training Goal PT LTG, Assist Device walker, rolling  -LS      Gait Training Goal PT LTG, Distance to Achieve 10  -LS      Static Sitting Balance PT LTG    Static Sitting Balance PT LTG, Date Established 02/11/18  -LS      Static Sitting Balance PT LTG, Time to Achieve 2 wks  -LS      Static Sitting Balance PT LTG, [place] Level supervision required  -LS      Static Sitting Balance PT LTG, Assist Device UE Support  -LS        User Key  (r) = Recorded By, (t) = Taken By, (c) = Cosigned By    Initials Name Provider Type    DANIEL Radford, PT Physical Therapist    LS Pattie Abreu, PT Physical Therapist    KR Rhina Aviles, PT Physical Therapist                Outcome Measures       02/21/18 2577          How much help from another person do you currently need...    Turning from your back to your side while in flat bed without using bedrails? 2  -KR      Moving from lying on back to sitting on the side of a flat bed without bedrails? 2  -KR      Moving to and from a bed to a chair (including a wheelchair)? 1  -KR      Standing up from a chair using your arms (e.g., wheelchair, bedside chair)? 1  -KR      Climbing 3-5 steps with a railing? 1  -KR      To walk in hospital room? 1  -KR      AM-PAC 6 Clicks Score 8  -KR      Functional Assessment    Outcome Measure Options AM-PAC 6 Clicks Basic Mobility (PT)  -KR        User Key  (r) = Recorded By, (t) = Taken By, (c) = Cosigned By    Initials Name Provider Type    KR  Rhina Aviles, PT Physical Therapist           Time Calculation:         PT Charges       02/21/18 1605          Time Calculation    Start Time 1337  -KR      PT Received On 02/21/18  -KR      PT Goal Re-Cert Due Date 03/03/18  -KR      Time Calculation- PT    Total Timed Code Minutes- PT 23 minute(s)  -KR        User Key  (r) = Recorded By, (t) = Taken By, (c) = Cosigned By    Initials Name Provider Type    KR Rhina Aviles, PT Physical Therapist          Therapy Charges for Today     Code Description Service Date Service Provider Modifiers Qty    66601993982 HC PT RE-EVAL ESTABLISHED PLAN 2 2/21/2018 Rhina Aviles, PT GP 1    98285030713 HC PT THER PROC EA 15 MIN 2/21/2018 Rhina Aviles, PT GP 2    56949570368 HC PT THER SUPP EA 15 MIN 2/21/2018 Rhina Aviles, PT GP 2          PT G-Codes  Outcome Measure Options: AM-PAC 6 Clicks Basic Mobility (PT)      Alina Aviles PT  2/21/2018

## 2018-02-21 NOTE — SIGNIFICANT NOTE
02/21/18 0915   SLP Deferred Reason   SLP Deferred Reason Patient unavailable for evaluation  (Pt not alert and able to participate in communication tx. Pt has not been cleared by MD for PO at this time. SLP to follow up 2/22.)

## 2018-02-21 NOTE — PLAN OF CARE
Problem: Skin Integrity Impairment, Risk/Actual (Adult)  Goal: Skin Integrity/Wound Healing  Outcome: Ongoing (interventions implemented as appropriate)      Problem: Pressure Ulcer Risk (Kamran Scale) (Adult,Obstetrics,Pediatric)  Goal: Skin Integrity  Outcome: Ongoing (interventions implemented as appropriate)      Problem: Fall Risk (Adult)  Goal: Identify Related Risk Factors and Signs and Symptoms  Outcome: Ongoing (interventions implemented as appropriate)    Goal: Absence of Falls  Outcome: Ongoing (interventions implemented as appropriate)

## 2018-02-21 NOTE — PLAN OF CARE
Problem: Patient Care Overview (Adult)  Goal: Plan of Care Review  Outcome: Ongoing (interventions implemented as appropriate)   02/21/18 1558   Coping/Psychosocial Response Interventions   Plan Of Care Reviewed With patient   Outcome Evaluation   Outcome Summary/Follow up Plan PT re-evaluation completed for pt s/p exploratory laparotomy with g-tube placement who presents with decreased strength and impaired cognition. Pt required maxA x2 for bed mobility. STS transfer deferred due to pt's difficulty following commands. Pt would continue to benefit from PT skilled care. Recommend D/C to SNF.       Problem: Inpatient Physical Therapy  Goal: Bed Mobility Goal LTG- PT  Outcome: Ongoing (interventions implemented as appropriate)   02/11/18 1444 02/21/18 1558   Bed Mobility PT LTG   Bed Mobility PT LTG, Date Established 02/11/18 --    Bed Mobility PT LTG, Time to Achieve 2 wks --    Bed Mobility PT LTG, Activity Type supine to sit/sit to supine --    Bed Mobility PT LTG, [place] Level minimum assist (75% patient effort) --    Bed Mobility PT LTG, Date Goal Reviewed --  02/21/18   Bed Mobility PT LTG, Outcome --  goal ongoing     Goal: Transfer Training Goal 1 LTG- PT  Outcome: Ongoing (interventions implemented as appropriate)   02/11/18 1444 02/21/18 1558   Transfer Training PT LTG   Transfer Training PT LTG, Date Established 02/11/18 --    Transfer Training PT LTG, Time to Achieve 2 wks --    Transfer Training PT LTG, Activity Type sit to stand/stand to sit --    Transfer Training PT LTG, [place] Level minimum assist (75% patient effort);2 person assist required --    Transfer Training PT LTG, Assist Device walker, rolling --    Transfer Training PT LTG, Date Goal Reviewed --  02/21/18   Transfer Training PT LTG, Outcome --  goal ongoing     Goal: Gait Training Goal LTG- PT  Outcome: Ongoing (interventions implemented as appropriate)   02/11/18 1444 02/21/18 1558   Gait Training PT LTG   Gait Training Goal PT  LTG, Date Established 02/11/18 --    Gait Training Goal PT LTG, Time to Achieve 2 wks --    Gait Training Goal PT LTG, [place] Level moderate assist (50% patient effort);2 person assist required --    Gait Training Goal PT LTG, Assist Device walker, rolling --    Gait Training Goal PT LTG, Distance to Achieve 10 --    Gait Training Goal PT LTG, Date Goal Reviewed --  02/21/18   Gait Training Goal PT LTG, Outcome --  goal ongoing     Goal: Static Sitting Balance Goal LTG- PT  Outcome: Ongoing (interventions implemented as appropriate)   02/11/18 1444 02/21/18 1558   Static Sitting Balance PT LTG   Static Sitting Balance PT LTG, Date Established 02/11/18 --    Static Sitting Balance PT LTG, Time to Achieve 2 wks --    Static Sitting Balance PT LTG, [place] Level supervision required --    Static Sitting Balance PT LTG, Assist Device UE Support --    Static Sitting Balance PT LTG, Date Goal Reviewed --  02/21/18   Static Sitting Balance PT LTG, Outcome --  goal ongoing

## 2018-02-21 NOTE — PROGRESS NOTES
"General Surgery Post op Follow Up Note    Subjective:   Extubated.  Abdomen feeling a bit better.    /81 (BP Location: Left arm, Patient Position: Lying)  Pulse 85  Temp 98.9 °F (37.2 °C) (Oral)   Resp 16  Ht 165.1 cm (65\")  Wt 57.2 kg (126 lb)  SpO2 91%  BMI 20.97 kg/m2    General Appearance:  in no acute distress  Abdomen: incision is clean and dry    CBC    Results from last 7 days  Lab Units 02/21/18  0506   WBC 10*3/mm3 14.88*   HEMOGLOBIN g/dL 11.0*   HEMATOCRIT % 33.6*   PLATELETS 10*3/mm3 113*       CMP/BMP    Results from last 7 days  Lab Units 02/21/18  0506   SODIUM mmol/L 151*   POTASSIUM mmol/L 4.7   CHLORIDE mmol/L 120*   CO2 mmol/L 24.0   BUN mg/dL 45*   CREATININE mg/dL 0.60   CALCIUM mg/dL 7.9*   BILIRUBIN mg/dL 0.7   ALK PHOS U/L 58   ALT (SGPT) U/L 43*   AST (SGOT) U/L 40*   GLUCOSE mg/dL 188*         ASSESSMENT/PLAN:    Await GI function.  Keep NGT to low suction and g-tube to gravity.  Paint the abdominal incision and g-tube site with Betadine daily.    Darin Balderas MD  2/21/2018  11:42 AM  "

## 2018-02-21 NOTE — PROGRESS NOTES
Continued Stay Note  [facility]     Patient Name: Preeti Manning  MRN: [id number]  Today's Date: 2/21/2018    Admit Date: 2/9/2018          Discharge Plan       02/21/18 0943    Case Management/Social Work Plan    Plan Rehab    Additional Comments Hope to transfer to [facility] when medically ready.  CM will follow.              Discharge Codes     None        Expected Discharge Date and Time     Expected Discharge Date Expected Discharge Time    Feb 26, 2018             Tasha Kelley RN

## 2018-02-22 NOTE — THERAPY TREATMENT NOTE
Acute Care - Physical Therapy Treatment Note  [facility]     Patient Name: Preeti Manning  : 1940  MRN: [id number]  Today's Date: 2018  Onset of Illness/Injury or Date of Surgery Date: 18  Date of Referral to PT: 18  Referring Physician: MD Caitlyn    Admit Date: 2018    Visit Dx:    ICD-10-CM ICD-9-CM   1. Pulmonary emphysema, unspecified emphysema type J43.9 492.8   2. Intracranial hemorrhage I62.9 432.9   3. Impaired mobility and ADLs Z74.09 799.89   4. Impaired functional mobility, balance, gait, and endurance Z74.09 V49.89   5. Dysphagia, unspecified type R13.10 787.20   6. Anxiety - chronic benzodiazepines F41.9 300.00   7. Ileus versus small bowel obstruction 18 K56.7 560.1   8. Bowel perforation K63.1 569.83     Patient Active Problem List   Diagnosis   • Closed fracture of distal end of right femur   • COPD/emphysema   • CAD (coronary artery disease)   • Hyperlipidemia   • History CHF    • Stroke   • Nontraumatic R ICH    • Anxiety - chronic benzodiazepines   • Chronic pain syndrome - chronic narcotic pain medications   • Acute respiratory failure requiring intubation and mechanical ventilation, 18   • Urinary retention requiring Benz placement 18 (1200 mL residual)   • S/P right frontal craniotomy and excision of intraparenchymal hemorrhage 18   • Pneumonia LLL. Probable aspiration   • Ileus versus small bowel obstruction 18   • Pneumatosis coli and biliary air               Adult Rehabilitation Note       18 1501 18 0930       Rehab Assessment/Intervention    Discipline physical therapist  -EMMA speech language pathologist  -MD MEZA AV2     Document Type therapy note (daily note)  -KR therapy note (daily note)  -MD MEZA AV2     Subjective Information agree to therapy;no complaints  -KR no complaints;agree to therapy  -MD MEZA AV2     Patient Effort, Rehab Treatment adequate  -EMMA fair  -MD MZEA AV2     Symptoms Noted During/After Treatment fatigue   -KR none  -MD DERRICK,AV2     Precautions/Limitations fall precautions;oxygen therapy device and L/min   NG, G tube, L sided weakness  -KR      Recorded by [KR] Rhina Aviles PT [MD DERRICK,AV2] Jess Benitez, MS CCC-SLP (r) Vane Santiago, Speech Therapy Student (t) Jess Benitez, MS CCC-SLP (c)     Vital Signs    Pre Systolic BP Rehab 123  -KR      Pre Treatment Diastolic BP 74  -KR      Post Systolic BP Rehab 157  -KR      Post Treatment Diastolic BP 86  -KR      Pretreatment Heart Rate (beats/min) 96  -KR      Posttreatment Heart Rate (beats/min) 92  -KR      Pre SpO2 (%) 95  -KR      O2 Delivery Pre Treatment supplemental O2  -KR      Post SpO2 (%) 92  -KR      O2 Delivery Post Treatment supplemental O2  -KR      Pre Patient Position Sitting  -KR      Intra Patient Position Standing  -KR      Post Patient Position Sitting  -KR      Recorded by [KR] Rhina Aviles PT      Pain Assessment    Pain Assessment 0-10  -KR Blanchard-Javier FACES  -MD DERRICK,AV2     Blanchard-Javier FACES Pain Rating  2  -MD DERRICK,AV2     Pain Score 0  -KR      Post Pain Score 0  -KR      Recorded by [KR] Rhina Aviles, PT [MD DERRICK,AV2] Jess Benitez, MS CCC-SLP (r) Vane Santiago, Speech Therapy Student (t) Jess Benitez, MS CCC-SLP (c)     Cognitive Assessment/Intervention    Current Cognitive/Communication Assessment impaired  -KR      Orientation Status oriented to;person  -KR      Follows Commands/Answers Questions able to follow single-step instructions;50% of the time;needs cueing;needs increased time;needs repetition  -KR      Personal Safety severe impairment;decreased awareness, need for assist;decreased awareness, need for safety;decreased insight to deficits  -KR      Personal Safety Interventions fall prevention program maintained;gait belt;nonskid shoes/slippers when out of bed  -KR      Recorded by [KR] Rhina Aviles PT      Improve ability to follow directions    Improve ability to follow directions:  one-step directions with  objects;80%;with consistent cues  -DERRICK,MD,AV2     Status: Improve ability to follow directions  Progressing as expected  -AV,MD,AV2     Ability to Follow Directions Progress  40%;with consistent cues;continue to address  -MD DERRICK,AV2     Recorded by  [AV,MD,AV2] Jess Benitez MS CCC-SLP (r) Vane Santiago, Speech Therapy Student (t) Jess Benitez MS CCC-SLP (c)     Improve ability to comprehend questions    Improve ability to comprehend questions:  simple yes/no questions;80%;with consistent cues  -DERRICK,MD,AV2     Status: Improve ability to comprehend questions  Progressing as expected  -DERRICKMD,AV2     Ability to Comprehend Questions Progress  50%;with consistent cues  -MD DERRICK,AV2     Comments: Improve ability to comprehend questions  Simple y/n ?s targeting environment and personal life  -MD DERRICKAV2     Recorded by  [MD DERRICK,AV2] Jess Benitez MS CCC-SLP (r) Vane Santiago, Speech Therapy Student (t) Jess Benitez MS CCC-SLP (c)     Improve word retrieval skills    Improve word retrieval skills by:  completing automatic speech tasks counting;60%;with consistent cues  -DERRICK,MD,AV2     Status: Improve word retrieval skills  Progressing as expected  -MD DERRICK,AV2     Word Retrieval Skills Progress  continue to address  -MD DERRICK,AV2     Comments: Improve word retrieval skills  DNT this session  -MD DERRICKAV2     Recorded by  [MD DERRICK,AV2] Jess Benitez MS CCC-SLP (r) Vane Santiago, Speech Therapy Student (t) Jess Benitez MS CCC-SLP (c)     Improve attention    Improve attention by:  looking at speaker;attending to task;80%;without cues  -DERRICK,MD,AV2     Status: Improve attention  Progressing as expected  -DERRICK,MD,AV2     Attention Progress  70%;with inconsistent cues  -MD DERRICK,AV2     Recorded by  [MD DERRICK,AV2] Jess Benitez MS CCC-SLP (r) Vane Santiago, Speech Therapy Student (t) Jess Benitez MS CCC-SLP (c)     Bed Mobility, Assessment/Treatment    Bed Mob, Supine to Sit, [place] not tested   -KR      Bed Mob, Sit to Supine, [place] not tested  -KR      Bed Mobility, Comment UIC  -KR      Recorded by [EMMA] Rhina Aviles, PT      Transfer Assessment/Treatment    Transfers, Sit-Stand [place] maximum assist (25% patient effort);2 person assist required;verbal cues required  -KR      Transfers, Stand-Sit [place] maximum assist (25% patient effort);2 person assist required;verbal cues required  -KR      Transfers, Sit-Stand-Sit, Assist Device --   BUE support  -KR      Transfer, Safety Issues step length decreased;weight-shifting ability decreased;loses balance backward  -KR      Transfer, Impairments strength decreased;impaired balance;postural control impaired  -KR      Transfer, Comment STS x1 from chair. Pt able to clear hips from chair, but did not achieve full upright posture. Pt required max cueing for posture. Limited by fatigue.   -KR      Recorded by [EMMA] Rhina Aviles, PT      Gait Assessment/Treatment    Gait, [place] Level not appropriate to assess  -KR      Recorded by [EMMA] Rhina Aviles, PT      Motor Skills/Interventions    Additional Documentation Balance Skills Training (Group)  -KR      Recorded by [EMMA] Rhina Aviles, PT      Balance Skills Training    Sitting-Level of Assistance Moderate assistance   progressed to CGA  -KR      Sitting-Balance Support Right upper extremity supported;Left upper extremity supported;Feet supported  -KR      Standing-Level of Assistance Maximum assistance;x2  -KR      Static Standing Balance Support Right upper extremity supported;Left upper extremity supported  -KR      Recorded by [EMMA] Rhina Aviles, PT      Positioning and Restraints    Pre-Treatment Position sitting in chair/recliner  -KR      Post Treatment Position chair  -KR      In Chair notified nsg;reclined;call light within reach;encouraged to call for assist;exit alarm on;with family/caregiver;RUE elevated;LUE elevated;waffle cushion;on mechanical lift sling;legs elevated   -KR      Recorded by [KR] Rhina Aviles, PT        User Key  (r) = Recorded By, (t) = Taken By, (c) = Cosigned By    Initials Name Effective Dates    AV Jess Benitez, MS CCC-SLP 03/14/16 -     KR Rhina Aviles, PT 09/25/17 -     MD Vane Santiago, Speech Therapy Student 01/05/18 -                 IP PT Goals       02/22/18 1609 02/21/18 1558 02/16/18 1450    Bed Mobility PT LTG    Bed Mobility PT LTG, Date Goal Reviewed  02/21/18  -KR     Bed Mobility PT LTG, Outcome goal ongoing  -KR goal ongoing  -KR goal ongoing  -CD    Transfer Training PT LTG    Transfer Training PT  LTG, Date Goal Reviewed  02/21/18  -KR     Transfer Training PT LTG, Outcome goal ongoing  -KR goal ongoing  -KR goal ongoing  -CD    Gait Training PT LTG    Gait Training Goal PT LTG, Date Goal Reviewed  02/21/18  -KR     Gait Training Goal PT LTG, Outcome goal ongoing  -KR goal ongoing  -KR goal partially met   FOR ASSIST NEEDED X 8 FEET.   -CD    Static Sitting Balance PT LTG    Static Sitting Balance PT LTG, Date Goal Reviewed  02/21/18  -KR     Static Sitting Balance PT LTG, Outcome goal ongoing  -KR goal ongoing  -KR goal ongoing  -CD      02/14/18 1213 02/11/18 1444       Bed Mobility PT LTG    Bed Mobility PT LTG, Date Established  02/11/18  -LS     Bed Mobility PT LTG, Time to Achieve  2 wks  -LS     Bed Mobility PT LTG, Activity Type  supine to sit/sit to supine  -LS     Bed Mobility PT LTG, [place] Level  minimum assist (75% patient effort)  -LS     Bed Mobility PT LTG, Outcome goal ongoing  -LS      Transfer Training PT LTG    Transfer Training PT LTG, Date Established  02/11/18  -LS     Transfer Training PT LTG, Time to Achieve  2 wks  -LS     Transfer Training PT LTG, Activity Type  sit to stand/stand to sit  -LS     Transfer Training PT LTG, [place] Level  minimum assist (75% patient effort);2 person assist required  -LS     Transfer Training PT LTG, Assist Device  walker, rolling  -LS     Transfer Training PT LTG,  Outcome goal ongoing  -LS      Gait Training PT LTG    Gait Training Goal PT LTG, Date Established  02/11/18  -LS     Gait Training Goal PT LTG, Time to Achieve  2 wks  -LS     Gait Training Goal PT LTG, [place] Level  moderate assist (50% patient effort);2 person assist required  -LS     Gait Training Goal PT LTG, Assist Device  walker, rolling  -LS     Gait Training Goal PT LTG, Distance to Achieve  10  -LS     Gait Training Goal PT LTG, Outcome goal ongoing  -LS      Static Sitting Balance PT LTG    Static Sitting Balance PT LTG, Date Established  02/11/18  -LS     Static Sitting Balance PT LTG, Time to Achieve  2 wks  -LS     Static Sitting Balance PT LTG, [place] Level  supervision required  -LS     Static Sitting Balance PT LTG, Assist Device  UE Support  -LS     Static Sitting Balance PT LTG, Outcome goal ongoing  -LS        User Key  (r) = Recorded By, (t) = Taken By, (c) = Cosigned By    Initials Name Provider Type    DANIEL Radford, PT Physical Therapist    LS Pattie Abreu, PT Physical Therapist    EMMA Aviles, PT Physical Therapist          Physical Therapy Education     Title: PT OT SLP Therapies (Active)     Topic: Physical Therapy (Active)     Point: Mobility training (Active)    Learning Progress Summary    Learner Readiness Method Response Comment Documented by Status   Patient Acceptance E NR  KR 02/22/18 1608 Active    Acceptance E NR   02/21/18 1558 Active    Acceptance E NR,[facility] 02/20/18 0321 Active    Acceptance E NR,NL   02/18/18 2206 Active    Acceptance E NR   02/18/18 0450 Active    Acceptance E VU,NR BENEFITS OF OOB ACTIVITY, SAFETY WITH MOBILITY, TRANSFER TRAINING, GAIT TRAINING, CD 02/16/18 1449 Done    Acceptance E,D NR   02/14/18 1213 Active    Acceptance E,D NR   02/11/18 1444 Active   Family Acceptance E NR,[facility] 02/18/18 2206 Active    Acceptance E NR   02/18/18 0450 Active    Acceptance E VU,NR BENEFITS OF OOB ACTIVITY, SAFETY WITH MOBILITY,  TRANSFER TRAINING, GAIT TRAINING, CD 02/16/18 1449 Done    Acceptance E,D NR   02/14/18 1213 Active               Point: Home exercise program (Active)    Learning Progress Summary    Learner Readiness Method Response Comment Documented by Status   Patient Acceptance E NR  KR 02/22/18 1608 Active    Acceptance E NR  KR 02/21/18 1558 Active    Acceptance E NR,NL  AC 02/20/18 0321 Active    Acceptance E NR,NL   02/18/18 2206 Active    Acceptance E NR   02/18/18 0450 Active    Acceptance E VU,NR BENEFITS OF OOB ACTIVITY, SAFETY WITH MOBILITY, TRANSFER TRAINING, GAIT TRAINING, CD 02/16/18 1449 Done    Acceptance E,D NR   02/14/18 1213 Active   Family Acceptance E NR,NL   02/18/18 2206 Active    Acceptance E NR   02/18/18 0450 Active    Acceptance E VU,NR BENEFITS OF OOB ACTIVITY, SAFETY WITH MOBILITY, TRANSFER TRAINING, GAIT TRAINING, CD 02/16/18 1449 Done    Acceptance E,D NR   02/14/18 1213 Active               Point: Body mechanics (Active)    Learning Progress Summary    Learner Readiness Method Response Comment Documented by Status   Patient Acceptance E NR  KR 02/22/18 1608 Active    Acceptance E NR  KR 02/21/18 1558 Active    Acceptance E NR,NL   02/20/18 0321 Active    Acceptance E NR,NL   02/18/18 2206 Active    Acceptance E NR   02/18/18 0450 Active    Acceptance E VU,NR BENEFITS OF OOB ACTIVITY, SAFETY WITH MOBILITY, TRANSFER TRAINING, GAIT TRAINING, CD 02/16/18 1449 Done    Acceptance E,D NR   02/14/18 1213 Active    Acceptance E,D NR   02/11/18 1444 Active   Family Acceptance E NR,NL   02/18/18 2206 Active    Acceptance E NR   02/18/18 0450 Active    Acceptance E VU,NR BENEFITS OF OOB ACTIVITY, SAFETY WITH MOBILITY, TRANSFER TRAINING, GAIT TRAINING, CD 02/16/18 1449 Done    Acceptance E,D NR   02/14/18 1213 Active               Point: Precautions (Active)    Learning Progress Summary    Learner Readiness Method Response Comment Documented by Status   Patient Acceptance E NR  KR  02/22/18 1608 Active    Acceptance E NR  KR 02/21/18 1558 Active    Acceptance E NR,NL  AC 02/20/18 0321 Active    Acceptance E NR,NL  AC 02/18/18 2206 Active    Acceptance E NR  AC 02/18/18 0450 Active    Acceptance E VU,NR BENEFITS OF OOB ACTIVITY, SAFETY WITH MOBILITY, TRANSFER TRAINING, GAIT TRAINING,  02/16/18 1449 Done    Acceptance E,D NR   02/14/18 1213 Active    Acceptance E,D NR   02/11/18 1444 Active   Family Acceptance E NR,NL   02/18/18 2206 Active    Acceptance E NR   02/18/18 0450 Active    Acceptance E VU,NR BENEFITS OF OOB ACTIVITY, SAFETY WITH MOBILITY, TRANSFER TRAINING, GAIT TRAINING,  02/16/18 1449 Done    Acceptance E,D NR   02/14/18 1213 Active                      User Key     Initials Effective Dates Name Provider Type Discipline     06/19/15 -  Kirsty Radford, PT Physical Therapist PT     06/19/15 -  Pattie Abreu, PT Physical Therapist PT     07/03/17 -  Bárbara Byrd, RN Registered Nurse Nurse     09/25/17 -  Rhina Aviles, PT Physical Therapist PT                    PT Recommendation and Plan  Anticipated Discharge Disposition: skilled nursing facility  PT Frequency: daily  Plan of Care Review  Plan Of Care Reviewed With: patient  Progress: improving  Outcome Summary/Follow up Plan: Pt able to STS x1 from chair with maxA x2. Pt able to clear hips from chair, but did not achieve full upright posture despite max cueing. Pt limited by increased fatigue. Continue to progress as appropriate.           Outcome Measures       02/22/18 1501 02/22/18 1130 02/21/18 1337    How much help from another person do you currently need...    Turning from your back to your side while in flat bed without using bedrails? 2  -KR  2  -KR    Moving from lying on back to sitting on the side of a flat bed without bedrails? 2  -KR  2  -KR    Moving to and from a bed to a chair (including a wheelchair)? 1  -KR  1  -KR    Standing up from a chair using your arms (e.g., wheelchair, bedside  chair)? 2  -KR  1  -KR    Climbing 3-5 steps with a railing? 1  -KR  1  -KR    To walk in hospital room? 1  -KR  1  -KR    AM-PAC 6 Clicks Score 9  -KR  8  -KR    How much help from another is currently needed...    Putting on and taking off regular lower body clothing?  1  -CL     Bathing (including washing, rinsing, and drying)  1  -CL     Toileting (which includes using toilet bed pan or urinal)  1  -CL     Putting on and taking off regular upper body clothing  1  -CL     Taking care of personal grooming (such as brushing teeth)  2  -CL     Eating meals  1  -CL     Score  7  -CL     Modified [place] Scale    Modified [place] Scale  4 - Moderately severe disability.  Unable to walk without assistance, and unable to attend to own bodily needs without assistance.  -CL     Functional Assessment    Outcome Measure Options AM-PAC 6 Clicks Basic Mobility (PT)  -KR AM-PAC 6 Clicks Daily Activity (OT)  -CL AM-PAC 6 Clicks Basic Mobility (PT)  -KR      User Key  (r) = Recorded By, (t) = Taken By, (c) = Cosigned By    Initials Name Provider Type    CL Jannet Salguero OT Occupational Therapist    EMMA Aviles PT Physical Therapist           Time Calculation:         PT Charges       02/22/18 1611          Time Calculation    Start Time 1501  -KR      PT Received On 02/22/18  -KR      PT Goal Re-Cert Due Date 03/03/18  -KR      Time Calculation- PT    Total Timed Code Minutes- PT 18 minute(s)  -KR        User Key  (r) = Recorded By, (t) = Taken By, (c) = Cosigned By    Initials Name Provider Type    EMMA Aviles PT Physical Therapist          Therapy Charges for Today     Code Description Service Date Service Provider Modifiers Qty    78942528510 HC PT RE-EVAL ESTABLISHED PLAN 2 2/21/2018 Rhina Aviles, PT GP 1    57296852118 HC PT THER PROC EA 15 MIN 2/21/2018 Rhina Aviles, PT GP 2    61796565986 HC PT THER SUPP EA 15 MIN 2/21/2018 Rhina Aviles, PT GP 2    78958457548 HC PT THER PROC EA 15 MIN 2/22/2018 Rhina JIMENEZ  Stefan, PT GP 1    11351214781 HC PT THER SUPP EA 15 MIN 2/22/2018 Rhina Aviles, PT GP 1          PT G-Codes  Outcome Measure Options: AM-PAC 6 Clicks Basic Mobility (PT)    Alina Aviles PT  2/22/2018

## 2018-02-22 NOTE — PLAN OF CARE
Problem: Patient Care Overview (Adult)  Goal: Plan of Care Review  Outcome: Ongoing (interventions implemented as appropriate)   02/22/18 1554   Coping/Psychosocial Response Interventions   Plan Of Care Reviewed With patient   Patient Care Overview   Progress progress toward functional goals is gradual   Outcome Evaluation   Outcome Summary/Follow up Plan OT Rev-eval complete s/p ex-lap. Pt session limited d/t lethargy and c/o abd pain. Pt Depx2 for bed mobility and bed/chair t/f. Recommend cont skilled IPOT POC. Recommend pt DC to IP rehab.        Problem: Inpatient Occupational Therapy  Goal: Follow Directions Goal LTG- OT  Outcome: Revised   02/22/18 1554   Follow Directions OT LTG   Follow Directions OT LTG, Date Established 02/22/18   Follow Directions OT LTG, Time to Achieve by discharge   Follow Directions OT LTG, Activity Type 75% treatment session;1-Step   Follow Directions OT LTG, [place] Level with verbal cues;demonstration   Follow Directions OT LTG, Outcome goal revised     Goal: Grooming Goal LTG- OT  Outcome: Ongoing (interventions implemented as appropriate)   02/11/18 0813 02/22/18 1554   Grooming OT LTG   Grooming Goal OT LTG, Date Established 02/11/18 --    Grooming Goal OT LTG, Time to Achieve by discharge --    Grooming Goal OT LTG, Activity Type Pt will wash R/L side of face.  --    Grooming Goal OT LTG, [place] Level moderate assist (50% patient effort) --    Grooming Goal OT LTG, Position sitting, edge of bed --    Grooming Goal OT LTG, Additional Goal AAD --    Grooming Goal OT LTG, Outcome --  goal ongoing     Goal: Transfer Training Goal 1 LTG- OT  Outcome: Revised   02/16/18 1406 02/22/18 1554   Transfer Training OT LTG   Transfer Training OT LTG, Date Established 02/16/18 --    Transfer Training OT LTG, Time to Achieve --  by discharge   Transfer Training OT LTG, Activity Type --  sit to stand/stand to sit;toilet;bed to chair /chair to bed   Transfer Training OT LTG,  [place] Level --  maximum assist (25% patient effort);2 person assist required   Transfer Training OT LTG, Additional Goal --  AAD   Transfer Training OT LTG, Outcome --  goal revised     Goal: Strength Goal LTG- OT  Outcome: Ongoing (interventions implemented as appropriate)   02/16/18 1406 02/22/18 1554   Strength OT LTG   Strength Goal OT LTG, Date Established 02/16/18 --    Strength Goal OT LTG, Time to Achieve 1 wk --    Strength Goal OT LTG, Measure to Achieve Pt will particiipate in UE exercises each session to increase functional strength and UE use with ADL's.  --    Strength Goal OT LTG, Outcome --  goal ongoing

## 2018-02-22 NOTE — PLAN OF CARE
Problem: Skin Integrity Impairment, Risk/Actual (Adult)  Goal: Skin Integrity/Wound Healing  Outcome: Ongoing (interventions implemented as appropriate)      Problem: Mechanical Ventilation, Invasive (Adult)  Goal: Signs and Symptoms of Listed Potential Problems Will be Absent or Manageable (Mechanical Ventilation, Invasive)  Outcome: Ongoing (interventions implemented as appropriate)      Problem: Pressure Ulcer Risk (Kamran Scale) (Adult,Obstetrics,Pediatric)  Goal: Skin Integrity  Outcome: Ongoing (interventions implemented as appropriate)      Problem: Fall Risk (Adult)  Goal: Identify Related Risk Factors and Signs and Symptoms  Outcome: Ongoing (interventions implemented as appropriate)    Goal: Absence of Falls  Outcome: Ongoing (interventions implemented as appropriate)

## 2018-02-22 NOTE — PROGRESS NOTES
INTENSIVIST   PROGRESS NOTE     Hospital:  LOS: 13 days   Subjective   Ms. Preeti Manning, 77 y.o. 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     S     Interval History:  POD: 5 Days Post-Op     Doing great.  Talking.  Laughing. Good spirits.  Uneventful night.     The patient's relevant past medical, surgical and social history were reviewed and updated in Epic as appropriate.      ROS:   Constitutional: Negative for fever or chills.   Respiratory: Negative for dyspnea or cough.   Cardiovascular: Negative for chest pain or palpitations.   Gastrointestinal: Negative for  nausea, vomiting or diarrhea.       Objective   O     Vitals:  Temp: 98.6 °F (37 °C) (02/22/18 1200) Temp  Min: 97.6 °F (36.4 °C)  Max: 99.7 °F (37.6 °C)   BP: 144/79 (02/22/18 1200) BP  Min: 131/72  Max: 167/83   Pulse: 86 (02/22/18 1217) Pulse  Min: 79  Max: 101   Resp: 18 (02/22/18 1217) Resp  Min: 14  Max: 24   SpO2: 93 % (02/22/18 1217) SpO2  Min: 91 %  Max: 97 %   Device: nasal cannula with humidification (02/22/18 1217)    Flow Rate: 6 (02/22/18 1217) Flow (L/min)  Min: 6  Max: 6       Intake/Ouptut 24 hrs (7:00AM - 6:59 AM)  Intake & Output (last 3 days)       02/19 0701 - 02/20 0700 02/20 0701 - 02/21 0700 02/21 0701 - 02/22 0700 02/22 0701 - 02/23 0700    I.V. (mL/kg) 2004.8 (35.1) 482 (8.4) 444.7 (7.8) 380 (6.6)    Blood 669.9       IV Piggyback  200 200     .4 1384.5 1181.3 332.5    Total Intake(mL/kg) 2996.1 (52.4) 2066.5 (36.2) 1826 (31.9) 712.5 (12.5)    Urine (mL/kg/hr) 2380 (1.7) 2115 (1.5) 3745 (2.7) 575 (1.7)    Other 1275 (0.9) 890 (0.6) 2500 (1.8) 625  (1.8)    Total Output 3650 3007 6218 1200    Net -658.9 -938.5 -4419 -487.5                Medications (drips):    Adult Central 2-in-1 TPN Last Rate: 60 mL/hr at 02/21/18 1750   hydromorphone 1 mg/ml Last Rate: 0.2 mg/hr (02/22/18 0516)     Physical Examination  Telemetry:  Sinus Rhythm: normal sinus rhythm (02/22/18 1200)   Cardiovascular: Normal rate, regular and rhythm. Normal heart sounds.  No murmurs, gallop or rub.   Respiratory: No respiratory distress. Normal respiratory effort.  Normal breath sounds  Clear to auscultation and percussion.    Abdominal:  Soft. No masses. Non-tender. No distension. No HSM.  Post op  PEG   Extremities: No digital cyanosis. No clubbing.  Edema hands.   Neurological:   Aphasic  Left hemiplegia  Best Eye Response: 4-->(E4) spontaneous (02/22/18 1200)  Best Motor Response: 6-->(M6) obeys commands (02/22/18 1200)  Best Verbal Response: 4-->(V4) confused (02/22/18 1200)  Michelle Coma Scale Score: 14 (02/22/18 1200)   Lines/Drains/Airways: R IJ 3 L  NG  PEG  Benz     Hematology:    Results from last 7 days  Lab Units 02/22/18  0402 02/21/18  0506 02/20/18  0632   WBC 10*3/mm3 12.51* 14.88* 17.96*   HEMOGLOBIN g/dL 12.2 11.0* 10.3*   MCV fL 92.4 91.8 90.9   PLATELETS 10*3/mm3 95* 113* 130*     Electrolytes, Magnesium and Phosphorus:    Results from last 7 days  Lab Units 02/22/18  0402 02/21/18  0506 02/20/18  0632   SODIUM mmol/L 148* 151* 152*   POTASSIUM mmol/L 3.7 4.7 4.2   CO2 mmol/L 28.0 24.0 24.0   MAGNESIUM mg/dL 2.1 2.5 2.9*   PHOSPHORUS mg/dL 2.8 2.6 2.4     Renal:    Results from last 7 days  Lab Units 02/22/18  0402 02/21/18  0506 02/20/18  0632   CREATININE mg/dL 0.50* 0.60 0.70   BUN mg/dL 39* 45* 40*     Estimated Creatinine Clearance: 53.2 mL/min (by C-G formula based on Cr of 0.5).    Images:  Ct Head Without Contrast    Result Date: 2/22/2018  Intra-axial hemorrhage with subarachnoid and intraventricular blood as well as low dense edema. There has been little change  since 02/19/2018.  D:  02/22/2018 E:  02/22/2018        Xr Chest 1 View    Result Date: 2/22/2018  There has been no change since 02/21/2018.  D:  02/22/2018 E:  02/22/2018      Xr Chest 1 View    Result Date: 2/21/2018  Post extubation radiograph with mild new left basilar atelectasis.  D:  02/21/2018 E:  02/21/2018  This report was finalized on 2/21/2018 9:34 PM by DR. Jourdan Wright MD.      Results for orders placed during the hospital encounter of 02/09/18   Adult Transthoracic Echo Complete W/ Cont if Necessary Per Protocol    Narrative · Left ventricular systolic function is normal. Estimated EF = 55%.  · There is no evidence of pericardial effusion.  · Normal right ventricular cavity size, wall thickness, systolic function   and septal motion noted.  · Technically difficult and very limited study; no IV Lumason used.        Results: Reviewed.  I reviewed the patient's new laboratory and imaging results.  I independently reviewed the patient's new images.    Medications: Reviewed.    Assessment/Plan   A / P     77 y.o.female, admitted on 2/9/2018 with Stroke [I63.9]:     1. Leaking gastrostomy with intraabdominal contamination  Procedure(s) (LRB):  LAPAROTOMY EXPLORATORY WITH GASTROTOMY, DEBREEDMENT AND G TUBE PLACEMENT (N/A). WASHOUT OF INTRAABDOMINAL ABSCESS.  Dr. Balderas  2/17/2018  1. UTI: E coli, K pneumoniae  2. Antibiotics: MRP  2. 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 S/P Crani and excision of intraparenchymal hemorrhage. 2/9/2018  2. Aphasia  3. Dexamethasone 4 mg Q 8 hrs } NS  3. Respiratory failure  1. Emphysema  2. Extubated 2/11/2018  3. Intubated 2/17/2018  1. Mechanical Ventilation  4. Extubated 2/20/2018  5. Tobacco use  4. CAD  5. HFpEF  1. EF 55% (ECHO 2/9/2018)  6. Dyslipidemia  7. Anemia, NC, Better after transfusion on 2/19/2018  8. Anxiety, on chronic BZD  9. Chronic Pain syndrome  10. Nutrition. Malnourished. Parenteral Nutrition   MST  Total Score: MST score: 5  11. Glucose: No h/o DM  1. Hyperglycemia due to Dexamethasone      Results from last 7 days  Lab Units 02/22/18  1151 02/22/18  0545 02/21/18  2324 02/21/18  1740 02/21/18  1215 02/21/18  0517   GLUCOSE mg/dL 180* 239* 227* 161* 175* 173*       Lab Results  Lab Value Date/Time   HGBA1C 5.80 (H) 02/10/2018 0440   HGBA1C 5.10 05/12/2017 0433        Nutrition Support: No active supplement orders     Diet:  NPO Diet  Adult Central 2-in-1 TPN + Smoflipids   Advance Directives: Full Code     Assessment / Plan:    1. Watch PLT count, now down to 95K  2. Continue PN. Increase volume, calories and protein. F/U Na levels. Phosphorus has been normal.  3. Add basal insulin  4. Disposition: Keep in ICU.    Plan of care and goals reviewed during interdisciplinary rounds.  I discussed the patient's findings and my recommendations with patient and nursing staff     Sherwin Brady MD, FACP, FCCP, CNSC  Intensive Care Medicine, Nutrition Support and Pulmonary Medicine

## 2018-02-22 NOTE — THERAPY TREATMENT NOTE
Acute Care - Speech Language Pathology Treatment Note  [facility]     Patient Name: Preeti Manning  : 1940  MRN: [id number]  Today's Date: 2018         Admit Date: 2018    Visit Dx:      ICD-10-CM ICD-9-CM   1. Pulmonary emphysema, unspecified emphysema type J43.9 492.8   2. Intracranial hemorrhage I62.9 432.9   3. Impaired mobility and ADLs Z74.09 799.89   4. Impaired functional mobility, balance, gait, and endurance Z74.09 V49.89   5. Dysphagia, unspecified type R13.10 787.20   6. Anxiety - chronic benzodiazepines F41.9 300.00   7. Ileus versus small bowel obstruction 18 K56.7 560.1   8. Bowel perforation K63.1 569.83     Patient Active Problem List   Diagnosis   • Closed fracture of distal end of right femur   • COPD/emphysema   • CAD (coronary artery disease)   • Hyperlipidemia   • History CHF    • Stroke   • Nontraumatic R ICH    • Anxiety - chronic benzodiazepines   • Chronic pain syndrome - chronic narcotic pain medications   • Acute respiratory failure requiring intubation and mechanical ventilation, 18   • Urinary retention requiring Benz placement 18 (1200 mL residual)   • S/P right frontal craniotomy and excision of intraparenchymal hemorrhage 18   • Pneumonia LLL. Probable aspiration   • Ileus versus small bowel obstruction 18   • Pneumatosis coli and biliary air              Adult Rehabilitation Note       18 0930          Rehab Assessment/Intervention    Discipline (P)  speech language pathologist  -MD      Document Type (P)  therapy note (daily note)  -MD      Subjective Information (P)  no complaints;agree to therapy  -MD      Patient Effort, Rehab Treatment (P)  fair  -MD      Symptoms Noted During/After Treatment (P)  none  -MD      Recorded by [MD] Vane Santiago Speech Therapy Student      Pain Assessment    Pain Assessment (P)  [place] JOHNSON  -MD [place] FACES Pain Rating (P)  2  -MD      Recorded by [MD] Vane Santiago Speech  Therapy Student      Improve ability to follow directions    Improve ability to follow directions: (P)  one-step directions with objects;80%;with consistent cues  -MD      Status: Improve ability to follow directions (P)  Progressing as expected  -MD      Ability to Follow Directions Progress (P)  40%;with consistent cues;continue to address  -MD      Recorded by [MD] Vane Santiago Speech Therapy Student      Improve ability to comprehend questions    Improve ability to comprehend questions: (P)  simple yes/no questions;80%;with consistent cues  -MD      Status: Improve ability to comprehend questions (P)  Progressing as expected  -MD      Ability to Comprehend Questions Progress (P)  50%;with consistent cues  -MD      Comments: Improve ability to comprehend questions (P)  Simple y/n ?s targeting environment and personal life  -MD      Recorded by [MD] Vane Santiago Speech Therapy Student      Improve word retrieval skills    Improve word retrieval skills by: (P)  completing automatic speech tasks counting;60%;with consistent cues  -MD      Status: Improve word retrieval skills (P)  Progressing as expected  -MD      Word Retrieval Skills Progress (P)  continue to address  -MD      Comments: Improve word retrieval skills (P)  DNT this session  -MD      Recorded by [MD] Vane Santiago Speech Therapy Student      Improve attention    Improve attention by: (P)  looking at speaker;attending to task;80%;without cues  -MD      Status: Improve attention (P)  Progressing as expected  -MD      Attention Progress (P)  70%;with inconsistent cues  -MD      Recorded by [MD] Vane Santiago Speech Therapy Student        User Key  (r) = Recorded By, (t) = Taken By, (c) = Cosigned By    Initials Name Effective Dates    MD Vane Santiago Speech Therapy Student 01/05/18 -               IP SLP Goals       02/22/18 1005 02/16/18 1332 02/12/18 1040    Safely Consume Diet    Safely Consume Diet- SLP, Date Established   02/16/18  -LR     Safely Consume Diet- SLP, Time to Achieve  by discharge  -LR     Begin to Take Some PO Safely    Begin to Take Some PO Safely- SLP, Date Goal Reviewed  02/16/18  -LR 02/12/18  -RD    Begin to Take Some PO Safely- SLP, Outcome  goal met  -LR goal ongoing  -RD    Receptive Language- Optimal Participation in Care    Receptive Language Optimal Participation in Care- SLP, Date Goal Reviewed (P)  02/22/18  -MD  02/12/18  -RD    Receptive Language Optimal Participation in Care- SLP, Outcome (P)  goal ongoing  -MD  goal ongoing  -RD    Expressive- Optimal Participation in Care    Expressive Optimal Participation in Care- SLP, Date Goal Reviewed (P)  02/22/18  -MD  02/12/18  -RD    Expressive Optimal Participation in Care- SLP, Outcome (P)  goal ongoing  -MD  goal ongoing  -RD      02/11/18 1511          Begin to Take Some PO Safely    Begin to Take Some PO Safely- SLP, Date Established 02/11/18  -LS      Begin to Take Some PO Safely- SLP, Time to Achieve by discharge  -LS      Begin to Take Some PO Safely- SLP, Outcome goal ongoing  -LS      Receptive Language- Optimal Participation in Care    Receptive Language Optimal Participation in Care- SLP, Date Established 02/11/18  -LS      Receptive Language Optimal Participation in Care- SLP, Time to Achieve by discharge  -LS      Receptive Language Optimal Participation in Care- SLP, Outcome goal ongoing  -LS      Expressive- Optimal Participation in Care    Expressive Optimal Participation in Care- SLP, Date Established 02/11/18  -LS      Expressive Optimal Participation in Care- SLP, Time to Achieve by discharge  -LS      Expressive Optimal Participation in Care- SLP, Outcome goal ongoing  -LS        User Key  (r) = Recorded By, (t) = Taken By, (c) = Cosigned By    Initials Name Provider Type    LR Debbie Neumann, MS CCC-SLP Speech and Language Pathologist    LS Monique Vivas, MS CCC-SLP Speech and Language Pathologist    SHYANN Barrera, MS CCC-SLP  Speech and Language Pathologist    MD Vane Santiago, Speech Therapy Student Speech Therapy Student          EDUCATION  The patient has been educated in the following areas:   Communication Impairment.    SLP Recommendation and Plan                               Plan of Care Review  Plan Of Care Reviewed With: (P) patient  Progress: (P) progress toward functional goals as expected  Outcome Summary/Follow up Plan: (P) S/L tx completed this date. Pt semi-alert, but cooperative throughout. Pt improving w/ ability to follow directions/respond to simple yes/no questions targeting environment and personal information. Pt demonstrated difficulty expressively identifying objects, however was sucessful when prompted w/ choices. Pt  demonstrated appropriate automatic greetings in conversation w/ clinician.           Time Calculation:         Time Calculation- SLP       02/22/18 1019          Time Calculation- SLP    SLP Start Time (P)  0930  -MD      SLP Received On (P)  02/22/18  -MD        User Key  (r) = Recorded By, (t) = Taken By, (c) = Cosigned By    Initials Name Provider Type    MD Vane Santiago, Speech Therapy Student Speech Therapy Student          Therapy Charges for Today     Code Description Service Date Service Provider Modifiers Qty    [id number]  ST TREATMENT SPEECH 4 2/22/2018 Vane Santiago, Speech Therapy Student GN 1               Vane Santiago Speech Therapy Student  2/22/2018

## 2018-02-22 NOTE — PROGRESS NOTES
"General Surgery Post op Follow Up Note    Subjective:   Feeling better.  Denies abdominal pain.  Passing some flatus.    /83  Pulse 79  Temp 97.6 °F (36.4 °C) (Oral)   Resp 20  Ht 165.1 cm (65\")  Wt 57.2 kg (126 lb)  SpO2 93%  BMI 20.97 kg/m2    General Appearance:  in no acute distress  Abdomen: incision is clean and dry, appropriate.    CBC    Results from last 7 days  Lab Units 02/22/18  0402   WBC 10*3/mm3 12.51*   HEMOGLOBIN g/dL 12.2   HEMATOCRIT % 37.5   PLATELETS 10*3/mm3 95*       CMP/BMP    Results from last 7 days  Lab Units 02/22/18  0402   SODIUM mmol/L 148*   POTASSIUM mmol/L 3.7   CHLORIDE mmol/L 114*   CO2 mmol/L 28.0   BUN mg/dL 39*   CREATININE mg/dL 0.50*   CALCIUM mg/dL 8.0*   BILIRUBIN mg/dL 1.1   ALK PHOS U/L 54   ALT (SGPT) U/L 49*   AST (SGOT) U/L 41*   GLUCOSE mg/dL 227*         ASSESSMENT/PLAN:    Place NGT to gravity drainage.  If no nausea may remove.  Supportive care.    Darin Balderas MD  2/22/2018  7:51 AM  "

## 2018-02-22 NOTE — PLAN OF CARE
Problem: Patient Care Overview (Adult)  Goal: Plan of Care Review  Outcome: Ongoing (interventions implemented as appropriate)   02/22/18 1609   Coping/Psychosocial Response Interventions   Plan Of Care Reviewed With patient   Patient Care Overview   Progress improving   Outcome Evaluation   Outcome Summary/Follow up Plan Pt able to STS x1 from chair with maxA x2. Pt able to clear hips from chair, but did not achieve full upright posture despite max cueing. Pt limited by increased fatigue. Continue to progress as appropriate.        Problem: Inpatient Physical Therapy  Goal: Bed Mobility Goal LTG- PT  Outcome: Ongoing (interventions implemented as appropriate)   02/11/18 1444 02/21/18 1558 02/22/18 1609   Bed Mobility PT LTG   Bed Mobility PT LTG, Date Established 02/11/18 --  --    Bed Mobility PT LTG, Time to Achieve 2 wks --  --    Bed Mobility PT LTG, Activity Type supine to sit/sit to supine --  --    Bed Mobility PT LTG, [place] Level minimum assist (75% patient effort) --  --    Bed Mobility PT LTG, Date Goal Reviewed --  02/21/18 --    Bed Mobility PT LTG, Outcome --  --  goal ongoing     Goal: Transfer Training Goal 1 LTG- PT  Outcome: Ongoing (interventions implemented as appropriate)   02/11/18 1444 02/21/18 1558 02/22/18 1609   Transfer Training PT LTG   Transfer Training PT LTG, Date Established 02/11/18 --  --    Transfer Training PT LTG, Time to Achieve 2 wks --  --    Transfer Training PT LTG, Activity Type sit to stand/stand to sit --  --    Transfer Training PT LTG, [place] Level minimum assist (75% patient effort);2 person assist required --  --    Transfer Training PT LTG, Assist Device walker, rolling --  --    Transfer Training PT LTG, Date Goal Reviewed --  02/21/18 --    Transfer Training PT LTG, Outcome --  --  goal ongoing     Goal: Gait Training Goal LTG- PT  Outcome: Ongoing (interventions implemented as appropriate)   02/11/18 1444 02/21/18 1558 02/22/18 1609   Gait Training PT  LTG   Gait Training Goal PT LTG, Date Established 02/11/18 --  --    Gait Training Goal PT LTG, Time to Achieve 2 wks --  --    Gait Training Goal PT LTG, [place] Level moderate assist (50% patient effort);2 person assist required --  --    Gait Training Goal PT LTG, Assist Device walker, rolling --  --    Gait Training Goal PT LTG, Distance to Achieve 10 --  --    Gait Training Goal PT LTG, Date Goal Reviewed --  02/21/18 --    Gait Training Goal PT LTG, Outcome --  --  goal ongoing     Goal: Static Sitting Balance Goal LTG- PT  Outcome: Ongoing (interventions implemented as appropriate)   02/11/18 1444 02/21/18 1558 02/22/18 1609   Static Sitting Balance PT LTG   Static Sitting Balance PT LTG, Date Established 02/11/18 --  --    Static Sitting Balance PT LTG, Time to Achieve 2 wks --  --    Static Sitting Balance PT LTG, [place] Level supervision required --  --    Static Sitting Balance PT LTG, Assist Device UE Support --  --    Static Sitting Balance PT LTG, Date Goal Reviewed --  02/21/18 --    Static Sitting Balance PT LTG, Outcome --  --  goal ongoing

## 2018-02-22 NOTE — THERAPY RE-EVALUATION
Acute Care - Occupational Therapy Re-Evaluation  [facility]     Patient Name: Preeti Manning  : 1940  MRN: [id number]  Today's Date: 2018  Onset of Illness/Injury or Date of Surgery Date: 18  Date of Referral to OT: 18  Referring Physician: MD Caitlyn    Admit Date: 2018       ICD-10-CM ICD-9-CM   1. Pulmonary emphysema, unspecified emphysema type J43.9 492.8   2. Intracranial hemorrhage I62.9 432.9   3. Impaired mobility and ADLs Z74.09 799.89   4. Impaired functional mobility, balance, gait, and endurance Z74.09 V49.89   5. Dysphagia, unspecified type R13.10 787.20   6. Anxiety - chronic benzodiazepines F41.9 300.00   7. Ileus versus small bowel obstruction 18 K56.7 560.1   8. Bowel perforation K63.1 569.83     Patient Active Problem List   Diagnosis   • Closed fracture of distal end of right femur   • COPD/emphysema   • CAD (coronary artery disease)   • Hyperlipidemia   • History CHF    • Stroke   • Nontraumatic R ICH    • Anxiety - chronic benzodiazepines   • Chronic pain syndrome - chronic narcotic pain medications   • Acute respiratory failure requiring intubation and mechanical ventilation, 18   • Urinary retention requiring Benz placement 18 (1200 mL residual)   • S/P right frontal craniotomy and excision of intraparenchymal hemorrhage 18   • Pneumonia LLL. Probable aspiration   • Ileus versus small bowel obstruction 18   • Pneumatosis coli and biliary air     Past Medical History:   Diagnosis Date   • Anxiety    • Arthritis    • CHF (congestive heart failure)    • COPD (chronic obstructive pulmonary disease)    • Coronary artery disease    • Depression    • Fibromyalgia      Past Surgical History:   Procedure Laterality Date   • CORONARY ANGIOPLASTY WITH STENT PLACEMENT     • CRANIOTOMY FOR ANEURYSM/ARTERIOVENOUS MALFORMATION N/A 2018    Procedure: CRANIOTOMY FOR INTRACRANIAL HEMORRHAGE;  Surgeon: Stef Riley MD;  Location: [facility];   Service:    • ENDOSCOPY W/ PEG TUBE PLACEMENT N/A 2/15/2018    Procedure: ESOPHAGOGASTRODUODENOSCOPY AT BEDSIDE WITH PEG PLACEMENT;  Surgeon: Rashel Zeng MD;  Location:  [facility];  Service:    • EXPLORATORY LAPAROTOMY N/A 2/17/2018    Procedure: LAPAROTOMY EXPLORATORY WITH GASTROTOMY, DEBRIDEMENT AND G TUBE PLACEMENT;  Surgeon: Darin Balderas MD;  Location:  [facility];  Service:    • FEMUR OPEN REDUCTION INTERNAL FIXATION Right 5/15/2017    Procedure: DISTAL FEMUR OPEN REDUCTION INTERNAL FIXATION RIGHT;  Surgeon: Pierre Velarde MD;  Location:  [facility];  Service:    • TOTAL HIP ARTHROPLASTY Right 2017          OT ASSESSMENT FLOWSHEET (last 72 hours)      OT Evaluation       02/22/18 1200 02/22/18 1130 02/22/18 1000 02/22/18 0930 02/22/18 0800    Rehab Evaluation    Document Type  re-evaluation  -CL  therapy note (daily note)  -AV (r) MD (t) AV (c)     Subjective Information  agree to therapy;complains of;pain;fatigue  -CL  no complaints;agree to therapy  -AV (r) MD (t) AV (c)     Patient Effort, Rehab Treatment  adequate  -CL  fair  -AV (r) MD (krystian) DERRICK (c)     Symptoms Noted During/After Treatment  fatigue  -CL  none  -AV (r) MD (krystian) DERRICK (c)     General Information    Patient Profile Review  yes  -CL       Onset of Illness/Injury or Date of Surgery Date  02/09/18  -CL       Referring Physician  MD Caitlyn  -CL       Pertinent History Of Current Problem  Please see IE for full history. Pt s/p ex-lap w/ washout of abdominal abscess w/ g-tube placement on 02/17 w/ subsequent prolong mechanical ventilation. Pt extubated on 02/20. RESUME IPOT orders received.    -CL       Precautions/Limitations  fall precautions;oxygen therapy device and L/min;other (see comments)   NG to wall suction, G-tube, L sided weakness  -CL       Prior Level of Function  --   Please see IE.  -CL       Equipment Currently Used at Home  --   Please see IE.  -CL       Plans/Goals Discussed With  patient;agreed upon  -CL        Risks Reviewed  patient:;LOB;nausea/vomiting;dizziness;increased discomfort;change in vital signs;lines disloged  -CL       Benefits Reviewed  patient:;improve function;increase independence;increase strength;increase balance;decrease pain;increase knowledge  -CL       Barriers to Rehab  medically complex;cognitive status  -CL       Living Environment    Living Environment Comment  Please see IE.  -CL       Clinical Impression    Date of Referral to OT  02/21/18  -CL       OT Diagnosis  Decreased independence in ADLs.   -CL       Patient/Family Goals Statement  Return to [facility].   -CL       Criteria for Skilled Therapeutic Interventions Met  yes;treatment indicated  -CL       Rehab Potential  good, to achieve stated therapy goals  -CL       Therapy Frequency  3 times/wk   MWF  -CL       Anticipated Equipment Needs At Discharge  --   TBA further  -CL       Anticipated Discharge Disposition  inpatient rehabilitation facility  -CL       Vital Signs    Pre Systolic BP Rehab  151  -CL       Pre Treatment Diastolic BP  81  -CL       Post Systolic BP Rehab  144  -CL       Post Treatment Diastolic BP  79  -CL       Pretreatment Heart Rate (beats/min)  85  -CL       Posttreatment Heart Rate (beats/min)  86  -CL       Pre SpO2 (%)  91  -CL       O2 Delivery Pre Treatment  supplemental O2  -CL       Post SpO2 (%)  91  -CL       O2 Delivery Post Treatment  supplemental O2  -CL       Pre Patient Position  Supine  -CL       Intra Patient Position  Sitting  -CL       Post Patient Position  Sitting  -CL       Pain Assessment    Pain Assessment  Blanchard-Javier FACES  -CL  Blanchard-Javier FACES  -AV (r) MD (t) AV (c)     Blanchard-Javier FACES Pain Rating  2  -CL  2  -AV (r) MD (t) AV (c)     Pain Score  2  -CL       Post Pain Score  2  -CL       Pain Type  Acute pain  -CL       Pain Location  Abdomen  -CL       Pain Intervention(s)  Repositioned;Ambulation/increased activity  -CL       Response to Interventions  Tolerated.   -CL       Cognitive  Assessment/Intervention    Current Cognitive/Communication Assessment  impaired  -CL       Orientation Status  oriented to;person;disoriented to;place;time;situation  -CL       Follows Commands/Answers Questions  50% of the time;needs repetition;needs increased time;needs cueing  -CL       Personal Safety  severe impairment;decreased awareness, need for assist;decreased awareness, need for safety  -CL       Personal Safety Interventions  fall prevention program maintained;nonskid shoes/slippers when out of bed;supervised activity  -CL       ROM (Range of Motion)    General ROM  no range of motion deficits identified  -CL       MMT (Manual Muscle Testing)    General MMT Assessment Detail  Unable to formally assess d/t cognitive status.   -CL       Muscle Tone Assessment    LUE Muscle Tone Assessment mildly decreased tone  -WB  mildly decreased tone  -WB  mildly decreased tone  -WB    Bed Mobility, Assessment/Treatment    Bed Mobility, Assistive Device  bed rails;draw sheet  -CL       Bed Mobility, Roll Left, [place]  dependent (less than 25% patient effort);2 person assist required;verbal cues required  -CL       Bed Mobility, Roll Right, [place]  dependent (less than 25% patient effort);2 person assist required;verbal cues required  -CL       Bed Mobility, Comment  VCs for HP/sequencing.   -CL       Transfer Assessment/Treatment    Transfers, Bed-Chair [place]  dependent (less than 25% patient effort);2 person assist required;verbal cues required  -CL       Transfers, Bed-Chair-Bed, Assist Device  mechanical lift  -CL       Transfers, Sit-Stand [place]  not appropriate to assess  -CL       Transfer, Comment  Deferred STS d/t poor command following/lethargy.   -CL       Functional Mobility    Functional Mobility- Ind. Level  not appropriate to assess  -CL       Grooming Assessment/Training    Grooming Assess/Train, Comment  Pt refused to perform.   -CL       Sensory Assessment/Intervention     Light Touch LUE;RUE;LLE;RLE  -WB --   Unable to formally assess d/t cognitive status.   -CL LUE;RUE;LLE;RLE  -WB  LUE;RUE;LLE;RLE  -WB    LUE Light Touch mild impairment  -WB  mild impairment  -WB  mild impairment  -WB    RUE Light Touch WNL  -WB  WNL  -WB  WNL  -WB    LLE Light Touch WNL  -WB  WNL  -WB  WNL  -WB    RLE Light Touch WNL  -WB  WNL  -WB  WNL  -WB    Positioning and Restraints    Pre-Treatment Position  in bed  -CL       Post Treatment Position  chair  -CL       In Chair  notified nsg;reclined;call light within reach;encouraged to call for assist;exit alarm on;with nsg;RUE elevated;LUE elevated;waffle cushion;on mechanical lift sling;legs elevated;heels elevated  -CL         02/22/18 0600 02/22/18 0400 02/22/18 0200 02/22/18 0000 02/21/18 2200    Muscle Tone Assessment    LUE Muscle Tone Assessment mildly decreased tone  -JH mildly decreased tone  -JH mildly decreased tone  -RL mildly decreased tone  -RL mildly decreased tone  -RL    Sensory Assessment/Intervention    Light Touch LUE;RUE;LLE;RLE  -JH LUE;RUE;LLE;RLE  -JH LUE;RUE;LLE;RLE  -RL LUE;RUE;LLE;RLE  -RL LUE;RUE;LLE;RLE  -RL    LUE Light Touch moderate impairment  -JH moderate impairment  -JH moderate impairment  -RL moderate impairment  -RL moderate impairment  -RL    RUE Light Touch WNL  -JH WNL  -JH WNL  -RL WNL  -RL WNL  -RL    LLE Light Touch absent sensation  -JH absent sensation  -JH absent sensation  -RL absent sensation  -RL absent sensation  -RL    RLE Light Touch WNL  -JH WNL  -JH WNL  -RL WNL  -RL WNL  -RL      02/21/18 2000 02/21/18 1800 02/21/18 1600 02/21/18 1400 02/21/18 1337    Rehab Evaluation    Document Type     re-evaluation  -KR    Subjective Information     agree to therapy;no complaints  -KR    Patient Effort, Rehab Treatment     fair  -KR    Symptoms Noted During/After Treatment     none  -KR    General Information    Patient Profile Review     yes  -KR    Onset of Illness/Injury or Date of Surgery Date     02/09/18  -KR     Referring Physician     MD Caitlyn  -KR    Pertinent History Of Current Problem     Please see IE for original admit diagnosis. PT re-evaluation s/p ex-lap with washout of abdominal abscess with g-tube placement on 2/17 with pt intubated and sedated.   -KR    Precautions/Limitations     fall precautions;oxygen therapy device and L/min   NG tube  -KR    Prior Level of Function     --   please see IE  -KR    Plans/Goals Discussed With     patient;agreed upon  -KR    Risks Reviewed     patient:;LOB;dizziness;increased discomfort;change in vital signs  -KR    Benefits Reviewed     patient:;improve function;increase independence;increase balance;increase strength  -KR    Barriers to Rehab     medically complex;cognitive status  -KR    Living Environment    Living Environment Comment     please see IE for living environment  -KR    Vital Signs    Pre Systolic BP Rehab     152  -KR    Pre Treatment Diastolic BP     82  -KR    Post Systolic BP Rehab     159  -KR    Post Treatment Diastolic BP     87  -KR    Pretreatment Heart Rate (beats/min)     83  -KR    Posttreatment Heart Rate (beats/min)     85  -KR    Pre SpO2 (%)     92  -KR    O2 Delivery Pre Treatment     supplemental O2  -KR    Post SpO2 (%)     93  -KR    O2 Delivery Post Treatment     supplemental O2  -KR    Pre Patient Position     Supine  -KR    Intra Patient Position     Sitting  -KR    Post Patient Position     Supine  -KR    Pain Assessment    Pain Assessment     0-10  -KR    Pain Score     0  -KR    Post Pain Score     0  -KR    Cognitive Assessment/Intervention    Current Cognitive/Communication Assessment     impaired  -KR    Orientation Status     disoriented x 4  -KR    Follows Commands/Answers Questions     able to follow single-step instructions;25% of the time;needs cueing;needs increased time;needs repetition  -KR    Personal Safety     severe impairment;decreased awareness, need for assist;decreased awareness, need for safety;decreased  insight to deficits  -KR    Personal Safety Interventions     fall prevention program maintained;nonskid shoes/slippers when out of bed;supervised activity  -KR    ROM (Range of Motion)    General ROM     lower extremity range of motion deficits identified  -KR    General ROM Detail     BLE AROM impaired 25%; pt unable to follow commands; PROM WFL  -KR    MMT (Manual Muscle Testing)    General MMT Assessment     lower extremity strength deficits identified  -KR    General MMT Assessment Detail     unable to formally assess  -KR    Muscle Tone Assessment    LUE Muscle Tone Assessment mildly decreased tone  -RL mildly decreased tone  -WB mildly decreased tone  -WB mildly decreased tone  -WB     Bed Mobility, Assessment/Treatment    Bed Mob, Supine to Sit, [place]     maximum assist (25% patient effort);2 person assist required;verbal cues required  -KR    Bed Mob, Sit to Supine, [place]     maximum assist (25% patient effort);2 person assist required;verbal cues required  -KR    Bed Mobility, Safety Issues     cognitive deficits limit understanding;decreased use of arms for pushing/pulling;decreased use of legs for bridging/pushing  -KR    Bed Mobility, Impairments     strength decreased;impaired balance;coordination impaired  -KR    Bed Mobility, Comment     VC's for sequencing. Pt had increased difficulty following commands to provide assistance.  -KR    Transfer Assessment/Treatment    Transfers, Sit-Stand [place]     not tested  -KR    Transfers, Stand-Sit [place]     not tested  -KR    Transfer, Comment     STS transfers deferred due to decreased cognition and pt's decreased ability to follow commands.   -KR    Motor Skills/Interventions    Additional Documentation     Balance Skills Training (Group)  -KR    Balance Skills Training    Sitting-Level of Assistance     Moderate assistance;x2   progressed to periods of CGA  -KR    Sitting-Balance Support     Right upper extremity supported;Left  upper extremity supported;Feet supported  -KR    Sensory Assessment/Intervention    Light Touch LUE;RUE;LLE;RLE  -RL LUE;RUE;LLE;RLE  -WB LUE;RUE;LLE;RLE  -WB  LLE;RLE  -KR    LUE Light Touch moderate impairment  -RL moderate impairment  -WB moderate impairment  -WB      RUE Light Touch WNL  -RL WNL  -WB WNL  -WB      LLE Light Touch absent sensation  -RL absent sensation  -WB absent sensation  -WB  --   unable to formally assess due to cognitive status  -KR    RLE Light Touch WNL  -RL WNL  -WB WNL  -WB  --   unable to formally assess due to cognitive status  -KR    Positioning and Restraints    Pre-Treatment Position     in bed  -KR    Post Treatment Position     bed  -KR    In Bed     notified nsg;supine;call light within reach;encouraged to call for assist;exit alarm on;side rails up x3;RUE elevated;LUE elevated;CPM RLE;CPM LLE;R waffle boot;L waffle boot  -KR      02/21/18 1200 02/21/18 1000 02/21/18 0800          Muscle Tone Assessment    LUE Muscle Tone Assessment mildly decreased tone  -WB mildly decreased tone  -WB mildly decreased tone  -WB      Sensory Assessment/Intervention    Light Touch   LUE;RUE;LLE;RLE  -WB      LUE Light Touch   moderate impairment  -WB      RUE Light Touch   WNL  -WB      LLE Light Touch   absent sensation  -WB      RLE Light Touch   WNL  -WB        User Key  (r) = Recorded By, (t) = Taken By, (c) = Cosigned By    Initials Name Effective Dates    AV Jess Benitez MS CCC-SLP 03/14/16 -     JH Tomy Flores, RN 06/16/16 -     RL Cheryl Willett, CARMEL 06/16/16 -     CL Jannet Salguero, OT 06/08/16 -     WB Rachel Schuler, CARMEL 06/16/16 -     KR Rhina Aviles, PT 09/25/17 -     MD Vane Santiago, Speech Therapy Student 01/05/18 -            Occupational Therapy Education     Title: PT OT SLP Therapies (Active)     Topic: Occupational Therapy (Active)     Point: ADL training (Active)    Description: Instruct learner(s) on proper safety adaptation and remediation techniques during  self care or transfers.   Instruct in proper use of assistive devices.    Learning Progress Summary    Learner Readiness Method Response Comment Documented by Status   Patient Acceptance E,D NR Pt educated on appropriate safety precautions, t/f techniques, positioning, and role of OT. CL 02/22/18 1554 Active    Acceptance E NR,NL   02/20/18 0321 Active    Acceptance E NR,NL  AC 02/18/18 2206 Active    Acceptance E NR   02/18/18 0450 Active    Acceptance E,D VU,DU,NR Educated on body mechanics for increased balance for ADL's. Educated on benefits of ADL participation. AN 02/16/18 1403 Done    Acceptance E,D NR Pt educated on appropriate safety precautions and role of OT. CL 02/11/18 0810 Active   Family Acceptance E NR,NL   02/18/18 2206 Active    Acceptance E NR   02/18/18 0450 Active    Acceptance E,D VUDU,NR Educated on body mechanics for increased balance for ADL's. Educated on benefits of ADL participation.  02/16/18 1403 Done               Point: Home exercise program (Active)    Description: Instruct learner(s) on appropriate technique for monitoring, assisting and/or progressing therapeutic exercises/activities.    Learning Progress Summary    Learner Readiness Method Response Comment Documented by Status   Patient Acceptance E NR,NL  AC 02/20/18 0321 Active    Acceptance E NR,NL   02/18/18 2206 Active    Acceptance E NR   02/18/18 0450 Active   Family Acceptance E NR,NL   02/18/18 2206 Active    Acceptance E NR   02/18/18 0450 Active               Point: Precautions (Active)    Description: Instruct learner(s) on prescribed precautions during self-care and functional transfers.    Learning Progress Summary    Learner Readiness Method Response Comment Documented by Status   Patient Acceptance E,D NR Pt educated on appropriate safety precautions, t/f techniques, positioning, and role of OT.  02/22/18 1554 Active    Acceptance E NR,NL   02/20/18 0321 Active    Acceptance E NR,NL  AC  02/18/18 2206 Active    Acceptance E NR   02/18/18 0450 Active    Acceptance E,D NR Pt educated on appropriate safety precautions and role of OT. CL 02/11/18 0810 Active   Family Acceptance E NR,NL   02/18/18 2206 Active    Acceptance E NR   02/18/18 0450 Active               Point: Body mechanics (Active)    Description: Instruct learner(s) on proper positioning and spine alignment during self-care, functional mobility activities and/or exercises.    Learning Progress Summary    Learner Readiness Method Response Comment Documented by Status   Patient Acceptance E,D NR Pt educated on appropriate safety precautions, t/f techniques, positioning, and role of OT. CL 02/22/18 1554 Active    Acceptance E NR,NL   02/20/18 0321 Active    Acceptance E NR,NL   02/18/18 2206 Active    Acceptance E NR   02/18/18 0450 Active    Acceptance E,D VU,DU,NR Educated on body mechanics for increased balance for ADL's. Educated on benefits of ADL participation.  02/16/18 1403 Done   Family Acceptance E NR,NL   02/18/18 2206 Active    Acceptance E NR   02/18/18 0450 Active    Acceptance E,D VU,DU,NR Educated on body mechanics for increased balance for ADL's. Educated on benefits of ADL participation.  02/16/18 1403 Done                      User Key     Initials Effective Dates Name Provider Type Discipline    AN 06/22/15 -  Lucrecia Puri OT Occupational Therapist OT     07/03/17 -  Bárbara Byrd RN Registered Nurse Nurse     06/08/16 -  Jannet Salguero OT Occupational Therapist OT                  OT Recommendation and Plan  Anticipated Equipment Needs At Discharge:  (TBA further)  Anticipated Discharge Disposition: inpatient rehabilitation facility  Therapy Frequency: 3 times/wk (MWF)  Plan of Care Review  Plan Of Care Reviewed With: patient  Progress: progress toward functional goals is gradual  Outcome Summary/Follow up Plan: OT Rev-eval complete s/p ex-lap. Pt session limited d/t lethargy and c/o abd pain. Pt  Depx2 for bed mobility and bed/chair t/f. Recommend cont skilled IPOT POC. Recommend pt DC to IP rehab.           OT Goals       02/22/18 1554 02/16/18 1406 02/16/18 1404    Transfer Training OT LTG    Transfer Training OT LTG, Date Established  02/16/18  -AN     Transfer Training OT LTG, Time to Achieve by discharge  -CL 1 wk  -AN     Transfer Training OT LTG, Activity Type sit to stand/stand to sit;toilet;bed to chair /chair to bed  -CL toilet  -AN     Transfer Training OT LTG, [place] Level maximum assist (25% patient effort);2 person assist required  -CL minimum assist (75% patient effort)  -AN     Transfer Training OT LTG, Assist Device  walker, rolling;commode, bedside  -AN     Transfer Training OT LTG, Additional Goal AAD  -CL      Transfer Training OT LTG, Outcome goal revised  -CL  goal met  -AN    Strength OT LTG    Strength Goal OT LTG, Date Established  02/16/18  -AN     Strength Goal OT LTG, Time to Achieve  1 wk  -AN     Strength Goal OT LTG, Measure to Achieve  Pt will particiipate in UE exercises each session to increase functional strength and UE use with ADL's.   -AN     Strength Goal OT LTG, Outcome goal ongoing  -CL      Static Sitting Balance OT LTG    Static Sitting Balance OT LTG, Outcome   goal met  -AN    Follow Directions OT LTG    Follow Directions OT LTG, Date Established 02/22/18  -CL      Follow Directions OT LTG, Time to Achieve by discharge  -CL      Follow Directions OT LTG, Activity Type 75% treatment session;1-Step  -CL      Follow Directions OT LTG, [place] Level with verbal cues;demonstration  -CL      Follow Directions OT LTG, Outcome goal revised  -CL  goal met  -AN    Grooming OT LTG    Grooming Goal OT LTG, Outcome goal ongoing  -CL  goal ongoing  -AN      02/11/18 0813          Transfer Training OT LTG    Transfer Training OT LTG, Date Established 02/11/18  -CL      Transfer Training OT LTG, Time to Achieve by discharge  -CL      Transfer Training OT LTG, Activity  Type sit to stand/stand to sit;bed to chair /chair to bed;toilet  -CL      Transfer Training OT LTG, [place] Level maximum assist (25% patient effort);2 person assist required  -CL      Transfer Training OT LTG, Additional Goal AAD  -CL      Static Sitting Balance OT LTG    Static Sitting Balance OT LTG, Date Established 02/11/18  -CL      Static Sitting Balance OT LTG, Time to Achieve by discharge  -CL      Static Sitting Balance OT LTG, [place] Level minimum assist (75% patient effort)  -CL      Static Sitting Balance OT LTG, Additional Goal AAD  -CL      Follow Directions OT LTG    Follow Directions OT LTG, Date Established 02/11/18  -CL      Follow Directions OT LTG, Time to Achieve by discharge  -CL      Follow Directions OT LTG, Activity Type 1-Step;75% treatment session  -CL      Follow Directions OT LTG, [place] Level with verbal cues;demonstration  -CL      Grooming OT LTG    Grooming Goal OT LTG, Date Established 02/11/18  -CL      Grooming Goal OT LTG, Time to Achieve by discharge  -CL      Grooming Goal OT LTG, Activity Type Pt will wash R/L side of face.   -CL      Grooming Goal OT LTG, [place] Level moderate assist (50% patient effort)  -CL      Grooming Goal OT LTG, Position sitting, edge of bed  -CL      Grooming Goal OT LTG, Additional Goal AAD  -CL        User Key  (r) = Recorded By, (t) = Taken By, (c) = Cosigned By    Initials Name Provider Type    AN Lucrecia Puri, OT Occupational Therapist    CL Jannet Salguero OT Occupational Therapist                Outcome Measures       02/22/18 1130 02/21/18 1337       How much help from another person do you currently need...    Turning from your back to your side while in flat bed without using bedrails?  2  -KR     Moving from lying on back to sitting on the side of a flat bed without bedrails?  2  -KR     Moving to and from a bed to a chair (including a wheelchair)?  1  -KR     Standing up from a chair using your arms (e.g.,  wheelchair, bedside chair)?  1  -KR     Climbing 3-5 steps with a railing?  1  -KR     To walk in hospital room?  1  -KR     AM-PAC 6 Clicks Score  8  -KR     How much help from another is currently needed...    Putting on and taking off regular lower body clothing? 1  -CL      Bathing (including washing, rinsing, and drying) 1  -CL      Toileting (which includes using toilet bed pan or urinal) 1  -CL      Putting on and taking off regular upper body clothing 1  -CL      Taking care of personal grooming (such as brushing teeth) 2  -CL      Eating meals 1  -CL      Score 7  -CL      Modified Rosey Scale    Modified Rosey Scale 4 - Moderately severe disability.  Unable to walk without assistance, and unable to attend to own bodily needs without assistance.  -CL      Functional Assessment    Outcome Measure Options AM-PAC 6 Clicks Daily Activity (OT)  -CL AM-PAC 6 Clicks Basic Mobility (PT)  -KR       User Key  (r) = Recorded By, (t) = Taken By, (c) = Cosigned By    Initials Name Provider Type    CL Jannet Salguero OT Occupational Therapist    EMMA Aviles PT Physical Therapist          Time Calculation:   OT Start Time: 1130    Therapy Charges for Today     Code Description Service Date Service Provider Modifiers Qty    [id number]  OT RE-EVAL 2 2/22/2018 Jannet Salguero OT GO 1    [id number]  OT THER SUPP EA 15 MIN 2/22/2018 Jannet Salguero OT GO 2    [id number]  OT THERAPEUTIC ACT EA 15 MIN 2/22/2018 Jannet Salguero OT GO 2               Jannet Salguero OT  2/22/2018

## 2018-02-22 NOTE — PLAN OF CARE
Problem: Patient Care Overview (Adult)  Goal: Plan of Care Review  Outcome: Ongoing (interventions implemented as appropriate)   02/22/18 1005   Coping/Psychosocial Response Interventions   Plan Of Care Reviewed With patient   Patient Care Overview   Progress progress toward functional goals as expected   Outcome Evaluation   Outcome Summary/Follow up Plan Unable to complete BS re-eval 2' NPO orders per MD. S/L tx completed this date. Pt semi-alert, but cooperative throughout. Pt improving w/ ability to follow 1-step directions/respond to simple yes/no questions targeting environment and personal information. Pt demonstrated difficulty expressively identifying objects, however was sucessful when prompted w/ choices. Pt demonstrated appropriate automatic greetings in conversation w/ clinician. Rec: con't ST, repeat clinical dys eval when pt is cleared by MD to resume PO trials.        Problem: Inpatient SLP  Goal: Expressive-Patient will improve expressive language skills to allow optimal participation in care  Outcome: Ongoing (interventions implemented as appropriate)   02/11/18 1511 02/22/18 1005   Expressive- Optimal Participation in Care   Expressive Optimal Participation in Care- SLP, Date Established 02/11/18 --    Expressive Optimal Participation in Care- SLP, Time to Achieve by discharge --    Expressive Optimal Participation in Care- SLP, Date Goal Reviewed --  02/22/18   Expressive Optimal Participation in Care- SLP, Outcome --  goal ongoing     Goal: Receptive Language-Patient will improve receptive language skills to allow optimal participation in care  Outcome: Ongoing (interventions implemented as appropriate)   02/11/18 1511 02/22/18 1005   Receptive Language- Optimal Participation in Care   Receptive Language Optimal Participation in Care- SLP, Date Established 02/11/18 --    Receptive Language Optimal Participation in Care- SLP, Time to Achieve by discharge --    Receptive Language Optimal  Participation in Care- SLP, Date Goal Reviewed --  02/22/18   Receptive Language Optimal Participation in Care- SLP, Outcome --  goal ongoing

## 2018-02-22 NOTE — PROGRESS NOTES
Neurosurgery progress note:    CT scan looks essentially the same.  Certainly no worsening.  The remaining hematoma is slowly absorbing with decrease shift of intracranial structure.  Apparently he looks in a positive direction in the context of resolution.  Next    Nothing need be done from a neurosurgical perspective other than observation.  We will continue to follow.

## 2018-02-22 NOTE — PROGRESS NOTES
Adult Nutrition  Assessment/PES    Patient Name:  Preeti Manning  YOB: 1940  MRN: [id number]  Admit Date:  2/9/2018    Assessment Date:  2/22/2018    Comments:  PN adjustments noted; await return of gut function.          Reason for Assessment       02/22/18 1354    Reason for Assessment    Reason For Assessment/Visit multidisciplinary rounds;TF/PN;follow up protocol    Identified At Risk By Screening Criteria dysphagia or difficulty swallowing;tube feeding or parenteral nutrition    Time Spent (min) 30    Diagnosis --   per notes of this adm              Nutrition/Diet History       02/22/18 1355    Nutrition/Diet History    Reported/Observed By RN    Other RN reports surgeon has ordered NG to gravity drain x 4 hrs if no nausea NG can be DC'd.              Labs/Tests/Procedures/Meds       02/22/18 1356    Labs/Tests/Procedures/Meds    Labs/Tests Review Reviewed;Na+    Medication Review Reviewed, pertinent     Results from last 7 days  Lab Units 02/22/18  0402   SODIUM mmol/L 148*   POTASSIUM mmol/L 3.7   CHLORIDE mmol/L 114*   CO2 mmol/L 28.0   BUN mg/dL 39*   CREATININE mg/dL 0.50*   CALCIUM mg/dL 8.0*   BILIRUBIN mg/dL 1.1   ALK PHOS U/L 54   ALT (SGPT) U/L 49*   AST (SGOT) U/L 41*   GLUCOSE mg/dL 227*                Physical Findings       02/22/18 1357    Physical Appearance    Overall Physical Appearance on oxygen therapy    Skin surgical wound              Nutrition Prescription Ordered       02/22/18 1357    Nutrition Prescription PO    Current PO Diet NPO    Nutrition Prescription PN    PN Route Central    PN Goal Rate (mL/hr) 60 mL/hr    PN Goal Volume (mL) 1440 mL    Dextrose Concentration (%) --   189 gm    Dextrose (Kcal) 642.6    Amino Acid (gm) 100    Lipid Concentration (%) 20%    Lipid Volume (mL) 100 mL    Lipid Frequency Daily            Evaluation of Received Nutrient/Fluid Intake       02/22/18 1358    Evaluation of Received Nutrient/Fluid Intake    Number of Days Evaluated 1 day     Nutrition Delivered Calorie Evaluation;Protein Evaluation    Calorie Intake Evaluation    Parenteral Calories (kcal) 1231    Other Calories (kcal) 200   ILE    Total Calories (kcal) 1431    % Kcal Needs 89%    Protein Intake Evaluation    Parenteral Protein (gm) 118    Total Protein (gm) 118    % Protein Needs 137 %     PN Evaluation    Number of Days PN Evaluated 1 day    PN Average delivery (mL/day)  1181.3 mL/day   82 % of goal delivery    PN Average lipid delivery (mL/day)  100 mL/day   SMOF-ILE            Problem/Interventions:        Problem 1       02/22/18 1402    Nutrition Diagnoses Problem 1    Problem 1 Inadequate Intake/Infusion    Inadequate Intake Type Parenteral    Macronutrient Kcal    Etiology (related to) MNT for Treatment/Condition    Gastrointestinal --   intra-abdominal abscess,s/p exp lap w/ washout and  gastric wall debridement    Signs/Symptoms (evidenced by) NPO;PN Intake Delivery    Percent (%) of EN goal --   Pt w/ inadequate po intake, EN since adm now NPO s/p surgery    Percent (%) of  PN goal 82 %                    Intervention Goal       02/22/18 1403    Intervention Goal    General Nutrition support treatment    TF/PN Adjust TF/PN            Nutrition Intervention       02/22/18 1403    Nutrition Intervention    RD/Tech Action Follow Tx progress;Care plan reviewd              Education/Evaluation       02/22/18 1404    Monitor/Evaluation    Monitor Per protocol;I&O;Pertinent labs;Skin status;Symptoms        Electronically signed by:  Amy Agustin RD  02/22/18 2:04 PM

## 2018-02-23 PROBLEM — D69.6 THROMBOCYTOPENIA (HCC): Status: ACTIVE | Noted: 2018-01-01

## 2018-02-23 PROBLEM — I63.9 STROKE (HCC): Status: RESOLVED | Noted: 2018-01-01 | Resolved: 2018-01-01

## 2018-02-23 PROBLEM — D64.9 ANEMIA: Status: ACTIVE | Noted: 2018-01-01

## 2018-02-23 PROBLEM — K56.7 ILEUS (HCC): Status: RESOLVED | Noted: 2018-01-01 | Resolved: 2018-01-01

## 2018-02-23 PROBLEM — K94.23 LEAKING PERCUTANEOUS ENDOSCOPIC GASTROSTOMY (PEG) TUBE (HCC): Status: ACTIVE | Noted: 2018-01-01

## 2018-02-23 PROBLEM — I50.9 CHF (CONGESTIVE HEART FAILURE) (HCC): Status: RESOLVED | Noted: 2017-01-01 | Resolved: 2018-01-01

## 2018-02-23 PROBLEM — S72.401A CLOSED FRACTURE OF DISTAL END OF RIGHT FEMUR (HCC): Status: RESOLVED | Noted: 2017-01-01 | Resolved: 2018-01-01

## 2018-02-23 PROBLEM — K63.89 PNEUMATOSIS COLI: Status: RESOLVED | Noted: 2018-01-01 | Resolved: 2018-01-01

## 2018-02-23 PROBLEM — E11.8 TYPE 2 DIABETES MELLITUS WITH COMPLICATION (HCC): Status: ACTIVE | Noted: 2018-01-01

## 2018-02-23 NOTE — PLAN OF CARE
Problem: Patient Care Overview (Adult)  Goal: Plan of Care Review  Outcome: Ongoing (interventions implemented as appropriate)   02/23/18 1334   Coping/Psychosocial Response Interventions   Plan Of Care Reviewed With patient   Patient Care Overview   Progress progress toward functional goals is gradual   Outcome Evaluation   Outcome Summary/Follow up Plan Pt transferred from bed to chair with mechanical lift. Pt STS x1 from chair with maxA x2. Pt able to clear hips, but continues to have increased difficulty achieving upright posture. Pt limited by increased fatigue.        Problem: Inpatient Physical Therapy  Goal: Bed Mobility Goal LTG- PT  Outcome: Ongoing (interventions implemented as appropriate)   02/11/18 1444 02/21/18 1558 02/23/18 1334   Bed Mobility PT LTG   Bed Mobility PT LTG, Date Established 02/11/18 --  --    Bed Mobility PT LTG, Time to Achieve 2 wks --  --    Bed Mobility PT LTG, Activity Type supine to sit/sit to supine --  --    Bed Mobility PT LTG, [place] Level minimum assist (75% patient effort) --  --    Bed Mobility PT LTG, Date Goal Reviewed --  02/21/18 --    Bed Mobility PT LTG, Outcome --  --  goal ongoing     Goal: Transfer Training Goal 1 LTG- PT  Outcome: Ongoing (interventions implemented as appropriate)   02/11/18 1444 02/21/18 1558 02/23/18 1334   Transfer Training PT LTG   Transfer Training PT LTG, Date Established 02/11/18 --  --    Transfer Training PT LTG, Time to Achieve 2 wks --  --    Transfer Training PT LTG, Activity Type sit to stand/stand to sit --  --    Transfer Training PT LTG, [place] Level minimum assist (75% patient effort);2 person assist required --  --    Transfer Training PT LTG, Assist Device walker, rolling --  --    Transfer Training PT LTG, Date Goal Reviewed --  02/21/18 --    Transfer Training PT LTG, Outcome --  --  goal ongoing     Goal: Gait Training Goal LTG- PT  Outcome: Ongoing (interventions implemented as appropriate)   02/11/18 1444  02/21/18 1558 02/23/18 1334   Gait Training PT LTG   Gait Training Goal PT LTG, Date Established 02/11/18 --  --    Gait Training Goal PT LTG, Time to Achieve 2 wks --  --    Gait Training Goal PT LTG, [place] Level moderate assist (50% patient effort);2 person assist required --  --    Gait Training Goal PT LTG, Assist Device walker, rolling --  --    Gait Training Goal PT LTG, Distance to Achieve 10 --  --    Gait Training Goal PT LTG, Date Goal Reviewed --  02/21/18 --    Gait Training Goal PT LTG, Outcome --  --  goal ongoing     Goal: Static Sitting Balance Goal LTG- PT  Outcome: Ongoing (interventions implemented as appropriate)   02/11/18 1444 02/21/18 1558 02/23/18 1334   Static Sitting Balance PT LTG   Static Sitting Balance PT LTG, Date Established 02/11/18 --  --    Static Sitting Balance PT LTG, Time to Achieve 2 wks --  --    Static Sitting Balance PT LTG, [place] Level supervision required --  --    Static Sitting Balance PT LTG, Assist Device UE Support --  --    Static Sitting Balance PT LTG, Date Goal Reviewed --  02/21/18 --    Static Sitting Balance PT LTG, Outcome --  --  goal ongoing

## 2018-02-23 NOTE — PROGRESS NOTES
She has steadily improved in context of neurologic function. Remains disorientated, speech intact yet dysphasic. Hemiparesis persists.    PLAN:   1. Reduce dexamethasone.  2. Restart SLP, PT and OT  3. Look for ECF. Should be appropriate for [facility].

## 2018-02-23 NOTE — THERAPY TREATMENT NOTE
Acute Care - Physical Therapy Treatment Note  [facility]     Patient Name: Preeti Manning  : 1940  MRN: [id number]  Today's Date: 2018  Onset of Illness/Injury or Date of Surgery Date: 18  Date of Referral to PT: 18  Referring Physician: MD Caitlyn    Admit Date: 2018    Visit Dx:    ICD-10-CM ICD-9-CM   1. Pulmonary emphysema, unspecified emphysema type J43.9 492.8   2. Intracranial hemorrhage I62.9 432.9   3. Impaired mobility and ADLs Z74.09 799.89   4. Impaired functional mobility, balance, gait, and endurance Z74.09 V49.89   5. Dysphagia, unspecified type R13.10 787.20   6. Anxiety - chronic benzodiazepines F41.9 300.00   7. Ileus versus small bowel obstruction 18 K56.7 560.1   8. Bowel perforation K63.1 569.83     Patient Active Problem List   Diagnosis   • Closed fracture of distal end of right femur   • COPD/emphysema   • CAD (coronary artery disease)   • Hyperlipidemia   • History CHF    • Stroke   • Nontraumatic R ICH    • Anxiety - chronic benzodiazepines   • Chronic pain syndrome - chronic narcotic pain medications   • Acute respiratory failure requiring intubation and mechanical ventilation, 18   • Urinary retention requiring Benz placement 18 (1200 mL residual)   • S/P right frontal craniotomy and excision of intraparenchymal hemorrhage 18   • Pneumonia LLL. Probable aspiration   • Ileus versus small bowel obstruction 18   • Pneumatosis coli and biliary air               Adult Rehabilitation Note       18 1100 18 1501 18 0930    Rehab Assessment/Intervention    Discipline physical therapist  -KR physical therapist  -KR speech language pathologist  -MD MEZA AV2    Document Type therapy note (daily note)  -EMMA therapy note (daily note)  -EMMA therapy note (daily note)  -MD DERRICK,DERRICK2    Subjective Information agree to therapy;no complaints  -EMMA agree to therapy;no complaints  -EMMA no complaints;agree to therapy  -MD DERRICK,DERRICK2    Patient Effort,  Rehab Treatment adequate  -KR adequate  -KR fair  -MD DERRICK,AV2    Symptoms Noted During/After Treatment fatigue  -KR fatigue  -KR none  -MD DERRICK,AV2    Precautions/Limitations fall precautions;oxygen therapy device and L/min  -KR fall precautions;oxygen therapy device and L/min   NG, G tube, L sided weakness  -KR     Recorded by [KR] Rhina Aviles, PT [KR] Rhina Aviles, PT [MD DERRICK,AV2] Jess Benitez, MS CCC-SLP (r) Vane Santiago, Speech Therapy Student (t) Jess Benitez, MS CCC-SLP (c)    Vital Signs    Pre Systolic BP Rehab 122  -  -KR     Pre Treatment Diastolic BP 83  -KR 74  -KR     Post Systolic BP Rehab 145  -  -KR     Post Treatment Diastolic BP 88  -KR 86  -KR     Pretreatment Heart Rate (beats/min) 110  -KR 96  -KR     Posttreatment Heart Rate (beats/min) 114  -KR 92  -KR     Pre SpO2 (%) 95  -KR 95  -KR     O2 Delivery Pre Treatment supplemental O2  -KR supplemental O2  -KR     Post SpO2 (%) 92  -KR 92  -KR     O2 Delivery Post Treatment supplemental O2  -KR supplemental O2  -KR     Pre Patient Position Supine  -KR Sitting  -KR     Intra Patient Position Standing  -KR Standing  -KR     Post Patient Position Sitting  -KR Sitting  -KR     Recorded by [KR] Rhina Aviles, PT [KR] Rhina Aviles, PT     Pain Assessment    Pain Assessment Blanchard-Javier FACES  -KR 0-10  -KR Blanchard-Javier FACES  -MD DERRICK,AV2    Blanchard-Javier FACES Pain Rating 2  -KR  2  -MD DERRICK,AV2    Pain Score 2  -KR 0  -KR     Post Pain Score 2  -KR 0  -KR     Pain Type Acute pain  -KR      Pain Location Abdomen  -KR      Pain Intervention(s) Repositioned;Ambulation/increased activity  -KR      Response to Interventions tolerated  -KR      Recorded by [KR] Rhina Aviles, PT [KR] Rhina Aviles, PT [MD DERRICK,AV2] Jess Benitez, MS CCC-SLP (r) Vane Santiago, Speech Therapy Student (t) Jess Benitez, MS CCC-SLP (c)    Cognitive Assessment/Intervention    Current Cognitive/Communication Assessment impaired  -KR impaired  -KR      Orientation Status oriented to;person  -KR oriented to;person  -KR     Follows Commands/Answers Questions able to follow single-step instructions;50% of the time;needs increased time;needs cueing;needs repetition  -KR able to follow single-step instructions;50% of the time;needs cueing;needs increased time;needs repetition  -KR     Personal Safety severe impairment;decreased awareness, need for assist;decreased awareness, need for safety;decreased insight to deficits  -KR severe impairment;decreased awareness, need for assist;decreased awareness, need for safety;decreased insight to deficits  -KR     Personal Safety Interventions fall prevention program maintained;gait belt;nonskid shoes/slippers when out of bed  -KR fall prevention program maintained;gait belt;nonskid shoes/slippers when out of bed  -KR     Recorded by [KR] Rhina vAiles PT [KR] Rhina Aviles PT     Improve ability to follow directions    Improve ability to follow directions:   one-step directions with objects;80%;with consistent cues  -MD DERRICKAV2    Status: Improve ability to follow directions   Progressing as expected  -MD MEZA AV2    Ability to Follow Directions Progress   40%;with consistent cues;continue to address  -MD MEZA AV2    Recorded by   [MD DERRICK,AV2] Jess Benitez MS CCC-SLP (r) Vane Santiago, Speech Therapy Student (t) Jess Benitez MS CCC-SLP (c)    Improve ability to comprehend questions    Improve ability to comprehend questions:   simple yes/no questions;80%;with consistent cues  -MD DERRICKAV2    Status: Improve ability to comprehend questions   Progressing as expected  -MD MEZA AV2    Ability to Comprehend Questions Progress   50%;with consistent cues  -MD MEZA AV2    Comments: Improve ability to comprehend questions   Simple y/n ?s targeting environment and personal life  -MD MEZA AV2    Recorded by   [MD DERRICK,AV2] Jess Benitez MS CCC-SLP (r) Vane Santiago, Speech Therapy Student (t) Jess Benitez MS CCC-SLP (c)     Improve word retrieval skills    Improve word retrieval skills by:   completing automatic speech tasks counting;60%;with consistent cues  -MD DERRICKAV2    Status: Improve word retrieval skills   Progressing as expected  -MD MEZA AV2    Word Retrieval Skills Progress   continue to address  -MD MEZA AV2    Comments: Improve word retrieval skills   DNT this session  -MD MEZA AV2    Recorded by   [MD MEZA AV2] Jess Benitez MS CCC-SLP (r) Vane Santiago, Speech Therapy Student (t) Jess Benitez MS CCC-SLP (c)    Improve attention    Improve attention by:   looking at speaker;attending to task;80%;without cues  -MD MEZA AV2    Status: Improve attention   Progressing as expected  -MD MEZA AV2    Attention Progress   70%;with inconsistent cues  -MD MEZA AV2    Recorded by   [MD MEZA AV2] Jess Benitez MS CCC-SLP (r) Vane Santiago, Speech Therapy Student (t) Jess Benitez MS CCC-SLP (c)    Bed Mobility, Assessment/Treatment    Bed Mobility, Roll Left, [place] maximum assist (25% patient effort);2 person assist required;verbal cues required  -KR      Bed Mobility, Roll Right, [place] maximum assist (25% patient effort);2 person assist required;verbal cues required  -KR      Bed Mob, Supine to Sit, [place] not tested  -KR not tested  -KR     Bed Mob, Sit to Supine, [place] not tested  -KR not tested  -KR     Bed Mobility, Safety Issues cognitive deficits limit understanding;decreased use of arms for pushing/pulling;decreased use of legs for bridging/pushing  -KR      Bed Mobility, Impairments strength decreased  -KR      Bed Mobility, Comment Pt rolled L and R for placement of sling.   -KR UIC  -KR     Recorded by [KR] Rhina Aviles, PT [KR] Rhina Aviles, PT     Transfer Assessment/Treatment    Transfers, Bed-Chair [place] dependent (less than 25% patient effort);2 person assist required;verbal cues required  -KR      Transfers, Bed-Chair-Bed, Assist Device mechanical lift  -KR       Transfers, Sit-Stand [place] maximum assist (25% patient effort);2 person assist required;verbal cues required  -KR maximum assist (25% patient effort);2 person assist required;verbal cues required  -KR     Transfers, Stand-Sit [place] maximum assist (25% patient effort);2 person assist required;verbal cues required  -KR maximum assist (25% patient effort);2 person assist required;verbal cues required  -KR     Transfers, Sit-Stand-Sit, Assist Device --   BUE support  -KR --   BUE support  -KR     Transfer, Safety Issues step length decreased;weight-shifting ability decreased;sequencing ability decreased  -KR step length decreased;weight-shifting ability decreased;loses balance backward  -KR     Transfer, Impairments strength decreased;impaired balance;coordination impaired;postural control impaired  -KR strength decreased;impaired balance;postural control impaired  -KR     Transfer, Comment STS x1 from chair. Pt required max cueing for sequencing. Pt able to clear hips, but did not achieve upright posture. Pt limited by increased fatigue.   -KR STS x1 from chair. Pt able to clear hips from chair, but did not achieve full upright posture. Pt required max cueing for posture. Limited by fatigue.   -KR     Recorded by [KR] Rhina Aviles, PT [KR] Rhina Aviles, PT     Gait Assessment/Treatment    Gait, [place] Level not appropriate to assess  -KR not appropriate to assess  -KR     Recorded by [KR] Rhina Aviles, PT [KR] Rhina Aviles, PT     Motor Skills/Interventions    Additional Documentation Balance Skills Training (Group)  -KR Balance Skills Training (Group)  -KR     Recorded by [KR] Rhina Aviles, PT [KR] Rhina Aviles, PT     Balance Skills Training    Sitting-Level of Assistance Moderate assistance  -KR Moderate assistance   progressed to CGA  -KR     Sitting-Balance Support Right upper extremity supported;Left upper extremity supported;Feet supported  -KR Right upper extremity supported;Left  upper extremity supported;Feet supported  -KR     Standing-Level of Assistance Maximum assistance;x2  -KR Maximum assistance;x2  -KR     Static Standing Balance Support Right upper extremity supported;Left upper extremity supported  -KR Right upper extremity supported;Left upper extremity supported  -KR     Recorded by [KR] Rhina Aviles, PT [KR] Rhina Aviles, PT     Positioning and Restraints    Pre-Treatment Position in bed  -KR sitting in chair/recliner  -KR     Post Treatment Position chair  -KR chair  -KR     In Chair notified nsg;reclined;call light within reach;encouraged to call for assist;exit alarm on;with family/caregiver;waffle cushion;on mechanical lift sling;legs elevated  -KR notified nsg;reclined;call light within reach;encouraged to call for assist;exit alarm on;with family/caregiver;RUE elevated;LUE elevated;waffle cushion;on mechanical lift sling;legs elevated  -KR     Recorded by [KR] Rhina Aviles, PT [KR] Rhina Aviles, PT       User Key  (r) = Recorded By, (t) = Taken By, (c) = Cosigned By    Initials Name Effective Dates    AV Jess Benitez, MS CCC-SLP 03/14/16 -     KR Rhina Aviles, PT 09/25/17 -     MD Vane Santiago, Speech Therapy Student 01/05/18 -                 IP PT Goals       02/23/18 1334 02/22/18 1609 02/21/18 1558    Bed Mobility PT LTG    Bed Mobility PT LTG, Date Goal Reviewed   02/21/18  -KR    Bed Mobility PT LTG, Outcome goal ongoing  -KR goal ongoing  -KR goal ongoing  -KR    Transfer Training PT LTG    Transfer Training PT  LTG, Date Goal Reviewed   02/21/18  -KR    Transfer Training PT LTG, Outcome goal ongoing  -KR goal ongoing  -KR goal ongoing  -KR    Gait Training PT LTG    Gait Training Goal PT LTG, Date Goal Reviewed   02/21/18  -KR    Gait Training Goal PT LTG, Outcome goal ongoing  -KR goal ongoing  -KR goal ongoing  -KR    Static Sitting Balance PT LTG    Static Sitting Balance PT LTG, Date Goal Reviewed   02/21/18  -KR    Static Sitting Balance PT  LTG, Outcome goal ongoing  -KR goal ongoing  -KR goal ongoing  -KR      02/16/18 1450 02/14/18 1213 02/11/18 1444    Bed Mobility PT LTG    Bed Mobility PT LTG, Date Established   02/11/18  -LS    Bed Mobility PT LTG, Time to Achieve   2 wks  -LS    Bed Mobility PT LTG, Activity Type   supine to sit/sit to supine  -LS    Bed Mobility PT LTG, [place] Level   minimum assist (75% patient effort)  -LS    Bed Mobility PT LTG, Outcome goal ongoing  -CD goal ongoing  -LS     Transfer Training PT LTG    Transfer Training PT LTG, Date Established   02/11/18  -LS    Transfer Training PT LTG, Time to Achieve   2 wks  -LS    Transfer Training PT LTG, Activity Type   sit to stand/stand to sit  -LS    Transfer Training PT LTG, [place] Level   minimum assist (75% patient effort);2 person assist required  -LS    Transfer Training PT LTG, Assist Device   walker, rolling  -LS    Transfer Training PT LTG, Outcome goal ongoing  -CD goal ongoing  -LS     Gait Training PT LTG    Gait Training Goal PT LTG, Date Established   02/11/18  -LS    Gait Training Goal PT LTG, Time to Achieve   2 wks  -LS    Gait Training Goal PT LTG, [place] Level   moderate assist (50% patient effort);2 person assist required  -LS    Gait Training Goal PT LTG, Assist Device   walker, rolling  -LS    Gait Training Goal PT LTG, Distance to Achieve   10  -LS    Gait Training Goal PT LTG, Outcome goal partially met   FOR ASSIST NEEDED X 8 FEET.   -CD goal ongoing  -LS     Static Sitting Balance PT LTG    Static Sitting Balance PT LTG, Date Established   02/11/18  -LS    Static Sitting Balance PT LTG, Time to Achieve   2 wks  -LS    Static Sitting Balance PT LTG, [place] Level   supervision required  -LS    Static Sitting Balance PT LTG, Assist Device   UE Support  -LS    Static Sitting Balance PT LTG, Outcome goal ongoing  -CD goal ongoing  -LS       User Key  (r) = Recorded By, (t) = Taken By, (c) = Cosigned By    Initials Name Provider Type     CD Kirsty Radford, PT Physical Therapist    XAIV Abreu, PT Physical Therapist    KR Rhina Aviles, PT Physical Therapist          Physical Therapy Education     Title: PT OT SLP Therapies (Active)     Topic: Physical Therapy (Active)     Point: Mobility training (Active)    Learning Progress Summary    Learner Readiness Method Response Comment Documented by Status   Patient Acceptance E NR  KR 02/23/18 1334 Active    Acceptance E NR  KR 02/22/18 1608 Active    Acceptance E NR  KR 02/21/18 1558 Active    Acceptance E NR,[facility] 02/20/18 0321 Active    Acceptance E NR,[facility] 02/18/18 2206 Active    Acceptance E NR   02/18/18 0450 Active    Acceptance E VU,NR BENEFITS OF OOB ACTIVITY, SAFETY WITH MOBILITY, TRANSFER TRAINING, GAIT TRAINING, CD 02/16/18 1449 Done    Acceptance E,D NR   02/14/18 1213 Active    Acceptance E,D NR   02/11/18 1444 Active   Family Acceptance E NR,[facility] 02/18/18 2206 Active    Acceptance E NR   02/18/18 0450 Active    Acceptance E VU,NR BENEFITS OF OOB ACTIVITY, SAFETY WITH MOBILITY, TRANSFER TRAINING, GAIT TRAINING, CD 02/16/18 1449 Done    Acceptance E,D NR   02/14/18 1213 Active               Point: Home exercise program (Active)    Learning Progress Summary    Learner Readiness Method Response Comment Documented by Status   Patient Acceptance E NR  KR 02/23/18 1334 Active    Acceptance E NR  KR 02/22/18 1608 Active    Acceptance E NR  KR 02/21/18 1558 Active    Acceptance E NR,[facility] 02/20/18 0321 Active    Acceptance E NR,[facility] 02/18/18 2206 Active    Acceptance E NR   02/18/18 0450 Active    Acceptance E VU,NR BENEFITS OF OOB ACTIVITY, SAFETY WITH MOBILITY, TRANSFER TRAINING, GAIT TRAINING, CD 02/16/18 1449 Done    Acceptance E,D NR   02/14/18 1213 Active   Family Acceptance E NR,[facility] 02/18/18 2206 Active    Acceptance E NR   02/18/18 0450 Active    Acceptance E VU,NR BENEFITS OF OOB ACTIVITY, SAFETY WITH MOBILITY, TRANSFER TRAINING, GAIT TRAINING, CD 02/16/18  1449 Done    Acceptance E,D NR   02/14/18 1213 Active               Point: Body mechanics (Active)    Learning Progress Summary    Learner Readiness Method Response Comment Documented by Status   Patient Acceptance E NR  KR 02/23/18 1334 Active    Acceptance E NR  KR 02/22/18 1608 Active    Acceptance E NR  KR 02/21/18 1558 Active    Acceptance E NR,[place]   02/20/18 0321 Active    Acceptance E NR,[place]   02/18/18 2206 Active    Acceptance E NR   02/18/18 0450 Active    Acceptance E VU,NR BENEFITS OF OOB ACTIVITY, SAFETY WITH MOBILITY, TRANSFER TRAINING, GAIT TRAINING, CD 02/16/18 1449 Done    Acceptance E,D NR   02/14/18 1213 Active    Acceptance E,D NR   02/11/18 1444 Active   Family Acceptance E NR,[facility] 02/18/18 2206 Active    Acceptance E NR   02/18/18 0450 Active    Acceptance E VU,NR BENEFITS OF OOB ACTIVITY, SAFETY WITH MOBILITY, TRANSFER TRAINING, GAIT TRAINING, CD 02/16/18 1449 Done    Acceptance E,D NR   02/14/18 1213 Active               Point: Precautions (Active)    Learning Progress Summary    Learner Readiness Method Response Comment Documented by Status   Patient Acceptance E NR  KR 02/23/18 1334 Active    Acceptance E NR  KR 02/22/18 1608 Active    Acceptance E NR  KR 02/21/18 1558 Active    Acceptance E NR,[facility] 02/20/18 0321 Active    Acceptance E NR,[facility] 02/18/18 2206 Active    Acceptance E NR   02/18/18 0450 Active    Acceptance E VU,NR BENEFITS OF OOB ACTIVITY, SAFETY WITH MOBILITY, TRANSFER TRAINING, GAIT TRAINING, CD 02/16/18 1449 Done    Acceptance E,D NR   02/14/18 1213 Active    Acceptance E,D NR   02/11/18 1444 Active   Family Acceptance E NR,[facility] 02/18/18 2206 Active    Acceptance E NR   02/18/18 0450 Active    Acceptance E VU,NR BENEFITS OF OOB ACTIVITY, SAFETY WITH MOBILITY, TRANSFER TRAINING, GAIT TRAINING, CD 02/16/18 1449 Done    Acceptance E,D NR   02/14/18 1213 Active                      User Key     Initials Effective Dates Name Provider Type Discipline     CD 06/19/15 -  Kirsty Radford, PT Physical Therapist PT    LS 06/19/15 -  Pattie Abreu, PT Physical Therapist PT    AC 07/03/17 -  Bárbara Byrd, RN Registered Nurse Nurse    KR 09/25/17 -  Rhina Aviles, PT Physical Therapist PT                    PT Recommendation and Plan  Anticipated Discharge Disposition: skilled nursing facility  PT Frequency: daily  Plan of Care Review  Plan Of Care Reviewed With: patient  Progress: progress toward functional goals is gradual  Outcome Summary/Follow up Plan: Pt transferred from bed to chair with mechanical lift. Pt STS x1 from chair with maxA x2. Pt able to clear hips, but continues to have increased difficulty achieving upright posture. Pt limited by increased fatigue.           Outcome Measures       02/23/18 1100 02/22/18 1501 02/22/18 1130    How much help from another person do you currently need...    Turning from your back to your side while in flat bed without using bedrails? 2  -KR 2  -KR     Moving from lying on back to sitting on the side of a flat bed without bedrails? 2  -KR 2  -KR     Moving to and from a bed to a chair (including a wheelchair)? 1  -KR 1  -KR     Standing up from a chair using your arms (e.g., wheelchair, bedside chair)? 2  -KR 2  -KR     Climbing 3-5 steps with a railing? 1  -KR 1  -KR     To walk in hospital room? 1  -KR 1  -KR     AM-PAC 6 Clicks Score 9  -KR 9  -KR     How much help from another is currently needed...    Putting on and taking off regular lower body clothing?   1  -CL    Bathing (including washing, rinsing, and drying)   1  -CL    Toileting (which includes using toilet bed pan or urinal)   1  -CL    Putting on and taking off regular upper body clothing   1  -CL    Taking care of personal grooming (such as brushing teeth)   2  -CL    Eating meals   1  -CL    Score   7  -CL    Modified Rosey Scale    Modified Grayson Scale   4 - Moderately severe disability.  Unable to walk without assistance, and unable to attend to own  bodily needs without assistance.  -CL    Functional Assessment    Outcome Measure Options AM-PAC 6 Clicks Basic Mobility (PT)  -KR AM-PAC 6 Clicks Basic Mobility (PT)  -KR AM-PAC 6 Clicks Daily Activity (OT)  -CL      02/21/18 1337          How much help from another person do you currently need...    Turning from your back to your side while in flat bed without using bedrails? 2  -KR      Moving from lying on back to sitting on the side of a flat bed without bedrails? 2  -KR      Moving to and from a bed to a chair (including a wheelchair)? 1  -KR      Standing up from a chair using your arms (e.g., wheelchair, bedside chair)? 1  -KR      Climbing 3-5 steps with a railing? 1  -KR      To walk in hospital room? 1  -KR      AM-PAC 6 Clicks Score 8  -KR      Functional Assessment    Outcome Measure Options AM-PAC 6 Clicks Basic Mobility (PT)  -KR        User Key  (r) = Recorded By, (t) = Taken By, (c) = Cosigned By    Initials Name Provider Type    CL Jannet Salguero, OT Occupational Therapist    KR Rhina Aviles, PT Physical Therapist           Time Calculation:         PT Charges       02/23/18 1338          Time Calculation    Start Time 1100  -KR      PT Received On 02/23/18  -KR      PT Goal Re-Cert Due Date 03/03/18  -KR      Time Calculation- PT    Total Timed Code Minutes- PT 24 minute(s)  -KR        User Key  (r) = Recorded By, (t) = Taken By, (c) = Cosigned By    Initials Name Provider Type    KR Rhina Aviles, PT Physical Therapist          Therapy Charges for Today     Code Description Service Date Service Provider Modifiers Qty    83707588275 HC PT THER PROC EA 15 MIN 2/22/2018 Rhina Aviles, PT GP 1    14441896692 HC PT THER SUPP EA 15 MIN 2/22/2018 Rhina N Stefan, PT GP 1    05945022667 HC PT THER PROC EA 15 MIN 2/23/2018 Rhina N Stefan, PT GP 2    19581109272 HC PT THER SUPP EA 15 MIN 2/23/2018 Rhina Aviles, PT GP 2          PT G-Codes  Outcome Measure Options: AM-PAC 6 Clicks Basic Mobility  (PT)    Alina Aviles, PT  2/23/2018

## 2018-02-23 NOTE — PROGRESS NOTES
"General Surgery Post op Follow Up Note    Subjective:   No new complaints.    /81  Pulse 112  Temp 98.2 °F (36.8 °C) (Oral)   Resp 18  Ht 165.1 cm (65\")  Wt 57.2 kg (126 lb)  SpO2 92%  BMI 20.97 kg/m2    General Appearance:  in no acute distress  Abdomen: incision is clean and dry, g-tube to gravity    CBC    Results from last 7 days  Lab Units 02/23/18  0408   WBC 10*3/mm3 13.83*   HEMOGLOBIN g/dL 12.5   HEMATOCRIT % 38.6   PLATELETS 10*3/mm3 79*       CMP/BMP    Results from last 7 days  Lab Units 02/23/18  0408   SODIUM mmol/L 148*   POTASSIUM mmol/L 4.0   CHLORIDE mmol/L 112*   CO2 mmol/L 32.0*   BUN mg/dL 47*   CREATININE mg/dL 0.60   CALCIUM mg/dL 8.2*   BILIRUBIN mg/dL 1.4*   ALK PHOS U/L 78   ALT (SGPT) U/L 200*   AST (SGOT) U/L 172*   GLUCOSE mg/dL 188*         ASSESSMENT/PLAN:    Clamp the g-tube today.  If no nausea over today then start gentle TF tomorrow via the g-tube.      Darin Balderas MD  2/23/2018  9:08 AM  "

## 2018-02-23 NOTE — PROGRESS NOTES
Adult Nutrition  Assessment/PES    Patient Name:  Preeti Manning  YOB: 1940  MRN: [id number]  Admit Date:  2/9/2018    Assessment Date:  2/23/2018    Comments:  Await surgeon's  approval to use g-tube for feeding. Impact Peptide 1.5 at 15 ml/hr to initiate. Adjust PN and EN support accordingly, monitor tolerance.          Reason for Assessment       02/23/18 1621    Reason for Assessment    Reason For Assessment/Visit follow up protocol;TF/PN    Identified At Risk By Screening Criteria tube feeding or parenteral nutrition    Time Spent (min) 45    Diagnosis Diagnosis   per notes of this adm              Nutrition/Diet History       02/23/18 1621    Nutrition/Diet History    Reported/Observed By RN    Other New PN at goal rate; Ng out , g-tube clamped; surgeon considering initiation of gentle TF tomorrow.              Labs/Tests/Procedures/Meds       02/23/18 1623    Labs/Tests/Procedures/Meds    Labs/Tests Review Reviewed;Na+;ALT;AST;Platlets    Medication Review Reviewed, pertinent            Physical Findings       02/23/18 1623    Physical Appearance    Overall Physical Appearance --   in chair              Nutrition Prescription Ordered       02/23/18 1624    Nutrition Prescription PO    Current PO Diet NPO    Nutrition Prescription PN    PN Route Central    PN Goal Rate (mL/hr) 71 mL/hr    PN Current Rate (mL/hr) 71 mL/hr    PN Goal Volume (mL) 1704 mL    Dextrose Concentration (%) --   14.67 %    Dextrose (Kcal) 850    Amino Acid (gm) 115   per day or 1704 ml    Lipid Concentration (%) 20%    Lipid Volume (mL) 100 mL   SMOF - ILE    Lipid Frequency Daily            Evaluation of Received Nutrient/Fluid Intake       02/23/18 1625    Calculation Measurements    Weight Used For Calculations 57.2 kg (126 lb 1.7 oz)    Evaluation of Received Nutrient/Fluid Intake    Number of Days Evaluated 1 day    Calorie Intake Evaluation    Parenteral Calories (kcal) 1877    Other Calories (kcal) 200   SMOF     Total Calories (kcal) 2077    Total Calories (kcal/Kg) 36.31 kcal/kg    % Kcal Needs 129    Protein Intake Evaluation    Parenteral Protein (gm) 174    Total Protein (gm) 174    % Protein Needs 153    PN Evaluation    Number of Days PN Evaluated 1 day    PN Average delivery (mL/day)  1672.6 mL/day    PN Average lipid delivery (mL/day)  100 mL/day   SMOF- ILE            Problem/Interventions:                  Intervention Goal       02/23/18 1629    Intervention Goal    General Nutrition support treatment    TF/PN Adjust TF/PN            Nutrition Intervention       02/23/18 1629    Nutrition Intervention    RD/Tech Action Follow Tx progress;Care plan reviewd            Nutrition Prescription       02/23/18 1629    Nutrition Prescription EN    Enteral Route Gastrostomy    Product Impact Peptide 1.5 fidelia    TF Delivery Method Continuous    Continuous TF Goal Rate (mL/hr) 55 mL/hr    Continuous TF Starting Rate (mL/hr) 15 mL/hr    Continuous TF Goal Volume (mL) 1100 mL    Continuous TF Starting Volume (mL) 360 mL    Water flush (mL)  --   once PN weaned; free water w/ medication administration    New EN Prescription Ordered? No, recommended    EN to Supply    Kcal/Day 1650 Kcal/Day    Protein (gm/day) 103 gm/day    Total Free H2O (mL/day) 847 mL/day    Fiber Per Day (gm/day) 0 gm/day            Education/Evaluation       02/23/18 1631    Monitor/Evaluation    Monitor Per protocol;I&O;Pertinent labs;Skin status;Symptoms        Electronically signed by:  Amy Agustin RD  02/23/18 4:31 PM

## 2018-02-23 NOTE — PROGRESS NOTES
INTENSIVIST   PROGRESS NOTE     Hospital:  LOS: 14 days   Subjective   Ms. Preeti Manning, 77 y.o. 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     S     Interval History:  POD: 6 Days Post-Op     Talking. Long sentences.  Great spirits.  No distress.     The patient's relevant past medical, surgical and social history were reviewed and updated in Epic as appropriate.      ROS:   Constitutional: Negative for fever or chills.   Respiratory: Negative for dyspnea or cough.   Cardiovascular: Negative for chest pain or palpitations.   Gastrointestinal: Negative for  nausea, vomiting or diarrhea.       Objective   O     Vitals:  Temp: 97.7 °F (36.5 °C) (02/23/18 1200) Temp  Min: 97.7 °F (36.5 °C)  Max: 99 °F (37.2 °C)   BP: 140/94 (02/23/18 1200) BP  Min: 122/83  Max: 148/91   Pulse: 111 (02/23/18 1245) Pulse  Min: 80  Max: 115   Resp: 22 (02/23/18 1200) Resp  Min: 14  Max: 24   SpO2: 93 % (02/23/18 1245) SpO2  Min: 91 %  Max: 96 %   Device: nasal cannula with humidification (02/23/18 1200)    Flow Rate: 5 (02/23/18 1200) Flow (L/min)  Min: 5  Max: 6       Intake/Ouptut 24 hrs (7:00AM - 6:59 AM)  Intake & Output (last 3 days)       02/20 0701 - 02/21 0700 02/21 0701 - 02/22 0700 02/22 0701 - 02/23 0700 02/23 0701 - 02/24 0700    I.V. (mL/kg) 482 (8.4) 444.7 (7.8) 1197 (20.9)     Blood        IV Piggyback 200 200 200     TPN 1384.5 1181.3 1672.6 318    Total Intake(mL/kg) 2066.5 (36.2) 1826 (31.9) 3069.6 (53.7) 318 (5.6)    Urine (mL/kg/hr) 2115 (1.5) 3745 (2.7) 2625 (1.9) 450 (1.2)    Other 890 (0.6) 2500 (1.8) 1725 (1.3) 400 (1.1)    Total Output 5054 1678  4350 850    Net -938.5 -4419 -1280.4 -532                Medications (drips):    Adult Central 2-in-1 TPN Last Rate: 71 mL/hr at 02/23/18 0848   Adult Central 2-in-1 TPN      Physical Examination  Telemetry:  Sinus Rhythm: sinus tachycardia (02/23/18 1000)   Cardiovascular: Normal rate, regular and rhythm. Normal heart sounds.  No murmurs, gallop or rub.   Respiratory: No respiratory distress. Normal respiratory effort.  Normal breath sounds  Clear to auscultation and percussion.    Abdominal:  Soft. No masses. Non-tender. No distension. No HSM.  Post op  PEG   Extremities: No digital cyanosis. No clubbing.  Edema hands.   Neurological:   Mild aphasia.  Left hemiplegia  Best Eye Response: 4-->(E4) spontaneous (02/23/18 1000)  Best Motor Response: 6-->(M6) obeys commands (02/23/18 1000)  Best Verbal Response: 4-->(V4) confused (02/23/18 1000)  Michelle Coma Scale Score: 14 (02/23/18 1000)   Lines/Drains/Airways: R IJ 3 L  NG  PEG  Benz     Interval:  (returned from CT scan )  1a. Level Of Consciousness: 0-->Alert: keenly responsive  1b. LOC Questions: 1-->Answers one question correctly  1c. LOC Commands: 0-->Performs both tasks correctly  2. Best Gaze: 0-->Normal  3. Visual: 0-->No visual loss  4. Facial Palsy: 1-->Minor paralysis (flattened nasolabial fold, asymmetry on smiling)  5a. Motor Arm, Left: 2-->Some effort against gravity: limb cannot get to or maintain (if cued) 90 (or 45) degrees, drifts down to bed, but has some effort against gravity  5b. Motor Arm, Right: 1-->Drift: limb holds 90 (or 45) degrees, but drifts down before full 10 secs: does not hit bed or other support  6a. Motor Leg, Left: 2-->Some effort against gravity: leg falls to bed by 5 secs, but has some effort against gravity  6b. Motor Leg, Right: 1-->Drift: leg falls by the end of the 5-sec period but does not hit bed  7. Limb Ataxia: 0-->Absent  8. Sensory: 1-->Mild-to-moderate sensory loss: patient feels pinprick is less sharp or is dull on  the affected side: or there is a loss of superficial pain with pinprick, but patient is aware of being touched  9. Best Language: 1-->Mild-to-moderate aphasia: some obvious loss of fluency or facility of comprehension, without significant limitation on ideas expressed or form of expression. Reduction of speech and/or comprehension, however, makes conversation. . . (see row details)  10. Dysarthria: 1-->Mild-to-moderate dysarthria: patient slurs at least some words and, at worst, can be understood with some difficulty  11. Extinction and Inattention (formerly Neglect): 0-->No abnormality    Total (NIH Stroke Scale): 11 2/23/2018 07    Hematology:    Results from last 7 days  Lab Units 02/23/18  0408 02/22/18  0402 02/21/18  0506   WBC 10*3/mm3 13.83* 12.51* 14.88*   HEMOGLOBIN g/dL 12.5 12.2 11.0*   MCV fL 93.5 92.4 91.8   PLATELETS 10*3/mm3 79* 95* 113*     Electrolytes, Magnesium and Phosphorus:    Results from last 7 days  Lab Units 02/23/18  0408 02/22/18  0402 02/21/18  0506   SODIUM mmol/L 148* 148* 151*   POTASSIUM mmol/L 4.0 3.7 4.7   CO2 mmol/L 32.0* 28.0 24.0   MAGNESIUM mg/dL 2.1 2.1 2.5   PHOSPHORUS mg/dL 3.4 2.8 2.6     Renal:    Results from last 7 days  Lab Units 02/23/18  0408 02/22/18  0402 02/21/18  0506   CREATININE mg/dL 0.60 0.50* 0.60   BUN mg/dL 47* 39* 45*     Estimated Creatinine Clearance: 53.2 mL/min (by C-G formula based on Cr of 0.6).    Images:  Ct Head Without Contrast    Result Date: 2/22/2018  Intra-axial hemorrhage with subarachnoid and intraventricular blood as well as low dense edema. There has been little change since 02/19/2018.  D:  02/22/2018 E:  02/22/2018    This report was finalized on 2/22/2018 2:32 PM by Dr. Thang Wolfe MD.      Xr Chest 1 View    Result Date: 2/22/2018  There has been no change since 02/21/2018.  D:  02/22/2018 E:  02/22/2018  This report was finalized on 2/22/2018 2:32 PM by Dr. Thang Wolfe MD.      Results for orders placed during the hospital  encounter of 02/09/18   Adult Transthoracic Echo Complete W/ Cont if Necessary Per Protocol    Narrative · Left ventricular systolic function is normal. Estimated EF = 55%.  · There is no evidence of pericardial effusion.  · Normal right ventricular cavity size, wall thickness, systolic function   and septal motion noted.  · Technically difficult and very limited study; no IV Lumason used.        Results: Reviewed.  I reviewed the patient's new laboratory and imaging results.  I independently reviewed the patient's new images.    Medications: Reviewed.    Assessment/Plan   A / P     77 y.o.female, admitted on 2/9/2018 with Stroke [I63.9]:     1. Leaking gastrostomy with intraabdominal contamination  Procedure(s) (LRB):  LAPAROTOMY EXPLORATORY WITH GASTROTOMY, DEBREEDMENT AND G TUBE PLACEMENT (N/A). WASHOUT OF INTRAABDOMINAL ABSCESS.  Dr. Balderas  2/17/2018  1. UTI: E coli, K pneumoniae  2. Antibiotics: MRP  2. 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 S/P Crani and excision of intraparenchymal hemorrhage. 2/9/2018  2. Aphasia  3. Dexamethasone 4 mg Q 8 hrs } NS  3. Respiratory failure  1. Emphysema  2. Extubated 2/11/2018  3. Intubated 2/17/2018  1. Mechanical Ventilation  4. Extubated 2/20/2018  5. Tobacco use  4. CAD  5. HFpEF  1. EF 55% (ECHO 2/9/2018)  6. Dyslipidemia  7. Anemia, NC, Better after transfusion on 2/19/2018  8. Thrombocytopenia      Lab Results  Lab Value Date/Time   PLT 79 (L) 02/23/2018 0408   PLT 95 (L) 02/22/2018 0402    (L) 02/21/2018 0506    (L) 02/20/2018 0632    (L) 02/19/2018 0340    02/18/2018 0417       9. Anxiety, on chronic BZD  10. Chronic Pain syndrome  11. Nutrition. Malnourished. Parenteral Nutrition   MST Total Score: MST score: 5  12. Glucose: No h/o DM  1. Hyperglycemia due to Dexamethasone      Results from last 7 days  Lab Units 02/23/18  1057 02/23/18  0511 02/22/18  2310 02/22/18  1723 02/22/18  1151  02/22/18  0545   GLUCOSE mg/dL 190* 176* 191* 169* 180* 239*       Lab Results  Lab Value Date/Time   HGBA1C 5.80 (H) 02/10/2018 0440   HGBA1C 5.10 05/12/2017 0433        Nutrition Support: No active supplement orders     Diet:  NPO Diet  Adult Central 2-in-1 TPN  Adult Central 2-in-1 TPN + Smoflipids   Advance Directives: Full Code     Assessment / Plan:    1. Watch PLT count, now down to 60 K  1. Check Heparin Ab.  2. Hold heparin products  2. Continue PN.  3. May start Enteral Nutrition tomorrow, but expect to start at low rate and advance slowly as per Surgery, thus, we will continue Parenteral Nutrition until her enteral intake meets at least 60% of her estimated requirements.  4. Disposition: Keep in ICU.    Plan of care and goals reviewed during interdisciplinary rounds.  I discussed the patient's findings and my recommendations with patient and nursing staff     Sherwin Brady MD, FACP, FCCP, CNSC  Intensive Care Medicine, Nutrition Support and Pulmonary Medicine

## 2018-02-23 NOTE — PLAN OF CARE
Problem: Patient Care Overview (Adult)  Goal: Plan of Care Review  Outcome: Ongoing (interventions implemented as appropriate)   02/23/18 1435   Coping/Psychosocial Response Interventions   Plan Of Care Reviewed With patient   Patient Care Overview   Progress progress toward functional goals as expected   Outcome Evaluation   Outcome Summary/Follow up Plan S/L tx completed this date. Pt lethargic and dozing, but cooperative w/ frequent cueing. Pt able to complete automatic speech tasks (counting, VY, JANE) w/ inconsistent cueing to begin. Improvement observed in pt's ability to expressively name objects. Performance on simple yes/no and 1 step directions likely impacted by pt's decreased alertness. Rec: con't ST       Problem: Inpatient SLP  Goal: Expressive-Patient will improve expressive language skills to allow optimal participation in care  Outcome: Ongoing (interventions implemented as appropriate)   02/11/18 1511 02/22/18 1005 02/23/18 1435   Expressive- Optimal Participation in Care   Expressive Optimal Participation in Care- SLP, Date Established 02/11/18 --  --    Expressive Optimal Participation in Care- SLP, Time to Achieve by discharge --  --    Expressive Optimal Participation in Care- SLP, Date Goal Reviewed --  --  02/23/18   Expressive Optimal Participation in Care- SLP, Outcome --  goal ongoing --      Goal: Receptive Language-Patient will improve receptive language skills to allow optimal participation in care  Outcome: Ongoing (interventions implemented as appropriate)   02/11/18 1511 02/23/18 1435   Receptive Language- Optimal Participation in Care   Receptive Language Optimal Participation in Care- SLP, Date Established 02/11/18 --    Receptive Language Optimal Participation in Care- SLP, Time to Achieve by discharge --    Receptive Language Optimal Participation in Care- SLP, Date Goal Reviewed --  02/23/18   Receptive Language Optimal Participation in Care- SLP, Outcome --  goal ongoing

## 2018-02-23 NOTE — THERAPY TREATMENT NOTE
Acute Care - Speech Language Pathology Treatment Note  [facility]     Patient Name: Preeti Manning  : 1940  MRN: [id number]  Today's Date: 2018         Admit Date: 2018    Visit Dx:      ICD-10-CM ICD-9-CM   1. Pulmonary emphysema, unspecified emphysema type J43.9 492.8   2. Intracranial hemorrhage I62.9 432.9   3. Impaired mobility and ADLs Z74.09 799.89   4. Impaired functional mobility, balance, gait, and endurance Z74.09 V49.89   5. Dysphagia, unspecified type R13.10 787.20   6. Anxiety - chronic benzodiazepines F41.9 300.00   7. Ileus versus small bowel obstruction 18 K56.7 560.1   8. Bowel perforation K63.1 569.83     Patient Active Problem List   Diagnosis   • Closed fracture of distal end of right femur   • COPD/emphysema   • CAD (coronary artery disease)   • Hyperlipidemia   • History CHF    • Stroke   • Nontraumatic R ICH    • Anxiety - chronic benzodiazepines   • Chronic pain syndrome - chronic narcotic pain medications   • Acute respiratory failure requiring intubation and mechanical ventilation, 18   • Urinary retention requiring Benz placement 18 (1200 mL residual)   • S/P right frontal craniotomy and excision of intraparenchymal hemorrhage 18   • Pneumonia LLL. Probable aspiration   • Ileus versus small bowel obstruction 18   • Pneumatosis coli and biliary air              Adult Rehabilitation Note       18 1400 18 1100 18 1501    Rehab Assessment/Intervention    Discipline (P)  speech language pathologist  -MD physical therapist  -KR physical therapist  -KR    Document Type (P)  therapy note (daily note)  -MD therapy note (daily note)  -KR therapy note (daily note)  -KR    Subjective Information (P)  agree to therapy  -MD agree to therapy;no complaints  -KR agree to therapy;no complaints  -KR    Patient Effort, Rehab Treatment (P)  adequate  -MD adequate  -KR adequate  -KR    Symptoms Noted During/After Treatment (P)  fatigue  -MD fatigue   -KR fatigue  -KR    Precautions/Limitations  fall precautions;oxygen therapy device and L/min  -KR fall precautions;oxygen therapy device and L/min   NG, G tube, L sided weakness  -KR    Recorded by [MD] Vane Santiago, Speech Therapy Student [KR] Rhina Aviles, PT [KR] Rhina Aviles, PT    Vital Signs    Pre Systolic BP Rehab  122  -  -KR    Pre Treatment Diastolic BP  83  -KR 74  -KR    Post Systolic BP Rehab  145  -  -KR    Post Treatment Diastolic BP  88  -KR 86  -KR    Pretreatment Heart Rate (beats/min)  110  -KR 96  -KR    Posttreatment Heart Rate (beats/min)  114  -KR 92  -KR    Pre SpO2 (%)  95  -KR 95  -KR    O2 Delivery Pre Treatment  supplemental O2  -KR supplemental O2  -KR    Post SpO2 (%)  92  -KR 92  -KR    O2 Delivery Post Treatment  supplemental O2  -KR supplemental O2  -KR    Pre Patient Position  Supine  -KR Sitting  -KR    Intra Patient Position  Standing  -KR Standing  -KR    Post Patient Position  Sitting  -KR Sitting  -KR    Recorded by  [KR] Rhina Aviles, PT [KR] Rhina Aviles PT    Pain Assessment    Pain Assessment (P)  [place] JOHNSON  -MD Blanchard-Baker FACES  -KR 0-10  -KR    Blanchard-Javier FACES Pain Rating (P)  2  -MD 2  -KR     Pain Score  2  -KR 0  -KR    Post Pain Score  2  -KR 0  -KR    Pain Type  Acute pain  -KR     Pain Location  Abdomen  -KR     Pain Intervention(s)  Repositioned;Ambulation/increased activity  -KR     Response to Interventions  tolerated  -KR     Recorded by [MD] Vane Santiago, Speech Therapy Student [KR] Rhina Aviles, PT [KR] Rhina Aviles PT    Cognitive Assessment/Intervention    Current Cognitive/Communication Assessment  impaired  -KR impaired  -KR    Orientation Status  oriented to;person  -KR oriented to;person  -KR    Follows Commands/Answers Questions  able to follow single-step instructions;50% of the time;needs increased time;needs cueing;needs repetition  -KR able to follow single-step instructions;50% of the time;needs  cueing;needs increased time;needs repetition  -KR    Personal Safety  severe impairment;decreased awareness, need for assist;decreased awareness, need for safety;decreased insight to deficits  -KR severe impairment;decreased awareness, need for assist;decreased awareness, need for safety;decreased insight to deficits  -KR    Personal Safety Interventions  fall prevention program maintained;gait belt;nonskid shoes/slippers when out of bed  -KR fall prevention program maintained;gait belt;nonskid shoes/slippers when out of bed  -KR    Recorded by  [EMMA] Rhina Aviles PT [EMMA] Rhina Aviles PT    Improve ability to follow directions    Improve ability to follow directions: (P)  one-step directions with objects;80%;with consistent cues  -MD      Status: Improve ability to follow directions (P)  Progressing as expected  -MD      Ability to Follow Directions Progress (P)  40%;with consistent cues;continue to address  -MD      Recorded by [MD] Vane Santiago, Speech Therapy Student      Improve ability to comprehend questions    Improve ability to comprehend questions: (P)  simple yes/no questions;80%;with consistent cues  -MD      Status: Improve ability to comprehend questions (P)  Progressing as expected  -MD      Ability to Comprehend Questions Progress (P)  50%;with consistent cues  -MD      Comments: Improve ability to comprehend questions (P)  Simple personal/environmental y/n  -MD      Recorded by [MD] Vane Santiago, Speech Therapy Student      Improve word retrieval skills    Improve word retrieval skills by: (P)  completing automatic speech tasks counting;60%;with consistent cues  -MD      Status: Improve word retrieval skills (P)  Progressing as expected  -MD      Word Retrieval Skills Progress (P)  60%;with inconsistent cues;continue to address  -MD      Comments: Improve word retrieval skills (P)  Able to count 1-20 w/ inconsistent cues; perseveration noted when counting  -MD      Recorded by [MD] Vaen  JAD Santiago Speech Therapy Student      Improve attention    Improve attention by: (P)  looking at speaker;attending to task;80%;without cues  -MD      Status: Improve attention (P)  Progressing as expected  -MD      Attention Progress (P)  60%;with inconsistent cues  -MD      Comments: Improve attention (P)  Pt lethargic and dozing throughout session  -MD      Recorded by [MD] Vane Santiago Speech Therapy Student      Bed Mobility, Assessment/Treatment    Bed Mobility, Roll Left, [place]  maximum assist (25% patient effort);2 person assist required;verbal cues required  -KR     Bed Mobility, Roll Right, [place]  maximum assist (25% patient effort);2 person assist required;verbal cues required  -KR     Bed Mob, Supine to Sit, [place]  not tested  -KR not tested  -KR    Bed Mob, Sit to Supine, [place]  not tested  -KR not tested  -KR    Bed Mobility, Safety Issues  cognitive deficits limit understanding;decreased use of arms for pushing/pulling;decreased use of legs for bridging/pushing  -KR     Bed Mobility, Impairments  strength decreased  -KR     Bed Mobility, Comment  Pt rolled L and R for placement of sling.   -KR UIC  -KR    Recorded by  [KR] Rhina Aviles, PT [KR] Rhina Aviles, PT    Transfer Assessment/Treatment    Transfers, Bed-Chair [place]  dependent (less than 25% patient effort);2 person assist required;verbal cues required  -KR     Transfers, Bed-Chair-Bed, Assist Device  mechanical lift  -KR     Transfers, Sit-Stand [place]  maximum assist (25% patient effort);2 person assist required;verbal cues required  -KR maximum assist (25% patient effort);2 person assist required;verbal cues required  -KR    Transfers, Stand-Sit [place]  maximum assist (25% patient effort);2 person assist required;verbal cues required  -KR maximum assist (25% patient effort);2 person assist required;verbal cues required  -KR    Transfers, Sit-Stand-Sit, Assist Device  --   BUE support   -KR --   BUE support  -KR    Transfer, Safety Issues  step length decreased;weight-shifting ability decreased;sequencing ability decreased  -KR step length decreased;weight-shifting ability decreased;loses balance backward  -KR    Transfer, Impairments  strength decreased;impaired balance;coordination impaired;postural control impaired  -KR strength decreased;impaired balance;postural control impaired  -KR    Transfer, Comment  STS x1 from chair. Pt required max cueing for sequencing. Pt able to clear hips, but did not achieve upright posture. Pt limited by increased fatigue.   -KR STS x1 from chair. Pt able to clear hips from chair, but did not achieve full upright posture. Pt required max cueing for posture. Limited by fatigue.   -KR    Recorded by  [KR] Rhina Aviles, PT [KR] Rhina Aviles, PT    Gait Assessment/Treatment    Gait, [place] Level  not appropriate to assess  -KR not appropriate to assess  -KR    Recorded by  [EMMA] Rhina Aviles, PT [KR] Rhina Aviles, PT    Motor Skills/Interventions    Additional Documentation  Balance Skills Training (Group)  -KR Balance Skills Training (Group)  -KR    Recorded by  [KR] Rhina Aviles, PT [KR] Rhina Aviles, PT    Balance Skills Training    Sitting-Level of Assistance  Moderate assistance  -KR Moderate assistance   progressed to CGA  -KR    Sitting-Balance Support  Right upper extremity supported;Left upper extremity supported;Feet supported  -KR Right upper extremity supported;Left upper extremity supported;Feet supported  -KR    Standing-Level of Assistance  Maximum assistance;x2  -KR Maximum assistance;x2  -KR    Static Standing Balance Support  Right upper extremity supported;Left upper extremity supported  -KR Right upper extremity supported;Left upper extremity supported  -KR    Recorded by  [KR] Rhina Aviles, PT [KR] Rhina Aviles, PT    Positioning and Restraints    Pre-Treatment Position  in bed  -KR sitting in chair/recliner  -KR    Post  Treatment Position  chair  -KR chair  -KR    In Chair  notified nsg;reclined;call light within reach;encouraged to call for assist;exit alarm on;with family/caregiver;waffle cushion;on mechanical lift sling;legs elevated  -KR notified nsg;reclined;call light within reach;encouraged to call for assist;exit alarm on;with family/caregiver;RUE elevated;LUE elevated;waffle cushion;on mechanical lift sling;legs elevated  -KR    Recorded by  [KR] Rhina Aviles, PT [KR] Rhina Aviles, PT      02/22/18 09          Rehab Assessment/Intervention    Discipline speech language pathologist  -MD MEZA AV2      Document Type therapy note (daily note)  -MD MEZA AV2      Subjective Information no complaints;agree to therapy  -MD MEZA AV2      Patient Effort, Rehab Treatment fair  -MD MEZA AV2      Symptoms Noted During/After Treatment none  -MD MEZA AV2      Recorded by [MD MEZA AV2] Jess Benitez MS CCC-SLP (r) Vane Santiago, Speech Therapy Student (t) Jess Benitez MS [facility]-SLP (c)      Pain Assessment    Pain Assessment Blanchard-Javier FACES  -MD MEZA AV2      Blanchard-Javier FACES Pain Rating 2  -MD MEZA AV2      Recorded by [MD MEZA AV2] Jess Benitez MS CCC-SLP (r) Vane Santiago, Speech Therapy Student (t) Jess Benitez MS CCC-SLP (c)      Improve ability to follow directions    Improve ability to follow directions: one-step directions with objects;80%;with consistent cues  -MD MEZA AV2      Status: Improve ability to follow directions Progressing as expected  -MD MEZA AV2      Ability to Follow Directions Progress 40%;with consistent cues;continue to address  -MD MEZA AV2      Recorded by [MD DERRICK,AV2] Jess Benitez MS CCC-SLP (r) Vane Santiago, Speech Therapy Student (t) Jess Benitez MS CCC-SLP (c)      Improve ability to comprehend questions    Improve ability to comprehend questions: simple yes/no questions;80%;with consistent cues  -MD MEZA AV2      Status: Improve ability to comprehend questions Progressing as expected   -MD MEZA AV2      Ability to Comprehend Questions Progress 50%;with consistent cues  -MD MEZA AV2      Comments: Improve ability to comprehend questions Simple y/n ?s targeting environment and personal life  -MD MEZA AV2      Recorded by [MD MEZA AV2] Jess Benitez MS CCC-SLP (r) Vane Santiago, Speech Therapy Student (t) Jess Benitez MS CCC-SLP (c)      Improve word retrieval skills    Improve word retrieval skills by: completing automatic speech tasks counting;60%;with consistent cues  -MD MEZA AV2      Status: Improve word retrieval skills Progressing as expected  -MD MEZA AV2      Word Retrieval Skills Progress continue to address  -MD MEZA AV2      Comments: Improve word retrieval skills DNT this session  -MD MEZA AV2      Recorded by [MD MEZA AV2] Jess Benitez MS CCC-SLP (r) Vane Santiago, Speech Therapy Student (t) Jess Benitez MS CCC-SLP (c)      Improve attention    Improve attention by: looking at speaker;attending to task;80%;without cues  -MD MEZA AV2      Status: Improve attention Progressing as expected  -MD MEZA AV2      Attention Progress 70%;with inconsistent cues  -MD MEZA AV2      Recorded by [MD MEZA AV2] Jess Benitez MS CCC-SLP (r) Vane Santiago, Speech Therapy Student (t) Jess Benitez MS CCC-SLP (c)        User Key  (r) = Recorded By, (t) = Taken By, (c) = Cosigned By    Initials Name Effective Dates    AV Jess Benitez MS CCC-SLP 03/14/16 -     EMMA Aviles, PT 09/25/17 -     MD Vane Santiago, Speech Therapy Student 01/05/18 -               IP SLP Goals       02/23/18 1435 02/22/18 1005 02/16/18 1332    Safely Consume Diet    Safely Consume Diet- SLP, Date Established   02/16/18  -LR    Safely Consume Diet- SLP, Time to Achieve   by discharge  -LR    Begin to Take Some PO Safely    Begin to Take Some PO Safely- SLP, Date Goal Reviewed   02/16/18  -LR    Begin to Take Some PO Safely- SLP, Outcome   goal met  -LR    Receptive Language- Optimal Participation in Care     Receptive Language Optimal Participation in Care- SLP, Date Goal Reviewed (P)  02/23/18  -MD 02/22/18  -AV (r) MD (t) AV (c)     Receptive Language Optimal Participation in Care- SLP, Outcome (P)  goal ongoing  -MD goal ongoing  -AV (r) MD (t) AV (c)     Expressive- Optimal Participation in Care    Expressive Optimal Participation in Care- SLP, Date Goal Reviewed (P)  02/23/18  -MD 02/22/18  -AV (r) MD (t) AV (c)     Expressive Optimal Participation in Care- SLP, Outcome  goal ongoing  -AV (r) MD (t) AV (c)       02/12/18 1040 02/11/18 1511       Begin to Take Some PO Safely    Begin to Take Some PO Safely- SLP, Date Established  02/11/18  -LS     Begin to Take Some PO Safely- SLP, Time to Achieve  by discharge  -LS     Begin to Take Some PO Safely- SLP, Date Goal Reviewed 02/12/18  -RD      Begin to Take Some PO Safely- SLP, Outcome goal ongoing  -RD goal ongoing  -LS     Receptive Language- Optimal Participation in Care    Receptive Language Optimal Participation in Care- SLP, Date Established  02/11/18  -LS     Receptive Language Optimal Participation in Care- SLP, Time to Achieve  by discharge  -LS     Receptive Language Optimal Participation in Care- SLP, Date Goal Reviewed 02/12/18  -RD      Receptive Language Optimal Participation in Care- SLP, Outcome goal ongoing  -RD goal ongoing  -LS     Expressive- Optimal Participation in Care    Expressive Optimal Participation in Care- SLP, Date Established  02/11/18  -LS     Expressive Optimal Participation in Care- SLP, Time to Achieve  by discharge  -LS     Expressive Optimal Participation in Care- SLP, Date Goal Reviewed 02/12/18  -RD      Expressive Optimal Participation in Care- SLP, Outcome goal ongoing  -RD goal ongoing  -LS       User Key  (r) = Recorded By, (t) = Taken By, (c) = Cosigned By    Initials Name Provider Type    MEENA Neumann, MS CCC-SLP Speech and Language Pathologist    XAVI Vivas, MS CCC-SLP Speech and Language Pathologist    AV  Jess Benitez, MS CCC-SLP Speech and Language Pathologist    SHYANN Barrera, MS CCC-SLP Speech and Language Pathologist    MD Vane Santiago, Speech Therapy Student Speech Therapy Student          EDUCATION  The patient has been educated in the following areas:   Cognitive Impairment Communication Impairment.    SLP Recommendation and Plan                               Plan of Care Review  Plan Of Care Reviewed With: (P) patient  Progress: (P) progress toward functional goals as expected  Outcome Summary/Follow up Plan: (P) S/L tx completed this date. Pt lethargic and dozing, but cooperative w/ frequent cueing. Pt able to complete automatic speech tasks (counting, VY, JANE) w/ inconsistent cueing to begin. Improvement observed in pt's ability to expressively name objects. Performance on simple yes/no and 1 step directions likely impacted by pt's decreased alertness.            Time Calculation:         Time Calculation- SLP       02/23/18 1443          Time Calculation- SLP    SLP Start Time (P)  1400  -MD        User Key  (r) = Recorded By, (t) = Taken By, (c) = Cosigned By    Initials Name Provider Type    MD Vane Santiago, Speech Therapy Student Speech Therapy Student          Therapy Charges for Today     Code Description Service Date Service Provider Modifiers Qty    05871624944 HC ST TREATMENT SPEECH 4 2/22/2018 Vane Santiago Speech Therapy Student GN 1    32349166372 HC ST TREATMENT SPEECH 4 2/23/2018 Vane Santiago Speech Therapy Student GN 1               Vane Santiago Speech Therapy Student  2/23/2018

## 2018-02-23 NOTE — PROGRESS NOTES
Continued Stay Note   [facility]     Patient Name: Preeti Manning  MRN: [id number]  Today's Date: 2/23/2018    Admit Date: 2/9/2018          Discharge Plan       02/23/18 0904    Case Management/Social Work Plan    Plan Rehab    Patient/Family In Agreement With Plan yes    Additional Comments Remains on TPN.  Hope to transfer to [facility] when TPN is discontinued.                Discharge Codes     None        Expected Discharge Date and Time     Expected Discharge Date Expected Discharge Time    Feb 26, 2018             Tasha Kelley RN

## 2018-02-23 NOTE — SIGNIFICANT NOTE
02/23/18 1354   SLP Deferred Reason   SLP Deferred Reason Patient unavailable for evaluation  (Pt has not been cleared by MD for PO thus will place Clincial swallow eval on hold. Please place new order when pt cleared for PO. SLP will continue communication therapy.)

## 2018-02-24 NOTE — PROGRESS NOTES
FOLLOW UP ENCOUNTER          WORKING DIAGNOSIS:   Spontaneous intracerebral hemorrhage                          MEDICAL HISTORY SINCE LAST ENCOUNTER :     She continues to improve slowly from a neurosurgical perspective.  She will require rehabilitation in order to achieve maximal improvement.                                      ASSESSMENT/DISPOSITION :       1.  We will continue to decrease dexamethasone.  Next    2.  May be transferred to rehabilitation whenever appropriate.

## 2018-02-24 NOTE — SIGNIFICANT NOTE
02/24/18 1431   SLP Deferred Reason   SLP Deferred Reason (SLP spoke to RN and pt just returning from CT and is exhausted and is requesting to hold off until 2/25. )

## 2018-02-24 NOTE — PROGRESS NOTES
"General Surgery Post op Follow Up Note    Subjective:   Feeling okay.  Some abdominal pain, passing flatus, no BM.    /74 (BP Location: Left arm, Patient Position: Lying)  Pulse 84  Temp 98.1 °F (36.7 °C) (Oral)   Resp 20  Ht 165.1 cm (65\")  Wt 57.2 kg (126 lb)  SpO2 100%  BMI 20.97 kg/m2    General Appearance:  in no acute distress  Abdomen: incision is clean and dry, minimal tenderness.    CBC    Results from last 7 days  Lab Units 02/24/18  0323   WBC 10*3/mm3 13.33*   HEMOGLOBIN g/dL 11.6   HEMATOCRIT % 35.9   PLATELETS 10*3/mm3 88*       CMP/BMP    Results from last 7 days  Lab Units 02/24/18  0323   SODIUM mmol/L 146   POTASSIUM mmol/L 4.3   CHLORIDE mmol/L 113*   CO2 mmol/L 27.0   BUN mg/dL 52*   CREATININE mg/dL 0.50*   CALCIUM mg/dL 8.1*   BILIRUBIN mg/dL 1.2   ALK PHOS U/L 91   ALT (SGPT) U/L 304*   AST (SGOT) U/L 147*   GLUCOSE mg/dL 185*         ASSESSMENT/PLAN:    I am going to obtain a CT a/p without contrast, as she is a bit more tender today.  Clamp the g-tube.  NPO.  Awaiting return of GI function.  Cholestasis, TPN off.    Darin Balderas MD  2/24/2018  11:30 AM  "

## 2018-02-24 NOTE — PROGRESS NOTES
"Critical Care Note     LOS: 15 days   Patient Care Team:  Carmela Pollock MD as PCP - General    Chief Complaint/Reason for visit: Intracranial hemorrhage, acute peritonitis from a dislodged PEG tube with intra-abdominal contamination, urinary retention, respiratory failure, coronary artery disease, diabetes mellitus .      Subjective     77-year-old hospitalized on February 9 with an intracranial hemorrhage requiring craniotomy and evacuation. A PEG tube was placed. Several days later she developed abdominal pain and the PEG tube was dislodged and feedings were intra-abdominal. On February 17 she underwent exploratory laparotomy, right debridement of her stomach washout of her abdomen and replacement of a PEG tube. She was reintubated for surgery. She was extubated on February 20. She has Escherichia coli and Klebsiella growing from a urine culture from February 17.  Interval History:    Nursing reports that she has abdominal tenderness. She was receiving tube feeding through her PEG tube but I asked him to stop. CT scan of the abdomen was ordered by surgery.      Review of Systems:    All systems were reviewed and negative except as noted in subjective.    Medical history, surgical history, social history, family history reviewed    Objective     Intake/Output:    Intake/Output Summary (Last 24 hours) at 02/24/18 1521  Last data filed at 02/24/18 1400   Gross per 24 hour   Intake           2552.6 ml   Output             2155 ml   Net            397.6 ml       Nutrition:  NPO Diet  Adult Central 2-in-1 TPN  Adult Central 2-in-1 TPN    Infusions:    Adult Central 2-in-1 TPN  Last Rate: 71 mL/hr at 02/23/18 1829   Adult Central 2-in-1 TPN         Telemetry:  Sinus Rhythm: sinus tachycardia          Vital Signs  Blood pressure 151/88, pulse 92, temperature 97.9 °F (36.6 °C), temperature source Axillary, resp. rate 20, height 165.1 cm (65\"), weight 57.2 kg (126 lb), SpO2 98 %, not currently breastfeeding.    Physical " Exam:  General Appearance:  Thin elderly woman in no respiratory distress    Head:  Right craniotomy sutures in place. Mild fluctuant seroma. No erythema or warmth    Eyes:          Pupils equal and reactive to light    Ears:     Throat: Oral mucosa dry, multiple missing teeth    Neck: Trachea midline, neck veins flat, RIJ line   Back:      Lungs:   Symmetric chest excursion, clear to auscultation     Heart:  Regular rhythm, normal S1, S2    Abdomen:   Midline abdominal incision intact. PEG tube in place. Tender in the right lower quadrant with guarding.    Rectal:   Deferred   Extremities: No pitting edema    Pulses: Pedal pulses present    Skin: Warm and dry, no rash    Lymph nodes:    Neurologic: Some left-sided weakness but will  my hand on the left and right       Results Review:     I reviewed the patient's new clinical results.     Results from last 7 days  Lab Units 02/24/18 0323 02/23/18  0408 02/22/18  0402   SODIUM mmol/L 146 148* 148*   POTASSIUM mmol/L 4.3 4.0 3.7   CHLORIDE mmol/L 113* 112* 114*   CO2 mmol/L 27.0 32.0* 28.0   BUN mg/dL 52* 47* 39*   CREATININE mg/dL 0.50* 0.60 0.50*   CALCIUM mg/dL 8.1* 8.2* 8.0*   BILIRUBIN mg/dL 1.2 1.4* 1.1   ALK PHOS U/L 91 78 54   ALT (SGPT) U/L 304* 200* 49*   AST (SGOT) U/L 147* 172* 41*   GLUCOSE mg/dL 185* 188* 227*       Results from last 7 days  Lab Units 02/24/18  0323 02/23/18  0408 02/22/18  0402   WBC 10*3/mm3 13.33* 13.83* 12.51*   HEMOGLOBIN g/dL 11.6 12.5 12.2   HEMATOCRIT % 35.9 38.6 37.5   PLATELETS 10*3/mm3 88* 79* 95*       Results from last 7 days  Lab Units 02/20/18  1734   PH, ARTERIAL pH units 7.450   PO2 ART mm Hg 69.2*   PCO2, ARTERIAL mm Hg 33.8*   HCO3 ART mmol/L 23.5     Lab Results   Component Value Date    BLOODCX No growth at 5 days 02/17/2018    BLOODCX No growth at 5 days 02/17/2018     Lab Results   Component Value Date    URINECX >100,000 CFU/mL Escherichia coli (A) 02/17/2018    URINECX >100,000 CFU/mL Klebsiella pneumoniae  (A) 02/17/2018       I reviewed the patient's new imaging including images and reports.    Interpretation Summary      · Left ventricular systolic function is normal. Estimated EF = 55%.  · There is no evidence of pericardial effusion.  · Normal right ventricular cavity size, wall thickness, systolic function and septal motion noted.  · Technically difficult and very limited study; no IV Lumason used.       FINDINGS: Cardiac silhouette is normal. There is patchy bibasilar  airspace disease. A central venous catheter is well positioned. There  are other chronic appearing pulmonary changes.          IMPRESSION:  There has been no change since 02/21/2018.        All medications reviewed.     atorvastatin 80 mg Oral Nightly   dexamethasone 2 mg Intravenous BID   Fat Emulsion Fish Oil Based 20,000 mg Intravenous Q24H (TPN)   insulin detemir 12 Units Subcutaneous Q24H   insulin regular 0-9 Units Subcutaneous Q6H   ipratropium-albuterol 3 mL Nebulization 4x Daily - RT   meropenem 1 g Intravenous Q8H   nystatin 5 mL Oral 4x Daily   pantoprazole 40 mg Intravenous Q AM   povidone-iodine  Topical Q24H         Assessment/Plan     Principal Problem:    Nontraumatic R ICH   Active Problems:    COPD/emphysema    Acute respiratory failure requiring intubation and mechanical ventilation, 2/17/18    S/P right frontal craniotomy and excision of intraparenchymal hemorrhage 2/9/18    Leaking percutaneous endoscopic gastrostomy (PEG) tube, intra abdominal contamination    CAD (coronary artery disease)    Urinary retention requiring Benz placement 2/17/18 (1200 mL residual)    Pneumonia LLL. Probable aspiration    Thrombocytopenia    Type 2 diabetes mellitus with complication    Anxiety - chronic benzodiazepines    Chronic pain syndrome - chronic narcotic pain medications    Anemia    Debilitated 77-year-old woman hospitalized with an intracranial hemorrhage requiring craniotomy and evacuation of the clot on February 9. She had some  residual left-sided weakness. She then developed acute abdominal symptoms several days after PEG tube placement. She had leakage of tube feeding into the peritoneal cavity. She underwent exploratory laparotomy with washout and debridement of first gastric wall with replacement of a PEG tube. Today she has some increasing abdominal pain. She does not have fever and her white count is 13.3, improving. She has normal renal function.    PLAN:    Hold tube feedings  Continue TPN  Consider changing deep line to a PICC line  Follow-up CT scan of the abdomen  Decadron per neurosurgery  Meropenem  Long-acting insulin with sliding scale  Speech therapy  Physical therapy, occupational therapy    VTE Prophylaxis:SCDS    Stress Ulcer Prophylaxis: Protonix    Naida Wong MD  02/24/18  3:21 PM      Time: 30min  I personally provided care to this critically ill patient as documented above.  Critical care time does not include time spent on separately billed procedures.  Non of my critical care time was concurrent with other critical care providers.

## 2018-02-24 NOTE — PROGRESS NOTES
Pharmacy Parenteral Nutrition Evaluation    Preeti Manning is a  77 y.o. female receiving TPN.     Indication:  Prolonged NPO/Inaccessible GIT  Consulting Physician:  Intensivist    Labs    Results from last 7 days     Lab Units 02/24/18 0323 02/23/18 0408 02/22/18  0402   SODIUM mmol/L 146 148* 148*   POTASSIUM mmol/L 4.3 4.0 3.7   CHLORIDE mmol/L 113* 112* 114*   CO2 mmol/L 27.0 32.0* 28.0   BUN mg/dL 52* 47* 39*   CREATININE mg/dL 0.50* 0.60 0.50*   CALCIUM mg/dL 8.1* 8.2* 8.0*   BILIRUBIN mg/dL 1.2 1.4* 1.1   ALK PHOS U/L 91 78 54   ALT (SGPT) U/L 304* 200* 49*   AST (SGOT) U/L 147* 172* 41*   GLUCOSE mg/dL 185* 188* 227*     Results from last 7 days     Lab Units 02/24/18 0323 02/23/18 0408 02/22/18  0402 02/19/18  0340   MAGNESIUM mg/dL 2.1 2.1 2.1 < > 2.9*   PHOSPHORUS mg/dL 3.6 3.4 2.8 < > 3.3   PREALBUMIN mg/dL --  --  --  --  <5.0*   < > = values in this interval not displayed.         Results from last 7 days     Lab Units 02/24/18 0323 02/23/18 0408 02/22/18  0402   WBC 10*3/mm3 13.33* 13.83* 12.51*   HEMOGLOBIN g/dL 11.6 12.5 12.2   HEMATOCRIT % 35.9 38.6 37.5   PLATELETS 10*3/mm3 88* 79* 95*       Triglycerides   Date Value Ref Range Status   02/19/2018 109 0 - 150 mg/dL Final     Comment:     Falsely depressed results may occur on samples drawn from patients receiving N-Acetylcysteine (NAC) or Metamizole.       estimated creatinine clearance is 53.2 mL/min (by C-G formula based on Cr of 0.5).    Intake & Output (last 3 days)         02/21 0701 - 02/22 0700 02/22 0701 - 02/23 0700 02/23 0701 - 02/24 0700 02/24 0701 - 02/25 0700      I.V. (mL/kg) 444.7 (7.8) 1197 (20.9) 204.8 (3.6)     IV Piggyback 200 200 300     TPN 1181.3 1672.6 1682.8     Total Intake(mL/kg) 1826 (31.9) 3069.6 (53.7) 2187.6 (38.3)     Urine (mL/kg/hr) 3745 (2.7) 2625 (1.9) 2140 (1.6)     Other 2500 (1.8) 1725 (1.3) 735 (0.5)     Total Output 4852 2796 7023      Net -7179 -4000.4 -687.4                      Dietitian  Recommendations  Diet Orders (active)       Start     Ordered      02/17/18 2344  NPO Diet  Diet Effective Now     Comments:  Strict NPO.    02/17/18 4210          Current TPN Regimen:  Dextrose 250 g/day; Amino Acids 115 g/day, infusion rate of 71 mL/hr, and SMOF lipids 100 mL/day     Of note, awaiting MD approval to use G tube for trophic feeding; this will be a slow titration up to goal.    Assessment/Plan:  1.  Continue current TPN regimen--clarified this with Dr. Wong after orders to stop (tube feeding to be stopped instead of TPN).  2.  Decrease potassium to 25 mEq/L.  3.  Await initiation of TFs; can adjust rate of TPN as appropriate per RD recommendation.  4.  Labs in AM:  [facility]    Pharmacy will continue to follow and adjust dose based on renal function, electrolyte levels, and clinical status.    Thanks,  Saul Brower, PharmD, BCPS  #[id number]  02/24/18  8:22 AM

## 2018-02-24 NOTE — PROGRESS NOTES
Adult Nutrition  Assessment/PES    Patient Name:  Preeti Manning  YOB: 1940  MRN: [id number]  Admit Date:  2/9/2018    Assessment Date:  2/24/2018     Suggest initiate D10% as PN stopped today    When able to use gut, rec start trophic feed: Impact Peptide @15ml/hr    If unable to use gut yet, may need to resume PN            Reason for Assessment       02/24/18 1216    Reason for Assessment    Reason For Assessment/Visit follow up protocol;TF/PN    Time Spent (min) 45        Patient Active Problem List   Diagnosis   • COPD/emphysema   • CAD (coronary artery disease)   • Hyperlipidemia   • Nontraumatic R ICH    • Anxiety - chronic benzodiazepines   • Chronic pain syndrome - chronic narcotic pain medications   • Acute respiratory failure requiring intubation and mechanical ventilation, 2/17/18   • Urinary retention requiring Benz placement 2/17/18 (1200 mL residual)   • S/P right frontal craniotomy and excision of intraparenchymal hemorrhage 2/9/18   • Pneumonia LLL. Probable aspiration   • Leaking percutaneous endoscopic gastrostomy (PEG) tube, intra abdominal contamination   • Thrombocytopenia   • Anemia   • Type 2 diabetes mellitus with complication               Labs/Tests/Procedures/Meds       02/24/18 1217    Labs/Tests/Procedures/Meds    Labs/Tests Review Reviewed;BUN;Creat;K+;Mg++;Phos;ALT;Platlets    Medication Review Reviewed, pertinent       Results from last 7 days  Lab Units 02/24/18  0323   SODIUM mmol/L 146   POTASSIUM mmol/L 4.3   CHLORIDE mmol/L 113*   CO2 mmol/L 27.0   BUN mg/dL 52*   CREATININE mg/dL 0.50*   CALCIUM mg/dL 8.1*   BILIRUBIN mg/dL 1.2   ALK PHOS U/L 91   ALT (SGPT) U/L 304*   AST (SGOT) U/L 147*   GLUCOSE mg/dL 185*              Physical Findings       02/24/18 1218    Physical Findings/Assessment    Additional Documentation pt resting in bed, on nasal cannula, c/o pain all over    per RN: pt tender on right side, PN stopped per MD, possible CT scan later today, plan  start D10 @71ml/hr,  will clamp g tube per surgery    Physical Appearance    Tubes --   g- tube  clamped ( 735ml out past 24 hours)    Skin surgical wound              Nutrition Prescription Ordered       02/24/18 1227    Nutrition Prescription PO    Current PO Diet NPO            Evaluation of Received Nutrient/Fluid Intake       02/24/18 1227    PN Evaluation    PN Average delivery (mL/day)  1683 mL/day    99% PN delivery    PN Average lipid delivery (mL/day)  --   100ml SMOF lipids  documented             Evaluation of Prescribed Nutrient/Fluid Intake       02/24/18 1232    Evaluation of Prescribed Nutrient/Fluid Intake    Nutrition Prescribed Calorie Evaluation;Protein Evaluation    Calories at Prescribed Goal    Parenteral Calories (kcal) 1510    Total Calories (kcal) 1510    % Kcal Needs 100    Protein at Prescribed Goal    Parenteral Protein (gm) 115    Total Protein (gm) 115    % Protein Needs 101          Problem/Interventions:        Problem 1       02/24/18 1215    Nutrition Diagnoses Problem 1    Problem 1 Inadequate Nutrient Intake    Etiology (related to) --   per clinical status    Signs/Symptoms (evidenced by) NPO   PN off                    Intervention Goal       02/24/18 1232    Intervention Goal    General Nutrition support treatment    TF/PN Inititiate TF  when able to use gut            Nutrition Intervention       02/24/18 1233    Nutrition Intervention    RD/Tech Action Follow Tx progress;Care plan reviewd              Education/Evaluation       02/24/18 1233    Monitor/Evaluation    Monitor Per protocol;I&O;Supplement intake;Pertinent labs;PN delivery/tolerance;Skin status;Symptoms        Electronically signed by:  Deborah Coy RD  02/24/18 12:36 PM

## 2018-02-24 NOTE — SIGNIFICANT NOTE
"I was asked to discuss the patient's code status by the patients POA, her granddaughter. She discusses that the family has decided that after all of the patients issues and the fact that the patient would not want to \"be like this\". They have asked that she be made DNR/DNI. They would like vassopressors if needed to keep her comfortable. I have changed the code status per family wishes.    WILLIAM Slater, ACNP-BC  Pulmonary and Critical Care Service  2/24/2018 3:55 PM  "

## 2018-02-25 PROBLEM — I63.9 CVA (CEREBRAL VASCULAR ACCIDENT) (HCC): Status: ACTIVE | Noted: 2018-01-01

## 2018-02-25 NOTE — PROGRESS NOTES
"Preeti Manning  1940  [id number]    Surgery Progress Note    Date of visit: 2/25/2018    Subjective: Unresponsive  Daughter at bedside  Comfort measures per palliative care recommendations    Objective:    /59 (BP Location: Left arm, Patient Position: Lying)  Pulse 101  Temp 97.1 °F (36.2 °C) (Axillary)   Resp (!) 32  Ht 165.1 cm (65\")  Wt 57.2 kg (126 lb)  SpO2 92%  BMI 20.97 kg/m2    Intake/Output Summary (Last 24 hours) at 02/25/18 1218  Last data filed at 02/25/18 1029   Gross per 24 hour   Intake          2000.66 ml   Output             1870 ml   Net           130.66 ml               LABS:      Results from last 7 days  Lab Units 02/24/18  0323   WBC 10*3/mm3 13.33*   HEMOGLOBIN g/dL 11.6   HEMATOCRIT % 35.9   PLATELETS 10*3/mm3 88*       Results from last 7 days  Lab Units 02/24/18  0323   SODIUM mmol/L 146   POTASSIUM mmol/L 4.3   CHLORIDE mmol/L 113*   CO2 mmol/L 27.0   BUN mg/dL 52*   CREATININE mg/dL 0.50*   CALCIUM mg/dL 8.1*   BILIRUBIN mg/dL 1.2   ALK PHOS U/L 91   ALT (SGPT) U/L 304*   AST (SGOT) U/L 147*   GLUCOSE mg/dL 185*       Results from last 7 days  Lab Units 02/24/18  0323   SODIUM mmol/L 146   POTASSIUM mmol/L 4.3   CHLORIDE mmol/L 113*   CO2 mmol/L 27.0   BUN mg/dL 52*   CREATININE mg/dL 0.50*   GLUCOSE mg/dL 185*   CALCIUM mg/dL 8.1*       Lab Results  Lab Value Date/Time   LIPASE 24 08/26/2017 2138         Assessment/ Plan: Patient is a debilitated 77 years old lady with multiple medical problems and intracranial hemorrhage  Comfort measures have been initiated by palliative care  Patient awaiting transfer to inpatient hospice service    Problem List Items Addressed This Visit     COPD/emphysema - Primary    Relevant Orders    Critical Care (Completed)    Anxiety - chronic benzodiazepines    Relevant Orders    Critical Care (Completed)    RESOLVED: Ileus versus small bowel obstruction 2/17/18    Relevant Orders    Insert Central Line At Bedside (Completed)      Other Visit " Diagnoses     Dysphagia, unspecified type        Relevant Orders    Critical Care (Completed)    Bowel perforation        Relevant Orders    OR Surgery Panel (Completed)    OR Surgery Panel (Completed)            Shay Fernandes MD  2/25/2018  12:18 PM

## 2018-02-25 NOTE — CONSULTS
Discussed with pt AMENA Rolon at [phone]. Family has elected inpatient hospice services. See [facility] for documentation and assessment and plan. Thank you for this consult.

## 2018-02-25 NOTE — NURSING NOTE
Called patients Italia BECKWITH, to explain to her that patient was having more labored breathing, tachypnea, increased heart rate and to clarify there conditional code and if they would want patient to wear bi-pap. Italia BECKWITH, said her and the family had talked and they desired patient to just be made comfortable and wanted to make her fully comfort measures. JOON Astudillo, made aware of families wishes.

## 2018-02-25 NOTE — SIGNIFICANT NOTE
02/25/18 1114   SLP Deferred Reason   SLP Deferred Reason Patient/family declined treatment  (Pt is asleep, family asked to hold )

## 2018-02-25 NOTE — DISCHARGE SUMMARY
Date of Discharge:  2/25/2018    Discharge Diagnosis:  Principal Problem:    Nontraumatic R ICH   Active Problems:    COPD/emphysema    Acute respiratory failure requiring intubation and mechanical ventilation, 2/17/18    S/P right frontal craniotomy and excision of intraparenchymal hemorrhage 2/9/18    Leaking percutaneous endoscopic gastrostomy (PEG) tube, intra abdominal contamination    CAD (coronary artery disease)    Urinary retention requiring Benz placement 2/17/18 (1200 mL residual)    Pneumonia LLL. Probable aspiration    Thrombocytopenia    Type 2 diabetes mellitus with complication    Anxiety - chronic benzodiazepines    Chronic pain syndrome - chronic narcotic pain medications    Anemia    Presenting Problem/History of Present Illness  Stroke [I63.9]     77-year-old female smoker with a history of coronary artery disease, prior stents, COPD, fibromyalgia, chronic pain on chronic narcotics, anxiety on benzodiazepines was found to be confused by a family friend today.  She apparently had been called by a friend of her granddaughter who noticed that she was slurring words and confused.  The patient's daughter was informed of this and started to drive to her home.  Another friend arrived and noted the patient to be confused with slurring of words and weakness on the left.  EMS was called and she was brought to our emergency department for presumed ischemic stroke.  A stat CT scan of the head revealed a large right frontal intracerebral hemorrhage with subdural extension.  Dr. Riley with neurosurgery was consulted.  The patient was moved directly to the intensive care unit from the CT scanner. On arrival to the intensive care unit she had somewhat garbled speech but was able to form words and answers some questions appropriately.  She complains of headache.  She is weak in the left upper extremity.  Dr. Riley evaluated and made plans for surgery.    Hospital Course    Neurosurgery performed a craniotomy  and extraction of intraparenchymal hematoma.  She returned to ICU on mechanical ventilation with plans to wean and liberate from ventilator.  Hospital day #2 the patient was extubated successfully.  Feeding tube was to be placed and patient was watched closely for benzodiazepine withdrawal.  This was unsuccessful and Gen. surgery was consult for PEG tube placement which was performed on hospital day #5.  Her condition improved and she was transferred to telemetry with an initial plan for rehabilitation.  Her condition deteriorated and on hospital day #8 a rapid response was called for respiratory distress.  She was found to be severely tachypneic, mildly hypotensive, and tachycardic with heart rate in the 130s to 160s.  She was started on BiPAP and ABGs showed pH 7.52, PCO2 28, PCO2 50, bicarbonate 23.  She was subsequently transferred to the ICU and on arrival was on BiPAP with a respiratory rate of 60 and accessory muscle use.  She was noted to be mottled in her lower extremities up to mid thigh and pulse oximeter will not  secondary to cool extremities.  She was intubated and a central venous catheter was placed.  Gen. surgery was asked to see the patient in consult.  Patient was taken urgently to the operating room with a recent finding of free air and ischemic colitis.  She underwent exploratory lap with washout of intra-abdominal abscess.  Parental nutrition was started.  She remained in ICU and her platelet count was noted to be decreasing.  Heparin products were held.  She was able to be extubated on hospital day #11 and a feeding tube was inserted.  By day 14 her platelet count was noted to be down to 60,000 with heparin products held still.  Her gastric feeds were initiated but at hospital day 15, but the patient again reported abdominal pain.  CT scan of the abdomen showed worsening lower lobe opacities, but the abdomen was negative.  At this time patient's family requested that the patient be made  DNR/DNI after the patient had a further decline in status.  Palliative care was asked to evaluate the patient on hospital day 16 and the patient was transferred to the hospice service.    Procedures Performed  Procedure(s):  LAPAROTOMY EXPLORATORY WITH GASTROTOMY, DEBRIDEMENT AND G TUBE PLACEMENT       Consults:   Consults     Date and Time Order Name Status Description    2/24/2018 7247 Inpatient Consult to Palliative Care MD Completed     2/17/2018 1931 Inpatient Consult to General Surgery Completed     2/14/2018 1300 Inpatient Consult to General Surgery Completed     2/14/2018 1058 Inpatient Consult to General Surgery Completed           Condition on Discharge:   Palliative    Vital Signs  Temp:  [97.1 °F (36.2 °C)-97.9 °F (36.6 °C)] 97.9 °F (36.6 °C)  Heart Rate:  [] 94  Resp:  [24-34] 32  BP: ()/(52-76) 123/73    Physical Exam:     General Appearance:  Thin elderly woman in mild respiratory distress using some accessory muscles   Head:  Right craniotomy sutures in place. Mild fluctuant seroma. No erythema or warmth    Eyes:              Ears:      Throat: Oral mucosa dry, multiple missing teeth    Neck: Trachea midline, neck veins flat, RIJ line   Back:       Lungs:   Symmetric chest excursion, clear to auscultation     Heart:  Regular rhythm, normal S1, S2    Abdomen:   Midline abdominal incision intact. PEG tube in place    Rectal:   Deferred   Extremities: No pitting edema    Pulses: Pedal pulses present    Skin: Warm and dry, no rash    Lymph nodes:     Neurologic: Sedated post dilaudid, ativan. Tachypnea, but not spontaneously opening eyes or moving extremities.  However, with stimulation she is moaning in discomfort       Discharge Disposition  Hospice/Medical Facility ([facility])    Discharge Medications   Preeti Manning   Home Medication Instructions TAMAR:[id number]    Printed on:02/25/18 3171   Medication Information                      atorvastatin (LIPITOR) 80 MG  tablet  Take 80 mg by mouth Daily.             carvedilol (COREG) 3.125 MG tablet  Take 3.125 mg by mouth 2 (Two) Times a Day With Meals.             mirtazapine (REMERON) 15 MG tablet  Take 15 mg by mouth Every Night.                 Discharge Diet:   Parental nutrition    Activity at Discharge:   Bedrest    Follow-up Appointments  No future appointments.         WILLIAM Weiss  Pulmonary and Critical Care Medicine  02/25/18  5:40 PM  Naida Wong MD    Time: Discharge 30 min

## 2018-02-25 NOTE — PROGRESS NOTES
Continued Stay Note  [facility]     Patient Name: Preeti Manning  MRN: [id number]  Today's Date: 2/25/2018    Admit Date: 2/9/2018          Discharge Plan       02/25/18 1519    Case Management/Social Work Plan    Plan Inpatient Hospice note    Additional Comments Chart Reviewed. Met with Daughter and niece. Discussed hospice services, hospice POC and goals. They have elected hospice benefits, hospcie paperwork was signed. Discussed with ICU APRN, and and hospice APRN. Patient is admitted to hospice services. The family has requested to go to [facility].               Discharge Codes     None        Expected Discharge Date and Time     Expected Discharge Date Expected Discharge Time    Feb 25, 2018             Bárbara Giron RN

## 2018-02-25 NOTE — PROGRESS NOTES
Pharmacy Parenteral Nutrition Evaluation  Preeti Manning is a  77 y.o. female receiving TPN.     Indication:  Prolonged NPO/Inaccessible GIT  Consulting Physician:  Intensivist    Labs  Results from last 7 days   Lab Units 02/24/18 0323 02/23/18 0408 02/22/18  0402   SODIUM mmol/L 146 148* 148*   POTASSIUM mmol/L 4.3 4.0 3.7   CHLORIDE mmol/L 113* 112* 114*   CO2 mmol/L 27.0 32.0* 28.0   BUN mg/dL 52* 47* 39*   CREATININE mg/dL 0.50* 0.60 0.50*   CALCIUM mg/dL 8.1* 8.2* 8.0*   BILIRUBIN mg/dL 1.2 1.4* 1.1   ALK PHOS U/L 91 78 54   ALT (SGPT) U/L 304* 200* 49*   AST (SGOT) U/L 147* 172* 41*   GLUCOSE mg/dL 185* 188* 227*     Results from last 7 days   Lab Units 02/24/18 0323 02/23/18 0408 02/22/18  0402 02/19/18  0340   MAGNESIUM mg/dL 2.1 2.1 2.1 < > 2.9*   PHOSPHORUS mg/dL 3.6 3.4 2.8 < > 3.3   PREALBUMIN mg/dL --  --  --  --  <5.0*   < > = values in this interval not displayed.     Results from last 7 days   Lab Units 02/24/18 0323 02/23/18 0408 02/22/18  0402   WBC 10*3/mm3 13.33* 13.83* 12.51*   HEMOGLOBIN g/dL 11.6 12.5 12.2   HEMATOCRIT % 35.9 38.6 37.5   PLATELETS 10*3/mm3 88* 79* 95*       Triglycerides   Date Value Ref Range Status   02/19/2018 109 0 - 150 mg/dL Final     Comment:     Falsely depressed results may occur on samples drawn from patients receiving N-Acetylcysteine (NAC) or Metamizole.       estimated creatinine clearance is 53.2 mL/min (by C-G formula based on Cr of 0.5).    Intake & Output (last 3 days)         02/22 0701 - 02/23 0700 02/23 0701 - 02/24 0700 02/24 0701 - 02/25 0700 02/25 0701 - 02/26 0700      I.V. (mL/kg) 1197 (20.9) 204.8 (3.6) 286.5 (5)     IV Piggyback 200 300 194.8     TPN 1672.6 1682.8 1591.5     Total Intake(mL/kg) 3069.6 (53.7) 2187.6 (38.3) 2072.8 (36.3)     Urine (mL/kg/hr) 2625 (1.9) 2140 (1.6) 1355 (1)     Other 1725 (1.3) 735 (0.5) 600 (0.4)     Stool   0 (0)     Total Output 4350 2875 1955      Net -1280.4 -687.4 +117.8              Unmeasured Stool  Occurrence   1 x             Dietitian Recommendations  Diet Orders (active)      Diet Orders (active)       Start     Ordered      02/17/18 2344  NPO Diet  Diet Effective Now     Comments:  Strict NPO.    02/17/18 2343          Current TPN Regimen:  Dextrose 250 g/day; Amino Acids 115 g/day, infusion rate of 71 mL/hr, and SMOF lipids 100 mL/day     Of note, awaiting MD approval to use G tube for trophic feeding; this will be a slow titration up to goal once it starts.    2/24:  CT abdomen/pelvis:  No associated fluid collection with PEG tube.    Assessment/Plan:  1.  Continue current TPN regimen.  2.  Continue current electrolyte formulation.  3.  Await initiation of TFs; can adjust rate of TPN as appropriate per RD recommendation.  4.  Labs in AM:  Nutrition panel per protocol.    Pharmacy will continue to follow and adjust dose based on renal function, electrolyte levels, and clinical status.    Thanks,  Saul Brower, PharmD, BCPS  #[id number]  02/25/18  8:25 AM

## 2018-02-25 NOTE — PROGRESS NOTES
"Critical Care Note     LOS: 16 days   Patient Care Team:  Carmela Pollock MD as PCP - General    Chief Complaint/Reason for visit: Intracranial hemorrhage, acute peritonitis from a dislodged PEG tube with intra-abdominal contamination, urinary retention, respiratory failure, coronary artery disease, diabetes mellitus .      Subjective     77-year-old hospitalized on February 9 with an intracranial hemorrhage requiring craniotomy and evacuation. A PEG tube was placed. Several days later she developed abdominal pain and the PEG tube was dislodged and feedings were intra-abdominal. On February 17 she underwent exploratory laparotomy, right debridement of her stomach washout of her abdomen and replacement of a PEG tube. She was reintubated for surgery. She was extubated on February 20. She has Escherichia coli and Klebsiella growing from a urine culture from February 17.  Interval History:     Patient further declined last night and family decided to shift measures to comfort.. Palliative care team evaluated today and patient's family has elected to employ inpatient hospice service.      Review of Systems:    All systems were reviewed and negative except as noted in subjective.    Medical history, surgical history, social history, family history reviewed    Objective     Intake/Output:    Intake/Output Summary (Last 24 hours) at 02/25/18 1118  Last data filed at 02/25/18 1029   Gross per 24 hour   Intake          2268.36 ml   Output             1945 ml   Net           323.36 ml       Nutrition:  NPO Diet    Infusions:    Morphine        Telemetry:  Sinus Rhythm: sinus tachycardia          Vital Signs  Blood pressure 112/59, pulse 101, temperature 97.1 °F (36.2 °C), temperature source Axillary, resp. rate (!) 32, height 165.1 cm (65\"), weight 57.2 kg (126 lb), SpO2 92 %, not currently breastfeeding.    Physical Exam:  General Appearance:  Thin elderly woman in mild respiratory distress using some accessory muscles "   Head:  Right craniotomy sutures in place. Mild fluctuant seroma. No erythema or warmth    Eyes:               Ears:     Throat: Oral mucosa dry, multiple missing teeth    Neck: Trachea midline, neck veins flat, RIJ line   Back:      Lungs:   Symmetric chest excursion, clear to auscultation     Heart:  Regular rhythm, normal S1, S2    Abdomen:   Midline abdominal incision intact. PEG tube in place    Rectal:   Deferred   Extremities: No pitting edema    Pulses: Pedal pulses present    Skin: Warm and dry, no rash    Lymph nodes:    Neurologic: Sedated post dilaudid, ativan. Tachypnea, but not spontaneously opening eyes or moving extremities.  However, with stimulation she is moaning in discomfort      Results Review:     I reviewed the patient's new clinical results.     Results from last 7 days  Lab Units 02/24/18  0323 02/23/18  0408 02/22/18  0402   SODIUM mmol/L 146 148* 148*   POTASSIUM mmol/L 4.3 4.0 3.7   CHLORIDE mmol/L 113* 112* 114*   CO2 mmol/L 27.0 32.0* 28.0   BUN mg/dL 52* 47* 39*   CREATININE mg/dL 0.50* 0.60 0.50*   CALCIUM mg/dL 8.1* 8.2* 8.0*   BILIRUBIN mg/dL 1.2 1.4* 1.1   ALK PHOS U/L 91 78 54   ALT (SGPT) U/L 304* 200* 49*   AST (SGOT) U/L 147* 172* 41*   GLUCOSE mg/dL 185* 188* 227*       Results from last 7 days  Lab Units 02/24/18  0323 02/23/18  0408 02/22/18  0402   WBC 10*3/mm3 13.33* 13.83* 12.51*   HEMOGLOBIN g/dL 11.6 12.5 12.2   HEMATOCRIT % 35.9 38.6 37.5   PLATELETS 10*3/mm3 88* 79* 95*       Results from last 7 days  Lab Units 02/20/18  1734   PH, ARTERIAL pH units 7.450   PO2 ART mm Hg 69.2*   PCO2, ARTERIAL mm Hg 33.8*   HCO3 ART mmol/L 23.5     Lab Results   Component Value Date    BLOODCX No growth at 5 days 02/17/2018    BLOODCX No growth at 5 days 02/17/2018     Lab Results   Component Value Date    URINECX >100,000 CFU/mL Escherichia coli (A) 02/17/2018    URINECX >100,000 CFU/mL Klebsiella pneumoniae (A) 02/17/2018       I reviewed the patient's new imaging including images  and reports.    Interpretation Summary      · Left ventricular systolic function is normal. Estimated EF = 55%.  · There is no evidence of pericardial effusion.  · Normal right ventricular cavity size, wall thickness, systolic function and septal motion noted.  · Technically difficult and very limited study; no IV Lumason used.       FINDINGS: Cardiac silhouette is normal. There is patchy bibasilar  airspace disease. A central venous catheter is well positioned. There  are other chronic appearing pulmonary changes.          IMPRESSION:  There has been no change since 02/21/2018.        All medications reviewed.     dexamethasone 2 mg Intravenous BID   insulin regular 0-9 Units Subcutaneous Q6H   ipratropium-albuterol 3 mL Nebulization 4x Daily - RT   LORazepam 0.25 mg Intravenous Q6H   meropenem 1 g Intravenous Q8H   nystatin 5 mL Oral 4x Daily   pantoprazole 40 mg Intravenous Q AM   povidone-iodine  Topical Q24H         Assessment/Plan     Principal Problem:    Nontraumatic R ICH   Active Problems:    COPD/emphysema    Acute respiratory failure requiring intubation and mechanical ventilation, 2/17/18    S/P right frontal craniotomy and excision of intraparenchymal hemorrhage 2/9/18    Leaking percutaneous endoscopic gastrostomy (PEG) tube, intra abdominal contamination    CAD (coronary artery disease)    Urinary retention requiring Benz placement 2/17/18 (1200 mL residual)    Pneumonia LLL. Probable aspiration    Thrombocytopenia    Type 2 diabetes mellitus with complication    Anxiety - chronic benzodiazepines    Chronic pain syndrome - chronic narcotic pain medications    Anemia    Debilitated 77-year-old woman hospitalized with an intracranial hemorrhage requiring craniotomy and evacuation of the clot on February 9. She had some residual left-sided weakness. She then developed acute abdominal symptoms several days after PEG tube placement. She had leakage of tube feeding into the peritoneal cavity. She  underwent exploratory laparotomy with washout and debridement of first gastric wall with replacement of a PEG tube. Yesterday she had some abdominal tenderness. Repeat CT scan revealed worsening bilateral lower lobe opacities, no intra-abdominal abscess. Family approached nursing staff about shifting to no resuscitation and comfort measures. They changed her CODE STATUS to a conditional code. She had a further decline in her status later in the evening and they shifted to comfort measures only. Palliative care team evaluated today and she has been accepted to the inpatient hospice service.    PLAN:    Transfer to inpatient hospice service  Comfort measures    VTE Prophylaxis:SCDS    Stress Ulcer Prophylaxis: Protonix    Naida Wong MD  02/25/18  11:18 AM      Time: 30min  I personally provided care to this critically ill patient as documented above.  Critical care time does not include time spent on separately billed procedures.  Non of my critical care time was concurrent with other critical care providers.

## 2018-07-30 NOTE — THERAPY EVALUATION
Acute Care - Physical Therapy Initial Evaluation  [facility]     Patient Name: Preeti Manning  : 1940  MRN: [id number]  Today's Date: 2018   Onset of Illness/Injury or Date of Surgery Date: 18  Date of Referral to PT: 18  Referring Physician: MD Terry      Admit Date: 2018     Visit Dx:    ICD-10-CM ICD-9-CM   1. Intracranial hemorrhage I62.9 432.9   2. Impaired mobility and ADLs Z74.09 799.89   3. 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   • Anxiety - chronic benzodiazepines   • Chronic pain syndrome - chronic narcotic pain medications     Past Medical History:   Diagnosis Date   • Anxiety    • Arthritis    • CHF (congestive heart failure)    • COPD (chronic obstructive pulmonary disease)    • Coronary artery disease    • Depression    • Fibromyalgia      Past Surgical History:   Procedure Laterality Date   • CORONARY ANGIOPLASTY WITH STENT PLACEMENT     • FEMUR OPEN REDUCTION INTERNAL FIXATION Right 5/15/2017    Procedure: DISTAL FEMUR OPEN REDUCTION INTERNAL FIXATION RIGHT;  Surgeon: Pierre Velarde MD;  Location: [facility];  Service:    • TOTAL HIP ARTHROPLASTY Right 2017          PT ASSESSMENT (last 72 hours)      PT Evaluation       18 1331 18 0740    Rehab Evaluation    Document Type evaluation  -LS evaluation  -CL    Subjective Information agree to therapy;no complaints  -LS agree to therapy;unable to respond   Pt orally intubated, RN cleared for tx.   -CL    Patient Effort, Rehab Treatment fair  -LS fair  -CL    Symptoms Noted During/After Treatment none  -LS none  -CL    General Information    Patient Profile Review yes  -LS yes   -CL    Onset of Illness/Injury or Date of Surgery Date 02/09/18  -LS 02/09/18  -CL    Referring Physician MD Terry  -LS MD Osvaldo  -CL    Pertinent History Of Current Problem To ED with HA, LUE weakness, confusion, slurred speech. CT (+) for intraparenchymal hemorrhage with subdural component. S/p R frontal crani and excision of hemorrhage on 2/9. Extubated 2/11.   -LS Pt presented to ED w/ HA and LUE weakness. CT (+) intraparenchymal hemorrhage w/ subudral component. Pt s/p R frontal craniotomy and excision of intra parenchymal hemorrhage.   -CL    Precautions/Limitations fall precautions;oxygen therapy device and L/min   L-weakness  -LS fall precautions;oxygen therapy device and L/min;other (see comments)   NG, orally intubated, L sided weakness, R eye swollen shut  -CL    Prior Level of Function --   TBA further; pt unable to report; no family present  -LS --   Pt poor historian, no family present  -CL    Equipment Currently Used at Home --   TBA further  -LS --   Pt poor historian, no family present  -CL    Plans/Goals Discussed With patient;agreed upon  -LS patient;agreed upon  -CL    Risks Reviewed patient:;LOB;dizziness;increased discomfort  -LS patient:;LOB;nausea/vomiting;dizziness;increased discomfort;change in vital signs;lines disloged  -CL    Benefits Reviewed patient:;improve function;increase independence;increase strength;increase knowledge  -LS patient:;improve function;increase independence;increase strength;increase balance;decrease pain;increase knowledge  -CL    Barriers to Rehab medically complex;cognitive status  -LS medically complex  -CL    Living Environment    Lives With child(bree), adult  -LS child(bree), adult  -CL    Living Arrangements apartment  -LS apartment  -CL    Home Accessibility  --   walk-in shower  -CL    Living Environment Comment Per CM note; pt unable to report today. TBA further.   -LS Per CM, pt poor historian, no family present.   -CL    Clinical Impression    Date  CHIEF COMPLAINT: f/up for sob, asthma, allergy, chronic bronchitis and tracheomalacia=--some sob and cough but controlled at present--no BM but OOB and ambulating    Interval Events: NG tube in place    REVIEW OF SYSTEMS:  Constitutional: No fevers or chills. No weight loss. + fatigue or generalized malaise.  Eyes: No itching or discharge from the eyes  ENT: No ear pain. No ear discharge. No nasal congestion. No post nasal drip. No epistaxis. No throat pain. No sore throat. No difficulty swallowing.   CV: No chest pain. No palpitations. No lightheadedness or dizziness.   Resp: No dyspnea at rest. + dyspnea on exertion. No orthopnea. No wheezing. + cough. No stridor. No sputum production. No chest pain with respiration.  GI: No nausea. No vomiting. No diarrhea.  MSK: No joint pain or pain in any extremities  Integumentary: No skin lesions. No pedal edema.  Neurological: No gross motor weakness. No sensory changes.  [+] All other systems negative  [ ] Unable to assess ROS because ________    OBJECTIVE:  ICU Vital Signs Last 24 Hrs  T(C): 36.8 (30 Jul 2018 00:20), Max: 38 (29 Jul 2018 17:41)  T(F): 98.2 (30 Jul 2018 00:20), Max: 100.4 (29 Jul 2018 17:41)  HR: 76 (30 Jul 2018 00:20) (76 - 96)  BP: 101/64 (30 Jul 2018 00:20) (101/64 - 136/80)  BP(mean): --  ABP: --  ABP(mean): --  RR: 18 (30 Jul 2018 00:20) (18 - 18)  SpO2: 97% (30 Jul 2018 00:20) (91% - 99%)        07-28 @ 07:01  -  07-29 @ 07:00  --------------------------------------------------------  IN: 2550 mL / OUT: 1150 mL / NET: 1400 mL    07-29 @ 07:01  -  07-30 @ 05:08  --------------------------------------------------------  IN: 2300 mL / OUT: 1875 mL / NET: 425 mL      CAPILLARY BLOOD GLUCOSE      POCT Blood Glucose.: 176 mg/dL (30 Jul 2018 00:19)      PHYSICAL EXAM:NAD in bed w/ O2 and NGT in place  General: Awake, alert, oriented X 3.   HEENT: Atraumatic, normocephalic.                 M[place]atti Grade 3                No nasal congestion.                No tonsillar or pharyngeal exudates.  Lymph Nodes: No palpable lymphadenopathy  Neck: No JVD. No carotid bruit.   Respiratory: abnormal chest expansion-reduced                         Normal percussion                         Normal and equal air entry                         No wheeze, rhonchi or rales.  Cardiovascular: S1 S2 normal. + murmurs, rubs or gallops.   Abdomen: Soft, +tender, non-distended. No organomegaly. Reduced BS  Extremities: Warm to touch. Peripheral pulse palpable. No pedal edema.   Skin: No rashes or skin lesions  Neurological: Motor and sensory examination equal and normal in all four extremities.  Psychiatry: Appropriate mood and affect.    HOSPITAL MEDICATIONS:  MEDICATIONS  (STANDING):  ALBUTerol/ipratropium for Nebulization 3 milliLiter(s) Nebulizer every 6 hours  buDESOnide 160 MICROgram(s)/formoterol 4.5 MICROgram(s) Inhaler 2 Puff(s) Inhalation two times a day  dextrose 5% + sodium chloride 0.45% with potassium chloride 20 mEq/L 1000 milliLiter(s) (100 mL/Hr) IV Continuous <Continuous>  dextrose 5%. 1000 milliLiter(s) (50 mL/Hr) IV Continuous <Continuous>  dextrose 50% Injectable 12.5 Gram(s) IV Push once  dextrose 50% Injectable 25 Gram(s) IV Push once  dextrose 50% Injectable 25 Gram(s) IV Push once  digoxin  Injectable 0.25 milliGRAM(s) IV Push daily  enoxaparin Injectable 40 milliGRAM(s) SubCutaneous daily  hydrocortisone sodium succinate Injectable 20 milliGRAM(s) IV Push daily  insulin lispro (HumaLOG) corrective regimen sliding scale   SubCutaneous every 6 hours  morphine PCA (5 mG/mL) 30 milliLiter(s) PCA Continuous PCA Continuous  pantoprazole  Injectable 40 milliGRAM(s) IV Push daily  tiotropium 18 MICROgram(s) Capsule 1 Capsule(s) Inhalation daily    MEDICATIONS  (PRN):  ALBUTerol    90 MICROgram(s) HFA Inhaler 2 Puff(s) Inhalation every 6 hours PRN Shortness of Breath  dextrose 40% Gel 15 Gram(s) Oral once PRN Blood Glucose LESS THAN 70 milliGRAM(s)/deciliter  diphenhydrAMINE   Injectable 12.5 milliGRAM(s) IV Push every 4 hours PRN Itching  glucagon  Injectable 1 milliGRAM(s) IntraMuscular once PRN Glucose LESS THAN 70 milligrams/deciliter  metoclopramide Injectable 10 milliGRAM(s) IV Push every 6 hours PRN Nausea  morphine PCA (5 mG/mL) Rescue Clinician Bolus 2.5 milliGRAM(s) IV Push every 15 minutes PRN for Pain Scale GREATER THAN 6  naloxone Injectable 0.1 milliGRAM(s) IV Push every 3 minutes PRN For ANY of the following changes in patient status:  A. RR LESS THAN 10 breaths per minute, B. Oxygen saturation LESS THAN 90%, C. Sedation score of 6  tetracaine/benzocaine/butamben Spray 1 Spray(s) Topical four times a day PRN NGT irritation      LABS:                        10.3   4.9   )-----------( 194      ( 29 Jul 2018 22:57 )             33.9     07-29    137  |  98  |  5<L>  ----------------------------<  133<H>  4.3   |  28  |  0.61    Ca    8.0<L>      29 Jul 2018 19:35  Phos  2.9     07-29  Mg     1.8     07-29    TPro  5.7<L>  /  Alb  2.3<L>  /  TBili  0.4  /  DBili  x   /  AST  9<L>  /  ALT  6<L>  /  AlkPhos  67  07-29      Urinalysis Basic - ( 29 Jul 2018 19:35 )    Color: PL Yellow / Appearance: Clear / SG: <1.005 / pH: x  Gluc: x / Ketone: Negative  / Bili: Negative / Urobili: Negative   Blood: x / Protein: Negative / Nitrite: Negative   Leuk Esterase: Small / RBC: 0-2 /HPF / WBC 5-10 /HPF   Sq Epi: x / Non Sq Epi: x / Bacteria: Few /HPF            MICROBIOLOGY:     RADIOLOGY:  [ ] Reviewed and interpreted by me    Point of Care Ultrasound Findings:    PFT:    EKG: of Referral to PT 02/09/18  -LS     PT Diagnosis impaired functional mobility, balance, gait  -LS     Patient/Family Goals Statement return to PLOF  -LS     Criteria for Skilled Therapeutic Interventions Met yes;treatment indicated  -LS     Rehab Potential good, to achieve stated therapy goals  -LS     Vital Signs    Pre Systolic BP Rehab 133  -  -CL    Pre Treatment Diastolic BP 66  -LS 60  -CL    Post Systolic BP Rehab 128  -  -CL    Post Treatment Diastolic BP 69  -LS 49  -CL    Pretreatment Heart Rate (beats/min) 86  -LS 79  -CL    Posttreatment Heart Rate (beats/min) 82  -LS 71  -CL    Pre SpO2 (%) 96  -LS 96  -CL    O2 Delivery Pre Treatment supplemental O2   pt now on NC  -LS supplemental O2   vent  -CL    Post SpO2 (%)  94  -CL    O2 Delivery Post Treatment supplemental O2  -LS supplemental O2  -CL    Pre Patient Position Supine  -LS Supine  -CL    Intra Patient Position Standing  -LS Supine  -CL    Post Patient Position Supine  -LS Supine  -CL    Pain Assessment    Pain Assessment Blanchard-Jaiver FACES  -LS Blanchard-Javier FACES  -CL    Blanchard-Javier FACES Pain Rating 2  -LS 0  -CL    Pain Score 2  -LS     Post Pain Score 2  -LS     Pain Location Generalized  -LS     Pain Intervention(s) Repositioned;Ambulation/increased activity  -LS     Response to Interventions tolerated  -LS     Vision Assessment/Intervention    Visual Impairment Comment  Difficult to formally assess d/t cognitive status. Pt w/ noted R eye swollen shut and bruised.   -CL    Cognitive Assessment/Intervention    Current Cognitive/Communication Assessment impaired  -LS impaired  -CL    Orientation Status oriented to;person   responds to name  -LS oriented to;person   Pt reponds to name  -CL    Follows Commands/Answers Questions able to follow single-step instructions;25% of the time;needs cueing;needs increased time;needs repetition  -LS needs cueing;needs increased time;needs repetition   Pt followed less than 25% of commands  -CL     Personal Safety severe impairment;decreased awareness, need for safety;decreased awareness, need for assist;decreased insight to deficits  -LS severe impairment;decreased awareness, need for assist;decreased awareness, need for safety;decreased insight to deficits  -CL    Personal Safety Interventions fall prevention program maintained;gait belt;nonskid shoes/slippers when out of bed  -LS fall prevention program maintained;supervised activity  -CL    ROM (Range of Motion)    General ROM no range of motion deficits identified   BLEs  -LS no range of motion deficits identified  -CL    MMT (Manual Muscle Testing)    General MMT Assessment lower extremity strength deficits identified  -LS     General MMT Assessment Detail  Difficult to formally assess d/t cognitive status. Observed RUE grossly 3/5. Observed LUE shldr FE 1/5, bicep/tricep 1/5,  2-/5.   -CL    Lower Extremity    Lower Ext Manual Muscle Testing Detail Pt unable to follow commands for formal MMT; BLEs grossly 3+/5 as demonstrate during STS at EOB today.   -LS     Bed Mobility, Assessment/Treatment    Bed Mobility, Assistive Device draw sheet;head of bed elevated  -LS     Bed Mob, Supine to Sit, [place] maximum assist (25% patient effort);2 person assist required;verbal cues required  -LS     Bed Mob, Sit to Supine, [place] maximum assist (25% patient effort);2 person assist required;verbal cues required  -LS     Bed Mobility, Comment  Deferred mobility until PM w/ PT once extubated.   -CL    Transfer Assessment/Treatment    Transfers, Sit-Stand [place] maximum assist (25% patient effort);2 person assist required;verbal cues required  -LS     Transfers, Stand-Sit [place] maximum assist (25% patient effort);2 person assist required;verbal cues required  -LS     Transfer, Impairments impaired balance;strength decreased  -LS     Transfer, Comment PT/tech on either side of pt; vc's for hand placement and achieving upright. Performed x2  from EOB.   -     Gait Assessment/Treatment    Gait, [place] Level maximum assist (25% patient effort);2 person assist required;verbal cues required  -     Gait, Distance (Feet) 1  -LS     Gait, Impairments impaired balance;strength decreased  -     Gait, Comment Constant cues for attention to task. Able to take 4 small side steps towards HOB with PT/tech on either side. Assist needed for weightshifting and for upright posture.   -     Motor Skills/Interventions    Additional Documentation Balance Skills Training (Group)  -     Balance Skills Training    Sitting-Level of Assistance Minimum assistance   progressed to CGA  -     Sitting-Balance Support Feet unsupported  -     Sitting-Balance Activities Trunk control activities  -     Sitting # of Minutes 4  -LS     Standing-Level of Assistance Maximum assistance;x2  -LS     Static Standing Balance Support Right upper extremity supported;Left upper extremity supported  -     Gait Balance-Level of Assistance Maximum assistance;x2  -LS     Gait Balance Support Right upper extremity supported;Left upper extremity supported  -     Gait Balance Activities side-stepping  -     Therapy Exercises    Bilateral Lower Extremities AAROM:;10 reps;supine;ankle pumps/circles;heel slides;hip abduction/adduction  -     Bilateral Upper Extremity  AAROM:;PROM:;10 reps;elbow flexion/extension;hand pumps;pronation/supination;shoulder abduction/adduction;shoulder extension/flexion;shoulder ER/IR   wrist F/E  -CL    Sensory Assessment/Intervention    Light Touch --   TBA further  -LS --   Unable to formally assess d/t cognitive status.   -CL    Positioning and Restraints    Pre-Treatment Position in bed  -LS in bed  -CL    Post Treatment Position bed  - bed  -CL    In Bed notified nsg;supine;call light within reach;encouraged to call for assist;exit alarm on;with family/caregiver;RUE elevated;LUE elevated;legs elevated;heels elevated  - notified  nsg;fowlers;call light within reach;encouraged to call for assist;with nsg;side rails up x3;L waffle boot;with other staff  -CL      02/10/18 1310 02/10/18 0935    Rehab Evaluation    Evaluation Not Performed unable to evaluate, medical status change   Pt remains sedated and intubated; not appropriate for PT evaluation. Will check back tomorrow.   -KR unable to evaluate, medical status change   Pt currently intubated and sedated, not medically appropriate for skilled IPOT intervention. Pt w/ possible extubation this date. Will defer eval until tomorrow.   -CL      02/09/18 2206       General Information    Equipment Currently Used at Home oxygen  -TRACI     Living Environment    Lives With child(bree), adult  -TRACI     Living Arrangements apartment  -TRACI     Home Accessibility no concerns  -TRACI     Stair Railings at Home none  -TRACI     Type of Financial/Environmental Concern none  -TRACI     Transportation Available car;family or friend will provide  -TRACI       User Key  (r) = Recorded By, (t) = Taken By, (c) = Cosigned By    Initials Name Provider Type    LS Pattie Abreu, PT Physical Therapist    NONA Salguero, OT Occupational Therapist    TRACI Javier, RN Registered Nurse    KR Rhina Aviles, PT Physical Therapist          Physical Therapy Education     Title: PT OT SLP Therapies (Active)     Topic: Physical Therapy (Active)     Point: Mobility training (Active)    Learning Progress Summary    Learner Readiness Method Response Comment Documented by Status   Patient Acceptance E,D NR  LS 02/11/18 1444 Active               Point: Body mechanics (Active)    Learning Progress Summary    Learner Readiness Method Response Comment Documented by Status   Patient Acceptance E,D NR  LS 02/11/18 1444 Active               Point: Precautions (Active)    Learning Progress Summary    Learner Readiness Method Response Comment Documented by Status   Patient Acceptance E,D NR   02/11/18 1444 Active                      User Key      Initials Effective Dates Name Provider Type Discipline    LS 06/19/15 -  Pattie Abreu, PT Physical Therapist PT                PT Recommendation and Plan  Anticipated Discharge Disposition: inpatient rehabilitation facility  Plan of Care Review  Plan Of Care Reviewed With: patient  Outcome Summary/Follow up Plan: PT evaluation completed. Pt demonstrates decreased indep and safety re: functional mobility with unstable signs/symptoms, warranting further skilled PT services to promote PLOF. Limited today by decreased processing and difficulty attending to task. Recommend IP rehab placement at d/c.           IP PT Goals       02/11/18 1444          Bed Mobility PT LTG    Bed Mobility PT LTG, Date Established 02/11/18  -LS      Bed Mobility PT LTG, Time to Achieve 2 wks  -LS      Bed Mobility PT LTG, Activity Type supine to sit/sit to supine  -LS      Bed Mobility PT LTG, [place] Level minimum assist (75% patient effort)  -LS      Transfer Training PT LTG    Transfer Training PT LTG, Date Established 02/11/18  -LS      Transfer Training PT LTG, Time to Achieve 2 wks  -LS      Transfer Training PT LTG, Activity Type sit to stand/stand to sit  -LS      Transfer Training PT LTG, [place] Level minimum assist (75% patient effort);2 person assist required  -LS      Transfer Training PT LTG, Assist Device walker, rolling  -LS      Gait Training PT LTG    Gait Training Goal PT LTG, Date Established 02/11/18  -LS      Gait Training Goal PT LTG, Time to Achieve 2 wks  -LS      Gait Training Goal PT LTG, [place] Level moderate assist (50% patient effort);2 person assist required  -LS      Gait Training Goal PT LTG, Assist Device walker, rolling  -LS      Gait Training Goal PT LTG, Distance to Achieve 10  -LS      Static Sitting Balance PT LTG    Static Sitting Balance PT LTG, Date Established 02/11/18  -LS      Static Sitting Balance PT LTG, Time to Achieve 2 wks  -LS      Static Sitting Balance PT LTG, [place]  Level supervision required  -LS      Static Sitting Balance PT LTG, Assist Device UE Support  -LS        User Key  (r) = Recorded By, (t) = Taken By, (c) = Cosigned By    Initials Name Provider Type    XAVI Abreu, PT Physical Therapist                Outcome Measures       02/11/18 1331 02/11/18 0740       How much help from another person do you currently need...    Turning from your back to your side while in flat bed without using bedrails? 2  -LS      Moving from lying on back to sitting on the side of a flat bed without bedrails? 2  -LS      Moving to and from a bed to a chair (including a wheelchair)? 1  -LS      Standing up from a chair using your arms (e.g., wheelchair, bedside chair)? 2  -LS      Climbing 3-5 steps with a railing? 1  -LS      To walk in hospital room? 2  -LS      AM-PAC 6 Clicks Score 10  -LS      How much help from another is currently needed...    Putting on and taking off regular lower body clothing?  1  -CL     Bathing (including washing, rinsing, and drying)  1  -CL     Toileting (which includes using toilet bed pan or urinal)  1  -CL     Putting on and taking off regular upper body clothing  1  -CL     Taking care of personal grooming (such as brushing teeth)  2  -CL     Eating meals  1  -CL     Score  7  -CL     Modified [place] Scale    Modified [place] Scale 5 - Severe disability.  Bedridden, incontinent, and requiring constant nursing care and attention.  -LS 5 - Severe disability.  Bedridden, incontinent, and requiring constant nursing care and attention.  -CL     Functional Assessment    Outcome Measure Options AM-PAC 6 Clicks Basic Mobility (PT)  -LS AM-PAC 6 Clicks Daily Activity (OT);Modified [place]  -CL       User Key  (r) = Recorded By, (t) = Taken By, (c) = Cosigned By    Initials Name Provider Type    XAVI Abreu, PT Physical Therapist    CL Jannet Salguero OT Occupational Therapist           Time Calculation:         PT Charges       02/11/18 9363          Time  Calculation    Start Time 1331  -LS      PT Received On 02/11/18  -      PT Goal Re-Cert Due Date 02/21/18  -        User Key  (r) = Recorded By, (t) = Taken By, (c) = Cosigned By    Initials Name Provider Type    LS Pattie Abreu, PT Physical Therapist          Therapy Charges for Today     Code Description Service Date Service Provider Modifiers Qty    85424457405 HC PT EVAL HIGH COMPLEXITY 4 2/11/2018 Pattie Abreu, PT GP 1    83870389772 HC PT THER SUPP EA 15 MIN 2/11/2018 Pattie Abreu, PT GP 2          PT G-Codes  Outcome Measure Options: AM-PAC 6 Clicks Basic Mobility (PT)      Pattie Abreu, PT  2/11/2018

## 2019-08-20 NOTE — PROGRESS NOTES
Adult Nutrition  Assessment/PES    Patient Name:  Preeti Manning  YOB: 1940  MRN: [id number]  Admit Date:  2/9/2018    Assessment Date:  2/21/2018    Comments:  Current PN delivery met 68% kcal needs and 85 % of protein needs. Suggest additional dextrose kcal w/ increase to 244 gm/d (based on 1.44L) GIR+2.95 mg/kg/min.  This will provide 25 kcal/kg in this malnourished surgical (x3)  pt .          Reason for Assessment       02/21/18 1220    Reason for Assessment    Reason For Assessment/Visit multidisciplinary rounds;TF/PN;dysphagia    Identified At Risk By Screening Criteria tube feeding or parenteral nutrition;dysphagia or difficulty swallowing    Time Spent (min) 30    Diagnosis --   per notes of this adm    Pulmonary/Critical Care Extubated              Nutrition/Diet History       02/21/18 1222    Nutrition/Diet History    Reported/Observed By RN    Other Pt extubated , levophed off, surgeon notes improvement in abd exam; pharmacy to adjust Na + in PN per MD.              Labs/Tests/Procedures/Meds       02/21/18 1227    Labs/Tests/Procedures/Meds    Labs/Tests Review Reviewed;Na+    Medication Review Reviewed, pertinent;Pressors   levophed off     Results from last 7 days  Lab Units 02/21/18  0506   SODIUM mmol/L 151*   POTASSIUM mmol/L 4.7   CHLORIDE mmol/L 120*   CO2 mmol/L 24.0   BUN mg/dL 45*   CREATININE mg/dL 0.60   CALCIUM mg/dL 7.9*   BILIRUBIN mg/dL 0.7   ALK PHOS U/L 58   ALT (SGPT) U/L 43*   AST (SGOT) U/L 40*   GLUCOSE mg/dL 188*                Physical Findings       02/21/18 1227    Physical Appearance    Skin surgical wound              Nutrition Prescription Ordered       02/21/18 1227    Nutrition Prescription PO    Current PO Diet NPO    Nutrition Prescription PN    PN Route Central    PN Goal Rate (mL/hr) 60 mL/hr    PN Current Rate (mL/hr) 60 mL/hr    PN Goal Volume (mL) 1440 mL    Dextrose (Kcal) 642.6   189 gm/ d    Amino Acid (gm) 100    Lipid Concentration (%) 20%    Lipid  Patient would like his results mailed to him.     Please advise!    Informed patient of  message and pt agreed verbally to an understanding.    Volume (mL) 100 mL   SMOF-ILE    Lipid Frequency Daily            Evaluation of Received Nutrient/Fluid Intake       02/21/18 1240    Calculation Measurements    Weight Used For Calculations 57.2 kg (126 lb 1.7 oz)    PN Evaluation    Number of Days PN Evaluated 2 days    PN Average delivery (mL/day)  852.95 mL/day    PN Average lipid delivery (mL/day)  100 mL/day   20% SMOF ILE            Problem/Interventions:        Problem 1       02/21/18 1241    Nutrition Diagnoses Problem 1    Problem 1 Inadequate Intake/Infusion    Inadequate Intake Type Parenteral    Macronutrient Kcal;Fluid    Etiology (related to) MNT for Treatment/Condition    Gastrointestinal --   intra-abdominal abscess,s/p exp lap w/ washout and  gastric wall debridement    Signs/Symptoms (evidenced by) NPO;PN Intake Delivery    Percent (%) of EN goal --   Pt w/ inadequate po intake, EN since adm now NPO s/p surgery    Percent (%) of  PN goal 59 %                    Intervention Goal       02/21/18 1243    Intervention Goal    General Nutrition support treatment    TF/PN Adjust TF/PN            Nutrition Intervention       02/21/18 1248    Nutrition Intervention    RD/Tech Action Follow Tx progress;Care plan reviewd    Recommended/Ordered PN            Nutrition Prescription       02/21/18 1249    Nutrition Prescription PN    Parenteral Prescription PN begin/change    Dextrose (Kcal) 829.6   244 gm/ 24 hr supply based on 60 ml/hr or 1.44 L/d    New PN Prescription Ordered? No, recommended            Education/Evaluation       02/21/18 1251    Monitor/Evaluation    Monitor Per protocol;I&O;Pertinent labs;TF delivery/tolerance;Skin status;Symptoms        Electronically signed by:  Amy Agustin RD  02/21/18 12:51 PM

## 2021-03-19 NOTE — PROGRESS NOTES
Continued Stay Note   Stephen     Patient Name: Preeti Manning  MRN: [id number]  Today's Date: 2/16/2018    Admit Date: 2/9/2018          Discharge Plan       02/16/18 1453    Case Management/Social Work Plan    Plan Placement    Patient/Family In Agreement With Plan yes    Additional Comments Spoke with patient and family in room.  Their preference is to go to [facility] at discharge.  Confirmed with Krys that they received the referral and she is following.  Patient currently in bilateral wrist restraints to keep her from pulling at PEG tube.  Patient must be out of restraints for atleast 24 hours before any facility will take patient.  LEANNA will follow up with Krys from [facility] on Monday.  Kasie Nielson RN [phone]              Discharge Codes     None        Expected Discharge Date and Time     Expected Discharge Date Expected Discharge Time    Feb 19, 2018             Kasie Nielson RN     Normal vision: sees adequately in most situations; can see medication labels, newsprint
